# Patient Record
Sex: FEMALE | Race: WHITE | NOT HISPANIC OR LATINO | Employment: OTHER | URBAN - METROPOLITAN AREA
[De-identification: names, ages, dates, MRNs, and addresses within clinical notes are randomized per-mention and may not be internally consistent; named-entity substitution may affect disease eponyms.]

---

## 2018-08-02 ENCOUNTER — TRANSFERRED RECORDS (OUTPATIENT)
Dept: HEALTH INFORMATION MANAGEMENT | Facility: CLINIC | Age: 53
End: 2018-08-02

## 2018-08-13 ENCOUNTER — TRANSFERRED RECORDS (OUTPATIENT)
Dept: HEALTH INFORMATION MANAGEMENT | Facility: CLINIC | Age: 53
End: 2018-08-13

## 2020-02-07 ENCOUNTER — TRANSFERRED RECORDS (OUTPATIENT)
Dept: HEALTH INFORMATION MANAGEMENT | Facility: CLINIC | Age: 55
End: 2020-02-07

## 2020-07-25 ENCOUNTER — OFFICE VISIT (OUTPATIENT)
Dept: URGENT CARE | Facility: URGENT CARE | Age: 55
End: 2020-07-25
Payer: OTHER GOVERNMENT

## 2020-07-25 ENCOUNTER — APPOINTMENT (OUTPATIENT)
Dept: CT IMAGING | Facility: CLINIC | Age: 55
End: 2020-07-25
Attending: FAMILY MEDICINE
Payer: OTHER GOVERNMENT

## 2020-07-25 ENCOUNTER — HOSPITAL ENCOUNTER (EMERGENCY)
Facility: CLINIC | Age: 55
Discharge: HOME | End: 2020-07-25
Attending: FAMILY MEDICINE | Admitting: FAMILY MEDICINE
Payer: OTHER GOVERNMENT

## 2020-07-25 VITALS
OXYGEN SATURATION: 97 % | RESPIRATION RATE: 16 BRPM | SYSTOLIC BLOOD PRESSURE: 138 MMHG | HEART RATE: 96 BPM | TEMPERATURE: 96.8 F | DIASTOLIC BLOOD PRESSURE: 72 MMHG

## 2020-07-25 VITALS
TEMPERATURE: 98 F | OXYGEN SATURATION: 99 % | HEART RATE: 72 BPM | DIASTOLIC BLOOD PRESSURE: 74 MMHG | RESPIRATION RATE: 16 BRPM | SYSTOLIC BLOOD PRESSURE: 118 MMHG | WEIGHT: 215 LBS

## 2020-07-25 DIAGNOSIS — K57.32 DIVERTICULITIS OF COLON: ICD-10-CM

## 2020-07-25 DIAGNOSIS — R35.0 URINARY FREQUENCY: Primary | ICD-10-CM

## 2020-07-25 LAB
ALBUMIN SERPL-MCNC: 4.1 G/DL (ref 3.4–5)
ALBUMIN UR-MCNC: NEGATIVE MG/DL
ALBUMIN UR-MCNC: NEGATIVE MG/DL
ALP SERPL-CCNC: 76 U/L (ref 40–150)
ALT SERPL W P-5'-P-CCNC: 46 U/L (ref 0–50)
ANION GAP SERPL CALCULATED.3IONS-SCNC: 4 MMOL/L (ref 3–14)
APPEARANCE UR: CLEAR
APPEARANCE UR: CLEAR
AST SERPL W P-5'-P-CCNC: 21 U/L (ref 0–45)
BASOPHILS # BLD AUTO: 0 10E9/L (ref 0–0.2)
BASOPHILS NFR BLD AUTO: 0.4 %
BILIRUB SERPL-MCNC: 0.7 MG/DL (ref 0.2–1.3)
BILIRUB UR QL STRIP: NEGATIVE
BILIRUB UR QL STRIP: NEGATIVE
BUN SERPL-MCNC: 9 MG/DL (ref 7–30)
CALCIUM SERPL-MCNC: 9.3 MG/DL (ref 8.5–10.1)
CHLORIDE SERPL-SCNC: 105 MMOL/L (ref 94–109)
CO2 SERPL-SCNC: 30 MMOL/L (ref 20–32)
COLOR UR AUTO: YELLOW
COLOR UR AUTO: YELLOW
CREAT SERPL-MCNC: 0.81 MG/DL (ref 0.52–1.04)
DIFFERENTIAL METHOD BLD: NORMAL
EOSINOPHIL # BLD AUTO: 0.2 10E9/L (ref 0–0.7)
EOSINOPHIL NFR BLD AUTO: 2.6 %
ERYTHROCYTE [DISTWIDTH] IN BLOOD BY AUTOMATED COUNT: 12.8 % (ref 10–15)
GFR SERPL CREATININE-BSD FRML MDRD: 82 ML/MIN/{1.73_M2}
GLUCOSE SERPL-MCNC: 106 MG/DL (ref 70–99)
GLUCOSE UR STRIP-MCNC: NEGATIVE MG/DL
GLUCOSE UR STRIP-MCNC: NEGATIVE MG/DL
HCG SERPL QL: NEGATIVE
HCT VFR BLD AUTO: 41.4 % (ref 35–47)
HGB BLD-MCNC: 14.3 G/DL (ref 11.7–15.7)
HGB UR QL STRIP: ABNORMAL
HGB UR QL STRIP: ABNORMAL
IMM GRANULOCYTES # BLD: 0 10E9/L (ref 0–0.4)
IMM GRANULOCYTES NFR BLD: 0.4 %
KETONES UR STRIP-MCNC: NEGATIVE MG/DL
KETONES UR STRIP-MCNC: NEGATIVE MG/DL
LEUKOCYTE ESTERASE UR QL STRIP: NEGATIVE
LEUKOCYTE ESTERASE UR QL STRIP: NEGATIVE
LYMPHOCYTES # BLD AUTO: 1.6 10E9/L (ref 0.8–5.3)
LYMPHOCYTES NFR BLD AUTO: 20.3 %
MCH RBC QN AUTO: 31.9 PG (ref 26.5–33)
MCHC RBC AUTO-ENTMCNC: 34.5 G/DL (ref 31.5–36.5)
MCV RBC AUTO: 92 FL (ref 78–100)
MONOCYTES # BLD AUTO: 0.5 10E9/L (ref 0–1.3)
MONOCYTES NFR BLD AUTO: 6.4 %
NEUTROPHILS # BLD AUTO: 5.5 10E9/L (ref 1.6–8.3)
NEUTROPHILS NFR BLD AUTO: 69.9 %
NITRATE UR QL: NEGATIVE
NITRATE UR QL: NEGATIVE
NRBC # BLD AUTO: 0 10*3/UL
NRBC BLD AUTO-RTO: 0 /100
PH UR STRIP: 6 PH (ref 5–7)
PH UR STRIP: 7 PH (ref 5–7)
PLATELET # BLD AUTO: 208 10E9/L (ref 150–450)
POTASSIUM SERPL-SCNC: 3.8 MMOL/L (ref 3.4–5.3)
PROT SERPL-MCNC: 7.8 G/DL (ref 6.8–8.8)
RBC # BLD AUTO: 4.48 10E12/L (ref 3.8–5.2)
RBC #/AREA URNS AUTO: 4 /HPF (ref 0–2)
RBC #/AREA URNS AUTO: NORMAL /HPF
SODIUM SERPL-SCNC: 139 MMOL/L (ref 133–144)
SOURCE: ABNORMAL
SOURCE: ABNORMAL
SP GR UR STRIP: 1.01 (ref 1–1.03)
SP GR UR STRIP: 1.01 (ref 1–1.03)
SQUAMOUS #/AREA URNS AUTO: <1 /HPF (ref 0–1)
UROBILINOGEN UR STRIP-ACNC: 0.2 EU/DL (ref 0.2–1)
UROBILINOGEN UR STRIP-MCNC: 0 MG/DL (ref 0–2)
WBC # BLD AUTO: 7.8 10E9/L (ref 4–11)
WBC #/AREA URNS AUTO: <1 /HPF (ref 0–5)
WBC #/AREA URNS AUTO: NORMAL /HPF

## 2020-07-25 PROCEDURE — 74176 CT ABD & PELVIS W/O CONTRAST: CPT

## 2020-07-25 PROCEDURE — 84703 CHORIONIC GONADOTROPIN ASSAY: CPT | Performed by: FAMILY MEDICINE

## 2020-07-25 PROCEDURE — 85025 COMPLETE CBC W/AUTO DIFF WBC: CPT | Performed by: EMERGENCY MEDICINE

## 2020-07-25 PROCEDURE — 96374 THER/PROPH/DIAG INJ IV PUSH: CPT | Performed by: FAMILY MEDICINE

## 2020-07-25 PROCEDURE — 81001 URINALYSIS AUTO W/SCOPE: CPT | Performed by: FAMILY MEDICINE

## 2020-07-25 PROCEDURE — 99203 OFFICE O/P NEW LOW 30 MIN: CPT | Performed by: EMERGENCY MEDICINE

## 2020-07-25 PROCEDURE — 25000128 H RX IP 250 OP 636: Performed by: FAMILY MEDICINE

## 2020-07-25 PROCEDURE — 25800030 ZZH RX IP 258 OP 636: Performed by: FAMILY MEDICINE

## 2020-07-25 PROCEDURE — 96375 TX/PRO/DX INJ NEW DRUG ADDON: CPT | Performed by: FAMILY MEDICINE

## 2020-07-25 PROCEDURE — 80053 COMPREHEN METABOLIC PANEL: CPT | Performed by: EMERGENCY MEDICINE

## 2020-07-25 PROCEDURE — 99285 EMERGENCY DEPT VISIT HI MDM: CPT | Mod: 25 | Performed by: FAMILY MEDICINE

## 2020-07-25 PROCEDURE — 25000128 H RX IP 250 OP 636: Performed by: EMERGENCY MEDICINE

## 2020-07-25 PROCEDURE — 99285 EMERGENCY DEPT VISIT HI MDM: CPT | Mod: Z6 | Performed by: FAMILY MEDICINE

## 2020-07-25 PROCEDURE — 96361 HYDRATE IV INFUSION ADD-ON: CPT | Performed by: FAMILY MEDICINE

## 2020-07-25 PROCEDURE — 81001 URINALYSIS AUTO W/SCOPE: CPT | Performed by: EMERGENCY MEDICINE

## 2020-07-25 RX ORDER — ONDANSETRON 2 MG/ML
4 INJECTION INTRAMUSCULAR; INTRAVENOUS EVERY 30 MIN PRN
Status: DISCONTINUED | OUTPATIENT
Start: 2020-07-25 | End: 2020-07-25 | Stop reason: HOSPADM

## 2020-07-25 RX ORDER — SERTRALINE HYDROCHLORIDE 100 MG/1
150 TABLET, FILM COATED ORAL EVERY EVENING
COMMUNITY

## 2020-07-25 RX ORDER — HYDROCODONE BITARTRATE AND ACETAMINOPHEN 5; 325 MG/1; MG/1
1-2 TABLET ORAL EVERY 6 HOURS PRN
Qty: 8 TABLET | Refills: 0 | Status: SHIPPED | OUTPATIENT
Start: 2020-07-25 | End: 2020-11-16

## 2020-07-25 RX ORDER — KETOROLAC TROMETHAMINE 15 MG/ML
15 INJECTION, SOLUTION INTRAMUSCULAR; INTRAVENOUS ONCE
Status: COMPLETED | OUTPATIENT
Start: 2020-07-25 | End: 2020-07-25

## 2020-07-25 RX ORDER — ACETAMINOPHEN 160 MG
TABLET,DISINTEGRATING ORAL
COMMUNITY
End: 2020-12-04

## 2020-07-25 RX ADMIN — KETOROLAC TROMETHAMINE 15 MG: 15 INJECTION, SOLUTION INTRAMUSCULAR; INTRAVENOUS at 13:05

## 2020-07-25 RX ADMIN — ONDANSETRON 4 MG: 2 INJECTION INTRAMUSCULAR; INTRAVENOUS at 12:25

## 2020-07-25 RX ADMIN — SODIUM CHLORIDE 500 ML: 9 INJECTION, SOLUTION INTRAVENOUS at 13:04

## 2020-07-25 NOTE — PROGRESS NOTES
HPI: Patient is a 54-year-old female who presents with a several day history of left lower quadrant pain.  The pain is gotten increasingly worse.  She notes is quite sensitive to touch.  She is also had a low-grade fever.  She has had a previous urinary tract infection in the past that presented with left lower quadrant pain.  She states it clinically was ruled out at that time that she did not have diverticulitis.  She has had a colonoscopy that does show she has diverticulosis.  She has no dysuria.  She may have slight urinary frequency and urgency.  No back pain.  No nausea vomiting.      ROS: See HPI otherwise negative.    No Known Allergies   Current Outpatient Medications   Medication Sig Dispense Refill     Pediatric Multivit-Minerals-C (GUMMI BEAR MULTIVITAMIN  /MIN) CHEW        sertraline (ZOLOFT) 100 MG tablet Take 200 mg by mouth           PE: Physical therapy is a 54-year-old female who does not appear in acute distress vital signs are normal including a temp of 96.8.  She is not tachycardic.  HEENT reveals moist oral mucous membranes.  Examination of her abdomen reveals fairly exquisite tenderness in left lower quadrant with positive rebound tenderness.  Patient of the right lower quadrant does reproduce left lower quadrant symptoms.  No palpable mass.  Skin warm and moist.        TREATMENT: None in urgent care      ASSESSMENT: Fever for less left lower quadrant pain, probably other basis diverticulitis.  Patient's pain is relatively significant on exam and therefore requires emergency department evaluation to rule out perforation.      DIAGNOSIS: Fever, left lower quadrant abdominal pain      PLAN: Report to the emergency now.  Do not eat or drink until you are evaluated in the emergency department.

## 2020-07-25 NOTE — ED AVS SNAPSHOT
[facility]  [address] [address]  Phone:  [phone]  Fax:  [phone]                                    Sangita Meraz   MRN: [id number]    Department:  [facility]   Date of Visit:  7/25/2020           After Visit Summary Signature Page    I have received my discharge instructions, and my questions have been answered. I have discussed any challenges I see with this plan with the nurse or doctor.    ..........................................................................................................................................  Patient/Patient Representative Signature      ..........................................................................................................................................  Patient Representative Print Name and Relationship to Patient    ..................................................               ................................................  Date                                   Time    ..........................................................................................................................................  Reviewed by Signature/Title    ...................................................              ..............................................  Date                                               Time          22EPIC Rev 08/18

## 2020-07-25 NOTE — DISCHARGE INSTRUCTIONS
Return to the Emergency Room if the following occurs:     Severely worsened pain, vomiting/dehydration, or for any concern at anytime.    Or, follow-up with the following provider as we discussed:     Return to your primary doctor as needed, or if not improved over the next 5 days.    Medications discussed:    Augmentin.  Norco (5/325) 1-2 tablets every 6 hours for pain (start with one).  Note, each tab has 325 mg tylenol.  Do not exceed 4000 mg tylenol per 24 hours.      If you received pain-relieving or sedating medication during your time in the ER, avoid alcohol, driving automobiles, or working with machinery.  Also, a responsible adult must stay with you.        Call the Nurse Advice Line at [phone] or [phone] for any concern at anytime.

## 2020-07-25 NOTE — ED PROVIDER NOTES
HPI   The patient is a 54-year-old female presenting with abdominal pain.  Her pain started about 2 days ago.  The pain is been constant since onset.  The pain is worsened with movement and palpation.  It is primarily felt in the left lower quadrant.  She denies radiating symptoms into her back, chest, or flank.  She does have some mild urinary urgency but no dysuria or obvious hematuria.  She has had nausea but no vomiting.  She denies diarrhea and constipation.  She denies vaginal discharge or irritation.  She denies vaginal bleeding.  She does report having fever symptoms starting last night.  No skin rash.  No trauma or injury.  Her pain is currently rated 7/10.  It has progressively worsened with time.  She has had similar symptoms once previously and she was diagnosed with a bladder infection.  She was given antibiotics and her pain went away.  She was seen in the urgent care environment prior to arrival.  Her urine analysis was unremarkable.        Allergies:  Allergies   Allergen Reactions     Ivp Dye [Contrast Dye] Anaphylaxis     Problem List:    There are no active problems to display for this patient.     Past Medical History:    History reviewed. No pertinent past medical history.  Past Surgical History:    History reviewed. No pertinent surgical history.  Family History:    No family history on file.  Social History:  Marital Status:   [2]  Social History     Tobacco Use     Smoking status: None   Substance Use Topics     Alcohol use: None     Drug use: None      Medications:    amoxicillin-clavulanate (AUGMENTIN) 875-125 MG tablet  HYDROcodone-acetaminophen (NORCO) 5-325 MG tablet  Pediatric Multivit-Minerals-C (GUMMI BEAR MULTIVITAMIN  /MIN) CHEW  sertraline (ZOLOFT) 100 MG tablet      Review of Systems   All other systems reviewed and are negative.      PE   BP: (!) 142/71  Pulse: 79  Temp: 98  F (36.7  C)  Resp: 16  Weight: 97.5 kg (215 lb)  SpO2: 99 %  Physical Exam  Vitals signs  reviewed.   Constitutional:       Appearance: She is well-developed. She is obese.   HENT:      Head: Normocephalic and atraumatic.      Right Ear: External ear normal.      Left Ear: External ear normal.      Nose: Nose normal.      Mouth/Throat:      Mouth: Mucous membranes are moist.      Pharynx: Oropharynx is clear.   Eyes:      Extraocular Movements: Extraocular movements intact.      Conjunctiva/sclera: Conjunctivae normal.      Pupils: Pupils are equal, round, and reactive to light.   Neck:      Musculoskeletal: Normal range of motion.   Cardiovascular:      Rate and Rhythm: Normal rate and regular rhythm.   Pulmonary:      Effort: Pulmonary effort is normal.   Abdominal:      Comments: Obese.  Tender especially in the left lower quadrant.  She will voluntarily guard as I push deeply into this area.  No organomegaly or mass appreciated.  No distention.   Musculoskeletal: Normal range of motion.   Skin:     General: Skin is warm and dry.   Neurological:      Mental Status: She is alert and oriented to person, place, and time.   Psychiatric:         Behavior: Behavior normal.         ED COURSE and MDM   1301.  Patient has left lower quadrant abdominal pain and tenderness.  She has nausea.  She has fever symptoms.  She has a contrast dye allergy.  CT scan without contrast ordered.  Urine analysis unremarkable.  Lab values pending.  Toradol and Zofran given.  Fluid bolus.    1410.  CT scan shows diverticulitis.  Augmentin prescribed.  Hydrocodone/acetaminophen given, 8 tablets.  Follow-up discussed.  Return for worsening.    LABS  Labs Ordered and Resulted from Time of ED Arrival Up to the Time of Departure from the ED   COMPREHENSIVE METABOLIC PANEL - Abnormal; Notable for the following components:       Result Value    Glucose 106 (*)     All other components within normal limits   URINE MACROSCOPIC WITH REFLEX TO MICRO - Abnormal; Notable for the following components:    Blood Urine Small (*)     RBC Urine 4  (*)     All other components within normal limits   CBC WITH PLATELETS DIFFERENTIAL   HCG QUALITATIVE   PERIPHERAL IV CATHETER       IMAGING  Images reviewed by me.  Radiology report also reviewed.  Abd/pelvis CT - no contrast - [facility] Protocol   Final Result   IMPRESSION:    1. Findings compatible with acute uncomplicated diverticulitis.    2. No evidence for abscess formation or intra-abdominal free air.       LYSSA OWENS MD          Procedures    Medications   ondansetron (ZOFRAN) injection 4 mg (4 mg Intravenous Given 7/25/20 1225)   ketorolac (TORADOL) injection 15 mg (15 mg Intravenous Given 7/25/20 1305)   0.9% sodium chloride BOLUS (500 mLs Intravenous New Bag 7/25/20 1304)         IMPRESSION       ICD-10-CM    1. Diverticulitis of colon  K57.32             Medication List      Started    amoxicillin-clavulanate 875-125 MG tablet  Commonly known as:  Augmentin  1 tablet, Oral, 2 TIMES DAILY     HYDROcodone-acetaminophen 5-325 MG tablet  Commonly known as:  NORCO  1-2 tablets, Oral, EVERY 6 HOURS PRN, maximum 6 tablet(s) per day                          Bernard Benites MD  07/25/20 7792

## 2020-07-25 NOTE — PATIENT INSTRUCTIONS
Report to the [place] emergency department at this time.    Do not eat or drink anything until evaluated.

## 2020-11-16 ENCOUNTER — OFFICE VISIT (OUTPATIENT)
Dept: FAMILY MEDICINE | Facility: CLINIC | Age: 55
End: 2020-11-16
Payer: OTHER GOVERNMENT

## 2020-11-16 VITALS
DIASTOLIC BLOOD PRESSURE: 80 MMHG | HEART RATE: 80 BPM | WEIGHT: 230 LBS | SYSTOLIC BLOOD PRESSURE: 128 MMHG | TEMPERATURE: 98.3 F

## 2020-11-16 DIAGNOSIS — R73.09 ELEVATED GLUCOSE: ICD-10-CM

## 2020-11-16 DIAGNOSIS — R10.13 ABDOMINAL PAIN, EPIGASTRIC: Primary | ICD-10-CM

## 2020-11-16 DIAGNOSIS — K21.00 GASTROESOPHAGEAL REFLUX DISEASE WITH ESOPHAGITIS WITHOUT HEMORRHAGE: ICD-10-CM

## 2020-11-16 LAB
ALBUMIN SERPL-MCNC: 3.9 G/DL (ref 3.4–5)
ALP SERPL-CCNC: 71 U/L (ref 40–150)
ALT SERPL W P-5'-P-CCNC: 40 U/L (ref 0–50)
ANION GAP SERPL CALCULATED.3IONS-SCNC: 4 MMOL/L (ref 3–14)
AST SERPL W P-5'-P-CCNC: 28 U/L (ref 0–45)
BILIRUB SERPL-MCNC: 0.4 MG/DL (ref 0.2–1.3)
BUN SERPL-MCNC: 12 MG/DL (ref 7–30)
CALCIUM SERPL-MCNC: 9.9 MG/DL (ref 8.5–10.1)
CHLORIDE SERPL-SCNC: 104 MMOL/L (ref 94–109)
CO2 SERPL-SCNC: 29 MMOL/L (ref 20–32)
CREAT SERPL-MCNC: 1.05 MG/DL (ref 0.52–1.04)
DEPRECATED CALCIDIOL+CALCIFEROL SERPL-MC: 24 UG/L (ref 20–75)
ERYTHROCYTE [DISTWIDTH] IN BLOOD BY AUTOMATED COUNT: 12.9 % (ref 10–15)
GFR SERPL CREATININE-BSD FRML MDRD: 60 ML/MIN/{1.73_M2}
GLUCOSE SERPL-MCNC: 135 MG/DL (ref 70–99)
HCT VFR BLD AUTO: 41.4 % (ref 35–47)
HGB BLD-MCNC: 14.4 G/DL (ref 11.7–15.7)
LIPASE SERPL-CCNC: 165 U/L (ref 73–393)
MCH RBC QN AUTO: 31.6 PG (ref 26.5–33)
MCHC RBC AUTO-ENTMCNC: 34.8 G/DL (ref 31.5–36.5)
MCV RBC AUTO: 91 FL (ref 78–100)
PLATELET # BLD AUTO: 207 10E9/L (ref 150–450)
POTASSIUM SERPL-SCNC: 4.5 MMOL/L (ref 3.4–5.3)
PROT SERPL-MCNC: 7.5 G/DL (ref 6.8–8.8)
RBC # BLD AUTO: 4.56 10E12/L (ref 3.8–5.2)
SODIUM SERPL-SCNC: 137 MMOL/L (ref 133–144)
VIT B12 SERPL-MCNC: 313 PG/ML (ref 193–986)
WBC # BLD AUTO: 4.9 10E9/L (ref 4–11)

## 2020-11-16 PROCEDURE — 80053 COMPREHEN METABOLIC PANEL: CPT | Performed by: NURSE PRACTITIONER

## 2020-11-16 PROCEDURE — 85027 COMPLETE CBC AUTOMATED: CPT | Performed by: NURSE PRACTITIONER

## 2020-11-16 PROCEDURE — 99214 OFFICE O/P EST MOD 30 MIN: CPT | Performed by: NURSE PRACTITIONER

## 2020-11-16 PROCEDURE — 83690 ASSAY OF LIPASE: CPT | Performed by: NURSE PRACTITIONER

## 2020-11-16 PROCEDURE — 36415 COLL VENOUS BLD VENIPUNCTURE: CPT | Performed by: NURSE PRACTITIONER

## 2020-11-16 PROCEDURE — 82607 VITAMIN B-12: CPT | Performed by: NURSE PRACTITIONER

## 2020-11-16 PROCEDURE — 82306 VITAMIN D 25 HYDROXY: CPT | Performed by: NURSE PRACTITIONER

## 2020-11-16 RX ORDER — SUCRALFATE 1 G/1
1 TABLET ORAL 4 TIMES DAILY
Qty: 40 TABLET | Refills: 0 | Status: SHIPPED | OUTPATIENT
Start: 2020-11-16 | End: 2021-06-18

## 2020-11-16 NOTE — PATIENT INSTRUCTIONS
Patient Education     Epigastric Pain (Uncertain Cause)  Epigastric pain is pain in the upper abdomen. It can be a sign of disease. Common causes include:    Acid reflux (stomach acid flowing up into the esophagus)    Gastritis (irritation of the stomach lining) Most often this is from aspirin or NSAID medicines such as ibuprofen, bacteria called H. pylori, or frequent alcohol use.    Peptic ulcer disease    Inflammation of the pancreas    Gallstone    Infection in the gallbladder  Pain may be dull or burning. It may spread upward to the chest or to the back. There may be other symptoms such as belching, bloating, cramps or hunger pains. There may be weight loss or poor appetite, nausea or vomiting.  Since the cause of your pain is not certain yet,you may need more tests. Sometimes the doctor will treat you for the most likely condition to see if there is improvement before doing more tests.  Home care  Medicines    Antacids help neutralize the normal acids in your stomach.If you don t like the liquid, you can try a chewable one. You may find one works better than another for you. Overuse can cause diarrhea or constipation.    Acid blockers (H2 blockers) decrease acid production. Examples are cimetidine, famotidine, and ranitidine.    Acid inhibitors (PPIs) decrease acid production in a different way than the blockers. You may find they work better, but can take a little longer to take effect.  Examples are omeprazole, lansoprazole, pantoprazole, rabeprazole, and esomeprazole. Many of these are available over-the-counter or available as generics.    Take an antacid 30 to 60 minutes after eating and at bedtime, but not at the same time as an acid blocker.    Try not to take NSAIDs such as ibuprofen. Aspirin may also cause problems, but if taking it for your heart or other medical reasons, talk to your doctor before stopping it; you don't want to cause a worse problem, like a heart attack or stroke.  Diet    If  certain foods seem to cause your pain, try not to eat them. Certain foods can worsen symptoms of gastritis. Limit or avoid fatty, fried, and spicy foods, as well as coffee, chocolate, mint, and foods with high acid content such as tomatoes and citrus fruit and juices (orange, grapefruit, lemon).    Eat slowly and chew food well before swallowing. Symptoms of gastritis can be worsened by certain foods.    Don't drink alcohol. It can irritate the stomach.    Don't consume caffeine, or use tobacco. These can delay healing and worsen your problem.    Try eating smaller meals with snacks in between.    Keep an empty stomach for 2 to 3 hours before lying down.    Prop the head of the bed up if you have overnight symptoms. This helps acid clear from your esophagus.    Follow-up care  Follow up with your healthcare provider or as advised.  When to seek medical advice  Call your healthcare provider right away if any of the following occur:    Stomach pain worsens or moves to the right lower part of the abdomen    Chest pain appears, or if it worsens or spreads to the chest, back, neck, shoulder, or arm    Frequent vomiting (can t keep down liquids)    Blood in the stool or vomit (red or black color)    Feeling weak or dizzy, fainting, or having trouble breathing    Fever of 100.4 F (38 C) or higher, or as directed by your healthcare provider    Abdominal swelling  [employer] last reviewed this educational content on 3/1/2018    2296-6406 [employer]. [address] [place] [address]. All rights reserved. This information is not intended as a substitute for professional medical care. Always follow your healthcare professional's instructions.           Patient Education     GERD (Adult)    The esophagus is a tube that carries food from the mouth to the stomach. A valve (the LES, lower esophageal sphincter) at the lower end of the esophagus prevents stomach acid from flowing upward. When this valve doesn't  "work properly, stomach contents may repeatedly flow back up (reflux) into the esophagus. This is called gastroesophageal reflux disease (GERD). GERD can irritate the esophagus. It can cause problems with pain, swallowing or breathing. In severe cases, GERD can cause recurrent pneumonia (from aspiration or breathing in particles) or other serious problems.  Symptoms of reflux include burning, pressure or sharp pain in the upper abdomen or mid to lower chest. The pain can spread to the neck, back, or shoulder. There may be belching, an acid taste in the back of the throat, chronic cough, or sore throat, or hoarseness. GERD symptoms often occur during the day after a big meal. They can also occur at night when lying down.   Home care  Lifestyle changes can help reduce symptoms. If needed, your healthcare provider may prescribe medicines. Symptoms often improve with treatment, but if treatment is stopped, the symptoms often return after a few months. So most persons with GERD will need to continue treatment or get treatment on and off.  Lifestyle changes    Limit or avoid fatty, fried, and spicy foods, as well as coffee, chocolate, mint, and foods with high acid content such as tomatoes and citrus fruit and juices (orange, grapefruit, lemon).    Don t eat large meals, especially at night. Frequent, smaller meals are best. Don't lie down right after eating. And don t eat anything 3 hours before going to bed.    Don't drink alcohol or smoke. As much as possible, stay away from second hand smoke.    If you are overweight, losing weight will reduce symptoms.     Don't wear tight clothing around your stomach area.    If your symptoms occur during sleep, use a foam wedge to elevate your upper body (not just your head.) Or, place 4\" blocks under the head of your bed. Or use 2 bed risers under your bedframe.  Medicines  If needed, medicines can help relieve the symptoms of GERD and prevent damage to the esophagus. Discuss a " medicine plan with your healthcare provider. This may include one or more of the following medicines:    Antacids to help neutralize the normal acids in your stomach.    Acid blockers (Histamine or H2 blockers) to decrease acid production.    Acid inhibitors (proton pump inhibitors PPIs) to decrease acid production in a different way than the blockers. They may work better, but can take a little longer to take effect.  Take an antacid 30 to 60 minutes after eating and at bedtime, but not at the same time as an acid blocker.  Try not to take medicines such as ibuprofen and aspirin. If you are taking aspirin for your heart or other medical reasons, talk to your healthcare provider about stopping it.  Follow-up care  Follow up with your healthcare provider or as advised by our staff.  When to seek medical advice  Call your healthcare provider if any of the following occur:    Stomach pain gets worse or moves to the lower right abdomen (appendix area)    Chest pain appears or gets worse, or spreads to the back, neck, shoulder, or arm    An over-the-counter trial of medicine doesn't relieve your symptoms    Weight loss that can't be explained    Trouble or pain swallowing    Frequent vomiting (can t keep down liquids)    Blood in the stool or vomit (red or black in color)    Feeling weak or dizzy    Fever of 100.4 F (38 C) or higher, or as directed by your healthcare provider  Marta last reviewed this educational content on 3/1/2018    2515-3435 [employer]. [address] [place] [address]. All rights reserved. This information is not intended as a substitute for professional medical care. Always follow your healthcare professional's instructions.           Patient Education     Medicines for GERD  Gastroesophageal reflux disease (GERD) can be treated with medicine. This may be done with a medicine you can buy over the counter. Or with a medicine that your healthcare provider has to prescribe.  In some cases, both types may be used. Your provider will tell you what is best for your symptoms.   Antacids  Antacids work to weaken the acid in your stomach. They can give you quick relief. You can buy many of them with no prescription. Antacids can be high in sodium. This may be a problem if you have high blood pressure. Some antacids also have aluminum. This should be avoided if you have long-term (chronic) kidney disease. So check with your provider first. Take antacids only when you need to, as advised by your provider.   Side effects: Constipation, diarrhea. If you take too much medicine, it can cause calcium to build up.    H-2 blockers  These cause the stomach to make less acid. They are often used on demand as symptoms occur. And they are used daily to keep symptoms away. Your provider may prescribe them if antacids don t work for you. You can buy some of them over the counter. These come in a lower dosage.   Side effects: Confusion in older adults.   Proton-pump inhibitors  These also cause the stomach to make less acid. They reduce stomach acid more than H-2 blockers. They may be used for a short time, or longer to treat certain conditions. You can buy some of them over the counter. Or your provider may prescribe them. They help control GERD symptoms.   Side effects: Belly pain, diarrhea, upset stomach. Possible other side effects linked to long-term use and high doses.   Prokinetics  These medicines affect the movement of the digestive tract. They may be advised if your stomach is emptying too slowly. But in most cases they are not advised for treating GERD.   Side effects:Tiredness, depression, anxiety, problems with physical movement, belly cramps, constipation, diarrhea, a jittery feeling.   Medicines to stay away from  Don t take aspirin without your healthcare provider s approval. And don t take a nonsteroidal anti-inflammatory drug (NSAID), such as ibuprofen. These reduce the protective lining of  your stomach. This can lead to more GERD symptoms. Check with your provider or pharmacist before taking a new medicine.   [employer] last reviewed this educational content on 6/1/2019 2000-2020 [employer]. [address] [place] [address], [place] [address]. All rights reserved. This information is not intended as a substitute for professional medical care. Always follow your healthcare professional's instructions.           Patient Education     What Is GERD?     With GERD, the weak LES allows food and fluids to travel back, or reflux, into the esophagus.    If you often have a painful burning feeling in your chest after you eat, you may have gastroesophageal reflux disease (GERD). Heartburn that keeps coming back is a classic symptom of GERD. But you may have other symptoms as well. A GERD diagnosis is made only after a complete evaluation by your healthcare provider.   Note: Chest pain may also be caused by heart problems. Be sure to have all chest pain evaluated by a healthcare provider.   When you have a reflux problem  After you eat, food travels from your mouth down the esophagus to your stomach. Along the way, food passes through a one-way valve called the lower esophageal sphincter (LES). The LES sits at the opening to your stomach. Normally the LES opens when you swallow. It lets food enter the stomach, then closes quickly. With GERD, the LES doesn t work normally. It lets food and stomach acid flow back (reflux) into the esophagus.   Some common symptoms    Frequent heartburn    Frequent burping    Sour-or acid-tasting fluid backing up into your mouth    Symptoms that get worse after you eat, bend over, or lie down    Trouble swallowing or pain when swallowing    A dry, long-term (chronic) cough    Upset stomach (nausea) or vomiting    Relieving your discomfort  You and your healthcare provider can work together to find the treatment options that best ease your symptoms. These may include lifestyle  changes, medicine, and possibly surgery.   Many people find their GERD symptoms decrease when they eat small frequent meals instead of 3 large ones. Reducing the amount of fatty foods in your diet will also help.    The following foods tend to cause problems for people diagnosed with GERD:     Tomatoes and tomato products    Alcohol    Coffee    Peppermint    Greasy or spicy foods    Acidic foods such as citrus  To ease your symptoms, raise the head of your bed 4 to 6 inches. Don't eat anything within 2 to 3 hours of laying down.   Talk with your provider if you don t understand how to make the dietary changes needed to control your GERD symptoms. Your provider can refer you to a nutritionist.   [employer] last reviewed this educational content on 6/1/2019 2000-2020 [employer]. [address] [place] [address], [place] [address]. All rights reserved. This information is not intended as a substitute for professional medical care. Always follow your healthcare professional's instructions.

## 2020-11-16 NOTE — RESULT ENCOUNTER NOTE
Please Notify Sangita  of test results hemoglobin levels were within normal limits will contact you with other lab results once completed      No Sosa CNP

## 2020-11-16 NOTE — PROGRESS NOTES
Subjective     Sangita Meraz is a 55 year old female who presents to clinic today for the following health issues:    HPI         Abdominal/Flank Pain  Onset/Duration: about 1.5 weeks  Description:   Character: Burning  Location: epigastric region  Radiation: None  Intensity: moderate  Progression of Symptoms:  worsening  Accompanying Signs & Symptoms:  Fever/Chills: no  Gas/Bloating: YES  Nausea: YES  Vomitting: no  Diarrhea: YES  Constipation: no  Dysuria or Hematuria: no  History:   Trauma: no  Previous similar pain: YES- ulcer  Previous tests done: none  Precipitating factors:   Does the pain change with:     Food: YES- helps a little    Bowel Movement: no    Urination: no   Other factors:  no  Therapies tried and outcome: pepcid, tums  No LMP recorded.        Review of Systems   Constitutional, HEENT, cardiovascular, pulmonary, gi and gu systems are negative, except as otherwise noted.      Objective    /80 (BP Location: Right arm, Cuff Size: Adult Large)   Pulse 80   Temp 98.3  F (36.8  C) (Tympanic)   Wt 104.3 kg (230 lb)   There is no height or weight on file to calculate BMI.  Physical Exam   GENERAL: healthy, alert and no distress  EYES: Eyes grossly normal to inspection, PERRL and conjunctivae and sclerae normal  HENT: ear canals and TM's normal, nose and mouth without ulcers or lesions  NECK: no adenopathy, no asymmetry, masses, or scars and thyroid normal to palpation  RESP: lungs clear to auscultation - no rales, rhonchi or wheezes  CV: regular rate and rhythm, normal S1 S2, no S3 or S4, no murmur, click or rub, no peripheral edema and peripheral pulses strong  ABDOMEN: soft, nontender, no hepatosplenomegaly, no masses and bowel sounds normal  MS: no gross musculoskeletal defects noted, no edema  SKIN: no suspicious lesions or rashes  NEURO: Normal strength and tone, mentation intact and speech normal  PSYCH: mentation appears normal, affect normal/bright    Results for orders placed or  performed in visit on 11/16/20 (from the past 24 hour(s))   CBC with platelets   Result Value Ref Range    WBC 4.9 4.0 - 11.0 10e9/L    RBC Count 4.56 3.8 - 5.2 10e12/L    Hemoglobin 14.4 11.7 - 15.7 g/dL    Hematocrit 41.4 35.0 - 47.0 %    MCV 91 78 - 100 fl    MCH 31.6 26.5 - 33.0 pg    MCHC 34.8 31.5 - 36.5 g/dL    RDW 12.9 10.0 - 15.0 %    Platelet Count 207 150 - 450 10e9/L           Assessment & Plan     Abdominal pain, epigastric  Patient has history of ulcers and acid reflux patient also has history of diverticulitis.  Will obtain records from patient's past clinic in [place].  Based on symptoms and exam today pain does seem to be more upper gastric recommend lab work starting Carafate and obtaining upper GI based on her medical record review once we receive it we will determine when colonoscopy to be redone she states it was done 6 months ago will not repeat at this time  - CBC with platelets  - Comprehensive metabolic panel  - Lipase  - GASTROENTEROLOGY ADULT REF PROCEDURE ONLY; Future  - Vitamin D Deficiency  - Vitamin B12  - GASTROENTEROLOGY ADULT REF CONSULT ONLY; Future  - sucralfate (CARAFATE) 1 GM tablet; Take 1 tablet (1 g) by mouth 4 times daily for 10 days    Gastroesophageal reflux disease with esophagitis without hemorrhage  - sucralfate (CARAFATE) 1 GM tablet; Take 1 tablet (1 g) by mouth 4 times daily for 10 days          Return in about 1 week (around 11/23/2020), or if symptoms worsen or fail to improve.    CL Oneill [facility]

## 2020-11-20 DIAGNOSIS — Z11.59 ENCOUNTER FOR SCREENING FOR OTHER VIRAL DISEASES: Primary | ICD-10-CM

## 2020-11-20 NOTE — TELEPHONE ENCOUNTER
REFERRAL INFORMATION:    Referring Provider:  CL Vo CNP     Referring Clinic:  [facility]     Reason for Visit/Diagnosis: Abdominal Pain     FUTURE VISIT INFORMATION:    Appointment Date: 12/16/2020    Appointment Time: 10 AM      NOTES STATUS DETAILS   OFFICE NOTE from Referring Provider Internal 11/16/2020 Office visit with CL Vo CNP    OFFICE NOTE from Other Specialist N/A    HOSPITAL DISCHARGE SUMMARY/  ED VISITS Internal 7/25/2020 ([facility] ER)   OPERATIVE REPORT N/A    MEDICATION LIST Internal         ENDOSCOPY  N/A    COLONOSCOPY N/A    ERCP N/A    EUS N/A    STOOL TESTING N/A    PERTINENT LABS Internal    PATHOLOGY REPORTS (RELATED) N/A    IMAGING (CT, MRI, EGD, MRCP, Small Bowel Follow Through/SBT, MR/CT Enterography) Internal CT Abdomen Pelvis: 7/25/2020

## 2020-12-06 DIAGNOSIS — Z11.59 ENCOUNTER FOR SCREENING FOR OTHER VIRAL DISEASES: ICD-10-CM

## 2020-12-06 PROCEDURE — 36415 COLL VENOUS BLD VENIPUNCTURE: CPT | Performed by: SURGERY

## 2020-12-06 PROCEDURE — U0003 INFECTIOUS AGENT DETECTION BY NUCLEIC ACID (DNA OR RNA); SEVERE ACUTE RESPIRATORY SYNDROME CORONAVIRUS 2 (SARS-COV-2) (CORONAVIRUS DISEASE [COVID-19]), AMPLIFIED PROBE TECHNIQUE, MAKING USE OF HIGH THROUGHPUT TECHNOLOGIES AS DESCRIBED BY CMS-2020-01-R: HCPCS | Performed by: SURGERY

## 2020-12-07 ENCOUNTER — ANESTHESIA EVENT (OUTPATIENT)
Dept: GASTROENTEROLOGY | Facility: CLINIC | Age: 55
End: 2020-12-07
Payer: OTHER GOVERNMENT

## 2020-12-07 LAB
LABORATORY COMMENT REPORT: NORMAL
SARS-COV-2 RNA SPEC QL NAA+PROBE: NEGATIVE
SARS-COV-2 RNA SPEC QL NAA+PROBE: NORMAL
SPECIMEN SOURCE: NORMAL
SPECIMEN SOURCE: NORMAL

## 2020-12-07 NOTE — ANESTHESIA PREPROCEDURE EVALUATION
Anesthesia Pre-Procedure Evaluation    Patient: Sangita Meraz   MRN: [id number] : 1965          Preoperative Diagnosis: Epigastric pain [R10.13]    Procedure(s):  ESOPHAGOGASTRODUODENOSCOPY (EGD)    No past medical history on file.  No past surgical history on file.    Anesthesia Evaluation     .             ROS/MED HX    ENT/Pulmonary:       Neurologic:       Cardiovascular:         METS/Exercise Tolerance:     Hematologic:         Musculoskeletal:         GI/Hepatic:     (+) Other GI/Hepatic epigastric pain      Renal/Genitourinary:         Endo:     (+) Obesity, .      Psychiatric:     (+) psychiatric history       Infectious Disease:         Malignancy:         Other:                          Physical Exam  Normal systems: cardiovascular, pulmonary and dental    Airway   Mallampati: II  TM distance: >3 FB  Neck ROM: full    Dental     Cardiovascular       Pulmonary             Lab Results   Component Value Date    WBC 4.9 2020    HGB 14.4 2020    HCT 41.4 2020     2020     2020    POTASSIUM 4.5 2020    CHLORIDE 104 2020    CO2 29 2020    BUN 12 2020    CR 1.05 (H) 2020     (H) 2020    MIRIAM 9.9 2020    ALBUMIN 3.9 2020    PROTTOTAL 7.5 2020    ALT 40 2020    AST 28 2020    ALKPHOS 71 2020    BILITOTAL 0.4 2020    LIPASE 165 2020    HCGS Negative 2020       Preop Vitals  BP Readings from Last 3 Encounters:   20 128/80   20 118/74   20 138/72    Pulse Readings from Last 3 Encounters:   20 80   20 72   20 96      Resp Readings from Last 3 Encounters:   20 16   20 16    SpO2 Readings from Last 3 Encounters:   20 99%   20 97%      Temp Readings from Last 1 Encounters:   20 36.8  C (98.3  F) (Tympanic)    Ht Readings from Last 1 Encounters:   No data found for Ht      Wt Readings from Last 1 Encounters:    11/16/20 104.3 kg (230 lb)    There is no height or weight on file to calculate BMI.       Anesthesia Plan      History & Physical Review  History and physical reviewed and following examination; no interval change.    ASA Status:  2 .    NPO Status:  > 6 hours    Plan for MAC Reason for MAC:  Deep or markedly invasive procedure (G8)           Postoperative Care      Consents  Anesthetic plan, risks, benefits and alternatives discussed with:  Patient..                 CL Anderson CRNA

## 2020-12-09 ENCOUNTER — ANESTHESIA (OUTPATIENT)
Dept: GASTROENTEROLOGY | Facility: CLINIC | Age: 55
End: 2020-12-09
Payer: OTHER GOVERNMENT

## 2020-12-09 ENCOUNTER — HOSPITAL ENCOUNTER (OUTPATIENT)
Facility: CLINIC | Age: 55
Discharge: HOME | End: 2020-12-09
Attending: SURGERY | Admitting: SURGERY
Payer: OTHER GOVERNMENT

## 2020-12-09 VITALS
HEIGHT: 63 IN | RESPIRATION RATE: 16 BRPM | DIASTOLIC BLOOD PRESSURE: 68 MMHG | BODY MASS INDEX: 40.75 KG/M2 | WEIGHT: 230 LBS | OXYGEN SATURATION: 94 % | TEMPERATURE: 98.2 F | SYSTOLIC BLOOD PRESSURE: 120 MMHG

## 2020-12-09 LAB — UPPER GI ENDOSCOPY: NORMAL

## 2020-12-09 PROCEDURE — 258N000003 HC RX IP 258 OP 636: Performed by: SURGERY

## 2020-12-09 PROCEDURE — 250N000011 HC RX IP 250 OP 636: Performed by: NURSE ANESTHETIST, CERTIFIED REGISTERED

## 2020-12-09 PROCEDURE — 370N000001 HC ANESTHESIA TECHNICAL FEE, 1ST 30 MIN: Performed by: SURGERY

## 2020-12-09 PROCEDURE — 43239 EGD BIOPSY SINGLE/MULTIPLE: CPT | Performed by: SURGERY

## 2020-12-09 PROCEDURE — 88305 TISSUE EXAM BY PATHOLOGIST: CPT | Mod: 26 | Performed by: PATHOLOGY

## 2020-12-09 PROCEDURE — 88305 TISSUE EXAM BY PATHOLOGIST: CPT | Mod: TC,91 | Performed by: SURGERY

## 2020-12-09 PROCEDURE — 250N000009 HC RX 250: Performed by: SURGERY

## 2020-12-09 PROCEDURE — 250N000009 HC RX 250: Performed by: NURSE ANESTHETIST, CERTIFIED REGISTERED

## 2020-12-09 RX ORDER — NALOXONE HYDROCHLORIDE 0.4 MG/ML
0.2 INJECTION, SOLUTION INTRAMUSCULAR; INTRAVENOUS; SUBCUTANEOUS
Status: DISCONTINUED | OUTPATIENT
Start: 2020-12-09 | End: 2020-12-09 | Stop reason: HOSPADM

## 2020-12-09 RX ORDER — NALOXONE HYDROCHLORIDE 0.4 MG/ML
0.4 INJECTION, SOLUTION INTRAMUSCULAR; INTRAVENOUS; SUBCUTANEOUS
Status: DISCONTINUED | OUTPATIENT
Start: 2020-12-09 | End: 2020-12-09 | Stop reason: HOSPADM

## 2020-12-09 RX ORDER — PROPOFOL 10 MG/ML
INJECTION, EMULSION INTRAVENOUS PRN
Status: DISCONTINUED | OUTPATIENT
Start: 2020-12-09 | End: 2020-12-09

## 2020-12-09 RX ORDER — GLYCOPYRROLATE 0.2 MG/ML
INJECTION, SOLUTION INTRAMUSCULAR; INTRAVENOUS PRN
Status: DISCONTINUED | OUTPATIENT
Start: 2020-12-09 | End: 2020-12-09

## 2020-12-09 RX ORDER — PROPOFOL 10 MG/ML
INJECTION, EMULSION INTRAVENOUS CONTINUOUS PRN
Status: DISCONTINUED | OUTPATIENT
Start: 2020-12-09 | End: 2020-12-09

## 2020-12-09 RX ORDER — LIDOCAINE HYDROCHLORIDE 10 MG/ML
INJECTION, SOLUTION INFILTRATION; PERINEURAL PRN
Status: DISCONTINUED | OUTPATIENT
Start: 2020-12-09 | End: 2020-12-09

## 2020-12-09 RX ORDER — FLUMAZENIL 0.1 MG/ML
0.2 INJECTION, SOLUTION INTRAVENOUS
Status: DISCONTINUED | OUTPATIENT
Start: 2020-12-09 | End: 2020-12-09 | Stop reason: HOSPADM

## 2020-12-09 RX ORDER — SODIUM CHLORIDE, SODIUM LACTATE, POTASSIUM CHLORIDE, CALCIUM CHLORIDE 600; 310; 30; 20 MG/100ML; MG/100ML; MG/100ML; MG/100ML
INJECTION, SOLUTION INTRAVENOUS CONTINUOUS
Status: DISCONTINUED | OUTPATIENT
Start: 2020-12-09 | End: 2020-12-09 | Stop reason: HOSPADM

## 2020-12-09 RX ORDER — LIDOCAINE 40 MG/G
CREAM TOPICAL
Status: DISCONTINUED | OUTPATIENT
Start: 2020-12-09 | End: 2020-12-09 | Stop reason: HOSPADM

## 2020-12-09 RX ORDER — ONDANSETRON 2 MG/ML
4 INJECTION INTRAMUSCULAR; INTRAVENOUS
Status: DISCONTINUED | OUTPATIENT
Start: 2020-12-09 | End: 2020-12-09 | Stop reason: HOSPADM

## 2020-12-09 RX ADMIN — SODIUM CHLORIDE, POTASSIUM CHLORIDE, SODIUM LACTATE AND CALCIUM CHLORIDE: 600; 310; 30; 20 INJECTION, SOLUTION INTRAVENOUS at 09:14

## 2020-12-09 RX ADMIN — GLYCOPYRROLATE 0.2 MG: 0.2 INJECTION, SOLUTION INTRAMUSCULAR; INTRAVENOUS at 09:44

## 2020-12-09 RX ADMIN — LIDOCAINE HYDROCHLORIDE 100 MG: 10 INJECTION, SOLUTION INFILTRATION; PERINEURAL at 09:44

## 2020-12-09 RX ADMIN — TOPICAL ANESTHETIC 1 SPRAY: 200 SPRAY DENTAL; PERIODONTAL at 09:44

## 2020-12-09 RX ADMIN — PROPOFOL 50 MG: 10 INJECTION, EMULSION INTRAVENOUS at 09:44

## 2020-12-09 RX ADMIN — PROPOFOL 200 MCG/KG/MIN: 10 INJECTION, EMULSION INTRAVENOUS at 09:44

## 2020-12-09 RX ADMIN — LIDOCAINE HYDROCHLORIDE 1 ML: 10 INJECTION, SOLUTION EPIDURAL; INFILTRATION; INTRACAUDAL; PERINEURAL at 09:14

## 2020-12-09 ASSESSMENT — MIFFLIN-ST. JEOR: SCORE: 1612.4

## 2020-12-09 NOTE — H&P
55 year old year old female here for upper endoscopy for epigastric pain.  Ongoing.  Worse with empty stomach, better with food.  She does take Pepcid BID with some relief.  She has a history of PUD in the past.  She has had 2 previous EGDs, unsure of last.  No melena, dysphagia, hematemesis.          There is no problem list on file for this patient.      No past medical history on file.    History reviewed. No pertinent surgical history.    History reviewed. No pertinent family history.    No current outpatient medications on file.       Allergies   Allergen Reactions     Ivp Dye [Contrast Dye] Anaphylaxis       Pt reports that she has never smoked. She has never used smokeless tobacco. She reports previous alcohol use. She reports that she does not use drugs.    Exam:    Awake, Alert OX3  Lungs - CTA bilaterally  CV - RRR, no murmurs, distal pulses intact  Abd - soft, non-distended, non-tender, +BS  Extr - No cyanosis or edema    A/P 55 year old year old female in need of upper endoscopy for epigastric abdominal pain. Risks, benefits, alternatives, and complications were discussed including the possibility of perforation and the patient agreed to proceed.    Anton Knott, DO on 12/9/2020 at 9:04 AM

## 2020-12-09 NOTE — ANESTHESIA POSTPROCEDURE EVALUATION
Patient: Sangita Meraz    Procedure(s):  ESOPHAGOGASTRODUODENOSCOPY (EGD)    Diagnosis:Epigastric pain [R10.13]  Diagnosis Additional Information: No value filed.    Anesthesia Type:  MAC    Note:  Anesthesia Post Evaluation    Patient location during evaluation: Bedside  Patient participation: Able to fully participate in evaluation  Level of consciousness: awake and alert  Pain management: adequate  Airway patency: patent  Cardiovascular status: acceptable  Respiratory status: acceptable  Hydration status: acceptable  PONV: none     Anesthetic complications: None          Last vitals:  Vitals:    12/09/20 0835   BP: (!) 144/73   Resp: 18   Temp: 36.8  C (98.2  F)   SpO2: 96%         Electronically Signed By: Hernan Freire CRNA, CL [place]  December 9, 2020  9:54 AM

## 2020-12-09 NOTE — ANESTHESIA CARE TRANSFER NOTE
Patient: Sangita Meraz    Procedure(s):  ESOPHAGOGASTRODUODENOSCOPY (EGD)    Diagnosis: Epigastric pain [R10.13]  Diagnosis Additional Information: No value filed.    Anesthesia Type:   MAC     Note:  Airway :Nasal Cannula  Patient transferred to:Phase II  Handoff Report: Identifed the Patient, Identified the Reponsible Provider, Reviewed the pertinent medical history, Discussed the surgical course, Reviewed Intra-OP anesthesia mangement and issues during anesthesia, Set expectations for post-procedure period and Allowed opportunity for questions and acknowledgement of understanding      Vitals: (Last set prior to Anesthesia Care Transfer)    CRNA VITALS  12/9/2020 0923 - 12/9/2020 0954      12/9/2020             Pulse:  82    Ht Rate:  81    SpO2:  96 %                Electronically Signed By: Hernan Freire CRNA, APRN [place]  December 9, 2020  9:54 AM

## 2020-12-10 LAB — COPATH REPORT: NORMAL

## 2020-12-16 ENCOUNTER — PRE VISIT (OUTPATIENT)
Dept: GASTROENTEROLOGY | Facility: CLINIC | Age: 55
End: 2020-12-16

## 2020-12-16 ENCOUNTER — VIRTUAL VISIT (OUTPATIENT)
Dept: GASTROENTEROLOGY | Facility: CLINIC | Age: 55
End: 2020-12-16
Attending: NURSE PRACTITIONER
Payer: OTHER GOVERNMENT

## 2020-12-16 VITALS — HEIGHT: 63 IN | BODY MASS INDEX: 39.87 KG/M2 | WEIGHT: 225 LBS

## 2020-12-16 DIAGNOSIS — R19.7 DIARRHEA, UNSPECIFIED TYPE: Primary | ICD-10-CM

## 2020-12-16 DIAGNOSIS — R10.13 ABDOMINAL PAIN, EPIGASTRIC: ICD-10-CM

## 2020-12-16 PROCEDURE — 99204 OFFICE O/P NEW MOD 45 MIN: CPT | Mod: 95 | Performed by: INTERNAL MEDICINE

## 2020-12-16 RX ORDER — MONTELUKAST SODIUM 4 MG/1
1 TABLET, CHEWABLE ORAL 2 TIMES DAILY
Qty: 60 TABLET | Refills: 3 | Status: SHIPPED | OUTPATIENT
Start: 2020-12-16 | End: 2021-07-13

## 2020-12-16 SDOH — HEALTH STABILITY: MENTAL HEALTH: HOW MANY STANDARD DRINKS CONTAINING ALCOHOL DO YOU HAVE ON A TYPICAL DAY?: NOT ASKED

## 2020-12-16 SDOH — HEALTH STABILITY: MENTAL HEALTH: HOW OFTEN DO YOU HAVE A DRINK CONTAINING ALCOHOL?: NOT ASKED

## 2020-12-16 SDOH — HEALTH STABILITY: MENTAL HEALTH: HOW OFTEN DO YOU HAVE 6 OR MORE DRINKS ON ONE OCCASION?: NOT ASKED

## 2020-12-16 ASSESSMENT — PAIN SCALES - GENERAL: PAINLEVEL: NO PAIN (0)

## 2020-12-16 ASSESSMENT — MIFFLIN-ST. JEOR: SCORE: 1589.72

## 2020-12-16 NOTE — LETTER
"  12/16/2020      RE: Sangita Meraz  [address]  [facility] [address]      Dear Colleague,    Thank you for referring your patient, Sangita Meraz, to the [facility] [place]. Please see a copy of my visit note below.    Sangita Meraz is a 55 year old female who is being evaluated via a billable video visit.      The patient has been notified of following:     \"This video visit will be conducted via a call between you and your physician/provider. We have found that certain health care needs can be provided without the need for an in-person physical exam.  This service lets us provide the care you need with a video conversation.  If a prescription is necessary we can send it directly to your pharmacy.  If lab work is needed we can place an order for that and you can then stop by our lab to have the test done at a later time.    Video visits are billed at different rates depending on your insurance coverage.  Please reach out to your insurance provider with any questions.    If during the course of the call the physician/provider feels a video visit is not appropriate, you will not be charged for this service.\"    Patient has given verbal consent for Video visit? Yes  How would you like to obtain your AVS? MyChart  If you are dropped from the video visit, the video invite should be resent to: Text to cell phone: [phone]  Will anyone else be joining your video visit? No    Video-Visit Details    Type of service:  Video Visit    Video Start Time: 10 AM  Video End Time: 10:39 AM    Originating Location (pt. Location): Home    Distant Location (provider location):  [facility] [place]     Platform used for Video Visit: [facility]      Gastroenterology Consult   [facility]        Chief Complaint:   Loose stool pain, lower abdominal cramping, upper abdominal pain, responsive to Pepcid and Carafate           ASSESSMENT AND RECOMMENDATIONS:   Assessment:  55 " year old female functional dyspepsia and irritable bowel syndrome for years recent diagnosis of diverticulitis in July.  Her gastroenterologic symptoms are wearing on her quality of life and her ability to become healthy.     Recommendations:  Colonoscopy follow up diverticulitis  Colestipol for possible bile acid diarrhea  Gastrin, TTG, IgA, Liver profile, Lipase, CRP  Consider PPI in the future   Consider dietary and health psychology appts in the future             History of Present Illness:   Sangita Meraz is a 55 year old female with a history of    1)IBS: Duration-years, as long as she can remember.  Quality-50% of her stools are watery or mushy, associated prebowel movement lower abdominal cramping.  Frequency-4 times a day.  Other issues: Unpredictability dissatisfaction with bowel movements concern for needing a bathroom vis-à-vis possible urgency.  Foods that contained fiber make her symptoms worse.  She has endorsed bloating in the past.     2)Recent episode of diverticulitis: First episode was in July.  Prior colonoscopy last year and the year before that to assess a large adenomatous polyp removed in piecemeal fashion.  Some residual polyp found on follow up-given a 3 year time interval for surveillance.      3)Upper abdominal pain - she calls ulcer pain.  Was told she had ulcers in [place].  She had an EGD in 12/20 showed some mild antral erythema, biopseis negative for HP.  No small bowel biopsies were obtained.  Pain is relieved somewhat by Pepcid, Carafate, eating food.  Location- mid epigastric region.  Frequency- 3 x a week.  This is improving with Carafate. Has not recently tried PPI.     She denies heartburn or regurgitation or dysphagia.  She had Hill Grade II GE jxn on EGD. Small DH.      Denies weight loss, black or bloody stools, fever, rash, blood clots.          Past Medical History:     Obstructive Sleep Apnea  Diabetes  Diverticulitis  IBS-D  Dyspepsia  Morbid obesity   Depression  Large  adenomatous colon polyp removed at colonoscopy in 2018, residual polyp in 2019         Past Surgical History:     Past Surgical History:   Procedure Laterality Date     ESOPHAGOSCOPY, GASTROSCOPY, DUODENOSCOPY (EGD), COMBINED N/A 12/9/2020    Procedure: ESOPHAGOGASTRODUODENOSCOPY, WITH BIOPSY;  Surgeon: Anton Knott DO;  Location: [facility]            Previous Endoscopy:   Impression:          - Normal examined duodenum.                        - Gastritis. Biopsied.                        - One gastric polyp. Resected and retrieved.                        - Gastroesophageal flap valve classified as Hill Grade                        II (fold present, opens with respiration).                        - Small hiatal hernia.                        - Z-line regular, 39 cm from the incisors.   Recommendation:      - Discharge patient to home (ambulatory).                        - Resume previous diet.                        - Continue present medications.                        - Await pathology results.          Social History:     Social History     Socioeconomic History     Marital status:      Spouse name: None     Number of children: None     Years of education: None     Highest education level: None   Occupational History     None   Social Needs     Financial resource strain: None     Food insecurity     Worry: None     Inability: None     Transportation needs     Medical: None     Non-medical: None   Tobacco Use     Smoking status: Former Smoker     Smokeless tobacco: Never Used   Substance and Sexual Activity     Alcohol use: Yes     Drug use: Never     Sexual activity: None   Lifestyle     Physical activity     Days per week: None     Minutes per session: None     Stress: None   Relationships     Social connections     Talks on phone: None     Gets together: None     Attends [employer] service: None     Active member of club or organization: None     Attends meetings of clubs or organizations: None     " Relationship status: None     Intimate partner violence     Fear of current or ex partner: None     Emotionally abused: None     Physically abused: None     Forced sexual activity: None   Other Topics Concern     Parent/sibling w/ CABG, MI or angioplasty before 65F 55M? Not Asked   Social History Narrative     None            Family History:     Family History   Problem Relation Age of Onset     Uterine Cancer Mother      Colon Polyps Father      No known history of colorectal cancer, liver disease, or inflammatory bowel disease.         Allergies:   Reviewed and edited as appropriate     Allergies   Allergen Reactions     Ivp Dye [Contrast Dye] Anaphylaxis            Medications:     Current Outpatient Medications   Medication Sig Dispense Refill     sertraline (ZOLOFT) 100 MG tablet Take 100 mg by mouth                Review of Systems:     A complete 10 point review of systems was performed and is negative except as noted in the HPI           Physical Exam:   Ht 1.6 m (5' 3\")   Wt 102.1 kg (225 lb)   BMI 39.86 kg/m    Wt:   Wt Readings from Last 2 Encounters:   12/16/20 102.1 kg (225 lb)   12/09/20 104.3 kg (230 lb)      Constitutional: cooperative, pleasant, not dyspneic/diaphoretic, no acute distress  Eyes: Sclera anicteric/injected  Respiratory: Unlabored breathing  Skin:no jaundice  Neuro: AAO x 3, No asterixis  Psych: Normal affect  MSK: No gross deformities         Data:       Kemar Mckeon MD  Associate Professor of Medicine  Division of Gastroenterology, Hepatology, and Nutrition  [facility]        "

## 2020-12-16 NOTE — PROGRESS NOTES
"Sangita Meraz is a 55 year old female who is being evaluated via a billable video visit.      The patient has been notified of following:     \"This video visit will be conducted via a call between you and your physician/provider. We have found that certain health care needs can be provided without the need for an in-person physical exam.  This service lets us provide the care you need with a video conversation.  If a prescription is necessary we can send it directly to your pharmacy.  If lab work is needed we can place an order for that and you can then stop by our lab to have the test done at a later time.    Video visits are billed at different rates depending on your insurance coverage.  Please reach out to your insurance provider with any questions.    If during the course of the call the physician/provider feels a video visit is not appropriate, you will not be charged for this service.\"    Patient has given verbal consent for Video visit? Yes  How would you like to obtain your AVS? MyChart  If you are dropped from the video visit, the video invite should be resent to: Text to cell phone: [phone]  Will anyone else be joining your video visit? No        Video-Visit Details    Type of service:  Video Visit    Video Start Time: 10 AM  Video End Time: 10:39 AM    Originating Location (pt. Location): Home    Distant Location (provider location):  [facility] [place]     Platform used for Video Visit: Jayla Mckeon MD        Gastroenterology Consult   [facility]            Chief Complaint:   Loose stool pain, lower abdominal cramping, upper abdominal pain, responsive to Pepcid and Carafate           ASSESSMENT AND RECOMMENDATIONS:   Assessment:  55 year old female functional dyspepsia and irritable bowel syndrome for years recent diagnosis of diverticulitis in July.  Her gastroenterologic symptoms are wearing on her quality of life and her ability to become healthy.   "   Recommendations:  Colonoscopy follow up diverticulitis  Colestipol for possible bile acid diarrhea  Gastrin, TTG, IgA, Liver profile, Lipase, CRP  Consider PPI in the future   Consider dietary and health psychology appts in the future             History of Present Illness:   Sangita Meraz is a 55 year old female with a history of    1)IBS: Duration-years, as long as she can remember.  Quality-50% of her stools are watery or mushy, associated prebowel movement lower abdominal cramping.  Frequency-4 times a day.  Other issues: Unpredictability dissatisfaction with bowel movements concern for needing a bathroom vis-à-vis possible urgency.  Foods that contained fiber make her symptoms worse.  She has endorsed bloating in the past.     2)Recent episode of diverticulitis: First episode was in July.  Prior colonoscopy last year and the year before that to assess a large adenomatous polyp removed in piecemeal fashion.  Some residual polyp found on follow up-given a 3 year time interval for surveillance.      3)Upper abdominal pain - she calls ulcer pain.  Was told she had ulcers in [place].  She had an EGD in 12/20 showed some mild antral erythema, biopseis negative for HP.  No small bowel biopsies were obtained.  Pain is relieved somewhat by Pepcid, Carafate, eating food.  Location- mid epigastric region.  Frequency- 3 x a week.  This is improving with Carafate. Has not recently tried PPI.     She denies heartburn or regurgitation or dysphagia.  She had Hill Grade II GE jxn on EGD. Small DH.      Denies weight loss, black or bloody stools, fever, rash, blood clots.          Past Medical History:     Obstructive Sleep Apnea  Diabetes  Diverticulitis  IBS-D  Dyspepsia  Morbid obesity   Depression  Large adenomatous colon polyp removed at colonoscopy in 2018, residual polyp in 2019         Past Surgical History:     Past Surgical History:   Procedure Laterality Date     ESOPHAGOSCOPY, GASTROSCOPY, DUODENOSCOPY (EGD), COMBINED  N/A 12/9/2020    Procedure: ESOPHAGOGASTRODUODENOSCOPY, WITH BIOPSY;  Surgeon: Anton Knott DO;  Location: [facility]            Previous Endoscopy:   Impression:          - Normal examined duodenum.                        - Gastritis. Biopsied.                        - One gastric polyp. Resected and retrieved.                        - Gastroesophageal flap valve classified as Hill Grade                        II (fold present, opens with respiration).                        - Small hiatal hernia.                        - Z-line regular, 39 cm from the incisors.   Recommendation:      - Discharge patient to home (ambulatory).                        - Resume previous diet.                        - Continue present medications.                        - Await pathology results.          Social History:     Social History     Socioeconomic History     Marital status:      Spouse name: None     Number of children: None     Years of education: None     Highest education level: None   Occupational History     None   Social Needs     Financial resource strain: None     Food insecurity     Worry: None     Inability: None     Transportation needs     Medical: None     Non-medical: None   Tobacco Use     Smoking status: Former Smoker     Smokeless tobacco: Never Used   Substance and Sexual Activity     Alcohol use: Yes     Drug use: Never     Sexual activity: None   Lifestyle     Physical activity     Days per week: None     Minutes per session: None     Stress: None   Relationships     Social connections     Talks on phone: None     Gets together: None     Attends Alevism service: None     Active member of club or organization: None     Attends meetings of clubs or organizations: None     Relationship status: None     Intimate partner violence     Fear of current or ex partner: None     Emotionally abused: None     Physically abused: None     Forced sexual activity: None   Other Topics Concern      "Parent/sibling w/ CABG, MI or angioplasty before 65F 55M? Not Asked   Social History Narrative     None            Family History:     Family History   Problem Relation Age of Onset     Uterine Cancer Mother      Colon Polyps Father      No known history of colorectal cancer, liver disease, or inflammatory bowel disease.         Allergies:   Reviewed and edited as appropriate     Allergies   Allergen Reactions     Ivp Dye [Contrast Dye] Anaphylaxis            Medications:     Current Outpatient Medications   Medication Sig Dispense Refill     sertraline (ZOLOFT) 100 MG tablet Take 100 mg by mouth                Review of Systems:     A complete 10 point review of systems was performed and is negative except as noted in the HPI           Physical Exam:   Ht 1.6 m (5' 3\")   Wt 102.1 kg (225 lb)   BMI 39.86 kg/m    Wt:   Wt Readings from Last 2 Encounters:   12/16/20 102.1 kg (225 lb)   12/09/20 104.3 kg (230 lb)      Constitutional: cooperative, pleasant, not dyspneic/diaphoretic, no acute distress  Eyes: Sclera anicteric/injected  Respiratory: Unlabored breathing  Skin:no jaundice  Neuro: AAO x 3, No asterixis  Psych: Normal affect  MSK: No gross deformities         Data:         Kemar Mckeon MD  Associate Professor of Medicine  Division of Gastroenterology, Hepatology, and Nutrition  [facility]    "

## 2020-12-22 ENCOUNTER — TELEPHONE (OUTPATIENT)
Dept: GASTROENTEROLOGY | Facility: CLINIC | Age: 55
End: 2020-12-22

## 2020-12-22 NOTE — TELEPHONE ENCOUNTER
2nd Attempt    Left message for patient to call back to schedule colonoscopy with Dr. Mckeon.     Procedure: Lower Endoscopy at [facility] with me in one month   Lower Endoscopy Type: Colonoscopy    Purpose of Colonoscopy Procedure: History of Polyps    Colonoscopy reason for referral: recent diverticulitis    Colonoscopy Sedation: Conscious/Moderate    Preferred Location: [facility]    Scheduling Instructions: If you have not heard from the scheduling office within 2 business days, please call [phone].      Dx:   Diarrhea, unspecified type [R19.7]       Abdominal pain, epigastric [R10.13]

## 2020-12-22 NOTE — TELEPHONE ENCOUNTER
Patient is scheduled for colonoscopy with Dr. Mckeon    Spoke with: Sangita Meraz    Date of Procedure: 2/1/2021    [place] [facility]    Sedation Type C/S    Pre-op for Unit J MAC not needed    (if yes advise patient they will need a pre-op prior to procedure)      Is patient on blood thinners? -no (If yes- inform patient to follow up with PCP or provider for follow up instructions)     Informed patient they will need an adult  yes    Informed Patient of COVID Test Requirement yes and shcheduled    Preferred Pharmacy for Pre Prescription on chart    Confirmed Nurse will call to complete assessment yes    Additional comments: no

## 2020-12-30 DIAGNOSIS — R73.09 ELEVATED GLUCOSE: ICD-10-CM

## 2020-12-30 DIAGNOSIS — R10.13 ABDOMINAL PAIN, EPIGASTRIC: ICD-10-CM

## 2020-12-30 DIAGNOSIS — R19.7 DIARRHEA, UNSPECIFIED TYPE: ICD-10-CM

## 2020-12-30 LAB
ALBUMIN SERPL-MCNC: 3.7 G/DL (ref 3.4–5)
ALP SERPL-CCNC: 91 U/L (ref 40–150)
ALT SERPL W P-5'-P-CCNC: 87 U/L (ref 0–50)
AST SERPL W P-5'-P-CCNC: 35 U/L (ref 0–45)
BILIRUB DIRECT SERPL-MCNC: <0.1 MG/DL (ref 0–0.2)
BILIRUB SERPL-MCNC: 0.3 MG/DL (ref 0.2–1.3)
CRP SERPL-MCNC: 13.4 MG/L (ref 0–8)
FOLATE SERPL-MCNC: 11.2 NG/ML
HBA1C MFR BLD: 6.3 % (ref 0–5.6)
LIPASE SERPL-CCNC: 210 U/L (ref 73–393)
PROT SERPL-MCNC: 7.3 G/DL (ref 6.8–8.8)
VIT B12 SERPL-MCNC: 332 PG/ML (ref 193–986)

## 2020-12-30 PROCEDURE — 36415 COLL VENOUS BLD VENIPUNCTURE: CPT | Performed by: INTERNAL MEDICINE

## 2020-12-30 PROCEDURE — 82607 VITAMIN B-12: CPT | Performed by: INTERNAL MEDICINE

## 2020-12-30 PROCEDURE — 83036 HEMOGLOBIN GLYCOSYLATED A1C: CPT | Performed by: INTERNAL MEDICINE

## 2020-12-30 PROCEDURE — 83516 IMMUNOASSAY NONANTIBODY: CPT | Performed by: INTERNAL MEDICINE

## 2020-12-30 PROCEDURE — 86140 C-REACTIVE PROTEIN: CPT | Performed by: INTERNAL MEDICINE

## 2020-12-30 PROCEDURE — 83690 ASSAY OF LIPASE: CPT | Performed by: INTERNAL MEDICINE

## 2020-12-30 PROCEDURE — 99000 SPECIMEN HANDLING OFFICE-LAB: CPT | Performed by: INTERNAL MEDICINE

## 2020-12-30 PROCEDURE — 80076 HEPATIC FUNCTION PANEL: CPT | Performed by: INTERNAL MEDICINE

## 2020-12-30 PROCEDURE — 82746 ASSAY OF FOLIC ACID SERUM: CPT | Performed by: INTERNAL MEDICINE

## 2020-12-30 PROCEDURE — 82941 ASSAY OF GASTRIN: CPT | Mod: 90 | Performed by: INTERNAL MEDICINE

## 2020-12-30 PROCEDURE — 83516 IMMUNOASSAY NONANTIBODY: CPT | Mod: 59 | Performed by: INTERNAL MEDICINE

## 2020-12-30 PROCEDURE — 82784 ASSAY IGA/IGD/IGG/IGM EACH: CPT | Performed by: INTERNAL MEDICINE

## 2020-12-31 LAB
IGA SERPL-MCNC: 179 MG/DL (ref 84–499)
TTG IGA SER-ACNC: 1 U/ML
TTG IGG SER-ACNC: <1 U/ML

## 2021-01-02 LAB — GASTRIN SERPL-MCNC: 64 PG/ML (ref 0–100)

## 2021-01-06 ENCOUNTER — DOCUMENTATION ONLY (OUTPATIENT)
Dept: GASTROENTEROLOGY | Facility: CLINIC | Age: 56
End: 2021-01-06

## 2021-01-06 DIAGNOSIS — R10.13 ABDOMINAL PAIN, EPIGASTRIC: Primary | ICD-10-CM

## 2021-01-06 DIAGNOSIS — R19.7 DIARRHEA, UNSPECIFIED TYPE: ICD-10-CM

## 2021-01-06 DIAGNOSIS — R79.82 ELEVATED C-REACTIVE PROTEIN (CRP): ICD-10-CM

## 2021-01-06 NOTE — PROGRESS NOTES
Review of Objective Testing    CT abd pelvis July 25 th 2020-IMPRESSION:   1. Findings compatible with acute uncomplicated diverticulitis.   2. No evidence for abscess formation or intra-abdominal free air  Elevated CRP at 13.4 in November.     Blood work:   Nl CBC, lipase, CMP except for slighltly elevated cr at 1.05, elevated non-fasting glucose.     EGD 12/9/2020  Impression:          - Normal examined duodenum.                        - Gastritis. Biopsied.                        - One gastric polyp. Resected and retrieved.                        - Gastroesophageal flap valve classified as Hill Grade                        II (fold present, opens with respiration).                        - Small hiatal hernia.                        - Z-line regular, 39 cm from the incisors.     Recommendation:      - Discharge patient to home (ambulatory).                        - Resume previous diet.                        - Continue present medications.                        - Await pathology results.       PATHOLOGY:   FINAL DIAGNOSIS:   A. Stomach, biopsy:   - Gastric body-type mucosa with mild edema and reactive changes,   nonspecific   - No evidence of inflammation, intestinal metaplasia, dysplasia or   malignancy.   - No Helicobacter pylori identified.     B. Gastric cardia:   - Gastric cardia-type mucosa with mild edema, congestion, some fibrosis of    lamina propria and reactive changes   - see comment   - No evidence of inflammation, intestinal metaplasia, dysplasia or   malignancy.   - No Helicobacter pylori identified.     COMMENT:   The findings are nonspecific but may be seen in association with   congestive or erosive gastropathy     Consult on 12/16/2020    Blood work-   Elevated AST at 81  Elevated CRP at 13.4   Normal TTG, B12, Folate,  Lipase, and Gastrin     Colonoscopy is pending, yet to be completed.   Would order follow up CT and Fecal Slim protectin and fecal elastase now for elevated CRP.

## 2021-01-13 DIAGNOSIS — R19.7 DIARRHEA, UNSPECIFIED TYPE: ICD-10-CM

## 2021-01-13 DIAGNOSIS — R10.13 ABDOMINAL PAIN, EPIGASTRIC: Primary | ICD-10-CM

## 2021-01-13 DIAGNOSIS — R79.82 ELEVATED C-REACTIVE PROTEIN (CRP): ICD-10-CM

## 2021-01-13 DIAGNOSIS — Z11.59 ENCOUNTER FOR SCREENING FOR OTHER VIRAL DISEASES: Primary | ICD-10-CM

## 2021-01-13 NOTE — RESULT ENCOUNTER NOTE
Sangita,  One of your liver tests is mildly elevated and so is your CRP (a test that is very sensitive to inflammation).  I would like to check a MRI of your abdomen.  You still should also get a colonoscopy  My coordinator Bree will be reaching out to set this up.  Dr. Mckeon

## 2021-01-14 ENCOUNTER — PATIENT OUTREACH (OUTPATIENT)
Dept: GASTROENTEROLOGY | Facility: CLINIC | Age: 56
End: 2021-01-14

## 2021-01-14 ENCOUNTER — HEALTH MAINTENANCE LETTER (OUTPATIENT)
Age: 56
End: 2021-01-14

## 2021-01-20 ENCOUNTER — OFFICE VISIT (OUTPATIENT)
Dept: URGENT CARE | Facility: URGENT CARE | Age: 56
End: 2021-01-20
Payer: OTHER GOVERNMENT

## 2021-01-20 VITALS
TEMPERATURE: 98.7 F | RESPIRATION RATE: 16 BRPM | HEART RATE: 76 BPM | DIASTOLIC BLOOD PRESSURE: 80 MMHG | BODY MASS INDEX: 40.57 KG/M2 | SYSTOLIC BLOOD PRESSURE: 128 MMHG | WEIGHT: 229 LBS

## 2021-01-20 DIAGNOSIS — K57.92 DIVERTICULITIS: Primary | ICD-10-CM

## 2021-01-20 DIAGNOSIS — R10.32 LLQ ABDOMINAL PAIN: ICD-10-CM

## 2021-01-20 DIAGNOSIS — R50.9 FEVER, UNSPECIFIED FEVER CAUSE: ICD-10-CM

## 2021-01-20 DIAGNOSIS — Z87.19 HISTORY OF DIVERTICULITIS: ICD-10-CM

## 2021-01-20 LAB
BASOPHILS # BLD AUTO: 0 10E9/L (ref 0–0.2)
BASOPHILS NFR BLD AUTO: 0.2 %
DIFFERENTIAL METHOD BLD: NORMAL
EOSINOPHIL # BLD AUTO: 0.2 10E9/L (ref 0–0.7)
EOSINOPHIL NFR BLD AUTO: 1.9 %
ERYTHROCYTE [DISTWIDTH] IN BLOOD BY AUTOMATED COUNT: 12.9 % (ref 10–15)
HCT VFR BLD AUTO: 39.5 % (ref 35–47)
HGB BLD-MCNC: 14.2 G/DL (ref 11.7–15.7)
LYMPHOCYTES # BLD AUTO: 2.1 10E9/L (ref 0.8–5.3)
LYMPHOCYTES NFR BLD AUTO: 24.1 %
MCH RBC QN AUTO: 32.3 PG (ref 26.5–33)
MCHC RBC AUTO-ENTMCNC: 35.9 G/DL (ref 31.5–36.5)
MCV RBC AUTO: 90 FL (ref 78–100)
MONOCYTES # BLD AUTO: 0.7 10E9/L (ref 0–1.3)
MONOCYTES NFR BLD AUTO: 8 %
NEUTROPHILS # BLD AUTO: 5.8 10E9/L (ref 1.6–8.3)
NEUTROPHILS NFR BLD AUTO: 65.8 %
PLATELET # BLD AUTO: 220 10E9/L (ref 150–450)
RBC # BLD AUTO: 4.39 10E12/L (ref 3.8–5.2)
WBC # BLD AUTO: 8.8 10E9/L (ref 4–11)

## 2021-01-20 PROCEDURE — U0005 INFEC AGEN DETEC AMPLI PROBE: HCPCS | Performed by: NURSE PRACTITIONER

## 2021-01-20 PROCEDURE — U0003 INFECTIOUS AGENT DETECTION BY NUCLEIC ACID (DNA OR RNA); SEVERE ACUTE RESPIRATORY SYNDROME CORONAVIRUS 2 (SARS-COV-2) (CORONAVIRUS DISEASE [COVID-19]), AMPLIFIED PROBE TECHNIQUE, MAKING USE OF HIGH THROUGHPUT TECHNOLOGIES AS DESCRIBED BY CMS-2020-01-R: HCPCS | Performed by: NURSE PRACTITIONER

## 2021-01-20 PROCEDURE — 36415 COLL VENOUS BLD VENIPUNCTURE: CPT | Performed by: NURSE PRACTITIONER

## 2021-01-20 PROCEDURE — 85025 COMPLETE CBC W/AUTO DIFF WBC: CPT | Performed by: NURSE PRACTITIONER

## 2021-01-20 PROCEDURE — 99213 OFFICE O/P EST LOW 20 MIN: CPT | Performed by: NURSE PRACTITIONER

## 2021-01-20 RX ORDER — HYDROCODONE BITARTRATE AND ACETAMINOPHEN 5; 325 MG/1; MG/1
1 TABLET ORAL EVERY 6 HOURS PRN
Qty: 8 TABLET | Refills: 0 | Status: SHIPPED | OUTPATIENT
Start: 2021-01-20 | End: 2021-01-23

## 2021-01-20 ASSESSMENT — PAIN SCALES - GENERAL: PAINLEVEL: SEVERE PAIN (7)

## 2021-01-21 LAB
SARS-COV-2 RNA RESP QL NAA+PROBE: NOT DETECTED
SPECIMEN SOURCE: NORMAL

## 2021-01-21 NOTE — PATIENT INSTRUCTIONS
Take antibiotics as directed.    If your pain worsens or condition worsens in any way you need to go directly to the emergency room.  Patient Education     Diverticulitis    Some people get pouches along the wall of the colon as they get older. These pouches are called diverticuli. They often cause no symptoms. If the pouches become blocked, you can get an infection. This infection is called diverticulitis. It causes pain in your lower belly (abdomen) and fever. It may also cause nausea, vomiting, diarrhea, or constipation. If not treated, it can become a serious health problem. It can cause an abscess to form inside the pouch. The abscess may block the intestinal tract. It may even rupture, spreading infection throughout the belly.   When treatment is started early, oral antibiotics alone may be enough to cure diverticulitis. This method is tried first. But if you don't improve or if your condition gets worse while using these medicines, you may need to be admitted to the hospital. There, you will get antibiotics through an IV. You may also have to rest your bowel. That means you won't eat or drink for a period of time. Severe cases may need surgery.   Home care  These guidelines will help you care for yourself at home:     During the acute illness, rest and follow your healthcare provider's instructions about diet. Sometimes you will need to be on a clear liquid diet to rest your bowel. Once your symptoms are better, you may be told to eat a low-fiber diet for some time. You can eat foods such as:  ? Flake cereal  ? Mashed potatoes  ? Pancakes and waffles  ? Pasta  ? White bread  ? Rice  ? Applesauce  ? Bananas  ? Eggs  ? Fish  ? Poultry  ? Tofu  ? Cooked soft vegetables    Take antibiotics exactly as told. Don't miss any doses or stop taking the medicine, even if you feel better.    Monitor your temperature. Tell your healthcare provider if you have a rising temperature.  Preventing future attacks  Once you have  an episode of diverticulitis, you are at risk for having it again. But you may be able to lower your risk by eating a high-fiber diet (20 g/day to 35 g/day of fiber). This cleans out the colon pouches that already exist. It may also prevent new ones from forming. Foods high in fiber include:     Fresh fruits and edible peelings    Raw or lightly cooked vegetables    Whole-grain cereals and breads    Dried beans and peas    Bran  Here are other steps you can take to help prevent future attacks:     Take your medicines, such as antibiotics, as your healthcare provider says.    Drink 6 to 8 glasses of water every day, unless told otherwise.    Use a heating pad or hot water bottle to help belly cramping or pain.    Start an exercise program. Ask your healthcare provider how to get started. You can benefit from simple activities such as walking or gardening.    Treat diarrhea with a bland diet. Start with liquids only. Then slowly add fiber over time.    Watch for changes in your bowel movements (constipation to diarrhea). Prevent constipation by eating a high-fiber diet and taking a stool softener if needed.    Get plenty of rest and sleep.  Follow-up care  Check in with your healthcare provider as advised or sooner if you are not getting better in the next 2 days.   When to call your healthcare provider   Call your healthcare provider right away if any of these occur:    Fever of 100.4 F (38 C) or higher, or as directed by your healthcare provider    Repeated vomiting or swelling of the belly    Weakness, dizziness, light-headedness    Pain in your belly that gets worse, is severe, or spreads to your back    Pain that moves to the right lower belly    Rectal bleeding (stools that are red, black, or maroon in color)    Unexpected vaginal bleeding  [employer] last reviewed this educational content on 8/1/2019 2000-2020 [employer]. [address] [facility], [place] [address]. All rights reserved. This  information is not intended as a substitute for professional medical care. Always follow your healthcare professional's instructions.           Patient Education     Discharge Instructions for Diverticulitis   You have been diagnosed with diverticulitis. This is a condition in which small pouches form in your colon (large intestine) and become inflamed or infected. Follow the guidelines below for home care.  As you recover  Tips for recovery include:    Eat a low-fiber diet at first while you recover. Your healthcare provider may advise a liquid diet. This gives your bowel a chance to rest so that it can recover.    Foods to include: flake cereal, mashed potatoes, pancakes, waffles, pasta, white bread, rice, applesauce, bananas, eggs, fish, poultry, tofu, and well-cooked vegetables    Take your medicines as directed. Don't stop taking the medicines, even if you feel better.    Monitor your temperature and report any rise in temperature to your healthcare provider.    Take antibiotics exactly as directed. Don't miss any and keep taking them even if you feel better.     Drink 6 to 8 glasses of water every day, unless told otherwise.    Use a heating pad or hot water bottle to reduce abdominal cramping or pain.  Preventing diverticulitis in the future  Tips for prevention include:    Eat a high-fiber diet. Fiber adds bulk to the stool so that it passes through the large intestine more easily.    Keep drinking 6 to 8 glasses of water every day, unless told otherwise.    Start an exercise program. Ask your healthcare provider how to get started. You can benefit from simple activities such as walking or gardening.    Treat diarrhea with a bland diet. Start with liquids only, then slowly add fiber over time.    Watch for changes in your bowel movements (constipation to diarrhea).    Prevent constipation with fiber and add a stool softener if needed.     Get plenty of rest and sleep.  Follow-up care  Make a follow-up  appointment, or as advised. Your provider may recommend a colonoscopy or other imaging test of your colon.  When to call your healthcare provider  Call your healthcare provider immediately if you have any of the following:    Fever of 100.4 F (38.0 C) or higher, or as directed by your healthcare provider    Chills    Severe cramps in the belly, most commonly the lower left side    Tenderness in the belly, most commonly the lower left side    Nausea and vomiting    Bleeding from your rectum  [employer] last reviewed this educational content on 6/1/2019 2000-2020 [employer]. [address] [place] [address]. All rights reserved. This information is not intended as a substitute for professional medical care. Always follow your healthcare professional's instructions.

## 2021-01-21 NOTE — PROGRESS NOTES
"  Assessment & Plan  Patient is sure it is her diverticulitis and does not want to do a UA. Feels very much like last diverticulitis attack. Fever just started today and her symptoms have been coming on for a couple of days but worse today. Will treat as previously with Augmentin, same dose as in ER, and 8 vicodin as previously done in ER. She understands that if she worsens she is to go directly to the ER for further evaluation.  Problem List Items Addressed This Visit     None      Visit Diagnoses     Diverticulitis    -  Primary    Relevant Medications    amoxicillin-clavulanate (AUGMENTIN) 875-125 MG tablet    LLQ abdominal pain        Relevant Medications    HYDROcodone-acetaminophen (NORCO) 5-325 MG tablet    Other Relevant Orders    CBC with platelets and differential (Completed)    Fever, unspecified fever cause        Relevant Medications    HYDROcodone-acetaminophen (NORCO) 5-325 MG tablet    Other Relevant Orders    CBC with platelets and differential (Completed)    Symptomatic COVID-19 Virus (Coronavirus) by PCR (Completed)    History of diverticulitis        Relevant Medications    HYDROcodone-acetaminophen (NORCO) 5-325 MG tablet    Other Relevant Orders    CBC with platelets and differential (Completed)                           20 minutes spent on the date of the encounter doing chart review, history and exam, documentation and further activities as noted above         BMI:   Estimated body mass index is 40.57 kg/m  as calculated from the following:    Height as of 12/16/20: 1.6 m (5' 3\").    Weight as of this encounter: 103.9 kg (229 lb).   Weight management plan: Patient was referred to their PCP to discuss a diet and exercise plan.      Patient Instructions   Take antibiotics as directed.    If your pain worsens or condition worsens in any way you need to go directly to the emergency room.  Patient Education     Diverticulitis    Some people get pouches along the wall of the colon as they get " older. These pouches are called diverticuli. They often cause no symptoms. If the pouches become blocked, you can get an infection. This infection is called diverticulitis. It causes pain in your lower belly (abdomen) and fever. It may also cause nausea, vomiting, diarrhea, or constipation. If not treated, it can become a serious health problem. It can cause an abscess to form inside the pouch. The abscess may block the intestinal tract. It may even rupture, spreading infection throughout the belly.   When treatment is started early, oral antibiotics alone may be enough to cure diverticulitis. This method is tried first. But if you don't improve or if your condition gets worse while using these medicines, you may need to be admitted to the hospital. There, you will get antibiotics through an IV. You may also have to rest your bowel. That means you won't eat or drink for a period of time. Severe cases may need surgery.   Home care  These guidelines will help you care for yourself at home:     During the acute illness, rest and follow your healthcare provider's instructions about diet. Sometimes you will need to be on a clear liquid diet to rest your bowel. Once your symptoms are better, you may be told to eat a low-fiber diet for some time. You can eat foods such as:  ? Flake cereal  ? Mashed potatoes  ? Pancakes and waffles  ? Pasta  ? White bread  ? Rice  ? Applesauce  ? Bananas  ? Eggs  ? Fish  ? Poultry  ? Tofu  ? Cooked soft vegetables    Take antibiotics exactly as told. Don't miss any doses or stop taking the medicine, even if you feel better.    Monitor your temperature. Tell your healthcare provider if you have a rising temperature.  Preventing future attacks  Once you have an episode of diverticulitis, you are at risk for having it again. But you may be able to lower your risk by eating a high-fiber diet (20 g/day to 35 g/day of fiber). This cleans out the colon pouches that already exist. It may also  prevent new ones from forming. Foods high in fiber include:     Fresh fruits and edible peelings    Raw or lightly cooked vegetables    Whole-grain cereals and breads    Dried beans and peas    Bran  Here are other steps you can take to help prevent future attacks:     Take your medicines, such as antibiotics, as your healthcare provider says.    Drink 6 to 8 glasses of water every day, unless told otherwise.    Use a heating pad or hot water bottle to help belly cramping or pain.    Start an exercise program. Ask your healthcare provider how to get started. You can benefit from simple activities such as walking or gardening.    Treat diarrhea with a bland diet. Start with liquids only. Then slowly add fiber over time.    Watch for changes in your bowel movements (constipation to diarrhea). Prevent constipation by eating a high-fiber diet and taking a stool softener if needed.    Get plenty of rest and sleep.  Follow-up care  Check in with your healthcare provider as advised or sooner if you are not getting better in the next 2 days.   When to call your healthcare provider   Call your healthcare provider right away if any of these occur:    Fever of 100.4 F (38 C) or higher, or as directed by your healthcare provider    Repeated vomiting or swelling of the belly    Weakness, dizziness, light-headedness    Pain in your belly that gets worse, is severe, or spreads to your back    Pain that moves to the right lower belly    Rectal bleeding (stools that are red, black, or maroon in color)    Unexpected vaginal bleeding  Marta last reviewed this educational content on 8/1/2019 2000-2020 [employer]. [address] [place] [address], [place] [address]. All rights reserved. This information is not intended as a substitute for professional medical care. Always follow your healthcare professional's instructions.           Patient Education     Discharge Instructions for Diverticulitis   You have been diagnosed with  diverticulitis. This is a condition in which small pouches form in your colon (large intestine) and become inflamed or infected. Follow the guidelines below for home care.  As you recover  Tips for recovery include:    Eat a low-fiber diet at first while you recover. Your healthcare provider may advise a liquid diet. This gives your bowel a chance to rest so that it can recover.    Foods to include: flake cereal, mashed potatoes, pancakes, waffles, pasta, white bread, rice, applesauce, bananas, eggs, fish, poultry, tofu, and well-cooked vegetables    Take your medicines as directed. Don't stop taking the medicines, even if you feel better.    Monitor your temperature and report any rise in temperature to your healthcare provider.    Take antibiotics exactly as directed. Don't miss any and keep taking them even if you feel better.     Drink 6 to 8 glasses of water every day, unless told otherwise.    Use a heating pad or hot water bottle to reduce abdominal cramping or pain.  Preventing diverticulitis in the future  Tips for prevention include:    Eat a high-fiber diet. Fiber adds bulk to the stool so that it passes through the large intestine more easily.    Keep drinking 6 to 8 glasses of water every day, unless told otherwise.    Start an exercise program. Ask your healthcare provider how to get started. You can benefit from simple activities such as walking or gardening.    Treat diarrhea with a bland diet. Start with liquids only, then slowly add fiber over time.    Watch for changes in your bowel movements (constipation to diarrhea).    Prevent constipation with fiber and add a stool softener if needed.     Get plenty of rest and sleep.  Follow-up care  Make a follow-up appointment, or as advised. Your provider may recommend a colonoscopy or other imaging test of your colon.  When to call your healthcare provider  Call your healthcare provider immediately if you have any of the following:    Fever of 100.4 F  (38.0 C) or higher, or as directed by your healthcare provider    Chills    Severe cramps in the belly, most commonly the lower left side    Tenderness in the belly, most commonly the lower left side    Nausea and vomiting    Bleeding from your rectum  Marta last reviewed this educational content on 6/1/2019 2000-2020 [employer]. [address] [place] [address], [place] [address]. All rights reserved. This information is not intended as a substitute for professional medical care. Always follow your healthcare professional's instructions.               No follow-ups on file.    Gayla Agarwal, CL CNP  [facility] [place]    Adi Quesada is a 55 year old who presents to clinic today for the following health issues     HPI   Chief Complaint   Patient presents with     Abdominal Pain     Hx of diverticulitis. LLQ x2 days.      Fever of 101 this afternoon. Hx diverticulitis.          Review of Systems   Constitutional, HEENT, cardiovascular, pulmonary, GI, , musculoskeletal, neuro, skin, endocrine and psych systems are negative, except as otherwise noted.      Objective    /80 (BP Location: Right arm, Patient Position: Sitting, Cuff Size: Adult Large)   Pulse 76   Temp 98.7  F (37.1  C) (Tympanic)   Resp 16   Wt 103.9 kg (229 lb)   Breastfeeding No   BMI 40.57 kg/m    Body mass index is 40.57 kg/m .  Physical Exam   GENERAL: healthy, alert and no distress, nontoxic in appearance  EYES: Eyes grossly normal to inspection, PERRL and conjunctivae and sclerae normal  HENT: ear canals and TM's normal, nose and mouth without ulcers or lesions  NECK: supple with full ROM  RESP: lungs clear to auscultation - no rales, rhonchi or wheezes  CV: regular rate and rhythm, normal S1 S2, no S3 or S4, no murmur, click or rub, no peripheral edema   ABDOMEN: soft, mild LLQ pain, no guarding or rebound tenderness, no hepatosplenomegaly, no masses and bowel sounds normal  MS:  no gross musculoskeletal defects noted, no edema  Neg CVA tenderness    FULL PPE    Results for orders placed or performed in visit on 01/20/21 (from the past 24 hour(s))   CBC with platelets and differential   Result Value Ref Range    WBC 8.8 4.0 - 11.0 10e9/L    RBC Count 4.39 3.8 - 5.2 10e12/L    Hemoglobin 14.2 11.7 - 15.7 g/dL    Hematocrit 39.5 35.0 - 47.0 %    MCV 90 78 - 100 fl    MCH 32.3 26.5 - 33.0 pg    MCHC 35.9 31.5 - 36.5 g/dL    RDW 12.9 10.0 - 15.0 %    Platelet Count 220 150 - 450 10e9/L    % Neutrophils 65.8 %    % Lymphocytes 24.1 %    % Monocytes 8.0 %    % Eosinophils 1.9 %    % Basophils 0.2 %    Absolute Neutrophil 5.8 1.6 - 8.3 10e9/L    Absolute Lymphocytes 2.1 0.8 - 5.3 10e9/L    Absolute Monocytes 0.7 0.0 - 1.3 10e9/L    Absolute Eosinophils 0.2 0.0 - 0.7 10e9/L    Absolute Basophils 0.0 0.0 - 0.2 10e9/L    Diff Method Automated Method

## 2021-01-26 NOTE — OR NURSING
Pt states she has Diverticulitis.  She is scheduled for Colonoscopy 2/2/21.  Sent message to Dr Kemar Mckeon to call pt to see if it should be rescheduled for later date.    Dr Mckeon would like pt to wait 6 weeks for Colonoscopy.  Endo pool notified.

## 2021-01-27 ENCOUNTER — TELEPHONE (OUTPATIENT)
Dept: GASTROENTEROLOGY | Facility: CLINIC | Age: 56
End: 2021-01-27

## 2021-01-27 NOTE — TELEPHONE ENCOUNTER
Caller: Sangita Meraz    Reason for cancel? Pt. Had a flare up Dr. Mckeon needed to push it out 6 weeks    Rescheduled? Yes to 3/16/2021    Will patient call back to reschedule?

## 2021-02-01 ENCOUNTER — HOSPITAL ENCOUNTER (OUTPATIENT)
Dept: MRI IMAGING | Facility: CLINIC | Age: 56
Discharge: HOME | End: 2021-02-01
Attending: INTERNAL MEDICINE | Admitting: INTERNAL MEDICINE
Payer: OTHER GOVERNMENT

## 2021-02-01 DIAGNOSIS — R79.82 ELEVATED C-REACTIVE PROTEIN (CRP): ICD-10-CM

## 2021-02-01 DIAGNOSIS — R19.7 DIARRHEA, UNSPECIFIED TYPE: ICD-10-CM

## 2021-02-01 DIAGNOSIS — R10.13 ABDOMINAL PAIN, EPIGASTRIC: ICD-10-CM

## 2021-02-01 PROCEDURE — 255N000002 HC RX 255 OP 636: Performed by: INTERNAL MEDICINE

## 2021-02-01 PROCEDURE — 250N000011 HC RX IP 250 OP 636: Performed by: INTERNAL MEDICINE

## 2021-02-01 PROCEDURE — 258N000003 HC RX IP 258 OP 636: Performed by: INTERNAL MEDICINE

## 2021-02-01 PROCEDURE — A9585 GADOBUTROL INJECTION: HCPCS | Performed by: INTERNAL MEDICINE

## 2021-02-01 PROCEDURE — 74183 MRI ABD W/O CNTR FLWD CNTR: CPT

## 2021-02-01 RX ORDER — GADOBUTROL 604.72 MG/ML
10 INJECTION INTRAVENOUS ONCE
Status: COMPLETED | OUTPATIENT
Start: 2021-02-01 | End: 2021-02-01

## 2021-02-01 RX ADMIN — GADOBUTROL 10 ML: 604.72 INJECTION INTRAVENOUS at 14:24

## 2021-02-01 RX ADMIN — SODIUM CHLORIDE 50 ML: 9 INJECTION, SOLUTION INTRAVENOUS at 14:24

## 2021-02-01 RX ADMIN — GLUCAGON HYDROCHLORIDE 1 MG: KIT at 14:41

## 2021-02-07 NOTE — RESULT ENCOUNTER NOTE
Sangita,  Your MRI of the abdomen with protocol directed toward you small intestine  looked good/unremarkable. It did not show diverticulitis.      Dr. Mckeon

## 2021-02-27 DIAGNOSIS — Z11.59 ENCOUNTER FOR SCREENING FOR OTHER VIRAL DISEASES: Primary | ICD-10-CM

## 2021-03-11 ENCOUNTER — PATIENT OUTREACH (OUTPATIENT)
Dept: GASTROENTEROLOGY | Facility: CLINIC | Age: 56
End: 2021-03-11

## 2021-03-11 NOTE — TELEPHONE ENCOUNTER
Returned pt call and relayed clinic scheduling line to make follow up appt with Dr Mckeon as requested.

## 2021-03-12 DIAGNOSIS — Z11.59 ENCOUNTER FOR SCREENING FOR OTHER VIRAL DISEASES: ICD-10-CM

## 2021-03-12 LAB
SARS-COV-2 RNA RESP QL NAA+PROBE: NORMAL
SPECIMEN SOURCE: NORMAL

## 2021-03-12 PROCEDURE — U0005 INFEC AGEN DETEC AMPLI PROBE: HCPCS | Performed by: INTERNAL MEDICINE

## 2021-03-12 PROCEDURE — U0003 INFECTIOUS AGENT DETECTION BY NUCLEIC ACID (DNA OR RNA); SEVERE ACUTE RESPIRATORY SYNDROME CORONAVIRUS 2 (SARS-COV-2) (CORONAVIRUS DISEASE [COVID-19]), AMPLIFIED PROBE TECHNIQUE, MAKING USE OF HIGH THROUGHPUT TECHNOLOGIES AS DESCRIBED BY CMS-2020-01-R: HCPCS | Performed by: INTERNAL MEDICINE

## 2021-03-13 LAB
LABORATORY COMMENT REPORT: NORMAL
SARS-COV-2 RNA RESP QL NAA+PROBE: NEGATIVE
SPECIMEN SOURCE: NORMAL

## 2021-03-16 ENCOUNTER — HOSPITAL ENCOUNTER (OUTPATIENT)
Facility: AMBULATORY SURGERY CENTER | Age: 56
Discharge: HOME | End: 2021-03-16
Attending: INTERNAL MEDICINE | Admitting: INTERNAL MEDICINE
Payer: OTHER GOVERNMENT

## 2021-03-16 VITALS
RESPIRATION RATE: 18 BRPM | OXYGEN SATURATION: 95 % | WEIGHT: 225 LBS | HEIGHT: 63 IN | SYSTOLIC BLOOD PRESSURE: 131 MMHG | BODY MASS INDEX: 39.87 KG/M2 | DIASTOLIC BLOOD PRESSURE: 66 MMHG | TEMPERATURE: 97 F | HEART RATE: 69 BPM

## 2021-03-16 LAB — COLONOSCOPY: NORMAL

## 2021-03-16 PROCEDURE — 88305 TISSUE EXAM BY PATHOLOGIST: CPT | Performed by: PATHOLOGY

## 2021-03-16 PROCEDURE — 45380 COLONOSCOPY AND BIOPSY: CPT

## 2021-03-16 RX ORDER — NALOXONE HYDROCHLORIDE 0.4 MG/ML
0.2 INJECTION, SOLUTION INTRAMUSCULAR; INTRAVENOUS; SUBCUTANEOUS
Status: CANCELLED | OUTPATIENT
Start: 2021-03-16 | End: 2021-03-17

## 2021-03-16 RX ORDER — FLUMAZENIL 0.1 MG/ML
0.2 INJECTION, SOLUTION INTRAVENOUS
Status: CANCELLED | OUTPATIENT
Start: 2021-03-16 | End: 2021-03-16

## 2021-03-16 RX ORDER — NALOXONE HYDROCHLORIDE 0.4 MG/ML
0.4 INJECTION, SOLUTION INTRAMUSCULAR; INTRAVENOUS; SUBCUTANEOUS
Status: CANCELLED | OUTPATIENT
Start: 2021-03-16 | End: 2021-03-17

## 2021-03-16 RX ORDER — ONDANSETRON 2 MG/ML
4 INJECTION INTRAMUSCULAR; INTRAVENOUS EVERY 6 HOURS PRN
Status: CANCELLED | OUTPATIENT
Start: 2021-03-16

## 2021-03-16 RX ORDER — PROCHLORPERAZINE MALEATE 10 MG
10 TABLET ORAL EVERY 6 HOURS PRN
Status: CANCELLED | OUTPATIENT
Start: 2021-03-16

## 2021-03-16 RX ORDER — SIMETHICONE
LIQUID (ML) MISCELLANEOUS PRN
Status: DISCONTINUED | OUTPATIENT
Start: 2021-03-16 | End: 2021-03-16 | Stop reason: HOSPADM

## 2021-03-16 RX ORDER — FENTANYL CITRATE 50 UG/ML
INJECTION, SOLUTION INTRAMUSCULAR; INTRAVENOUS PRN
Status: DISCONTINUED | OUTPATIENT
Start: 2021-03-16 | End: 2021-03-16 | Stop reason: HOSPADM

## 2021-03-16 RX ORDER — ONDANSETRON 4 MG/1
4 TABLET, ORALLY DISINTEGRATING ORAL EVERY 6 HOURS PRN
Status: CANCELLED | OUTPATIENT
Start: 2021-03-16

## 2021-03-16 ASSESSMENT — MIFFLIN-ST. JEOR: SCORE: 1589.72

## 2021-03-17 LAB — COPATH REPORT: NORMAL

## 2021-03-17 NOTE — RESULT ENCOUNTER NOTE
Sangita,  The  biopsies of your colon all returned normal.    Kemar Mckeon MD  Associate Professor of Medicine  Division of Gastroenterology, Hepatology, and Nutrition  [facility]

## 2021-04-07 ENCOUNTER — IMMUNIZATION (OUTPATIENT)
Dept: NURSING | Facility: CLINIC | Age: 56
End: 2021-04-07
Payer: OTHER GOVERNMENT

## 2021-04-07 PROCEDURE — 0001A PR COVID VAC PFIZER DIL RECON 30 MCG/0.3 ML IM: CPT

## 2021-04-07 PROCEDURE — 91300 PR COVID VAC PFIZER DIL RECON 30 MCG/0.3 ML IM: CPT

## 2021-04-12 ENCOUNTER — OFFICE VISIT (OUTPATIENT)
Dept: DERMATOLOGY | Facility: CLINIC | Age: 56
End: 2021-04-12
Payer: OTHER GOVERNMENT

## 2021-04-12 VITALS — DIASTOLIC BLOOD PRESSURE: 81 MMHG | OXYGEN SATURATION: 98 % | SYSTOLIC BLOOD PRESSURE: 133 MMHG | HEART RATE: 59 BPM

## 2021-04-12 DIAGNOSIS — D18.01 ANGIOMA OF SKIN: ICD-10-CM

## 2021-04-12 DIAGNOSIS — L82.1 SEBORRHEIC KERATOSIS: ICD-10-CM

## 2021-04-12 DIAGNOSIS — L81.4 LENTIGO: ICD-10-CM

## 2021-04-12 DIAGNOSIS — L85.9 HYPERKERATOSIS: Primary | ICD-10-CM

## 2021-04-12 DIAGNOSIS — D48.5 NEOPLASM OF UNCERTAIN BEHAVIOR OF SKIN: ICD-10-CM

## 2021-04-12 DIAGNOSIS — D22.9 NEVUS: ICD-10-CM

## 2021-04-12 PROCEDURE — 11104 PUNCH BX SKIN SINGLE LESION: CPT | Performed by: PHYSICIAN ASSISTANT

## 2021-04-12 PROCEDURE — 88305 TISSUE EXAM BY PATHOLOGIST: CPT | Performed by: PATHOLOGY

## 2021-04-12 PROCEDURE — 99214 OFFICE O/P EST MOD 30 MIN: CPT | Mod: 25 | Performed by: PHYSICIAN ASSISTANT

## 2021-04-12 RX ORDER — UREA 40 %
CREAM (GRAM) TOPICAL
Qty: 120 G | Refills: 11 | Status: SHIPPED | OUTPATIENT
Start: 2021-04-12 | End: 2021-09-01

## 2021-04-12 NOTE — PROGRESS NOTES
HPI:   Chief complaints: Sangita Meraz is a pleasant 55 year old female who presents for Full skin cancer screening to rule out skin cancer   Last Skin Exam: 1-2 years ago      1st Baseline: no  Personal HX of Skin Cancer: no   Personal HX of Malignant Melanoma: no   Family HX of Skin Cancer / Malignant Melanoma: no  Personal HX of Atypical Moles:   no  Risk factors: history of sun exposure and burns; blistering sunburn in the past  New / Changing lesions:no  Social History: moved from [place] recently;  family and have lived all over - she has 5 children  On review of systems, there are no further skin complaints, patient is feeling otherwise well.  See patient intake sheet.  ROS of the following were done and are negative: Constitutional, Eyes, Ears, Nose,   Mouth, Throat, Cardiovascular, Respiratory, GI, Genitourinary, Musculoskeletal,   Psychiatric, Endocrine, Allergic/Immunologic.    PHYSICAL EXAM:   /81   Pulse 59   LMP 12/09/2018   SpO2 98%   Skin exam performed as follows: Type 2 skin. Mood appropriate  Alert and Oriented X 3. Well developed, well nourished in no distress.  General appearance: Normal  Head including face: Normal  Eyes: conjunctiva and lids: Normal  Mouth: Lips, teeth, gums: Normal  Neck: Normal  Chest-breast/axillae: Normal  Back: Normal  Spleen and liver: Normal  Cardiovascular: Exam of peripheral vascular system by observation for swelling, varicosities, edema: Normal  Genitalia: groin, buttocks: Normal  Extremities: digits/nails (clubbing): Normal  Eccrine and Apocrine glands: Normal  Right upper extremity: Normal  Left upper extremity: Normal  Right lower extremity: Normal  Left lower extremity: Normal  Skin: Scalp and body hair: See below    Pt deferred exam of breasts, groin, buttocks: No    Other physical findings:  1. Multiple pigmented macules on extremities and trunk  2. Multiple pigmented macules on face, trunk and extremities  3. Multiple vascular papules on trunk,  arms and legs  4. Multiple scattered keratotic plaques  5. Smooth hair loss and perifollicular plugging on the vertex scalp       Except as noted above, no other signs of skin cancer or melanoma.     ASSESSMENT/PLAN:   Benign Full skin cancer screening today. . Patient with history of none  Advised on monthly self exams and 1 year  Patient Education: Appropriate brochures given.    1. Multiple benign appearing nevi on arms, legs and trunk. Discussed ABCDEs of melanoma and sunscreen.   2. Multiple lentigos on arms, legs and trunk. Advised benign, no treatment needed.  3. Multiple scattered angiomas. Advised benign, no treatment needed.   4. Seborrheic keratosis on arms, legs and trunk. Advised benign, no treatment needed.  5. R/o LPP vs androgenic alopecia vs other on the vertex scalp.  1% Lidocaine with epinephrine for anesthetic, with sterile technique a 4 mm punch biopsy was used to obtain a biopsy specimen of the lesion. Hemostasis was obtained by pressure and wound was sutured with 4-0 sutures. Antibiotic dressing was applied, and wound care instructions provided. The specimen is labeled and sent to pathology for evaluation. The procedure was well tolerated without complications  6. Hyperkeratosis on bilateral feet - start Urea 40% cream every day-BID            Follow-up: yearly FSE/PRN sooner    1.) Patient was asked about new and changing moles. YES  2.) Patient received a complete physical skin examination: YES  3.) Patient was counseled to perform a monthly self skin examination: YES  Scribed By: Lizzie Landry, MS, PA-C

## 2021-04-12 NOTE — LETTER
4/12/2021         RE: Sangita Meraz  [address]  [facility] [address]        Dear Colleague,    Thank you for referring your patient, Sangita Meraz, to the [facility]. Please see a copy of my visit note below.    HPI:   Chief complaints: Sangita Meraz is a pleasant 55 year old female who presents for Full skin cancer screening to rule out skin cancer   Last Skin Exam: 1-2 years ago      1st Baseline: no  Personal HX of Skin Cancer: no   Personal HX of Malignant Melanoma: no   Family HX of Skin Cancer / Malignant Melanoma: no  Personal HX of Atypical Moles:   no  Risk factors: history of sun exposure and burns; blistering sunburn in the past  New / Changing lesions:no  Social History: moved from [place] recently;  family and have lived all over - she has 5 children  On review of systems, there are no further skin complaints, patient is feeling otherwise well.  See patient intake sheet.  ROS of the following were done and are negative: Constitutional, Eyes, Ears, Nose,   Mouth, Throat, Cardiovascular, Respiratory, GI, Genitourinary, Musculoskeletal,   Psychiatric, Endocrine, Allergic/Immunologic.    PHYSICAL EXAM:   /81   Pulse 59   LMP 12/09/2018   SpO2 98%   Skin exam performed as follows: Type 2 skin. Mood appropriate  Alert and Oriented X 3. Well developed, well nourished in no distress.  General appearance: Normal  Head including face: Normal  Eyes: conjunctiva and lids: Normal  Mouth: Lips, teeth, gums: Normal  Neck: Normal  Chest-breast/axillae: Normal  Back: Normal  Spleen and liver: Normal  Cardiovascular: Exam of peripheral vascular system by observation for swelling, varicosities, edema: Normal  Genitalia: groin, buttocks: Normal  Extremities: digits/nails (clubbing): Normal  Eccrine and Apocrine glands: Normal  Right upper extremity: Normal  Left upper extremity: Normal  Right lower extremity: Normal  Left lower extremity: Normal  Skin: Scalp and body hair: See  below    Pt deferred exam of breasts, groin, buttocks: No    Other physical findings:  1. Multiple pigmented macules on extremities and trunk  2. Multiple pigmented macules on face, trunk and extremities  3. Multiple vascular papules on trunk, arms and legs  4. Multiple scattered keratotic plaques  5. Smooth hair loss and perifollicular plugging on the vertex scalp       Except as noted above, no other signs of skin cancer or melanoma.     ASSESSMENT/PLAN:   Benign Full skin cancer screening today. . Patient with history of none  Advised on monthly self exams and 1 year  Patient Education: Appropriate brochures given.    1. Multiple benign appearing nevi on arms, legs and trunk. Discussed ABCDEs of melanoma and sunscreen.   2. Multiple lentigos on arms, legs and trunk. Advised benign, no treatment needed.  3. Multiple scattered angiomas. Advised benign, no treatment needed.   4. Seborrheic keratosis on arms, legs and trunk. Advised benign, no treatment needed.  5. R/o LPP vs androgenic alopecia vs other on the vertex scalp.  1% Lidocaine with epinephrine for anesthetic, with sterile technique a 4 mm punch biopsy was used to obtain a biopsy specimen of the lesion. Hemostasis was obtained by pressure and wound was sutured with 4-0 sutures. Antibiotic dressing was applied, and wound care instructions provided. The specimen is labeled and sent to pathology for evaluation. The procedure was well tolerated without complications  6. Hyperkeratosis on bilateral feet - start Urea 40% cream every day-BID            Follow-up: yearly FSE/PRN sooner    1.) Patient was asked about new and changing moles. YES  2.) Patient received a complete physical skin examination: YES  3.) Patient was counseled to perform a monthly self skin examination: YES  Scribed By: Lizzie Landry, MS, PAMarianC          Again, thank you for allowing me to participate in the care of your patient.        Sincerely,        Lizzie Landry PA-C

## 2021-04-15 LAB — COPATH REPORT: NORMAL

## 2021-04-19 DIAGNOSIS — L66.10 LICHEN PLANOPILARIS: Primary | ICD-10-CM

## 2021-04-19 RX ORDER — FLUOCINONIDE TOPICAL SOLUTION USP, 0.05% 0.5 MG/ML
SOLUTION TOPICAL
Qty: 50 ML | Refills: 3 | Status: SHIPPED | OUTPATIENT
Start: 2021-04-19 | End: 2022-12-23

## 2021-04-25 NOTE — NURSING NOTE
Chief Complaint   Patient presents with     Abdominal Pain     LLQ pain x 2 days and slight fever - had same symptoms 9 months ago and had UTI       Initial There were no vitals taken for this visit. There is no height or weight on file to calculate BMI.    Patient presents to the clinic using No [facility]    Health Maintenance that is potentially due pending provider review:  NONE    n/a    Is there anyone who you would like to be able to receive your results? No  If yes have patient fill out [id number]    
SROM

## 2021-04-28 ENCOUNTER — IMMUNIZATION (OUTPATIENT)
Dept: NURSING | Facility: CLINIC | Age: 56
End: 2021-04-28
Attending: INTERNAL MEDICINE
Payer: OTHER GOVERNMENT

## 2021-04-28 PROCEDURE — 91300 PR COVID VAC PFIZER DIL RECON 30 MCG/0.3 ML IM: CPT

## 2021-04-28 PROCEDURE — 0002A PR COVID VAC PFIZER DIL RECON 30 MCG/0.3 ML IM: CPT

## 2021-06-18 ENCOUNTER — TELEPHONE (OUTPATIENT)
Dept: GASTROENTEROLOGY | Facility: CLINIC | Age: 56
End: 2021-06-18

## 2021-06-18 DIAGNOSIS — R10.13 ABDOMINAL PAIN, EPIGASTRIC: ICD-10-CM

## 2021-06-18 DIAGNOSIS — K21.00 GASTROESOPHAGEAL REFLUX DISEASE WITH ESOPHAGITIS WITHOUT HEMORRHAGE: ICD-10-CM

## 2021-06-18 RX ORDER — SUCRALFATE 1 G/1
1 TABLET ORAL 4 TIMES DAILY
Qty: 40 TABLET | Refills: 11 | Status: SHIPPED | OUTPATIENT
Start: 2021-06-18 | End: 2021-12-27

## 2021-06-18 NOTE — TELEPHONE ENCOUNTER
Prior Authorization Retail Medication Request    Medication/Dose: sucralfate (CARAFATE) 1 GM tablet  Take 1 tablet (1 g) by mouth 4 times daily - Oral  ICD code (if different than what is on RX):    Previously Tried and Failed:    Rationale:    Loose stool pain, lower abdominal cramping, upper abdominal pain, responsive to Pepcid and Carafate    Insurance Name: [employer], /GADIEL   Insurance ID: [id number]       Pharmacy Information (if different than what is on RX)  Name:    Phone:

## 2021-06-21 NOTE — TELEPHONE ENCOUNTER
Prior Authorization Not Needed per Insurance        Medication: sucralfate (CARAFATE) 1 GM tablet-PA NOT NEEDED    Insurance Company:  - Phone [phone] Fax [phone]  Expected CoPay: $13    Pharmacy Filling the Rx: [id number] [place], [place] - [address] [place] [address]  Pharmacy Notified: Yes  Patient Notified: No    Called pharmacy and pharmacy stated that PA is Not Needed and medication is covered. Pharmacy stated that they have a paid claim on medication on 6/18/2021 quantity 40 for 10 day supply and patient has picked up medication. Insurance also states that PA is Not Needed and medication is covered.

## 2021-07-08 ASSESSMENT — ENCOUNTER SYMPTOMS
BOWEL INCONTINENCE: 0
DIARRHEA: 1
NERVOUS/ANXIOUS: 1
DEPRESSION: 1
POLYDIPSIA: 0
RECTAL PAIN: 0
WEIGHT GAIN: 0
NAUSEA: 1
BLOATING: 1
POLYPHAGIA: 0
WEIGHT LOSS: 0
NIGHT SWEATS: 0
DECREASED APPETITE: 1
BLOOD IN STOOL: 0
INSOMNIA: 0
INCREASED ENERGY: 1
ABDOMINAL PAIN: 1
CONSTIPATION: 0
CHILLS: 0
FATIGUE: 1
VOMITING: 1
DECREASED CONCENTRATION: 1
JAUNDICE: 0
ALTERED TEMPERATURE REGULATION: 1
HALLUCINATIONS: 0
FEVER: 0
HEARTBURN: 0
PANIC: 0

## 2021-07-13 ENCOUNTER — OFFICE VISIT (OUTPATIENT)
Dept: GASTROENTEROLOGY | Facility: CLINIC | Age: 56
End: 2021-07-13
Payer: OTHER GOVERNMENT

## 2021-07-13 VITALS
BODY MASS INDEX: 41.36 KG/M2 | HEART RATE: 67 BPM | HEIGHT: 63 IN | TEMPERATURE: 99.1 F | DIASTOLIC BLOOD PRESSURE: 75 MMHG | OXYGEN SATURATION: 95 % | RESPIRATION RATE: 12 BRPM | WEIGHT: 233.4 LBS | SYSTOLIC BLOOD PRESSURE: 135 MMHG

## 2021-07-13 DIAGNOSIS — R14.0 ABDOMINAL BLOATING: Primary | ICD-10-CM

## 2021-07-13 DIAGNOSIS — E66.01 MORBID OBESITY (H): ICD-10-CM

## 2021-07-13 PROCEDURE — 99215 OFFICE O/P EST HI 40 MIN: CPT | Performed by: INTERNAL MEDICINE

## 2021-07-13 RX ORDER — AMITRIPTYLINE HYDROCHLORIDE 10 MG/1
TABLET ORAL
Qty: 10 TABLET | Refills: 1 | Status: SHIPPED | OUTPATIENT
Start: 2021-07-13 | End: 2021-09-09

## 2021-07-13 ASSESSMENT — PAIN SCALES - GENERAL: PAINLEVEL: NO PAIN (0)

## 2021-07-13 ASSESSMENT — MIFFLIN-ST. JEOR: SCORE: 1627.83

## 2021-07-13 NOTE — NURSING NOTE
"Chief Complaint   Patient presents with     Follow Up     Follow up for abdominal pain       Vitals:    07/13/21 1613   BP: 135/75   BP Location: Left arm   Patient Position: Sitting   Cuff Size: Adult Large   Pulse: 67   Resp: 12   Temp: 99.1  F (37.3  C)   TempSrc: Oral   SpO2: 95%   Weight: 105.9 kg (233 lb 6.4 oz)   Height: 1.6 m (5' 3\")       Body mass index is 41.34 kg/m .          Jyoti Reddy CMA                        "

## 2021-07-13 NOTE — LETTER
7/13/2021         RE: Sangita Meraz  [address]  [facility] [address]        Dear Colleague,    Thank you for referring your patient, Sangita Meraz, to the [facility] [place]. Please see a copy of my visit note below.    Gastroenterology Progress Note  ealth             Reason for Follow Up:   Irritable bowel syndrome with abdominal bloating         ASSESSMENT AND RECOMMENDATIONS:   Assessment:  55 year old female with a history of IBS with abdominal bloating which became much more severe than baseline after her bout of diverticulitis last fall evaluation has been unremarkable which is included an MR E upper endoscopy colonoscopy blood work.  Particularly difficult symptoms include bloating, nausea and vomiting.  The patient's recently started Carafate which has been very helpful.  She uses Metamucil in the morning which is also been very helpful     Recommendations:  Recommend the patient start amitriptyline 10 mg p.o. nightly to titrate to 50 mg if tolerated.  Patient will follow up with me in 1 month via the chart tell me how she is doing.             History of Present Illness:   Sangita Meraz is a 55 year old female with a history of longstanding IBS with diarrhea and abdominal bloating.  In addition to this there is some component of nausea and vomiting.  She is beginning to take omeprazole after stopping her famotidine this seems to be helping in addition Carafate is helping significantly.  She started Metamucil after trying colestipol and this seems to help even better her bowel habits become much more even.  Prior evaluation has been unremarkable.  Evaluation recently has also been unremarkable.  She still has abdominal bloating and discomfort at times.  She would like something else for this at this time.    She denies black or bloody stool weight loss neurologic problems difficulty with new lumps or bumps or other new symptoms.         Past Medical and Surgical  "History, Social History, Family History - per Epic EMR: REVIEWED         Allergies:   Reviewed and edited as appropriate     Allergies   Allergen Reactions     Ivp Dye [Contrast Dye] Anaphylaxis            Medications:     Current Outpatient Medications   Medication Sig Dispense Refill     amitriptyline (ELAVIL) 10 MG tablet 10-20 mg (1-2 tabs) 10 tablet 1     fluocinonide (LIDEX) 0.05 % external solution Apply to scalp BID x 3-4 weeks PRN 50 mL 3     sertraline (ZOLOFT) 100 MG tablet Take 100 mg by mouth        sucralfate (CARAFATE) 1 GM tablet Take 1 tablet (1 g) by mouth 4 times daily 40 tablet 11     Urea 40 % CREA Apply to feet every day- g 11             Review of Systems:     Per HPI           Physical Exam:   /75 (BP Location: Left arm, Patient Position: Sitting, Cuff Size: Adult Large)   Pulse 67   Temp 99.1  F (37.3  C) (Oral)   Resp 12   Ht 1.6 m (5' 3\")   Wt 105.9 kg (233 lb 6.4 oz)   LMP 12/09/2018   SpO2 95%   BMI 41.34 kg/m    Wt:   Wt Readings from Last 2 Encounters:   07/13/21 105.9 kg (233 lb 6.4 oz)   03/16/21 102.1 kg (225 lb)      Constitutional: cooperative, pleasant, not dyspneic/diaphoretic, no acute distress  Eyes: Sclera anicteric/injected  Ears/nose/mouth/throat: Normal oropharynx without ulcers or exudate, mucus membranes moist, hearing intact  Neck: supple, thyroid normal size  CV: No edema  Respiratory: Unlabored breathing  Lymph: No axillary, submandibular, supraclavicular or inguinal lymphadenopathy  Abd:  Nondistended, +bs, no hepatosplenomegaly, nontender, no peritoneal signs  Skin: warm, perfused, no jaundice  Neuro: AAO x 3, No asterixis  Psych: Normal affect  MSK: No gross deformities          Kemar Mckeon MD  Associate Professor of Medicine  Division of Gastroenterology, Hepatology, and Nutrition  [facility]    Answers for HPI/ROS submitted by the patient on 7/8/2021  General Symptoms: Yes  Skin Symptoms: No  HENT Symptoms: " No  EYE SYMPTOMS: No  HEART SYMPTOMS: No  LUNG SYMPTOMS: No  INTESTINAL SYMPTOMS: Yes  URINARY SYMPTOMS: No  GYNECOLOGIC SYMPTOMS: No  BREAST SYMPTOMS: No  SKELETAL SYMPTOMS: No  BLOOD SYMPTOMS: No  NERVOUS SYSTEM SYMPTOMS: No  MENTAL HEALTH SYMPTOMS: Yes  Fever: No  Loss of appetite: Yes  Weight loss: No  Weight gain: No  Fatigue: Yes  Night sweats: No  Chills: No  Increased stress: No  Excessive hunger: No  Excessive thirst: No  Feeling hot or cold when others believe the temperature is normal: Yes  Loss of height: No  Post-operative complications: No  Surgical site pain: No  Hallucinations: No  Change in or Loss of Energy: Yes  Hyperactivity: No  Confusion: No  Heart burn or indigestion: No  Nausea: Yes  Vomiting: Yes  Abdominal pain: Yes  Bloating: Yes  Constipation: No  Diarrhea: Yes  Blood in stool: No  Black stools: No  Rectal or Anal pain: No  Fecal incontinence: No  Yellowing of skin or eyes: No  Vomit with blood: No  Change in stools: No  Nervous or Anxious: Yes  Depression: Yes  Trouble sleeping: No  Trouble thinking or concentrating: Yes  Mood changes: Yes  Panic attacks: No    I spent 1 hour reviewing the patient's chart    Prior medical evaluations imaging studies laboratory test consultations previous notes and the face-to-face interview which was greater than 50% counseling are included.      Again, thank you for allowing me to participate in the care of your patient.        Sincerely,        Kemar Mckeon MD

## 2021-07-13 NOTE — PATIENT INSTRUCTIONS
It was a pleasure taking care of you today. I've included a brief summary of our discussion and care plan from today's visit below.  Please review this information with your primary care provider.  _______________________________________________________________________    My recommendations are summarized as follows:  Recommendations:  Recommend the patient start amitriptyline 10 mg p.o. nightly to titrate to 50 mg if tolerated.  Patient will follow up with me in 1 month via the chart tell me how she is doing.      Return to GI Clinic in [place] in 6 months to review your progress.     If you need any follow-up appointments, please use the following phone numbers below.    To schedule or reschedule a follow-up GI appointment, call [phone] option 1    To schedule your endoscopy procedure, call [phone] option 2    To schedule imaging, please call [phone]     To schedule your lab appointments ([place] [facility] [place] call [phone]. Call your [facility] lab directly if you do not use the [facility]. If you use a non-[facility] lab, please let us know where to fax your lab order (call Anya at [phone]).      _______________________________________________________________________    Please be in touch if there are any further questions that arise following today's visit.  There are multiple ways to contact your gastroenterology care team.      During business hours, you may reach your gastroenterology RN Care Coordinator, Anya Aiken, at [phone].      You can always send a secure message through DailyTicket. DailyTicket messages are answered by your nurse or doctor typically within 24 hours. Please allow extra time on weekends and holidays.     What is DailyTicket?  DailyTicket is a secure way for you to access all of your healthcare records from the [facility].  It is a web based computer program, so you can sign on to it from any location.  It also allows you to send secure messages to your care team.  I  recommend signing up for [employer] access if you have not already done so and are comfortable with using a computer.     For urgent/emergent questions after business hours, you may reach the on-call GI Fellow by contacting the [facility]  at [phone].     How will I get the results of any tests ordered?    You will receive all of your results.  If you have signed up for [employer], any tests ordered at your visit will be available to you after your physician reviews them.  Typically this takes 1-2 weeks.  If there are urgent results that require a change in your care plan, your physician or nurse will call you to discuss the next steps.      Thank you for choosing [facility]!       Sincerely,  Kemar Mckeon MD  [facility]  Division of Gastroenterology

## 2021-07-14 NOTE — PROGRESS NOTES
Gastroenterology Progress Note  [facility]             Reason for Follow Up:   Irritable bowel syndrome with abdominal bloating         ASSESSMENT AND RECOMMENDATIONS:   Assessment:  55 year old female with a history of IBS with abdominal bloating which became much more severe than baseline after her bout of diverticulitis last fall evaluation has been unremarkable which is included an MR E upper endoscopy colonoscopy blood work.  Particularly difficult symptoms include bloating, nausea and vomiting.  The patient's recently started Carafate which has been very helpful.  She uses Metamucil in the morning which is also been very helpful     Recommendations:  Recommend the patient start amitriptyline 10 mg p.o. nightly to titrate to 50 mg if tolerated.  Patient will follow up with me in 1 month via the chart tell me how she is doing.             History of Present Illness:   Sangita Meraz is a 55 year old female with a history of longstanding IBS with diarrhea and abdominal bloating.  In addition to this there is some component of nausea and vomiting.  She is beginning to take omeprazole after stopping her famotidine this seems to be helping in addition Carafate is helping significantly.  She started Metamucil after trying colestipol and this seems to help even better her bowel habits become much more even.  Prior evaluation has been unremarkable.  Evaluation recently has also been unremarkable.  She still has abdominal bloating and discomfort at times.  She would like something else for this at this time.    She denies black or bloody stool weight loss neurologic problems difficulty with new lumps or bumps or other new symptoms.         Past Medical and Surgical History, Social History, Family History - per Epic EMR: REVIEWED         Allergies:   Reviewed and edited as appropriate     Allergies   Allergen Reactions     Ivp Dye [Contrast Dye] Anaphylaxis            Medications:     Current Outpatient Medications  "  Medication Sig Dispense Refill     amitriptyline (ELAVIL) 10 MG tablet 10-20 mg (1-2 tabs) 10 tablet 1     fluocinonide (LIDEX) 0.05 % external solution Apply to scalp BID x 3-4 weeks PRN 50 mL 3     sertraline (ZOLOFT) 100 MG tablet Take 100 mg by mouth        sucralfate (CARAFATE) 1 GM tablet Take 1 tablet (1 g) by mouth 4 times daily 40 tablet 11     Urea 40 % CREA Apply to feet every day- g 11             Review of Systems:     Per HPI           Physical Exam:   /75 (BP Location: Left arm, Patient Position: Sitting, Cuff Size: Adult Large)   Pulse 67   Temp 99.1  F (37.3  C) (Oral)   Resp 12   Ht 1.6 m (5' 3\")   Wt 105.9 kg (233 lb 6.4 oz)   LMP 12/09/2018   SpO2 95%   BMI 41.34 kg/m    Wt:   Wt Readings from Last 2 Encounters:   07/13/21 105.9 kg (233 lb 6.4 oz)   03/16/21 102.1 kg (225 lb)      Constitutional: cooperative, pleasant, not dyspneic/diaphoretic, no acute distress  Eyes: Sclera anicteric/injected  Ears/nose/mouth/throat: Normal oropharynx without ulcers or exudate, mucus membranes moist, hearing intact  Neck: supple, thyroid normal size  CV: No edema  Respiratory: Unlabored breathing  Lymph: No axillary, submandibular, supraclavicular or inguinal lymphadenopathy  Abd:  Nondistended, +bs, no hepatosplenomegaly, nontender, no peritoneal signs  Skin: warm, perfused, no jaundice  Neuro: AAO x 3, No asterixis  Psych: Normal affect  MSK: No gross deformities          Kemar Mckeon MD  Associate Professor of Medicine  Division of Gastroenterology, Hepatology, and Nutrition  [facility]    Answers for HPI/ROS submitted by the patient on 7/8/2021  General Symptoms: Yes  Skin Symptoms: No  HENT Symptoms: No  EYE SYMPTOMS: No  HEART SYMPTOMS: No  LUNG SYMPTOMS: No  INTESTINAL SYMPTOMS: Yes  URINARY SYMPTOMS: No  GYNECOLOGIC SYMPTOMS: No  BREAST SYMPTOMS: No  SKELETAL SYMPTOMS: No  BLOOD SYMPTOMS: No  NERVOUS SYSTEM SYMPTOMS: No  MENTAL HEALTH SYMPTOMS: " Yes  Fever: No  Loss of appetite: Yes  Weight loss: No  Weight gain: No  Fatigue: Yes  Night sweats: No  Chills: No  Increased stress: No  Excessive hunger: No  Excessive thirst: No  Feeling hot or cold when others believe the temperature is normal: Yes  Loss of height: No  Post-operative complications: No  Surgical site pain: No  Hallucinations: No  Change in or Loss of Energy: Yes  Hyperactivity: No  Confusion: No  Heart burn or indigestion: No  Nausea: Yes  Vomiting: Yes  Abdominal pain: Yes  Bloating: Yes  Constipation: No  Diarrhea: Yes  Blood in stool: No  Black stools: No  Rectal or Anal pain: No  Fecal incontinence: No  Yellowing of skin or eyes: No  Vomit with blood: No  Change in stools: No  Nervous or Anxious: Yes  Depression: Yes  Trouble sleeping: No  Trouble thinking or concentrating: Yes  Mood changes: Yes  Panic attacks: No    I spent 1 hour reviewing the patient's chart    Prior medical evaluations imaging studies laboratory test consultations previous notes and the face-to-face interview which was greater than 50% counseling are included.

## 2021-09-01 DIAGNOSIS — L85.9 HYPERKERATOSIS: ICD-10-CM

## 2021-09-01 RX ORDER — UREA 40 %
CREAM (GRAM) TOPICAL
Qty: 120 G | Refills: 11 | Status: SHIPPED | OUTPATIENT
Start: 2021-09-01 | End: 2022-07-27

## 2021-09-08 ASSESSMENT — ENCOUNTER SYMPTOMS
COUGH: 0
WEAKNESS: 0
EYE PAIN: 0
HEADACHES: 0
DIZZINESS: 1
CHILLS: 0
DYSURIA: 0
DIARRHEA: 0
NAUSEA: 0
SORE THROAT: 0
FEVER: 0
FREQUENCY: 0
HEARTBURN: 0
PALPITATIONS: 0
BREAST MASS: 0
SHORTNESS OF BREATH: 0
JOINT SWELLING: 0
NERVOUS/ANXIOUS: 1
CONSTIPATION: 0
HEMATOCHEZIA: 0
HEMATURIA: 0
ARTHRALGIAS: 0
PARESTHESIAS: 0
MYALGIAS: 0
ABDOMINAL PAIN: 0

## 2021-09-09 ENCOUNTER — OFFICE VISIT (OUTPATIENT)
Dept: FAMILY MEDICINE | Facility: CLINIC | Age: 56
End: 2021-09-09
Payer: OTHER GOVERNMENT

## 2021-09-09 VITALS
SYSTOLIC BLOOD PRESSURE: 122 MMHG | DIASTOLIC BLOOD PRESSURE: 80 MMHG | TEMPERATURE: 97.6 F | BODY MASS INDEX: 40.93 KG/M2 | WEIGHT: 231 LBS | RESPIRATION RATE: 16 BRPM | HEART RATE: 72 BPM | HEIGHT: 63 IN

## 2021-09-09 DIAGNOSIS — L23.7 CONTACT DERMATITIS DUE TO POISON IVY: ICD-10-CM

## 2021-09-09 DIAGNOSIS — R68.82 DECREASED LIBIDO: ICD-10-CM

## 2021-09-09 DIAGNOSIS — Z23 NEED FOR PROPHYLACTIC VACCINATION AND INOCULATION AGAINST INFLUENZA: ICD-10-CM

## 2021-09-09 DIAGNOSIS — Z00.00 ROUTINE GENERAL MEDICAL EXAMINATION AT A HEALTH CARE FACILITY: Primary | ICD-10-CM

## 2021-09-09 DIAGNOSIS — Z13.1 SCREENING FOR DIABETES MELLITUS: ICD-10-CM

## 2021-09-09 LAB
CHOLEST SERPL-MCNC: 173 MG/DL
ERYTHROCYTE [DISTWIDTH] IN BLOOD BY AUTOMATED COUNT: 12.8 % (ref 10–15)
GLUCOSE BLD-MCNC: 117 MG/DL (ref 70–99)
HCT VFR BLD AUTO: 41.8 % (ref 35–47)
HDLC SERPL-MCNC: 46 MG/DL
HGB BLD-MCNC: 15 G/DL (ref 11.7–15.7)
LDLC SERPL CALC-MCNC: 90 MG/DL
MCH RBC QN AUTO: 32.1 PG (ref 26.5–33)
MCHC RBC AUTO-ENTMCNC: 35.9 G/DL (ref 31.5–36.5)
MCV RBC AUTO: 90 FL (ref 78–100)
NONHDLC SERPL-MCNC: 127 MG/DL
PLATELET # BLD AUTO: 242 10E3/UL (ref 150–450)
RBC # BLD AUTO: 4.67 10E6/UL (ref 3.8–5.2)
TRIGL SERPL-MCNC: 186 MG/DL
WBC # BLD AUTO: 5.9 10E3/UL (ref 4–11)

## 2021-09-09 PROCEDURE — 99396 PREV VISIT EST AGE 40-64: CPT | Mod: 25 | Performed by: NURSE PRACTITIONER

## 2021-09-09 PROCEDURE — 87624 HPV HI-RISK TYP POOLED RSLT: CPT | Performed by: NURSE PRACTITIONER

## 2021-09-09 PROCEDURE — 90472 IMMUNIZATION ADMIN EACH ADD: CPT | Performed by: NURSE PRACTITIONER

## 2021-09-09 PROCEDURE — 90471 IMMUNIZATION ADMIN: CPT | Performed by: NURSE PRACTITIONER

## 2021-09-09 PROCEDURE — 80061 LIPID PANEL: CPT | Performed by: NURSE PRACTITIONER

## 2021-09-09 PROCEDURE — 90682 RIV4 VACC RECOMBINANT DNA IM: CPT | Performed by: NURSE PRACTITIONER

## 2021-09-09 PROCEDURE — 85027 COMPLETE CBC AUTOMATED: CPT | Performed by: NURSE PRACTITIONER

## 2021-09-09 PROCEDURE — 82947 ASSAY GLUCOSE BLOOD QUANT: CPT | Performed by: NURSE PRACTITIONER

## 2021-09-09 PROCEDURE — 99213 OFFICE O/P EST LOW 20 MIN: CPT | Mod: 25 | Performed by: NURSE PRACTITIONER

## 2021-09-09 PROCEDURE — 36415 COLL VENOUS BLD VENIPUNCTURE: CPT | Performed by: NURSE PRACTITIONER

## 2021-09-09 PROCEDURE — G0145 SCR C/V CYTO,THINLAYER,RESCR: HCPCS | Performed by: NURSE PRACTITIONER

## 2021-09-09 PROCEDURE — 90715 TDAP VACCINE 7 YRS/> IM: CPT | Performed by: NURSE PRACTITIONER

## 2021-09-09 RX ORDER — TRIAMCINOLONE ACETONIDE 1 MG/G
CREAM TOPICAL 2 TIMES DAILY
Qty: 45 G | Refills: 1 | Status: SHIPPED | OUTPATIENT
Start: 2021-09-09 | End: 2021-12-27

## 2021-09-09 RX ORDER — TRIAMCINOLONE ACETONIDE 1 MG/G
CREAM TOPICAL 2 TIMES DAILY
Qty: 45 G | Refills: 1 | Status: SHIPPED | OUTPATIENT
Start: 2021-09-09 | End: 2021-09-09

## 2021-09-09 ASSESSMENT — ENCOUNTER SYMPTOMS
NAUSEA: 0
DIARRHEA: 0
CONSTIPATION: 0
HEMATURIA: 0
JOINT SWELLING: 0
COUGH: 0
PARESTHESIAS: 0
FREQUENCY: 0
DIZZINESS: 1
SHORTNESS OF BREATH: 0
PALPITATIONS: 0
WEAKNESS: 0
ABDOMINAL PAIN: 0
ARTHRALGIAS: 0
SORE THROAT: 0
NERVOUS/ANXIOUS: 1
BREAST MASS: 0
CHILLS: 0
EYE PAIN: 0
FEVER: 0
HEADACHES: 0
HEARTBURN: 0
DYSURIA: 0
MYALGIAS: 0
HEMATOCHEZIA: 0

## 2021-09-09 ASSESSMENT — MIFFLIN-ST. JEOR: SCORE: 1616.94

## 2021-09-09 NOTE — PROGRESS NOTES
SUBJECTIVE:   CC: Sangita Meraz is an 55 year old woman who presents for preventive health visit.       Patient has been advised of split billing requirements and indicates understanding: Yes  Healthy Habits:     Getting at least 3 servings of Calcium per day:  Yes    Bi-annual eye exam:  Yes    Dental care twice a year:  Yes    Sleep apnea or symptoms of sleep apnea:  Daytime drowsiness and Sleep apnea    Diet:  Regular (no restrictions)    Frequency of exercise:  2-3 days/week    Duration of exercise:  Less than 15 minutes    Taking medications regularly:  Yes    Medication side effects:  Not applicable    PHQ-2 Total Score: 1    Additional concerns today:  Yes      Patient has poison ivy on her legs that she would like looked at. She has a spot on her labia that is dry and sometimes becomes inflamed.       Today's PHQ-2 Score:   PHQ-2 ( 1999 [employer]) 9/8/2021   Q1: Little interest or pleasure in doing things 1   Q2: Feeling down, depressed or hopeless 0   PHQ-2 Score 1   Q1: Little interest or pleasure in doing things Several days   Q2: Feeling down, depressed or hopeless Not at all   PHQ-2 Score 1       Abuse: Current or Past (Physical, Sexual or Emotional) - No  Do you feel safe in your environment? Yes    Have you ever done Advance Care Planning? (For example, a Health Directive, POLST, or a discussion with a medical provider or your loved ones about your wishes): No, advance care planning information given to patient to review.  Patient declined advance care planning discussion at this time.    Social History     Tobacco Use     Smoking status: Former Smoker     Smokeless tobacco: Never Used   Substance Use Topics     Alcohol use: Yes     If you drink alcohol do you typically have >3 drinks per day or >7 drinks per week? No    Alcohol Use 9/8/2021   Prescreen: >3 drinks/day or >7 drinks/week? No       Reviewed orders with patient.  Reviewed health maintenance and updated orders accordingly - Yes  Labs  reviewed in EPIC  BP Readings from Last 3 Encounters:   09/09/21 122/80   07/13/21 135/75   04/12/21 133/81    Wt Readings from Last 3 Encounters:   09/09/21 104.8 kg (231 lb)   07/13/21 105.9 kg (233 lb 6.4 oz)   03/16/21 102.1 kg (225 lb)                  Patient Active Problem List   Diagnosis     Morbid obesity (H)     Past Surgical History:   Procedure Laterality Date     COLONOSCOPY N/A 3/16/2021    Procedure: COLONOSCOPY, WITH BIOPSY AND CLIPPING;  Surgeon: Kemar Mckeon MD;  Location: [facility] [place]     ESOPHAGOSCOPY, GASTROSCOPY, DUODENOSCOPY (EGD), COMBINED N/A 12/9/2020    Procedure: ESOPHAGOGASTRODUODENOSCOPY, WITH BIOPSY;  Surgeon: Anton Knott DO;  Location: [facility]       Social History     Tobacco Use     Smoking status: Former Smoker     Smokeless tobacco: Never Used   Substance Use Topics     Alcohol use: Yes     Family History   Problem Relation Age of Onset     Uterine Cancer Mother      Colon Polyps Father          Current Outpatient Medications   Medication Sig Dispense Refill     fluocinonide (LIDEX) 0.05 % external solution Apply to scalp BID x 3-4 weeks PRN 50 mL 3     sertraline (ZOLOFT) 100 MG tablet Take 100 mg by mouth        sucralfate (CARAFATE) 1 GM tablet Take 1 tablet (1 g) by mouth 4 times daily 40 tablet 11     Urea 40 % CREA Apply to feet every day- g 11     Allergies   Allergen Reactions     Ivp Dye [Contrast Dye] Anaphylaxis     Recent Labs   Lab Test 12/30/20  1703 11/16/20  0850 07/25/20  1221   A1C 6.3*  --   --    ALT 87* 40 46   CR  --  1.05* 0.81   GFRESTIMATED  --  60* 82   GFRESTBLACK  --  70 >90   POTASSIUM  --  4.5 3.8        Breast Cancer Screening:    Breast CA Risk Assessment (FHS-7) 9/8/2021   Do you have a family history of breast, colon, or ovarian cancer? No / Unknown         Mammogram Screening: Recommended mammography every 1-2 years with patient discussion and risk factor consideration  Pertinent mammograms are reviewed under the imaging  tab.    History of abnormal Pap smear: NO - age 30- 65 PAP every 3 years recommended     Reviewed and updated as needed this visit by clinical staff                 Reviewed and updated as needed this visit by Provider                History reviewed. No pertinent past medical history.   Past Surgical History:   Procedure Laterality Date     COLONOSCOPY N/A 3/16/2021    Procedure: COLONOSCOPY, WITH BIOPSY AND CLIPPING;  Surgeon: Kemar Mckeon MD;  [place] [facility] [place]     ESOPHAGOSCOPY, GASTROSCOPY, DUODENOSCOPY (EGD), COMBINED N/A 12/9/2020    Procedure: ESOPHAGOGASTRODUODENOSCOPY, WITH BIOPSY;  Surgeon: Anton Knott DO;  Location: [facility]     OB History   No obstetric history on file.       Review of Systems   Constitutional: Negative for chills and fever.   HENT: Negative for congestion, ear pain, hearing loss and sore throat.    Eyes: Negative for pain and visual disturbance.   Respiratory: Negative for cough and shortness of breath.    Cardiovascular: Negative for chest pain, palpitations and peripheral edema.   Gastrointestinal: Negative for abdominal pain, constipation, diarrhea, heartburn, hematochezia and nausea.   Breasts:  Negative for tenderness, breast mass and discharge.   Genitourinary: Negative for dysuria, frequency, genital sores, hematuria, pelvic pain, urgency, vaginal bleeding and vaginal discharge.   Musculoskeletal: Negative for arthralgias, joint swelling and myalgias.   Skin: Positive for rash.   Neurological: Positive for dizziness. Negative for weakness, headaches and paresthesias.   Psychiatric/Behavioral: Negative for mood changes. The patient is nervous/anxious.           OBJECTIVE:   LMP 12/09/2018   Physical Exam  GENERAL APPEARANCE: healthy, alert and no distress  EYES: Eyes grossly normal to inspection, PERRL and conjunctivae and sclerae normal  HENT: ear canals and TM's normal, nose and mouth without ulcers or lesions, oropharynx clear and oral mucous membranes moist  NECK:  no adenopathy, no asymmetry, masses, or scars and thyroid normal to palpation  RESP: lungs clear to auscultation - no rales, rhonchi or wheezes  BREAST: normal without masses, tenderness or nipple discharge and no palpable axillary masses or adenopathy  CV: regular rate and rhythm, normal S1 S2, no S3 or S4, no murmur, click or rub, no peripheral edema and peripheral pulses strong  ABDOMEN: soft, nontender, no hepatosplenomegaly, no masses and bowel sounds normal   (female): normal female external genitalia, normal urethral meatus, vaginal mucosal atrophy noted, normal cervix, adnexae, and uterus without masses or abnormal discharge  MS: no musculoskeletal defects are noted and gait is age appropriate without ataxia  SKIN: no suspicious lesions Examination of the rash to legs bilateral  reveals: Poison Ivy: clusters of well-defined erythematous vesicles with clear drainage.  NEURO: Normal strength and tone, sensory exam grossly normal, mentation intact and speech normal  PSYCH: mentation appears normal and affect normal/bright    Diagnostic Test Results:  Labs reviewed in Epic  Results for orders placed or performed in visit on 09/09/21   CBC with platelets     Status: Normal   Result Value Ref Range    WBC Count 5.9 4.0 - 11.0 10e3/uL    RBC Count 4.67 3.80 - 5.20 10e6/uL    Hemoglobin 15.0 11.7 - 15.7 g/dL    Hematocrit 41.8 35.0 - 47.0 %    MCV 90 78 - 100 fL    MCH 32.1 26.5 - 33.0 pg    MCHC 35.9 31.5 - 36.5 g/dL    RDW 12.8 10.0 - 15.0 %    Platelet Count 242 150 - 450 10e3/uL       ASSESSMENT/PLAN:   (Z00.00) Routine general medical examination at a health care facility  (primary encounter diagnosis)  Comment:   Plan: GLUCOSE, Gynecologic Cytology (PAP), MA         Screening Digital Bilateral, Lipid panel reflex        to direct LDL Fasting, CBC with platelets            (Z23) Need for prophylactic vaccination and inoculation against influenza  Comment: Yes  Plan: INFLUENZA QUAD, RECOMBINANT, P-FREE (RIV4)  "        (FLUBLOK)  Yes I am    (L23.7) Contact dermatitis due to poison ivy  Comment:   Plan: triamcinolone (KENALOG) 0.1 % external cream,         DISCONTINUED: triamcinolone (KENALOG) 0.1 %         external cream            (R68.82) Decreased libido  Comment:   Plan: Ob/Gyn Referral              Patient has been advised of split billing requirements and indicates understanding: Yes  COUNSELING:  Reviewed preventive health counseling, as reflected in patient instructions       Regular exercise       Healthy diet/nutrition       Vision screening       Hearing screening       MMR vaccine reminder (1 dose) for patients born after 1956 with no documented vaccination/immunity        Alcohol Use       Contraception       Family planning       Osteoporosis prevention/bone health       Safe sex practices/STD prevention    Estimated body mass index is 41.34 kg/m  as calculated from the following:    Height as of 7/13/21: 1.6 m (5' 3\").    Weight as of 7/13/21: 105.9 kg (233 lb 6.4 oz).    Weight management plan: Discussed healthy diet and exercise guidelines    She reports that she has quit smoking. She has never used smokeless tobacco.      Counseling Resources:  ATP IV Guidelines  Pooled Cohorts Equation Calculator  Breast Cancer Risk Calculator  BRCA-Related Cancer Risk Assessment: FHS-7 Tool  FRAX Risk Assessment  ICSI Preventive Guidelines  Dietary Guidelines for Americans, 2010  USDA's MyPlate  ASA Prophylaxis  Lung CA Screening    No Sosa, APRN CNP  [facility]  "

## 2021-09-14 LAB
BKR LAB AP GYN ADEQUACY: NORMAL
BKR LAB AP GYN INTERPRETATION: NORMAL
BKR LAB AP HPV REFLEX: NORMAL
BKR LAB AP PREVIOUS ABNORMAL: NORMAL
PATH REPORT.COMMENTS IMP SPEC: NORMAL
PATH REPORT.RELEVANT HX SPEC: NORMAL

## 2021-09-15 LAB
HUMAN PAPILLOMA VIRUS 16 DNA: NEGATIVE
HUMAN PAPILLOMA VIRUS 18 DNA: NEGATIVE
HUMAN PAPILLOMA VIRUS FINAL DIAGNOSIS: NORMAL
HUMAN PAPILLOMA VIRUS OTHER HR: NEGATIVE

## 2021-09-23 ENCOUNTER — OFFICE VISIT (OUTPATIENT)
Dept: OBGYN | Facility: CLINIC | Age: 56
End: 2021-09-23
Payer: OTHER GOVERNMENT

## 2021-09-23 VITALS
RESPIRATION RATE: 18 BRPM | DIASTOLIC BLOOD PRESSURE: 64 MMHG | HEART RATE: 69 BPM | HEIGHT: 63 IN | BODY MASS INDEX: 41.62 KG/M2 | SYSTOLIC BLOOD PRESSURE: 142 MMHG | TEMPERATURE: 97.2 F | WEIGHT: 234.9 LBS

## 2021-09-23 DIAGNOSIS — Z79.890 HORMONE REPLACEMENT THERAPY: Primary | ICD-10-CM

## 2021-09-23 PROCEDURE — 99204 OFFICE O/P NEW MOD 45 MIN: CPT | Performed by: OBSTETRICS & GYNECOLOGY

## 2021-09-23 RX ORDER — FAMOTIDINE 20 MG
TABLET ORAL
COMMUNITY
End: 2022-09-07

## 2021-09-23 RX ORDER — CALCIUM CARBONATE 500(1250)
1 TABLET ORAL 2 TIMES DAILY
COMMUNITY
End: 2022-09-07

## 2021-09-23 RX ORDER — NICOTINE POLACRILEX 4 MG/1
20 GUM, CHEWING ORAL EVERY EVENING
COMMUNITY

## 2021-09-23 ASSESSMENT — MIFFLIN-ST. JEOR: SCORE: 1634.63

## 2021-09-23 NOTE — PROGRESS NOTES
"[facility]  OB/GYN Clinic   Gynecology Consult Note    CC:  Chief Complaint   Patient presents with     Consult     menopausal symptoms.  Vaginal discomfort, painful intercourse, vulvar itching and dryness.  Mole on vagina broke open and was bleeding.  Gets little tears with [place]-decreased libido even more because of this.       HPI: Ms. Meraz is a 55 year old  being seen for GYN consultation for menopausal symptoms.   Mom went into menopause in her 60s. Had PMB in 70s and had uterine cancer.   Periods stopped 51-52. No PMB.   Was having hot flashes, now down to 1x hot flashes at 11PM every night. Very low libido. Dyspareunia, both initial penetration and then deeper. Sometimes it's ok. Feels very dry. Uses \"Gulva\" as a vaginal dryness, uses every day, uses both morning and evening.   Also has brain fog.     GYN Hx: Hx of HPV and chlamydia. More normal PAP smears.     ROS: A 10 pt ROS was completed and found to be otherwise negative unless mentioned in the HPI.     PMH:   History reviewed. No pertinent past medical history. Has IBS.     PSHx:   Past Surgical History:   Procedure Laterality Date     COLONOSCOPY N/A 3/16/2021    Procedure: COLONOSCOPY, WITH BIOPSY AND CLIPPING;  Surgeon: Kemar Mckeon MD;  Location: [facility] OR     ESOPHAGOSCOPY, GASTROSCOPY, DUODENOSCOPY (EGD), COMBINED N/A 2020    Procedure: ESOPHAGOGASTRODUODENOSCOPY, WITH BIOPSY;  Surgeon: Anton Knott DO;  Location: [facility]       OBHx:   OB History    Para Term  AB Living   6 5 0 0 0 0   SAB TAB Ectopic Multiple Live Births   0 0 0 0 0      # Outcome Date GA Lbr Addy/2nd Weight Sex Delivery Anes PTL Lv   6             5 Para            4 Para            3 Para            2 Para            1 Para            1`x c/s, 3x , 1x c/s    Medications:   calcium carbonate (OS-MIRIAM) 500 MG tablet, Take 1 tablet by mouth 2 times daily  fluocinonide (LIDEX) 0.05 % external solution, Apply to " scalp BID x 3-4 weeks PRN  omeprazole 20 MG tablet, Take 20 mg by mouth daily  sertraline (ZOLOFT) 100 MG tablet, Take 100 mg by mouth   sucralfate (CARAFATE) 1 GM tablet, Take 1 tablet (1 g) by mouth 4 times daily  triamcinolone (KENALOG) 0.1 % external cream, Apply topically 2 times daily  Urea 40 % CREA, Apply to feet every day-BID  Vitamin D, Cholecalciferol, 25 MCG (1000 UT) CAPS,     No current facility-administered medications on file prior to visit.      Allergies:      Allergies   Allergen Reactions     Ivp Dye [Contrast Dye] Anaphylaxis       Social History:  Social History     Socioeconomic History     Marital status:      Spouse name: Not on file     Number of children: Not on file     Years of education: Not on file     Highest education level: Not on file   Occupational History     Not on file   Tobacco Use     Smoking status: Former Smoker     Smokeless tobacco: Never Used   Vaping Use     Vaping Use: Never used   Substance and Sexual Activity     Alcohol use: Yes     Drug use: Never     Sexual activity: Yes     Partners: Male   Other Topics Concern     Parent/sibling w/ CABG, MI or angioplasty before 65F 55M? Not Asked   Social History Narrative     Not on file     Social Determinants of Health     Financial Resource Strain:      Difficulty of Paying Living Expenses:    Food Insecurity:      Worried About Running Out of Food in the Last Year:      Ran Out of Food in the Last Year:    Transportation Needs:      Lack of Transportation (Medical):      Lack of Transportation (Non-Medical):    Physical Activity:      Days of Exercise per Week:      Minutes of Exercise per Session:    Stress:      Feeling of Stress :    Social Connections:      Frequency of Communication with Friends and Family:      Frequency of Social Gatherings with Friends and Family:      Attends Amish Services:      Active Member of Clubs or Organizations:      Attends Club or Organization Meetings:      Marital Status:     Intimate Partner Violence:      Fear of Current or Ex-Partner:      Emotionally Abused:      Physically Abused:      Sexually Abused:      Social History     Socioeconomic History     Marital status:      Spouse name: None     Number of children: None     Years of education: None     Highest education level: None   Occupational History     None   Tobacco Use     Smoking status: Former Smoker     Smokeless tobacco: Never Used   Vaping Use     Vaping Use: Never used   Substance and Sexual Activity     Alcohol use: Yes     Drug use: Never     Sexual activity: Yes     Partners: Male   Other Topics Concern     Parent/sibling w/ CABG, MI or angioplasty before 65F 55M? Not Asked   Social History Narrative     None     Social Determinants of Health     Financial Resource Strain:      Difficulty of Paying Living Expenses:    Food Insecurity:      Worried About Running Out of Food in the Last Year:      Ran Out of Food in the Last Year:    Transportation Needs:      Lack of Transportation (Medical):      Lack of Transportation (Non-Medical):    Physical Activity:      Days of Exercise per Week:      Minutes of Exercise per Session:    Stress:      Feeling of Stress :    Social Connections:      Frequency of Communication with Friends and Family:      Frequency of Social Gatherings with Friends and Family:      Attends Taoism Services:      Active Member of Clubs or Organizations:      Attends Club or Organization Meetings:      Marital Status:    Intimate Partner Violence:      Fear of Current or Ex-Partner:      Emotionally Abused:      Physically Abused:      Sexually Abused:        Family History:  Family History   Problem Relation Age of Onset     Uterine Cancer Mother         endometrial cancer     Colon Polyps Father      Physical Exam:   Vitals:    09/23/21 1005   BP: (!) 142/64   BP Location: Right arm   Cuff Size: Adult Large   Pulse: 69   Resp: 18   Temp: 97.2  F (36.2  C)   Weight: 106.5 kg (234 lb 14.4  "oz)   Height: 1.6 m (5' 3\")      Estimated body mass index is 41.61 kg/m  as calculated from the following:    Height as of this encounter: 1.6 m (5' 3\").    Weight as of this encounter: 106.5 kg (234 lb 14.4 oz).    Gen: Pleasant, talkative female in no apparent distress   Respiratory: breathing comfortably on room air   Cardiac: Regular rate, warm and well-perfused.   GI: Abd soft and non-tender  : External genitalia with profound vulvovaginal atrophy with tissue thinning, pallor, and loss of normal architecture, tight painful posterior band, cervix is small, normal uterus and no adnexal enlargement    MSK: Grossly normal movement of all four extremities  Psych: mood and affect bright   Lower extremity: edema not present     A&P: 54 yo F who presents to discuss menopause symptoms.   I did discuss with her that the symptoms she is describing are very classically described during menopause. Discussed that the standard of care for hot flashes is to start hormone replacement therapy. Since she does not have a uterus she would only need estrogen.  I did discuss with her that the data around hormonal replacement therapy is complicated and conflicting.  General prescribing guidelines due state that it should be initiated within 10 years of menopause and the lowest dose for the shortest amount of time that controlled her symptoms should be used.  Discussed that HRT should be used for <10 yrs. I did discuss that hormone replacement therapy used within these prescribing guidelines have benefits including decrease in CVD, dementia, colorectal and fracture risk as well as overall mortality. I discussed that using HRT outside of these prescribing guidelines could increase risks of CVD. If a uterus is present, she needs both estrogen and progesterone to prevent endometrial cancer. THe use of progesterone causes a slight increase risk of breast cancer. I did discuss the increased risk of VTE, but that the transdermal estrogen " would reduce the risk of VTE. Would like to start the combo patch.   I did review that there may be some off label uses of other medications such as gabapentin and SSRIs, but that these are typically reserved for women who are poor candidates for traditional HRT.  Also recommended vaginal estrogen for profound vulvovaginal atrophy symptoms. Wants to try estring.    The patient strongly desires starting hormone replacement therapy as her symptoms are very much affecting her quality of life.  I prescribed her a low-dose transdermal estrogen patch and she will let me know if her symptoms are not controlled and I will increase the dose for her.  I did discuss a trial of weaning annually.    I spent 40 min with the patient and 10 min in documentation and chart review.     Ines Chi MD  OB/GYN  9/23/2021

## 2021-10-06 ENCOUNTER — ANCILLARY PROCEDURE (OUTPATIENT)
Dept: MAMMOGRAPHY | Facility: CLINIC | Age: 56
End: 2021-10-06
Attending: NURSE PRACTITIONER
Payer: OTHER GOVERNMENT

## 2021-10-06 DIAGNOSIS — Z00.00 ROUTINE GENERAL MEDICAL EXAMINATION AT A HEALTH CARE FACILITY: ICD-10-CM

## 2021-10-06 PROCEDURE — 77063 BREAST TOMOSYNTHESIS BI: CPT | Mod: TC | Performed by: RADIOLOGY

## 2021-10-06 PROCEDURE — 77067 SCR MAMMO BI INCL CAD: CPT | Mod: TC | Performed by: RADIOLOGY

## 2021-12-27 ENCOUNTER — E-VISIT (OUTPATIENT)
Dept: FAMILY MEDICINE | Facility: CLINIC | Age: 56
End: 2021-12-27
Payer: OTHER GOVERNMENT

## 2021-12-27 ENCOUNTER — OFFICE VISIT (OUTPATIENT)
Dept: FAMILY MEDICINE | Facility: CLINIC | Age: 56
End: 2021-12-27
Payer: OTHER GOVERNMENT

## 2021-12-27 VITALS
HEART RATE: 76 BPM | SYSTOLIC BLOOD PRESSURE: 136 MMHG | RESPIRATION RATE: 18 BRPM | BODY MASS INDEX: 42.1 KG/M2 | HEIGHT: 63 IN | WEIGHT: 237.6 LBS | TEMPERATURE: 97.6 F | OXYGEN SATURATION: 96 % | DIASTOLIC BLOOD PRESSURE: 76 MMHG

## 2021-12-27 DIAGNOSIS — R31.9 HEMATURIA, UNSPECIFIED TYPE: ICD-10-CM

## 2021-12-27 DIAGNOSIS — N39.0 ACUTE UTI (URINARY TRACT INFECTION): Primary | ICD-10-CM

## 2021-12-27 LAB
ALBUMIN UR-MCNC: NEGATIVE MG/DL
APPEARANCE UR: CLEAR
BACTERIA #/AREA URNS HPF: ABNORMAL /HPF
BILIRUB UR QL STRIP: NEGATIVE
COLOR UR AUTO: YELLOW
GLUCOSE UR STRIP-MCNC: NEGATIVE MG/DL
HGB UR QL STRIP: ABNORMAL
KETONES UR STRIP-MCNC: NEGATIVE MG/DL
LEUKOCYTE ESTERASE UR QL STRIP: NEGATIVE
NITRATE UR QL: NEGATIVE
PH UR STRIP: 6.5 [PH] (ref 5–7)
RBC #/AREA URNS AUTO: ABNORMAL /HPF
SP GR UR STRIP: 1.01 (ref 1–1.03)
SQUAMOUS #/AREA URNS AUTO: ABNORMAL /LPF
UROBILINOGEN UR STRIP-ACNC: 0.2 E.U./DL
WBC #/AREA URNS AUTO: ABNORMAL /HPF

## 2021-12-27 PROCEDURE — 87186 SC STD MICRODIL/AGAR DIL: CPT | Performed by: NURSE PRACTITIONER

## 2021-12-27 PROCEDURE — 99213 OFFICE O/P EST LOW 20 MIN: CPT | Performed by: NURSE PRACTITIONER

## 2021-12-27 PROCEDURE — 81001 URINALYSIS AUTO W/SCOPE: CPT | Performed by: NURSE PRACTITIONER

## 2021-12-27 PROCEDURE — 99207 PR NO BILLABLE SERVICE THIS VISIT: CPT | Performed by: NURSE PRACTITIONER

## 2021-12-27 PROCEDURE — 87086 URINE CULTURE/COLONY COUNT: CPT | Performed by: NURSE PRACTITIONER

## 2021-12-27 PROCEDURE — 87088 URINE BACTERIA CULTURE: CPT | Performed by: NURSE PRACTITIONER

## 2021-12-27 RX ORDER — CEPHALEXIN 500 MG/1
500 CAPSULE ORAL 2 TIMES DAILY
Qty: 14 CAPSULE | Refills: 0 | Status: SHIPPED | OUTPATIENT
Start: 2021-12-27 | End: 2022-01-03

## 2021-12-27 RX ORDER — PHENAZOPYRIDINE HYDROCHLORIDE 200 MG/1
200 TABLET, FILM COATED ORAL 3 TIMES DAILY PRN
Qty: 9 TABLET | Refills: 0 | Status: SHIPPED | OUTPATIENT
Start: 2021-12-27 | End: 2022-02-10

## 2021-12-27 RX ORDER — BUSPIRONE HYDROCHLORIDE 10 MG/1
10 TABLET ORAL 2 TIMES DAILY
COMMUNITY
End: 2022-03-10

## 2021-12-27 ASSESSMENT — PAIN SCALES - GENERAL: PAINLEVEL: MILD PAIN (2)

## 2021-12-27 ASSESSMENT — MIFFLIN-ST. JEOR: SCORE: 1641.75

## 2021-12-27 NOTE — NURSING NOTE
"No chief complaint on file.      Initial Pulse 76   LMP 12/09/2018   SpO2 96%  Estimated body mass index is 41.61 kg/m  as calculated from the following:    Height as of 9/23/21: 1.6 m (5' 3\").    Weight as of 9/23/21: 106.5 kg (234 lb 14.4 oz).    Patient presents to the clinic using No DME    Health Maintenance that is potentially due pending provider review:  NONE    n/a    Is there anyone who you would like to be able to receive your results? No  If yes have patient fill out ОЛЕГ      "

## 2021-12-27 NOTE — PATIENT INSTRUCTIONS
Dear Sangita Meraz,     After reviewing your responses, I would like you to come in for a urine test to make sure we treat you correctly. This urine test is to evaluate you for a possible urinary tract infection, and should be scheduled for today or tomorrow. Schedule a Lab Only appointment here.     Lab appointments are not available at most locations on the weekends. If no Lab Only appointment is available, you should be seen in any of our convenient Walk-in or Urgent Care Centers, which can be found on our website here.     You will receive instructions with your results in [place] once they are available.     If your symptoms worsen, you develop pain in your back or stomach, develop fevers, or are not improving in 5 days, please contact your primary care provider for an appointment or visit a Walk-in or Urgent Care Center to be seen.     Thanks again for choosing us as your health care partner,     CL Oneill CNP  
medium/thick NAC

## 2021-12-27 NOTE — PROGRESS NOTES
"  Assessment & Plan     (N39.0) Acute UTI (urinary tract infection)  (primary encounter diagnosis)  Comment: RBCs noted in urine patient symptoms consistent with UTI will treat obtain urine culture and also repeat urine due to hematuria once treatment completed  Plan: UA Macro with Reflex to Micro and Culture - lab        collect, Urine Microscopic, Urine Culture         Aerobic Bacterial - lab collect, cephALEXin         (KEFLEX) 500 MG capsule, phenazopyridine         (PYRIDIUM) 200 MG tablet, CANCELED: UA reflex         to Microscopic - lab collect      No follow-ups on file.    CL Oneill CNP  [facility]    Adi Quesada is a 56 year old who presents for the following health issues     HPI     Genitourinary - Female  Onset/Duration: 1.5 week ago  Description:   Painful urination (Dysuria): YES           Frequency: YES-   Blood in urine (Hematuria): no  Delay in urine (Hesitency): YES  Intensity: moderate  Progression of Symptoms:  worsening and intermittent  Accompanying Signs & Symptoms:  Fever/chills: no  Flank pain: no  Nausea and vomiting: no  Vaginal symptoms: none  Abdominal/Pelvic Pain: no  History:   History of frequent UTI s: no  History of kidney stones: no  Sexually Active: YES  Possibility of pregnancy: No      Review of Systems   Constitutional, HEENT, cardiovascular, pulmonary, gi and gu systems are negative, except as otherwise noted.      Objective    Pulse 76   LMP 12/09/2018   SpO2 96%   There is no height or weight on file to calculate BMI. /76 (BP Location: Right arm, Patient Position: Sitting, Cuff Size: Adult Large)   Pulse 76   Temp 97.6  F (36.4  C) (Tympanic)   Resp 18   Ht 1.6 m (5' 2.99\")   Wt 107.8 kg (237 lb 9.6 oz)   LMP 12/09/2018   SpO2 96%   BMI 42.10 kg/m        Physical Exam   GENERAL: healthy, alert and no distress  EYES: Eyes grossly normal to inspection, PERRL and conjunctivae and sclerae normal  HENT: ear canals " and TM's normal, nose and mouth without ulcers or lesions  NECK: no adenopathy, no asymmetry, masses, or scars and thyroid normal to palpation  RESP: lungs clear to auscultation - no rales, rhonchi or wheezes  CV: regular rate and rhythm, normal S1 S2, no S3 or S4, no murmur, click or rub, no peripheral edema and peripheral pulses strong  ABDOMEN: soft, nontender, no hepatosplenomegaly, no masses and bowel sounds normal  MS: no gross musculoskeletal defects noted, no edema  SKIN: no suspicious lesions or rashes  NEURO: Normal strength and tone, mentation intact and speech normal  PSYCH: mentation appears normal, affect normal/bright    Results for orders placed or performed in visit on 12/27/21   UA Macro with Reflex to Micro and Culture - lab collect     Status: Abnormal    Specimen: Urine, Midstream   Result Value Ref Range    Color Urine Yellow Colorless, Straw, Light Yellow, Yellow    Appearance Urine Clear Clear    Glucose Urine Negative Negative mg/dL    Bilirubin Urine Negative Negative    Ketones Urine Negative Negative mg/dL    Specific Gravity Urine 1.010 1.003 - 1.035    Blood Urine Trace (A) Negative    pH Urine 6.5 5.0 - 7.0    Protein Albumin Urine Negative Negative mg/dL    Urobilinogen Urine 0.2 0.2, 1.0 E.U./dL    Nitrite Urine Negative Negative    Leukocyte Esterase Urine Negative Negative   Urine Microscopic     Status: Abnormal   Result Value Ref Range    Bacteria Urine Few (A) None Seen /HPF    RBC Urine 2-5 (A) 0-2 /HPF /HPF    WBC Urine 0-5 0-5 /HPF /HPF    Squamous Epithelials Urine Few (A) None Seen /LPF    Narrative    Urine Culture not indicated

## 2021-12-27 NOTE — PATIENT INSTRUCTIONS

## 2021-12-30 LAB
BACTERIA UR CULT: ABNORMAL
BACTERIA UR CULT: ABNORMAL

## 2022-01-03 RX ORDER — SULFAMETHOXAZOLE/TRIMETHOPRIM 800-160 MG
1 TABLET ORAL 2 TIMES DAILY
Qty: 6 TABLET | Refills: 0 | Status: SHIPPED | OUTPATIENT
Start: 2022-01-03 | End: 2022-01-06

## 2022-01-07 ENCOUNTER — LAB (OUTPATIENT)
Dept: LAB | Facility: CLINIC | Age: 57
End: 2022-01-07
Payer: OTHER GOVERNMENT

## 2022-01-07 DIAGNOSIS — N39.0 ACUTE UTI (URINARY TRACT INFECTION): ICD-10-CM

## 2022-01-07 DIAGNOSIS — R31.9 HEMATURIA, UNSPECIFIED TYPE: ICD-10-CM

## 2022-01-07 LAB
ALBUMIN UR-MCNC: NEGATIVE MG/DL
APPEARANCE UR: CLEAR
BILIRUB UR QL STRIP: NEGATIVE
COLOR UR AUTO: YELLOW
GLUCOSE UR STRIP-MCNC: NEGATIVE MG/DL
HGB UR QL STRIP: ABNORMAL
KETONES UR STRIP-MCNC: NEGATIVE MG/DL
LEUKOCYTE ESTERASE UR QL STRIP: NEGATIVE
NITRATE UR QL: NEGATIVE
PH UR STRIP: 5.5 [PH] (ref 5–7)
RBC #/AREA URNS AUTO: ABNORMAL /HPF
SP GR UR STRIP: 1.01 (ref 1–1.03)
SQUAMOUS #/AREA URNS AUTO: ABNORMAL /LPF
UROBILINOGEN UR STRIP-ACNC: 0.2 E.U./DL
WBC #/AREA URNS AUTO: ABNORMAL /HPF

## 2022-01-07 PROCEDURE — 81001 URINALYSIS AUTO W/SCOPE: CPT

## 2022-01-10 ENCOUNTER — TELEPHONE (OUTPATIENT)
Dept: UROLOGY | Facility: CLINIC | Age: 57
End: 2022-01-10
Payer: OTHER GOVERNMENT

## 2022-01-10 NOTE — TELEPHONE ENCOUNTER
[facility] Call Center    Phone Message    May a detailed message be left on voicemail: yes     Reason for Call: Appointment Intake    Referring Provider Name: No Sosa APRN CNP   Diagnosis and/or Symptoms: Hematuria, unspecified type    Action Taken: Message routed to:  Other: [facility]    Travel Screening: Not Applicable

## 2022-01-11 NOTE — TELEPHONE ENCOUNTER
Per recent recheck on urine under PCP    Test results reviewed with Patient continues to have hematuria and absence of an infection recommend follow-up with urology referral has been placed reviewed with patient  No Sosa CNP      Please advise on scheduling.

## 2022-01-18 ENCOUNTER — MYC MEDICAL ADVICE (OUTPATIENT)
Dept: FAMILY MEDICINE | Facility: CLINIC | Age: 57
End: 2022-01-18
Payer: OTHER GOVERNMENT

## 2022-01-24 ENCOUNTER — OFFICE VISIT (OUTPATIENT)
Dept: FAMILY MEDICINE | Facility: CLINIC | Age: 57
End: 2022-01-24
Payer: OTHER GOVERNMENT

## 2022-01-24 ENCOUNTER — ANCILLARY PROCEDURE (OUTPATIENT)
Dept: GENERAL RADIOLOGY | Facility: CLINIC | Age: 57
End: 2022-01-24
Attending: FAMILY MEDICINE
Payer: OTHER GOVERNMENT

## 2022-01-24 VITALS
RESPIRATION RATE: 16 BRPM | OXYGEN SATURATION: 98 % | TEMPERATURE: 99 F | SYSTOLIC BLOOD PRESSURE: 130 MMHG | HEIGHT: 63 IN | HEART RATE: 86 BPM | DIASTOLIC BLOOD PRESSURE: 84 MMHG | BODY MASS INDEX: 41.82 KG/M2 | WEIGHT: 236 LBS

## 2022-01-24 DIAGNOSIS — S99.921A FOOT INJURY, RIGHT, INITIAL ENCOUNTER: Primary | ICD-10-CM

## 2022-01-24 DIAGNOSIS — S99.921A FOOT INJURY, RIGHT, INITIAL ENCOUNTER: ICD-10-CM

## 2022-01-24 PROCEDURE — 99213 OFFICE O/P EST LOW 20 MIN: CPT | Performed by: FAMILY MEDICINE

## 2022-01-24 PROCEDURE — 73630 X-RAY EXAM OF FOOT: CPT | Mod: RT | Performed by: RADIOLOGY

## 2022-01-24 ASSESSMENT — MIFFLIN-ST. JEOR: SCORE: 1633.03

## 2022-01-24 ASSESSMENT — PAIN SCALES - GENERAL: PAINLEVEL: MILD PAIN (3)

## 2022-01-24 NOTE — PROGRESS NOTES
"S: Sangita Meraz is a 56 year old female who fell on ice yesterday, bent R foot oddly, sounds like flexed toes up position.  Hurts on top of foot, has swelling/redness/pain top of foot.     Old injury years ago, broke some of the metatarsals in same foot.  Full recovery.      O:/84   Pulse 86   Temp 99  F (37.2  C)   Resp 16   Ht 1.598 m (5' 2.9\")   Wt 107 kg (236 lb)   LMP 12/09/2018   SpO2 98%   BMI 41.94 kg/m     GEN: Alert and oriented, in no acute distress  Limping quite a bit  R foot with tenderness over top of foot, mid foot.  No pain over ankle area swelling/redness.     Xray: normal, no appreciated fx or lisfranc injury.  Navicular/talar irregularity, but no pain there, likely chronic    A:  R foot contusion      P; rest, time, tylenol.  If not improving, will f/u with podiatry, consult given.  She agrees with plan    "

## 2022-02-02 ENCOUNTER — OFFICE VISIT (OUTPATIENT)
Dept: PODIATRY | Facility: CLINIC | Age: 57
End: 2022-02-02
Payer: OTHER GOVERNMENT

## 2022-02-02 VITALS
SYSTOLIC BLOOD PRESSURE: 115 MMHG | HEIGHT: 63 IN | WEIGHT: 236 LBS | BODY MASS INDEX: 41.82 KG/M2 | DIASTOLIC BLOOD PRESSURE: 74 MMHG | HEART RATE: 67 BPM

## 2022-02-02 DIAGNOSIS — S92.321A DISPLACED FRACTURE OF SECOND METATARSAL BONE, RIGHT FOOT, INITIAL ENCOUNTER FOR CLOSED FRACTURE: ICD-10-CM

## 2022-02-02 DIAGNOSIS — M79.671 ACUTE FOOT PAIN, RIGHT: Primary | ICD-10-CM

## 2022-02-02 DIAGNOSIS — S99.921A FOOT INJURY, RIGHT, INITIAL ENCOUNTER: ICD-10-CM

## 2022-02-02 PROCEDURE — 99203 OFFICE O/P NEW LOW 30 MIN: CPT | Performed by: PODIATRIST

## 2022-02-02 ASSESSMENT — MIFFLIN-ST. JEOR: SCORE: 1633.03

## 2022-02-02 ASSESSMENT — PAIN SCALES - GENERAL: PAINLEVEL: MILD PAIN (3)

## 2022-02-02 NOTE — LETTER
2/2/2022         RE: Sangita Meraz  [address]  [facility] [facility] [place] [address]        Dear Colleague,    Thank you for referring your patient, Sangita Meraz, to the [facility] [place]. Please see a copy of my visit note below.    PATIENT HISTORY:  Sangita Meraz is a 56 year old female who presents to clinic in consultation at the request of  Leonard Fatima M.D. with a chief complaint of right foot pain.  The patient is seen by themselves.  The patient relates the pain is primarily located around the top of foot.  Patient describes injury as slipped on ice and hyper extended toes that has been going on for 11 day(s).  The patient has previously tried rest, ice, elevation, and Nsaids with little relief.  Denies any prior history of similar issues.Has Broken the third through fifth metatarsal in right foot. Happened 32 years ago. Broken right ankle twice as well. The patient is homemaker.  Any previous notes and studies that pertain to the patient's condition were reviewed.    Pertinent medical, surgical and family history was reviewed in Epic chart     Medications:   Current Outpatient Medications:      busPIRone (BUSPAR) 10 MG tablet, Take 10 mg by mouth 2 times daily, Disp: , Rfl:      calcium carbonate (OS-MIRIAM) 500 MG tablet, Take 1 tablet by mouth 2 times daily, Disp: , Rfl:      estradiol (ESTRING) 2 MG vaginal ring, Place 1 each vaginally every 3 months, Disp: 3 each, Rfl: 4     estradiol-norethindrone (COMBIPATCH) 0.05-0.14 MG/DAY bi-weekly patch, Place 1 patch onto the skin twice a week, Disp: 16 patch, Rfl: 6     fluocinonide (LIDEX) 0.05 % external solution, Apply to scalp BID x 3-4 weeks PRN, Disp: 50 mL, Rfl: 3     omeprazole 20 MG tablet, Take 20 mg by mouth daily, Disp: , Rfl:      sertraline (ZOLOFT) 100 MG tablet, Take 150 mg by mouth , Disp: , Rfl:      Urea 40 % CREA, Apply to feet every day-BID, Disp: 120 g, Rfl: 11     Vitamin D, Cholecalciferol, 25 MCG (1000 UT) CAPS, , Disp:  ", Rfl:      phenazopyridine (PYRIDIUM) 200 MG tablet, Take 1 tablet (200 mg) by mouth 3 times daily as needed for irritation Expect your urine to appear bright orange, Disp: 9 tablet, Rfl: 0     Allergies:    Allergies   Allergen Reactions     Ivp Dye [Contrast Dye] Anaphylaxis       Vitals: /74   Pulse 67   Ht 1.598 m (5' 2.9\")   Wt 107 kg (236 lb)   LMP 12/09/2018   BMI 41.94 kg/m    BMI= Body mass index is 41.94 kg/m .    LOWER EXTREMITY PHYSICAL EXAM    Dermatologic: Skin is intact to right lower extremity without significant lesions, rash or abrasion.        Vascular: DP & PT pulses are intact & regular on the right.   CFT and skin temperature is normal to the right lower extremity.     Neurologic: Lower extremity sensation is intact to light touch.  No evidence of weakness in the right lower extremity.        Musculoskeletal: Patient is ambulatory without assistive device or brace.  No gross ankle deformity noted.  No foot or ankle joint effusion is noted.  Noted pain on palpation over the dorsal aspect of the arch of the right foot.  No ecchymosis noted.  Noted edema over the arch of the right foot.    Diagnostics:  Radiographs included three views of the right foot demonstrating a small avulsion fracture fragment over the base of the metatarsal on the lateral view.  No cortical erosions or periosteal elevation.  All joint margins appear stable.  There is no apparent  tumor formation noted.  There is no evidence of foreign body.  The images were independently reviewed by myself along with the patient explaining the findings.      ASSESSMENT / PLAN:     ICD-10-CM    1. Acute foot pain, right  M79.671    2. Foot injury, right, initial encounter  S99.921A Orthopedic  Referral   3. Displaced fracture of second metatarsal bone, right foot, initial encounter for closed fracture  S92.321A MR Foot Right w/o Contrast     Ankle/Foot Bracing Supplies DME       I have explained to Sangita about the " conditions.  We discussed the underlying contributing factors to the condition as well as treatment options along with expected length of recovery.  At this time, the patient will obtain an MRI of the right foot for further evaluation of the Lisfranc joint.  The patient was fitted with a short CAM boot to offload the right foot fracture for proper healing.  The patient will be notified of the MRI results.  The patient will return to the office in one month for reevaluation and repeat x-rays.    Sangita verbalized agreement with and understanding of the rational for the diagnosis and treatment plan.  All questions were answered to best of my ability and the patient's satisfaction. The patient was advised to contact the clinic with any questions that may arise after the clinic visit.      Disclaimer: This note consists of symbols derived from keyboarding, dictation and/or voice recognition software. As a result, there may be errors in the script that have gone undetected. Please consider this when interpreting information found in this chart.       MICHAEL Mena.P.M., F.A.C.F.A.S.        Again, thank you for allowing me to participate in the care of your patient.        Sincerely,        Luis Gore DPM

## 2022-02-02 NOTE — NURSING NOTE
"Chief Complaint   Patient presents with     Consult     right foot injury       Initial /74   Pulse 67   Ht 1.598 m (5' 2.9\")   Wt 107 kg (236 lb)   LMP 12/09/2018   BMI 41.94 kg/m   Estimated body mass index is 41.94 kg/m  as calculated from the following:    Height as of this encounter: 1.598 m (5' 2.9\").    Weight as of this encounter: 107 kg (236 lb).  Medications and allergies reviewed.      Meredith RODRIGUEZ MA    "

## 2022-02-02 NOTE — PROGRESS NOTES
PATIENT HISTORY:  Sangita Meraz is a 56 year old female who presents to clinic in consultation at the request of  Leonard Fatima M.D. with a chief complaint of right foot pain.  The patient is seen by themselves.  The patient relates the pain is primarily located around the top of foot.  Patient describes injury as slipped on ice and hyper extended toes that has been going on for 11 day(s).  The patient has previously tried rest, ice, elevation, and Nsaids with little relief.  Denies any prior history of similar issues.Has Broken the third through fifth metatarsal in right foot. Happened 32 years ago. Broken right ankle twice as well. The patient is homemaker.  Any previous notes and studies that pertain to the patient's condition were reviewed.    Pertinent medical, surgical and family history was reviewed in Epic chart     Medications:   Current Outpatient Medications:      busPIRone (BUSPAR) 10 MG tablet, Take 10 mg by mouth 2 times daily, Disp: , Rfl:      calcium carbonate (OS-MIRIAM) 500 MG tablet, Take 1 tablet by mouth 2 times daily, Disp: , Rfl:      estradiol (ESTRING) 2 MG vaginal ring, Place 1 each vaginally every 3 months, Disp: 3 each, Rfl: 4     estradiol-norethindrone (COMBIPATCH) 0.05-0.14 MG/DAY bi-weekly patch, Place 1 patch onto the skin twice a week, Disp: 16 patch, Rfl: 6     fluocinonide (LIDEX) 0.05 % external solution, Apply to scalp BID x 3-4 weeks PRN, Disp: 50 mL, Rfl: 3     omeprazole 20 MG tablet, Take 20 mg by mouth daily, Disp: , Rfl:      sertraline (ZOLOFT) 100 MG tablet, Take 150 mg by mouth , Disp: , Rfl:      Urea 40 % CREA, Apply to feet every day-BID, Disp: 120 g, Rfl: 11     Vitamin D, Cholecalciferol, 25 MCG (1000 UT) CAPS, , Disp: , Rfl:      phenazopyridine (PYRIDIUM) 200 MG tablet, Take 1 tablet (200 mg) by mouth 3 times daily as needed for irritation Expect your urine to appear bright orange, Disp: 9 tablet, Rfl: 0     Allergies:    Allergies   Allergen Reactions     Ivp  "Dye [Contrast Dye] Anaphylaxis       Vitals: /74   Pulse 67   Ht 1.598 m (5' 2.9\")   Wt 107 kg (236 lb)   LMP 12/09/2018   BMI 41.94 kg/m    BMI= Body mass index is 41.94 kg/m .    LOWER EXTREMITY PHYSICAL EXAM    Dermatologic: Skin is intact to right lower extremity without significant lesions, rash or abrasion.        Vascular: DP & PT pulses are intact & regular on the right.   CFT and skin temperature is normal to the right lower extremity.     Neurologic: Lower extremity sensation is intact to light touch.  No evidence of weakness in the right lower extremity.        Musculoskeletal: Patient is ambulatory without assistive device or brace.  No gross ankle deformity noted.  No foot or ankle joint effusion is noted.  Noted pain on palpation over the dorsal aspect of the arch of the right foot.  No ecchymosis noted.  Noted edema over the arch of the right foot.    Diagnostics:  Radiographs included three views of the right foot demonstrating a small avulsion fracture fragment over the base of the metatarsal on the lateral view.  No cortical erosions or periosteal elevation.  All joint margins appear stable.  There is no apparent  tumor formation noted.  There is no evidence of foreign body.  The images were independently reviewed by myself along with the patient explaining the findings.      ASSESSMENT / PLAN:     ICD-10-CM    1. Acute foot pain, right  M79.671    2. Foot injury, right, initial encounter  S99.921A Orthopedic  Referral   3. Displaced fracture of second metatarsal bone, right foot, initial encounter for closed fracture  S92.321A MR Foot Right w/o Contrast     Ankle/Foot Bracing Supplies DME       I have explained to Sangita about the conditions.  We discussed the underlying contributing factors to the condition as well as treatment options along with expected length of recovery.  At this time, the patient will obtain an MRI of the right foot for further evaluation of the Lisfranc " joint.  The patient was fitted with a short CAM boot to offload the right foot fracture for proper healing.  The patient will be notified of the MRI results.  The patient will return to the office in one month for reevaluation and repeat x-rays.    Sangita verbalized agreement with and understanding of the rational for the diagnosis and treatment plan.  All questions were answered to best of my ability and the patient's satisfaction. The patient was advised to contact the clinic with any questions that may arise after the clinic visit.      Disclaimer: This note consists of symbols derived from keyboarding, dictation and/or voice recognition software. As a result, there may be errors in the script that have gone undetected. Please consider this when interpreting information found in this chart.       FAZAL Gore D.P.M., F.A.C.F.A.S.

## 2022-02-09 ENCOUNTER — HOSPITAL ENCOUNTER (OUTPATIENT)
Dept: MRI IMAGING | Facility: CLINIC | Age: 57
Discharge: HOME | End: 2022-02-09
Attending: PODIATRIST | Admitting: PODIATRIST
Payer: OTHER GOVERNMENT

## 2022-02-09 DIAGNOSIS — S92.321A DISPLACED FRACTURE OF SECOND METATARSAL BONE, RIGHT FOOT, INITIAL ENCOUNTER FOR CLOSED FRACTURE: ICD-10-CM

## 2022-02-09 PROCEDURE — 73718 MRI LOWER EXTREMITY W/O DYE: CPT | Mod: 26 | Performed by: RADIOLOGY

## 2022-02-09 PROCEDURE — 73718 MRI LOWER EXTREMITY W/O DYE: CPT | Mod: RT

## 2022-02-10 ENCOUNTER — VIRTUAL VISIT (OUTPATIENT)
Dept: UROLOGY | Facility: CLINIC | Age: 57
End: 2022-02-10
Payer: OTHER GOVERNMENT

## 2022-02-10 DIAGNOSIS — R31.29 MICROSCOPIC HEMATURIA: ICD-10-CM

## 2022-02-10 DIAGNOSIS — Z87.440 PERSONAL HISTORY OF URINARY TRACT INFECTION: Primary | ICD-10-CM

## 2022-02-10 PROCEDURE — 99203 OFFICE O/P NEW LOW 30 MIN: CPT | Mod: 95 | Performed by: UROLOGY

## 2022-02-10 NOTE — PROGRESS NOTES
Sangita is a 56 year old who is being evaluated via a billable video visit.      How would you like to obtain your AVS? MyChart  If the video visit is dropped, the invitation should be resent by: Text to cell phone:    Will anyone else be joining your video visit? No      Video Start Time: 845    Assessment & Plan   Problem List Items Addressed This Visit     None      Visit Diagnoses     Personal history of urinary tract infection    -  Primary    Microscopic hematuria               Review of external notes as documented elsewhere in note  Review of the result(s) of each unique test - UA/CT         See Patient Instructions    No follow-ups on file.    Jaiden Dillon MD  [facility] Quesada is a 56 year old who presents for the following health issues UTI/microscopic hematuria    HPI     Patient is a pleasant 56-year-old  female who was requested to be seen by No Sánchez for a consultation with regard to patient's microscopic hematuria.  Patient was diagnosed with a urine infection recently when she was seen for frequency, urgency, and dysuria.  She was treated for it with antibiotics.  Repeat urine test show microscopic  hematuria.  Urinalysis a year ago did show 4 red blood cell per high-power field.  CT scan done in 2020 which was unremarkable.  She has remote history of smoking.    Review of Systems   Constitutional, HEENT, cardiovascular, pulmonary, gi and gu systems are negative, except as otherwise noted.      Objective           Vitals:  No vitals were obtained today due to virtual visit.    Physical Exam   GENERAL: Healthy, alert and no distress  EYES: Eyes grossly normal to inspection.  No discharge or erythema, or obvious scleral/conjunctival abnormalities.  RESP: No audible wheeze, cough, or visible cyanosis.  No visible retractions or increased work of breathing.    SKIN: Visible skin clear. No significant rash, abnormal pigmentation or lesions.  NEURO:  Cranial nerves grossly intact.  Mentation and speech appropriate for age.  PSYCH: Mentation appears normal, affect normal/bright, judgement and insight intact, normal speech and appearance well-groomed.    Pleasant 56-year-old  female with recent UTI and microscopic hematuria.  Urine test have not shown any changes for 1 year.  Reassurance.  No further testing needed at this time.            Video-Visit Details    Type of service:  Video Visit    Video End Time:8:55 AM    Originating Location (pt. Location): Home    Distant Location (provider location):  [facility]     Platform used for Video Visit: [employer]

## 2022-02-23 ENCOUNTER — OFFICE VISIT (OUTPATIENT)
Dept: FAMILY MEDICINE | Facility: CLINIC | Age: 57
End: 2022-02-23
Payer: OTHER GOVERNMENT

## 2022-02-23 VITALS
HEIGHT: 64 IN | RESPIRATION RATE: 12 BRPM | TEMPERATURE: 97.7 F | BODY MASS INDEX: 40.29 KG/M2 | HEART RATE: 68 BPM | SYSTOLIC BLOOD PRESSURE: 130 MMHG | WEIGHT: 236 LBS | DIASTOLIC BLOOD PRESSURE: 80 MMHG

## 2022-02-23 DIAGNOSIS — N39.0 RECURRENT UTI: ICD-10-CM

## 2022-02-23 DIAGNOSIS — R30.0 DYSURIA: Primary | ICD-10-CM

## 2022-02-23 LAB
BACTERIA #/AREA URNS HPF: ABNORMAL /HPF
RBC #/AREA URNS AUTO: ABNORMAL /HPF
WBC #/AREA URNS AUTO: ABNORMAL /HPF
WBC CLUMPS #/AREA URNS HPF: PRESENT /HPF

## 2022-02-23 PROCEDURE — 87086 URINE CULTURE/COLONY COUNT: CPT | Performed by: FAMILY MEDICINE

## 2022-02-23 PROCEDURE — 87186 SC STD MICRODIL/AGAR DIL: CPT | Performed by: FAMILY MEDICINE

## 2022-02-23 PROCEDURE — 99214 OFFICE O/P EST MOD 30 MIN: CPT | Performed by: FAMILY MEDICINE

## 2022-02-23 PROCEDURE — 81015 MICROSCOPIC EXAM OF URINE: CPT | Performed by: FAMILY MEDICINE

## 2022-02-23 RX ORDER — PHENAZOPYRIDINE HYDROCHLORIDE 100 MG/1
100 TABLET, FILM COATED ORAL 3 TIMES DAILY PRN
Qty: 30 TABLET | Refills: 0 | Status: SHIPPED | OUTPATIENT
Start: 2022-02-23 | End: 2022-09-07

## 2022-02-23 RX ORDER — NITROFURANTOIN 25; 75 MG/1; MG/1
100 CAPSULE ORAL 2 TIMES DAILY
Qty: 14 CAPSULE | Refills: 0 | Status: SHIPPED | OUTPATIENT
Start: 2022-02-23 | End: 2022-03-10

## 2022-02-23 ASSESSMENT — PAIN SCALES - GENERAL: PAINLEVEL: NO PAIN (0)

## 2022-02-23 NOTE — NURSING NOTE
"Chief Complaint   Patient presents with     UTI     /80   Pulse 68   Temp 97.7  F (36.5  C) (Tympanic)   Resp 12   Ht 1.626 m (5' 4\")   Wt 107 kg (236 lb)   LMP 12/09/2018   BMI 40.51 kg/m   Estimated body mass index is 40.51 kg/m  as calculated from the following:    Height as of this encounter: 1.626 m (5' 4\").    Weight as of this encounter: 107 kg (236 lb).  Patient presents to the clinic using No DME      Health Maintenance that is potentially due pending provider review:    Health Maintenance Due   Topic Date Due     ANNUAL REVIEW OF HM ORDERS  Never done     HIV SCREENING  Never done     HEPATITIS C SCREENING  Never done     ZOSTER IMMUNIZATION (1 of 2) Never done     LUNG CANCER SCREENING  Never done        n/a        "

## 2022-02-23 NOTE — PROGRESS NOTES
"  Assessment & Plan     Dysuria  UTI culture pending   - Urine Microscopic; Future  - Urine Microscopic  - Urine Culture  - nitroFURantoin macrocrystal-monohydrate (MACROBID) 100 MG capsule; Take 1 capsule (100 mg) by mouth 2 times daily  - phenazopyridine (PYRIDIUM) 100 MG tablet; Take 1 tablet (100 mg) by mouth 3 times daily as needed for urinary tract discomfort    Recurrent UTI    - Urine Culture Aerobic Bacterial - lab collect; Future             Patient Instructions   Stay hydrated     Start antibiotic     Repeat Urine culture in 2 weeks       Return in about 2 weeks (around 3/9/2022) for Lab Work.    Trina Olson MD  [facility]    Adi Quesada is a 56 year old who presents for the following health issues     History of Present Illness     Reason for visit:  Probable UTI, potential kidney infection  Symptom onset:  1-3 days ago  Symptoms include:  Urethral discomfort, bladder distension, back pain  Symptom intensity:  Moderate  Symptom progression:  Worsening  Had these symptoms before:  Yes  Has tried/received treatment for these symptoms:  Yes  Previous treatment was successful:  Yes  Prior treatment description:  Antibiotics  What makes it worse:  No  What makes it better:  No    She eats 4 or more servings of fruits and vegetables daily.She consumes 0 sweetened beverage(s) daily.She exercises with enough effort to increase her heart rate 20 to 29 minutes per day.  She exercises with enough effort to increase her heart rate 4 days per week. She is missing 1 dose(s) of medications per week.  She is not taking prescribed medications regularly due to remembering to take.     Has been using Azo  Dysuria is worse today          Review of Systems   Constitutional, HEENT, cardiovascular, pulmonary, gi and gu systems are negative, except as otherwise noted.      Objective    /80   Pulse 68   Temp 97.7  F (36.5  C) (Tympanic)   Resp 12   Ht 1.626 m (5' 4\")   Wt " 107 kg (236 lb)   LMP 12/09/2018   BMI 40.51 kg/m    Body mass index is 40.51 kg/m .  Physical Exam   GENERAL APPEARANCE: healthy, alert and no distress  ABDOMEN: soft, nontender, without hepatosplenomegaly or masses and bowel sounds normal  PSYCH: mentation appears normal and affect normal/bright

## 2022-02-24 LAB — BACTERIA UR CULT: ABNORMAL

## 2022-02-25 ENCOUNTER — E-VISIT (OUTPATIENT)
Dept: URGENT CARE | Facility: URGENT CARE | Age: 57
End: 2022-02-25
Payer: OTHER GOVERNMENT

## 2022-02-25 DIAGNOSIS — G47.30 SLEEP APNEA, UNSPECIFIED TYPE: Primary | ICD-10-CM

## 2022-02-25 PROCEDURE — 99207 PR NON-BILLABLE SERV PER CHARTING: CPT | Performed by: NURSE PRACTITIONER

## 2022-03-02 ENCOUNTER — OFFICE VISIT (OUTPATIENT)
Dept: PODIATRY | Facility: CLINIC | Age: 57
End: 2022-03-02
Payer: OTHER GOVERNMENT

## 2022-03-02 ENCOUNTER — ANCILLARY PROCEDURE (OUTPATIENT)
Dept: GENERAL RADIOLOGY | Facility: CLINIC | Age: 57
End: 2022-03-02
Attending: PODIATRIST
Payer: OTHER GOVERNMENT

## 2022-03-02 VITALS
DIASTOLIC BLOOD PRESSURE: 80 MMHG | BODY MASS INDEX: 40.29 KG/M2 | WEIGHT: 236 LBS | SYSTOLIC BLOOD PRESSURE: 119 MMHG | HEART RATE: 71 BPM | HEIGHT: 64 IN

## 2022-03-02 DIAGNOSIS — S93.601D FOOT SPRAIN, RIGHT, SUBSEQUENT ENCOUNTER: ICD-10-CM

## 2022-03-02 DIAGNOSIS — S92.321D DISPLACED FRACTURE OF SECOND METATARSAL BONE, RIGHT FOOT, SUBSEQUENT ENCOUNTER FOR FRACTURE WITH ROUTINE HEALING: Primary | ICD-10-CM

## 2022-03-02 PROCEDURE — 99213 OFFICE O/P EST LOW 20 MIN: CPT | Performed by: PODIATRIST

## 2022-03-02 PROCEDURE — 73630 X-RAY EXAM OF FOOT: CPT | Mod: RT | Performed by: RADIOLOGY

## 2022-03-02 NOTE — PATIENT INSTRUCTIONS
Next Steps:      1. Support:  a. Wear supportive shoes, sandals, boots and/or inserts that have a rigid supportive sole.    i. This will offload the majority of tension forces that travel through your feet every step you take.    1. Skechers Max Cushioning Elite/Premier   2. Skechers Relax Fit D'Lux Walker  3. [employer]ebok Walk Ultra 7 DMX MAX   4. Hoka Bondi walking shoes  5. Superfeet inserts ([email or web address])  b. It is important that you also wear supportive shoe wear in the house to continue providing support to your feet.    c. You may always use a cushioned liner for your shoes if that makes your feet feel better.  2. Stretching  a. Calf stretching is essential to offload the tension forces that travel through your feet every step you take  b. Preferred calf stretch is the Runner's Stretch  i. Place one foot behind the other foot, flat against the ground (it is important to keep the heel on the ground).  The back leg is the one that will be stretched.  1. Start with the knee straight and lean your hips into the wall, counter or whatever you are leaning into - count to ten.  2. Next, bend the knee.  You should feel the stretch lower in the calf muscle - count to ten.  c. Repeat this stretch once an hour to start off with.  When symptoms subside, I recommend performing the stretch 3 times daily to prevent any future problems.                3. Tissue Massage  a. It is important that you physically loosen the inflammation tissue to help your body heal the injured tissue.  b. I recommend soaking your foot in warm water to increase the microcirculation to the soft tissues.  You may add Epson salt to the water if you prefer.  c. You may apply an over-the-counter muscle rub, such as Voltaren gel, and deeply massage the injured tissue.  4. Reduce Inflammation  a. You can ice the injured tissue with an ice pack with a light cloth covering or soaking in ice water 20 minutes to reduce any acute inflammation, typically at the  end of the day.  b. If tolerated, you may take a Non-Steroidal Antiinflammatory medication (NSAID), such as Advil or Aleve, to help reduce the inflammation tissue.  This can help the overall healing of the injured tissue.  i. It is important to take food with any NSAID medication as the most common side effect is stomach irritation.  If you encounter any problems when taking NSAID, it is recommended that you stop taking the medication and notify your provider.    It is important to understand that most problems that develop in the foot and ankle are caused by excessive tension that cause microinjury to the soft tissues and inflammation in the foot and ankle.  By addressing the underlying causes with support and stretching as well as treating the current inflammatory conditions with tissue massage and anti-inflammatory treatments, most foot and ankle musculoskeletal conditions will resolve.  This may take time to heal.  However, if symptoms persist past 4 weeks you should return to the office for reevaluation to determine further treatment options.

## 2022-03-02 NOTE — LETTER
"    3/2/2022         RE: Sangita Meraz  [address] [address]        Dear Colleague,    Thank you for referring your patient, Sangita Meraz, to the [facility]. Please see a copy of my visit note below.    Sangita returns to the office for reevaluation of the right foot.  The patient relates following the instructions given at the last visit with noted overall less pain and more improvement in function of the right foot.   The patient relates no other problems.    Vitals: /80   Pulse 71   Ht 1.626 m (5' 4\")   Wt 107 kg (236 lb)   LMP 12/09/2018   BMI 40.51 kg/m    BMI= Body mass index is 40.51 kg/m .    Lower Extremity Physical Exam:      Neurovascular status remains unchanged.  Muscular exam is within normal limits to major muscle groups.  Integument is intact.      Noted decreased pain on palpation over the base of the second metatarsal on the right.  No surrounding erythema or edema noted.  Noted firm palpable bony mass over the second tarsometatarsal joint on the right.    Diagnostics:  Radiograph evaluation including three views of the right foot reveals interval healing with increased trabeculation of the second metatarsal base fracture with small bony prominence dorsally..  I personally evaluated the images as well as reviewed the images with the patient pointing out the findings.      Assessment:     ICD-10-CM    1. Displaced fracture of second metatarsal bone, right foot, subsequent encounter for fracture with routine healing  S92.321D XR Foot Right G/E 3 Views   2. Foot sprain, right, subsequent encounter  S93.601D XR Foot Right G/E 3 Views       Plan:    I have explained to Sangita about the progress of the conditions.  At this time, the patient was educated on the importance of offloading supportive shoes and other devices.  I demonstrated to the patient calf stretches to perform every hour daily until symptoms resolve.  After symptoms resolve, the " patient was advised to perform the stretches 3 times daily to prevent future recurrence.  The patient was instructed to perform warm soaks with Epson salt after which to also apply over-the-counter Voltaren gel to deeply massage the injured tissue.  The patient was instructed to do this on a daily basis until symptoms resolve.  The patient may also take over-the-counter NSAID medication, if tolerated, to help further reduce the inflammation tissue.   The patient was advised to take this type of medication with food to prevent stomach irritation and to stop taking the medication if stomach irritation occurs.  The patient will return in four weeks for reevaluation if the symptoms do not resolve.    Sangita verbalized agreement with and understanding of the rational for the diagnosis and treatment plan.  All questions were answered to best of my ability and the patient's satisfaction. The patient was advised to contact the clinic with any questions that may arise after the clinic visit.      Disclaimer: This note consists of symbols derived from keyboarding, dictation and/or voice recognition software. As a result, there may be errors in the script that have gone undetected. Please consider this when interpreting information found in this chart.       MICHAEL Mena.PUGO., F.A.C.F.A.S.        Again, thank you for allowing me to participate in the care of your patient.        Sincerely,        Luis Gore DPM

## 2022-03-02 NOTE — PROGRESS NOTES
"Sangita returns to the office for reevaluation of the right foot.  The patient relates following the instructions given at the last visit with noted overall less pain and more improvement in function of the right foot.   The patient relates no other problems.    Vitals: /80   Pulse 71   Ht 1.626 m (5' 4\")   Wt 107 kg (236 lb)   LMP 12/09/2018   BMI 40.51 kg/m    BMI= Body mass index is 40.51 kg/m .    Lower Extremity Physical Exam:      Neurovascular status remains unchanged.  Muscular exam is within normal limits to major muscle groups.  Integument is intact.      Noted decreased pain on palpation over the base of the second metatarsal on the right.  No surrounding erythema or edema noted.  Noted firm palpable bony mass over the second tarsometatarsal joint on the right.    Diagnostics:  Radiograph evaluation including three views of the right foot reveals interval healing with increased trabeculation of the second metatarsal base fracture with small bony prominence dorsally..  I personally evaluated the images as well as reviewed the images with the patient pointing out the findings.      Assessment:     ICD-10-CM    1. Displaced fracture of second metatarsal bone, right foot, subsequent encounter for fracture with routine healing  S92.321D XR Foot Right G/E 3 Views   2. Foot sprain, right, subsequent encounter  S93.601D XR Foot Right G/E 3 Views       Plan:    I have explained to Sangita about the progress of the conditions.  At this time, the patient was educated on the importance of offloading supportive shoes and other devices.  I demonstrated to the patient calf stretches to perform every hour daily until symptoms resolve.  After symptoms resolve, the patient was advised to perform the stretches 3 times daily to prevent future recurrence.  The patient was instructed to perform warm soaks with Epson salt after which to also apply over-the-counter Voltaren gel to deeply massage the injured tissue.  " The patient was instructed to do this on a daily basis until symptoms resolve.  The patient may also take over-the-counter NSAID medication, if tolerated, to help further reduce the inflammation tissue.   The patient was advised to take this type of medication with food to prevent stomach irritation and to stop taking the medication if stomach irritation occurs.  The patient will return in four weeks for reevaluation if the symptoms do not resolve.    Sangita verbalized agreement with and understanding of the rational for the diagnosis and treatment plan.  All questions were answered to best of my ability and the patient's satisfaction. The patient was advised to contact the clinic with any questions that may arise after the clinic visit.      Disclaimer: This note consists of symbols derived from keyboarding, dictation and/or voice recognition software. As a result, there may be errors in the script that have gone undetected. Please consider this when interpreting information found in this chart.       FAZAL Gore D.P.M., F.ALVAREZ.F.A.S.

## 2022-03-02 NOTE — NURSING NOTE
"Chief Complaint   Patient presents with     Fracture Followup     right foot, XR today       Initial /80   Pulse 71   Ht 1.626 m (5' 4\")   Wt 107 kg (236 lb)   LMP 12/09/2018   BMI 40.51 kg/m   Estimated body mass index is 40.51 kg/m  as calculated from the following:    Height as of this encounter: 1.626 m (5' 4\").    Weight as of this encounter: 107 kg (236 lb).  Medications and allergies reviewed.      Meredith RODRIGUEZ MA    "

## 2022-03-08 ENCOUNTER — MYC MEDICAL ADVICE (OUTPATIENT)
Dept: FAMILY MEDICINE | Facility: CLINIC | Age: 57
End: 2022-03-08
Payer: OTHER GOVERNMENT

## 2022-03-09 ENCOUNTER — LAB (OUTPATIENT)
Dept: LAB | Facility: CLINIC | Age: 57
End: 2022-03-09
Payer: OTHER GOVERNMENT

## 2022-03-09 DIAGNOSIS — N39.0 RECURRENT UTI: ICD-10-CM

## 2022-03-09 PROCEDURE — 87086 URINE CULTURE/COLONY COUNT: CPT

## 2022-03-09 PROCEDURE — 87186 SC STD MICRODIL/AGAR DIL: CPT

## 2022-03-10 DIAGNOSIS — B96.20 E. COLI UTI: Primary | ICD-10-CM

## 2022-03-10 DIAGNOSIS — N39.0 E. COLI UTI: Primary | ICD-10-CM

## 2022-03-10 LAB — BACTERIA UR CULT: ABNORMAL

## 2022-03-10 RX ORDER — CIPROFLOXACIN 500 MG/1
500 TABLET, FILM COATED ORAL 2 TIMES DAILY
Qty: 14 TABLET | Refills: 0 | Status: SHIPPED | OUTPATIENT
Start: 2022-03-10 | End: 2022-03-17

## 2022-03-28 ENCOUNTER — DOCUMENTATION ONLY (OUTPATIENT)
Dept: LAB | Facility: CLINIC | Age: 57
End: 2022-03-28
Payer: OTHER GOVERNMENT

## 2022-03-28 ENCOUNTER — OFFICE VISIT (OUTPATIENT)
Dept: FAMILY MEDICINE | Facility: CLINIC | Age: 57
End: 2022-03-28
Payer: OTHER GOVERNMENT

## 2022-03-28 VITALS
RESPIRATION RATE: 12 BRPM | SYSTOLIC BLOOD PRESSURE: 124 MMHG | HEART RATE: 64 BPM | DIASTOLIC BLOOD PRESSURE: 84 MMHG | WEIGHT: 235 LBS | BODY MASS INDEX: 40.34 KG/M2 | TEMPERATURE: 98.3 F

## 2022-03-28 DIAGNOSIS — R30.0 DYSURIA: Primary | ICD-10-CM

## 2022-03-28 DIAGNOSIS — Z13.1 SCREENING FOR DIABETES MELLITUS: ICD-10-CM

## 2022-03-28 DIAGNOSIS — R31.9 HEMATURIA, UNSPECIFIED TYPE: ICD-10-CM

## 2022-03-28 LAB
ALBUMIN UR-MCNC: NEGATIVE MG/DL
APPEARANCE UR: CLEAR
BACTERIA #/AREA URNS HPF: ABNORMAL /HPF
BILIRUB UR QL STRIP: NEGATIVE
COLOR UR AUTO: YELLOW
GLUCOSE UR STRIP-MCNC: NEGATIVE MG/DL
HBA1C MFR BLD: 6.2 % (ref 0–5.6)
HGB UR QL STRIP: ABNORMAL
KETONES UR STRIP-MCNC: NEGATIVE MG/DL
LEUKOCYTE ESTERASE UR QL STRIP: NEGATIVE
NITRATE UR QL: NEGATIVE
PH UR STRIP: 6 [PH] (ref 5–7)
RBC #/AREA URNS AUTO: ABNORMAL /HPF
SP GR UR STRIP: 1.01 (ref 1–1.03)
SQUAMOUS #/AREA URNS AUTO: ABNORMAL /LPF
UROBILINOGEN UR STRIP-ACNC: 0.2 E.U./DL
WBC #/AREA URNS AUTO: ABNORMAL /HPF

## 2022-03-28 PROCEDURE — 36415 COLL VENOUS BLD VENIPUNCTURE: CPT | Performed by: NURSE PRACTITIONER

## 2022-03-28 PROCEDURE — 99213 OFFICE O/P EST LOW 20 MIN: CPT | Performed by: NURSE PRACTITIONER

## 2022-03-28 PROCEDURE — 81001 URINALYSIS AUTO W/SCOPE: CPT | Performed by: NURSE PRACTITIONER

## 2022-03-28 PROCEDURE — 83036 HEMOGLOBIN GLYCOSYLATED A1C: CPT | Performed by: NURSE PRACTITIONER

## 2022-03-28 ASSESSMENT — PAIN SCALES - GENERAL: PAINLEVEL: MILD PAIN (2)

## 2022-03-28 ASSESSMENT — ENCOUNTER SYMPTOMS: DYSURIA: 1

## 2022-03-28 NOTE — CONFIDENTIAL NOTE
Patient called back, reports she is still having ongoing symptoms of UTI, including burning with urination that has been going on for 8 days. Patient has been dealing with UTI for the past 3 months, has had 4 UA's in the past 3 months. Patient is advised appointment today, will cancel the lab and have an evaluation to review her symptoms and options. Patient is agreeable to this and is scheduled today at 9:40/10 with Iva Rich.    BETO Ordoñez

## 2022-03-28 NOTE — PROGRESS NOTES
Patient is coming to repeat UC per Yanet Diana's result note 3/10/22, please sign pended order.  Thanks!

## 2022-03-28 NOTE — PATIENT INSTRUCTIONS
Dysuria  History of painful urination, ongoing since December.  Has had one positive culture for Staphylococcus strain and 2+ cultures of E. coli.  Was seen by urology via virtual visit and was advised no further work-up for microscopic hematuria.  Patient just finished course of Cipro approximately a week and a half ago, still having symptoms, however are better.  Feels Pyridium does not help the pain.  Encouraged to increase fluid intake, advised to reschedule with alternate urology provider in person and also schedule him only appointment for a repeat urinalysis if symptoms are worsening or not improving in approximately 1 to 2 weeks.  - UA Macro with Reflex to Micro and Culture - lab collect  - Urine Microscopic  - UA Macro with Reflex to Micro and Culture - lab collect; Future    Hematuria, unspecified type  Microscopic hematuria noted on urinalysis infection.  See above.    Screening for diabetes mellitus  Patient due for screening for diabetes due to elevated glucose levels by previous provider.  Will have drawn today.  - Hemoglobin A1c

## 2022-03-28 NOTE — PROGRESS NOTES
"  Assessment & Plan     Dysuria  History of painful urination, ongoing since December.  Has had one positive culture for Staphylococcus strain and 2+ cultures of E. coli.  Was seen by urology via virtual visit and was advised no further work-up for microscopic hematuria.  Patient just finished course of Cipro approximately a week and a half ago, still having symptoms, however are better.  Feels Pyridium does not help the pain.  Encouraged to increase fluid intake, advised to reschedule with alternate urology provider in person and also schedule him only appointment for a repeat urinalysis if symptoms are worsening or not improving in approximately 1 to 2 weeks.  - UA Macro with Reflex to Micro and Culture - lab collect  - Urine Microscopic  - UA Macro with Reflex to Micro and Culture - lab collect; Future    Hematuria, unspecified type  Microscopic hematuria noted on urinalysis infection.  See above.    Screening for diabetes mellitus  Patient due for screening for diabetes due to elevated glucose levels by previous provider.  Will have drawn today.  - Hemoglobin A1c           BMI:   Estimated body mass index is 40.34 kg/m  as calculated from the following:    Height as of 3/2/22: 1.626 m (5' 4\").    Weight as of this encounter: 106.6 kg (235 lb).   Weight management plan: Patient was referred to their PCP to discuss a diet and exercise plan.    See Patient Instructions. After discussion with patient, patient verbalizes and agreeable to receive AVS instructions via My Chart, not printed today     Return in about 1 week (around 4/4/2022) for Lab Work.    Iva Rich, ARIANNE, APRN-CNP   [facility]    Adi Quesada is a 56 year old who presents for the following health issues:    Dysuria    History of Present Illness       Reason for visit:  Recurring UTI  Symptom onset:  3-7 days ago  Symptoms include:  Burning and discomfort in urethra, sometimes bladder pain  Symptom intensity:  " Moderate  Symptom progression:  Worsening  Had these symptoms before:  Yes  Has tried/received treatment for these symptoms:  Yes  Previous treatment was successful:  Yes  Prior treatment description:  2 rounds of antibiotics  What makes it worse:  Evening sitting?  What makes it better:  Haven t figured that out    She eats 4 or more servings of fruits and vegetables daily.She consumes 0 sweetened beverage(s) daily.She exercises with enough effort to increase her heart rate 20 to 29 minutes per day.  She exercises with enough effort to increase her heart rate 3 or less days per week.   She is taking medications regularly.     Burning more so in the evening, wakes up in a.m. without pain   Pyridium not very helpful        Review of Systems   Genitourinary: Positive for dysuria.      Constitutional, HEENT, cardiovascular, pulmonary, gi and gu systems are negative, except as otherwise noted.      Objective    /84   Pulse 64   Temp 98.3  F (36.8  C) (Tympanic)   Resp 12   Wt 106.6 kg (235 lb)   LMP 12/09/2018   BMI 40.34 kg/m    Body mass index is 40.34 kg/m .  Physical Exam   GENERAL APPEARANCE: healthy, alert and no distress  RESP: lungs clear to auscultation - no rales, rhonchi or wheezes  CV: regular rates and rhythm, normal S1 S2, no S3 or S4 and no murmur, click or rub  ABDOMEN: soft, nontender, without hepatosplenomegaly or masses and bowel sounds normal  MS: extremities normal- no gross deformities noted and no CVA tenderness  SKIN: no suspicious lesions or rashes  PSYCH: mentation appears normal and affect normal/bright    Results for orders placed or performed in visit on 03/28/22 (from the past 24 hour(s))   UA Macro with Reflex to Micro and Culture - lab collect    Specimen: Urine, Midstream   Result Value Ref Range    Color Urine Yellow Colorless, Straw, Light Yellow, Yellow    Appearance Urine Clear Clear    Glucose Urine Negative Negative mg/dL    Bilirubin Urine Negative Negative    Ketones  Urine Negative Negative mg/dL    Specific Gravity Urine 1.010 1.003 - 1.035    Blood Urine Small (A) Negative    pH Urine 6.0 5.0 - 7.0    Protein Albumin Urine Negative Negative mg/dL    Urobilinogen Urine 0.2 0.2, 1.0 E.U./dL    Nitrite Urine Negative Negative    Leukocyte Esterase Urine Negative Negative   Urine Microscopic   Result Value Ref Range    Bacteria Urine Few (A) None Seen /HPF    RBC Urine 2-5 (A) 0-2 /HPF /HPF    WBC Urine 0-5 0-5 /HPF /HPF    Squamous Epithelials Urine Few (A) None Seen /LPF    Narrative    Urine Culture not indicated   Hemoglobin A1c   Result Value Ref Range    Hemoglobin A1C 6.2 (H) 0.0 - 5.6 %           Chart documentation with Dragon Voice recognition Software. Although reviewed after completion, some words and grammatical errors may remain.

## 2022-03-28 NOTE — NURSING NOTE
"Chief Complaint   Patient presents with     Dysuria     /84   Pulse 64   Temp 98.3  F (36.8  C) (Tympanic)   Resp 12   Wt 106.6 kg (235 lb)   LMP 12/09/2018   BMI 40.34 kg/m   Estimated body mass index is 40.34 kg/m  as calculated from the following:    Height as of 3/2/22: 1.626 m (5' 4\").    Weight as of this encounter: 106.6 kg (235 lb).  Patient presents to the clinic using No DME      Health Maintenance that is potentially due pending provider review:    Health Maintenance Due   Topic Date Due     ANNUAL REVIEW OF HM ORDERS  Never done     HIV SCREENING  Never done     HEPATITIS C SCREENING  Never done     ZOSTER IMMUNIZATION (1 of 2) Never done     LUNG CANCER SCREENING  Never done        n/a        "

## 2022-03-28 NOTE — CONFIDENTIAL NOTE
Left message for the patient to call back. Is she having symptoms? Has appointment at 8 am this morning.    BETO Ordoñez

## 2022-05-12 ENCOUNTER — OFFICE VISIT (OUTPATIENT)
Dept: FAMILY MEDICINE | Facility: CLINIC | Age: 57
End: 2022-05-12
Payer: OTHER GOVERNMENT

## 2022-05-12 VITALS
BODY MASS INDEX: 39.99 KG/M2 | DIASTOLIC BLOOD PRESSURE: 80 MMHG | HEART RATE: 68 BPM | WEIGHT: 233 LBS | OXYGEN SATURATION: 99 % | SYSTOLIC BLOOD PRESSURE: 132 MMHG | RESPIRATION RATE: 14 BRPM

## 2022-05-12 DIAGNOSIS — M54.81 BILATERAL OCCIPITAL NEURALGIA: Primary | ICD-10-CM

## 2022-05-12 DIAGNOSIS — M54.2 NECK PAIN: ICD-10-CM

## 2022-05-12 LAB
ANION GAP SERPL CALCULATED.3IONS-SCNC: 2 MMOL/L (ref 3–14)
BUN SERPL-MCNC: 12 MG/DL (ref 7–30)
CALCIUM SERPL-MCNC: 9.1 MG/DL (ref 8.5–10.1)
CHLORIDE BLD-SCNC: 106 MMOL/L (ref 94–109)
CO2 SERPL-SCNC: 28 MMOL/L (ref 20–32)
CREAT SERPL-MCNC: 0.88 MG/DL (ref 0.52–1.04)
ERYTHROCYTE [DISTWIDTH] IN BLOOD BY AUTOMATED COUNT: 12.3 % (ref 10–15)
ERYTHROCYTE [SEDIMENTATION RATE] IN BLOOD BY WESTERGREN METHOD: 7 MM/HR (ref 0–30)
GFR SERPL CREATININE-BSD FRML MDRD: 77 ML/MIN/1.73M2
GLUCOSE BLD-MCNC: 122 MG/DL (ref 70–99)
HCT VFR BLD AUTO: 43.4 % (ref 35–47)
HGB BLD-MCNC: 15.2 G/DL (ref 11.7–15.7)
MCH RBC QN AUTO: 32.3 PG (ref 26.5–33)
MCHC RBC AUTO-ENTMCNC: 35 G/DL (ref 31.5–36.5)
MCV RBC AUTO: 92 FL (ref 78–100)
PLATELET # BLD AUTO: 211 10E3/UL (ref 150–450)
POTASSIUM BLD-SCNC: 4.3 MMOL/L (ref 3.4–5.3)
RBC # BLD AUTO: 4.7 10E6/UL (ref 3.8–5.2)
SODIUM SERPL-SCNC: 136 MMOL/L (ref 133–144)
WBC # BLD AUTO: 6.3 10E3/UL (ref 4–11)

## 2022-05-12 PROCEDURE — 99215 OFFICE O/P EST HI 40 MIN: CPT | Performed by: NURSE PRACTITIONER

## 2022-05-12 PROCEDURE — 80048 BASIC METABOLIC PNL TOTAL CA: CPT | Performed by: NURSE PRACTITIONER

## 2022-05-12 PROCEDURE — 36415 COLL VENOUS BLD VENIPUNCTURE: CPT | Performed by: NURSE PRACTITIONER

## 2022-05-12 PROCEDURE — 85027 COMPLETE CBC AUTOMATED: CPT | Performed by: NURSE PRACTITIONER

## 2022-05-12 PROCEDURE — 85652 RBC SED RATE AUTOMATED: CPT | Performed by: NURSE PRACTITIONER

## 2022-05-12 RX ORDER — GABAPENTIN 300 MG/1
300 CAPSULE ORAL 3 TIMES DAILY
Qty: 90 CAPSULE | Refills: 0 | Status: SHIPPED | OUTPATIENT
Start: 2022-05-12 | End: 2022-05-18

## 2022-05-12 ASSESSMENT — PAIN SCALES - GENERAL: PAINLEVEL: MODERATE PAIN (4)

## 2022-05-12 NOTE — PROGRESS NOTES
Assessment & Plan     (M54.81) Bilateral occipital neuralgia  (primary encounter diagnosis)  Comment:*Within normal limits CBC within normal limits concerning for occipital neuralgia  will start to treat with gabapentin if not improving would also consider adding muscle relaxers as he could have a tension headache component to it and will have patient follow-up with neurology  Plan: Adult Neurology  Referral, gabapentin         (NEURONTIN) 300 MG capsule      (M54.2) Neck pain  Comment:   Plan: ESR: Erythrocyte sedimentation rate, CBC with         platelets, Basic metabolic panel  (Ca, Cl, CO2,        Creat, Gluc, K, Na, BUN)                40 minutes spent on the date of the encounter doing chart review, history and exam, documentation and further activities per the note         No follow-ups on file.    CL Oneill CNP  [facility]    Adi Quesada is a 56 year old who presents for the following health issues     Pain    History of Present Illness       Reason for visit:  Back of head/neck pain  Symptom onset:  3-7 days ago  Symptoms include:  Pain  Symptom intensity:  Moderate  Symptom progression:  Staying the same  Had these symptoms before:  Yes  Has tried/received treatment for these symptoms:  No  What makes it worse:  Sudden head movement  What makes it better:  Not really    She eats 2-3 servings of fruits and vegetables daily.She consumes 0 sweetened beverage(s) daily.She exercises with enough effort to increase her heart rate 10 to 19 minutes per day.  She exercises with enough effort to increase her heart rate 3 or less days per week.   She is taking medications regularly.     Review of Systems   Constitutional, HEENT, cardiovascular, pulmonary, gi and gu systems are negative, except as otherwise noted.      Objective    LMP 12/09/2018   Body mass index is 39.99 kg/m . /80   Pulse 68   Resp 14   Wt 105.7 kg (233 lb)   LMP 12/09/2018   SpO2  99%   BMI 39.99 kg/m      Physical Exam   GENERAL: healthy, alert and no distress  EYES: Eyes grossly normal to inspection, PERRL and conjunctivae and sclerae normal  HENT: ear canals and TM's normal, nose and mouth without ulcers or lesions  NECK: no adenopathy, no asymmetry, masses, or scars and thyroid normal to palpation  RESP: lungs clear to auscultation - no rales, rhonchi or wheezes  CV: regular rate and rhythm, normal S1 S2, no S3 or S4, no murmur, click or rub, no peripheral edema and peripheral pulses strong  SKIN: no suspicious lesions or rashes  NEURO: Normal strength and tone, mentation intact and speech normal  PSYCH: mentation appears normal, affect normal/bright    Inspection: normal cervical lordosis  Tender:  occipital nerves  Non-tender:  spinous processes, scapula, medial border of scapula, superior angle of scapula, normal musculature, left paracervical muscles, right paracervical muscles, left trapezius muscles, right trapezius muscles  Range of Motion:  Full ROM of cervical spine  Strength: Full strength of all neck muscles          Results for orders placed or performed in visit on 05/12/22   CBC with platelets     Status: Normal   Result Value Ref Range    WBC Count 6.3 4.0 - 11.0 10e3/uL    RBC Count 4.70 3.80 - 5.20 10e6/uL    Hemoglobin 15.2 11.7 - 15.7 g/dL    Hematocrit 43.4 35.0 - 47.0 %    MCV 92 78 - 100 fL    MCH 32.3 26.5 - 33.0 pg    MCHC 35.0 31.5 - 36.5 g/dL    RDW 12.3 10.0 - 15.0 %    Platelet Count 211 150 - 450 10e3/uL   ESR: Erythrocyte sedimentation rate     Status: Normal   Result Value Ref Range    Erythrocyte Sedimentation Rate 7 0 - 30 mm/hr

## 2022-05-18 ENCOUNTER — MYC MEDICAL ADVICE (OUTPATIENT)
Dept: FAMILY MEDICINE | Facility: CLINIC | Age: 57
End: 2022-05-18
Payer: OTHER GOVERNMENT

## 2022-05-18 DIAGNOSIS — M54.81 BILATERAL OCCIPITAL NEURALGIA: ICD-10-CM

## 2022-05-18 RX ORDER — GABAPENTIN 300 MG/1
CAPSULE ORAL
Qty: 120 CAPSULE | Refills: 0 | Status: SHIPPED | OUTPATIENT
Start: 2022-05-18 | End: 2022-07-27

## 2022-06-16 ENCOUNTER — OFFICE VISIT (OUTPATIENT)
Dept: FAMILY MEDICINE | Facility: CLINIC | Age: 57
End: 2022-06-16
Payer: OTHER GOVERNMENT

## 2022-06-16 VITALS
DIASTOLIC BLOOD PRESSURE: 83 MMHG | OXYGEN SATURATION: 96 % | SYSTOLIC BLOOD PRESSURE: 138 MMHG | WEIGHT: 234 LBS | TEMPERATURE: 97.9 F | HEART RATE: 64 BPM | HEIGHT: 63 IN | BODY MASS INDEX: 41.46 KG/M2 | RESPIRATION RATE: 20 BRPM

## 2022-06-16 DIAGNOSIS — H93.8X2 SENSATION OF FULLNESS IN LEFT EAR: Primary | ICD-10-CM

## 2022-06-16 DIAGNOSIS — Z11.59 NEED FOR HEPATITIS C SCREENING TEST: ICD-10-CM

## 2022-06-16 DIAGNOSIS — Z11.4 SCREENING FOR HIV (HUMAN IMMUNODEFICIENCY VIRUS): ICD-10-CM

## 2022-06-16 PROCEDURE — 99213 OFFICE O/P EST LOW 20 MIN: CPT | Performed by: FAMILY MEDICINE

## 2022-06-16 NOTE — PROGRESS NOTES
"s :Sangita Meraz is a 56 year old female with fullness in L ear.  No drainage.  Some chronic vertigo off/on.  Some ringing chronically too.     O:/83 (BP Location: Right arm, Patient Position: Sitting, Cuff Size: Adult Regular)   Pulse 64   Temp 97.9  F (36.6  C) (Tympanic)   Resp 20   Ht 1.6 m (5' 3\")   Wt 106.1 kg (234 lb)   LMP 12/09/2018   SpO2 96%   Breastfeeding No   BMI 41.45 kg/m    GEN: Alert and oriented, in no acute distress  R ear normal  L ear normal  Normal gait    A: L ear fullness        Tinnitus       Hearing loss       Vertigo    P: could be some transitory eustachian tube issues on top of other chronic sx.    However, Meniere's is a possibility.  She was reassured by normal ear exam, but would like to talk to ENT about this as well.  Reasonable.     Consult given.    "

## 2022-06-22 ASSESSMENT — SLEEP AND FATIGUE QUESTIONNAIRES
HOW LIKELY ARE YOU TO NOD OFF OR FALL ASLEEP WHILE WATCHING TV: SLIGHT CHANCE OF DOZING
HOW LIKELY ARE YOU TO NOD OFF OR FALL ASLEEP WHILE SITTING QUIETLY AFTER LUNCH WITHOUT ALCOHOL: SLIGHT CHANCE OF DOZING
HOW LIKELY ARE YOU TO NOD OFF OR FALL ASLEEP WHILE SITTING AND TALKING TO SOMEONE: WOULD NEVER DOZE
HOW LIKELY ARE YOU TO NOD OFF OR FALL ASLEEP IN A CAR, WHILE STOPPED FOR A FEW MINUTES IN TRAFFIC: WOULD NEVER DOZE
HOW LIKELY ARE YOU TO NOD OFF OR FALL ASLEEP WHILE LYING DOWN TO REST IN THE AFTERNOON WHEN CIRCUMSTANCES PERMIT: HIGH CHANCE OF DOZING
HOW LIKELY ARE YOU TO NOD OFF OR FALL ASLEEP WHILE SITTING AND READING: SLIGHT CHANCE OF DOZING
HOW LIKELY ARE YOU TO NOD OFF OR FALL ASLEEP WHEN YOU ARE A PASSENGER IN A CAR FOR AN HOUR WITHOUT A BREAK: MODERATE CHANCE OF DOZING
HOW LIKELY ARE YOU TO NOD OFF OR FALL ASLEEP WHILE SITTING INACTIVE IN A PUBLIC PLACE: SLIGHT CHANCE OF DOZING

## 2022-06-27 ENCOUNTER — VIRTUAL VISIT (OUTPATIENT)
Dept: SLEEP MEDICINE | Facility: CLINIC | Age: 57
End: 2022-06-27
Attending: NURSE PRACTITIONER
Payer: OTHER GOVERNMENT

## 2022-06-27 VITALS — BODY MASS INDEX: 41.64 KG/M2 | WEIGHT: 235 LBS | HEIGHT: 63 IN

## 2022-06-27 DIAGNOSIS — G47.33 OSA ON CPAP: Primary | ICD-10-CM

## 2022-06-27 DIAGNOSIS — G47.30 SLEEP APNEA, UNSPECIFIED TYPE: ICD-10-CM

## 2022-06-27 PROCEDURE — 99205 OFFICE O/P NEW HI 60 MIN: CPT | Mod: 95 | Performed by: INTERNAL MEDICINE
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
## 2022-06-27 NOTE — NURSING NOTE
Chief Complaint   Patient presents with     Video Visit     New Sleep:  Sleep Apnea (study done about 6 years ago)       Patient confirms medications and allergies are accurate via patients echeck in completion, and or denies any changes since last reviewed/verified.     Medication flagged for removal is Urea cream    Ekaterina Thompson, Virtual Facilitator/LPN

## 2022-06-28 NOTE — PATIENT INSTRUCTIONS
Obstructive sleep apnea(AILEEN):   Based on compliance measures, AILEEN is adequately controlled with CPAP at the current pressure settings.    Prescription has been provided and faxed to [facility] for replacement auto CPAP device with pressure settings 12-16 cmH2O and for new CPAP supplies.  We discussed about the waiting list due to current CPAP supply shortage.  We recommend you  to use your current CPAP equipment until the replacement device becomes available and please to get the supplies for the device replaced regularly.   Please  to take your CPAP device to [facility] to get the pressure settings adjusted on the CPAP to range between 12 to 16 cm of water(since  you have reported weight gain and may benefit from slight increase in the pressure).  Recommend obtaining overnight oximetry with the  CPAP at the revised pressure settings to check for oxygen levels.  Plan to communicate the oximetry results via GivUT.    After obtaining the replacement CPAP equipment, if your insurance requires that she need to meet compliance, please  call the sleep clinic at [phone], to schedule a follow-up via video visit in approximately 6 to 8 weeks after obtaining the device.     Otherwise, if the oximetry results are normal, plan will  be to follow-up at the sleep clinic in 1 year via video visit.      Previously diagnosed idiopathic hypersomnolence-- Recommended  to follow regular sleep schedule and to increase the sleep duration with the aim of obtaining at least 8 hours of sleep per night and avoiding sleep deprivation.    Sleep hygiene-We discussed avoiding watching TV in bed and avoiding using electronics in bed, and  reduce the duration of the nap to no more than half hour.    Please avoid driving, operating any heavy machinery or other hazardous situations while drowsy or sleepy.      Your BMI is Body mass index is 41.63 kg/m .    What is BMI?  Body mass index (BMI) is one way to tell whether you are at a healthy weight,  overweight, or obese. It measures your weight in relation to your height.  A BMI of 18.5 to 24.9 is in the healthy range. A person with a BMI of 25 to 29.9 is considered overweight, and someone with a BMI of 30 or greater is considered obese.  Another way to find out if you are at risk for health problems caused by overweight and obesity is to measure your waist. If you are a woman and your waist is more than 35 inches, or if you are a man and your waist is more than 40 inches, your risk of disease may be higher.  More than two-thirds of American adults are considered overweight or obese. Being overweight or obese increases the risk for further weight gain.  Excess weight may lead to heart disease and diabetes. Creating and following plans for healthy eating and physical activity may help you improve your health.    Methods for maintaining or losing weight.  Weight control is part of healthy lifestyle and includes exercise, emotional health, and healthy eating habits.  Careful eating habits lifelong is the mainstay of weight control.  Though there are significant health benefits from weight loss, long-term weight loss with diet alone may be very difficult to achieve- studies show long-term success with dietary management in less than 10% of people. Attaining a healthy weight may be especially difficult to achieve in those with severe obesity. In some cases, medications, devices and surgical management might be considered.    What can you do?  If you are overweight or obese and are interested in methods for weight loss, you should discuss this with your provider. In addition, we recommend that you review healthy life styles and methods for weight loss available through the National Institutes of Health patient information sites:   http://win.niddk.nih.gov/publications/index.htm

## 2022-07-03 ENCOUNTER — OFFICE VISIT (OUTPATIENT)
Dept: URGENT CARE | Facility: URGENT CARE | Age: 57
End: 2022-07-03
Payer: OTHER GOVERNMENT

## 2022-07-03 VITALS
DIASTOLIC BLOOD PRESSURE: 76 MMHG | SYSTOLIC BLOOD PRESSURE: 126 MMHG | TEMPERATURE: 97.8 F | BODY MASS INDEX: 41.63 KG/M2 | OXYGEN SATURATION: 98 % | WEIGHT: 235 LBS | HEART RATE: 62 BPM

## 2022-07-03 DIAGNOSIS — R30.0 DYSURIA: Primary | ICD-10-CM

## 2022-07-03 DIAGNOSIS — R10.9 FLANK PAIN: ICD-10-CM

## 2022-07-03 DIAGNOSIS — N30.01 ACUTE CYSTITIS WITH HEMATURIA: ICD-10-CM

## 2022-07-03 LAB
ALBUMIN UR-MCNC: NEGATIVE MG/DL
APPEARANCE UR: ABNORMAL
BACTERIA #/AREA URNS HPF: ABNORMAL /HPF
BASOPHILS # BLD AUTO: 0 10E3/UL (ref 0–0.2)
BASOPHILS NFR BLD AUTO: 1 %
BILIRUB UR QL STRIP: NEGATIVE
CLUE CELLS: NORMAL
COLOR UR AUTO: YELLOW
EOSINOPHIL # BLD AUTO: 0.2 10E3/UL (ref 0–0.7)
EOSINOPHIL NFR BLD AUTO: 3 %
ERYTHROCYTE [DISTWIDTH] IN BLOOD BY AUTOMATED COUNT: 12.5 % (ref 10–15)
GLUCOSE UR STRIP-MCNC: NEGATIVE MG/DL
HCT VFR BLD AUTO: 42.7 % (ref 35–47)
HGB BLD-MCNC: 14.7 G/DL (ref 11.7–15.7)
HGB UR QL STRIP: ABNORMAL
IMM GRANULOCYTES # BLD: 0 10E3/UL
IMM GRANULOCYTES NFR BLD: 0 %
KETONES UR STRIP-MCNC: NEGATIVE MG/DL
LEUKOCYTE ESTERASE UR QL STRIP: ABNORMAL
LYMPHOCYTES # BLD AUTO: 1.7 10E3/UL (ref 0.8–5.3)
LYMPHOCYTES NFR BLD AUTO: 26 %
MCH RBC QN AUTO: 31.8 PG (ref 26.5–33)
MCHC RBC AUTO-ENTMCNC: 34.4 G/DL (ref 31.5–36.5)
MCV RBC AUTO: 92 FL (ref 78–100)
MONOCYTES # BLD AUTO: 0.5 10E3/UL (ref 0–1.3)
MONOCYTES NFR BLD AUTO: 8 %
NEUTROPHILS # BLD AUTO: 4 10E3/UL (ref 1.6–8.3)
NEUTROPHILS NFR BLD AUTO: 63 %
NITRATE UR QL: NEGATIVE
PH UR STRIP: 7 [PH] (ref 5–7)
PLATELET # BLD AUTO: 224 10E3/UL (ref 150–450)
RBC # BLD AUTO: 4.62 10E6/UL (ref 3.8–5.2)
RBC #/AREA URNS AUTO: ABNORMAL /HPF
SP GR UR STRIP: 1.01 (ref 1–1.03)
SQUAMOUS #/AREA URNS AUTO: ABNORMAL /LPF
TRICHOMONAS, WET PREP: NORMAL
UROBILINOGEN UR STRIP-ACNC: 0.2 E.U./DL
WBC # BLD AUTO: 6.3 10E3/UL (ref 4–11)
WBC #/AREA URNS AUTO: ABNORMAL /HPF
WBC'S/HIGH POWER FIELD, WET PREP: NORMAL
YEAST, WET PREP: NORMAL

## 2022-07-03 PROCEDURE — 99213 OFFICE O/P EST LOW 20 MIN: CPT | Performed by: NURSE PRACTITIONER

## 2022-07-03 PROCEDURE — 87210 SMEAR WET MOUNT SALINE/INK: CPT | Performed by: NURSE PRACTITIONER

## 2022-07-03 PROCEDURE — 87086 URINE CULTURE/COLONY COUNT: CPT | Performed by: NURSE PRACTITIONER

## 2022-07-03 PROCEDURE — 85025 COMPLETE CBC W/AUTO DIFF WBC: CPT | Performed by: NURSE PRACTITIONER

## 2022-07-03 PROCEDURE — 81001 URINALYSIS AUTO W/SCOPE: CPT | Performed by: NURSE PRACTITIONER

## 2022-07-03 PROCEDURE — 36415 COLL VENOUS BLD VENIPUNCTURE: CPT | Performed by: NURSE PRACTITIONER

## 2022-07-03 RX ORDER — SULFAMETHOXAZOLE/TRIMETHOPRIM 800-160 MG
1 TABLET ORAL 2 TIMES DAILY
Qty: 10 TABLET | Refills: 0 | Status: SHIPPED | OUTPATIENT
Start: 2022-07-03 | End: 2022-07-27

## 2022-07-03 RX ORDER — SULFAMETHOXAZOLE/TRIMETHOPRIM 800-160 MG
1 TABLET ORAL ONCE
Status: CANCELLED | OUTPATIENT
Start: 2022-07-03 | End: 2022-07-03

## 2022-07-03 ASSESSMENT — ENCOUNTER SYMPTOMS
VOMITING: 0
ABDOMINAL DISTENTION: 0
DIARRHEA: 0
FEVER: 0
FREQUENCY: 1
DYSURIA: 1
SHORTNESS OF BREATH: 0
DIFFICULTY URINATING: 1
FLANK PAIN: 1
CHEST TIGHTNESS: 0
CHILLS: 0
WHEEZING: 0
NAUSEA: 0

## 2022-07-03 NOTE — PATIENT INSTRUCTIONS
Take full course of antibiotics.  Urine culture pending, we will call you only if culture shows resistance and change of antibiotic is required or if no growth to stop antibiotics and to follow-up with your primary care provider.  Please follow up with primary care provider in 1 week for a repeat urine test as there was blood in your urine today.  May use over the counter AZO pain relief or Uristat (phenazopyridine) for urinary burning but do not use for 24 hours before future urine tests.  Drink plenty of fluids. Limit caffeine and alcohol as these are bladder irritants.  May take tylenol or ibuprofen as needed for discomfort.   If you develop any vomiting, high fevers or lower back pain, these can be signs of a kidney infection and you should be seen in urgent care or in the ER.  Prevention of future infections by drinking cranberry juice, urination after intercourse and wiping from front to back after using the toilet.  Please follow up with primary care provider if symptoms return, if you're not improving, worsening or new symptoms or for any adverse reactions to medications.

## 2022-07-03 NOTE — PROGRESS NOTES
Assessment & Plan     Dysuria  UA RESULTS:  Recent Labs   Lab Test 07/03/22  1126   COLOR Yellow   APPEARANCE Slightly Cloudy*   URINEGLC Negative   URINEBILI Negative   URINEKETONE Negative   SG 1.015   UBLD Small*   URINEPH 7.0   PROTEIN Negative   UROBILINOGEN 0.2   NITRITE Negative   LEUKEST Large*   RBCU 2-5*   WBCU *     - UA macro with reflex to Microscopic and Culture - Clinc Collect  - Wet prep - Clinic Collect  - Urine Microscopic Exam  - Urine Culture pending     Acute cystitis with hematuria   sulfamethoxazole-trimethoprim (BACTRIM DS) 800-160 MG tablet  Dispense: 10 tablet; Refill: 0   Because of the flank pain prescribed for 5 days instead of 3 days.    Right flank pain order CBC which was normal     CBC RESULTS: Recent Labs   Lab Test 07/03/22  1220   WBC 6.3   RBC 4.62   HGB 14.7   HCT 42.7   MCV 92   MCH 31.8   MCHC 34.4   RDW 12.5        Return in about 2 days (around 7/5/2022).    [facility]    Aid Quesada is a 56 year old female who presents to clinic today for the following health issues: urinary frequency, urgency, dysuria,  burning, suprapubic pain and pressure, back pain and nausea for the last five days. Patient took Pyridium for 2 days ago and increased her fluid intake to help relieve her symptoms. Patient denied nausea, vomiting, fever, chills, and body aches.      Chief Complaint   Patient presents with     Vaginal Problem     Pain with urination and back pain x5 days     UTI 6/28/2022  Onset of symptoms was 5 days 6/28/2022  Course of illness is worsening  Severity moderately severe  Current and associated symptoms dysuria, frequency, urgency, burning, suprapubic pain and pressure, back pain and nausea  Treatment and measures tried Uristat (Pyridium) 2 days and drank a lot water  Predisposing factors include history of frequent UTI's  Patient denied vaginal pain, discharge or bleeding.      Review of Systems    Constitutional: Negative for chills and fever.   Respiratory: Negative for chest tightness, shortness of breath and wheezing.    Cardiovascular: Negative for chest pain.   Gastrointestinal: Negative for abdominal distention, diarrhea, nausea and vomiting.   Genitourinary: Positive for difficulty urinating, dysuria, flank pain, frequency and urgency. Negative for vaginal bleeding, vaginal discharge and vaginal pain.         Objective    /76   Pulse 62   Temp 97.8  F (36.6  C)   Wt 106.6 kg (235 lb)   LMP 12/09/2018   SpO2 98%   BMI 41.63 kg/m    Physical Exam  Vitals and nursing note reviewed.   Constitutional:       Appearance: Normal appearance.   HENT:      Head: Normocephalic and atraumatic.   Cardiovascular:      Rate and Rhythm: Normal rate and regular rhythm.      Pulses: Normal pulses.      Heart sounds: Normal heart sounds.   Pulmonary:      Effort: Pulmonary effort is normal.      Breath sounds: Normal breath sounds.   Abdominal:      General: Abdomen is flat. Bowel sounds are normal.      Palpations: Abdomen is soft.      Tenderness: There is right CVA tenderness. There is no left CVA tenderness.   Skin:     General: Skin is warm and dry.   Neurological:      Mental Status: She is alert.   Psychiatric:         Mood and Affect: Mood normal.         Behavior: Behavior normal.

## 2022-07-04 NOTE — RESULT ENCOUNTER NOTE
[facility] Emergency Dept discharge antibiotic (if prescribed): Sulfamethoxazole-Trimethoprim (Bactrim DS, Septra DS) 800-160 mg PO tablet,  1 tablet by mouth 2 times daily for 5 days.   Date of Rx (if applicable):  7/3/22  No changes in treatment per [facility] ED Lab Result Urine culture protocol.

## 2022-07-05 LAB — BACTERIA UR CULT: NORMAL

## 2022-07-25 NOTE — PROGRESS NOTES
"ENT Consultation    Sangita Meraz who is a 56 year old female seen in consultation at the request of Dr. Leonard Fatima.      History of Present Illness - Sangita Meraz is a 56 year old female hearing a swooshing sound in right ear, hearing loss .  Patient noted about 6 weeks ago right-sided tinnitus more like a low-level home or \"ocean\" nonpulsatile now more constant without vertigo accompanied or dizziness.  Patient did have positional vertigo for years off-and-on never had it treated.  Most notable is the hearing loss at low tones that she noted on the right side.  Denies any ear pressure.  Vertigo mother had profound deafness but never had been diagnosed with the Ménière's disease or any other conditions accompanied by vertigo.  Patient never had any imaging done.          BP Readings from Last 1 Encounters:   07/27/22 118/78       BP noted to be well controlled today in office.     Sangita IS NOT a smoker/uses chewing tobacco.     Past Medical History -   Past Medical History:   Diagnosis Date     Depressive disorder 1982       Current Medications -   Current Outpatient Medications:      calcium carbonate (OS-MIRIAM) 500 MG tablet, Take 1 tablet by mouth 2 times daily, Disp: , Rfl:      estradiol (ESTRING) 2 MG vaginal ring, Place 1 each vaginally every 3 months, Disp: 3 each, Rfl: 4     estradiol-norethindrone (COMBIPATCH) 0.05-0.14 MG/DAY bi-weekly patch, Place 1 patch onto the skin twice a week, Disp: 16 patch, Rfl: 6     fluocinonide (LIDEX) 0.05 % external solution, Apply to scalp BID x 3-4 weeks PRN, Disp: 50 mL, Rfl: 3     omeprazole 20 MG tablet, Take 20 mg by mouth daily, Disp: , Rfl:      sertraline (ZOLOFT) 100 MG tablet, Take 150 mg by mouth , Disp: , Rfl:      Vitamin D, Cholecalciferol, 25 MCG (1000 UT) CAPS, , Disp: , Rfl:      phenazopyridine (PYRIDIUM) 100 MG tablet, Take 1 tablet (100 mg) by mouth 3 times daily as needed for urinary tract discomfort (Patient not taking: Reported on 7/27/2022), " "Disp: 30 tablet, Rfl: 0    Allergies -   Allergies   Allergen Reactions     Ivp Dye [Contrast Dye] Anaphylaxis     Other (Do Not Use)      Other reaction(s): Itching,Watering Eyes, Rash, Sneezing, Itching,Watering Eyes, Rash, Sneezing, Itching,Watering Eyes, Rash, Sneezing       Social History -   Social History     Socioeconomic History     Marital status:    Tobacco Use     Smoking status: Former Smoker     Packs/day: 1.00     Years: 10.00     Pack years: 10.00     Types: Cigarettes     Start date: 10/10/1982     Quit date: 1991     Years since quittin.2     Smokeless tobacco: Never Used   Vaping Use     Vaping Use: Never used   Substance and Sexual Activity     Alcohol use: Yes     Comment: 1-2 beer/month     Drug use: Never     Sexual activity: Yes     Partners: Male     Birth control/protection: Post-menopausal   Other Topics Concern     Parent/sibling w/ CABG, MI or angioplasty before 65F 55M? No       Family History -   Family History   Problem Relation Age of Onset     Uterine Cancer Mother         endometrial cancer     Diabetes Mother      Colon Polyps Father      Diabetes Father      Hypertension Father      Diabetes Brother      Hypertension Brother        Review of Systems - As per HPI and PMHx, otherwise review of system review of the head and neck negative. Otherwise 10+ review of system is negative    Physical Exam  /78   Temp 97.2  F (36.2  C) (Temporal)   Ht 1.6 m (5' 3\")   Wt 106.6 kg (235 lb)   LMP 2018   BMI 41.63 kg/m    BMI: Body mass index is 41.63 kg/m .    General - The patient is well nourished and well developed, and appears to have good nutritional status.  Alert and oriented to person and place, answers questions and cooperates with examination appropriately.    SKIN - No suspicious lesions or rashes.  Respiration - No respiratory distress.  Head and Face - Normocephalic and atraumatic, with no gross asymmetry noted of the contour of the facial " features.  The facial nerve is intact, with strong symmetric movements.    Voice and Breathing - The patient was breathing comfortably without the use of accessory muscles. The patients voice was clear and strong, and had appropriate pitch and quality.    Ears - Bilateral pinna and EACs with normal appearing overlying skin. Tympanic membrane intact with good mobility on pneumatic otoscopy bilaterally. Bony landmarks of the ossicular chain are normal. The tympanic membranes are normal in appearance. No retraction, perforation, or masses.  No fluid or purulence was seen in the external canal or the middle ear.     Eyes - Extraocular movements intact.  Sclera were not icteric or injected, conjunctiva were pink and moist.    Mouth - Examination of the oral cavity showed pink, healthy oral mucosa. No lesions or ulcerations noted.  The tongue was mobile and midline, and the dentition were in good condition.      Throat - The walls of the oropharynx were smooth, pink, moist, symmetric, and had no lesions or ulcerations.  The tonsillar pillars and soft palate were symmetric.  The uvula was midline on elevation.    Neck - Normal midline excursion of the laryngotracheal complex during swallowing.  Full range of motion on passive movement.  Palpation of the occipital, submental, submandibular, internal jugular chain, and supraclavicular nodes did not demonstrate any abnormal lymph nodes or masses.  The carotid pulse was palpable bilaterally.  Palpation of the thyroid was soft and smooth, with no nodules or goiter appreciated.  The trachea was mobile and midline.    Nose - External contour is symmetric, no gross deflection or scars.  Nasal mucosa is pink and moist with no abnormal mucus.  The septum was midline and non-obstructive, turbinates of normal size and position.  No polyps, masses, or purulence noted on examination.    Neuro - Nonfocal neuro exam is normal, CN 2 through 12 intact, normal gait and muscle  tone.      Performed in clinic today:  Audiologic Studies - An audiogram and tympanogram were performed today as part of the evaluation and personally reviewed. The tympanogram shows Type A curves on the right and Type A curves on the left, with Normal canal volumes and middle ear pressures.  The audiogram showed Moderate low-frequency SNHL correcting to normal at high frequencies on the right and Normal thresholdson the left.    Word recognition score 96% on the right versus 92% on the left.    A/P - Sangita Meraz is a 56 year old female with relatively sudden sensorineural hearing loss involving low frequencies on the right tinnitus but no other stigmata of hydrops.  She does have benign positional vertigo symptoms never addressed previously but they are not new.  No history of migraines or any other predisposing phenomena.  At this point we discussed hearing drop that may be hydrops related.  We discussed trial of prednisone 60 mg a day for 6 days to see if that can be reversed.  Risks and benefits of prednisone discussed and patient wishes to try it.  Also discussed diet such as elimination of a lot of salt caffeine dark chocolate glutamate such as red wine aged cheeses and alcohol in general.  Stress reduction techniques also discussed.  Patient will follow the advice of this counseling session.  Finally also patient will be referred for BPPV analysis and physical therapy possible Epley maneuver.  Patient will follow-up in 6 weeks with audiogram at that time.      Guicho Abarca MD

## 2022-07-27 ENCOUNTER — OFFICE VISIT (OUTPATIENT)
Dept: OTOLARYNGOLOGY | Facility: OTHER | Age: 57
End: 2022-07-27
Payer: OTHER GOVERNMENT

## 2022-07-27 ENCOUNTER — OFFICE VISIT (OUTPATIENT)
Dept: AUDIOLOGY | Facility: OTHER | Age: 57
End: 2022-07-27
Payer: OTHER GOVERNMENT

## 2022-07-27 VITALS
HEIGHT: 63 IN | SYSTOLIC BLOOD PRESSURE: 118 MMHG | DIASTOLIC BLOOD PRESSURE: 78 MMHG | WEIGHT: 235 LBS | BODY MASS INDEX: 41.64 KG/M2 | TEMPERATURE: 97.2 F

## 2022-07-27 DIAGNOSIS — H93.8X2 SENSATION OF FULLNESS IN LEFT EAR: ICD-10-CM

## 2022-07-27 DIAGNOSIS — H90.41 SENSORINEURAL HEARING LOSS (SNHL) OF RIGHT EAR WITH UNRESTRICTED HEARING OF LEFT EAR: Primary | ICD-10-CM

## 2022-07-27 DIAGNOSIS — H93.11 TINNITUS OF RIGHT EAR: ICD-10-CM

## 2022-07-27 DIAGNOSIS — H81.10 BENIGN PAROXYSMAL POSITIONAL VERTIGO, UNSPECIFIED LATERALITY: Primary | ICD-10-CM

## 2022-07-27 DIAGNOSIS — H93.8X2 PLUGGED FEELING IN EAR, LEFT: ICD-10-CM

## 2022-07-27 PROCEDURE — 92550 TYMPANOMETRY & REFLEX THRESH: CPT | Performed by: AUDIOLOGIST

## 2022-07-27 PROCEDURE — 99204 OFFICE O/P NEW MOD 45 MIN: CPT | Performed by: OTOLARYNGOLOGY

## 2022-07-27 PROCEDURE — 99207 PR NO CHARGE LOS: CPT | Performed by: AUDIOLOGIST

## 2022-07-27 PROCEDURE — 92557 COMPREHENSIVE HEARING TEST: CPT | Performed by: AUDIOLOGIST

## 2022-07-27 RX ORDER — PREDNISONE 20 MG/1
20 TABLET ORAL
Qty: 18 TABLET | Refills: 0 | Status: SHIPPED | OUTPATIENT
Start: 2022-07-27 | End: 2022-09-07

## 2022-07-27 ASSESSMENT — PAIN SCALES - GENERAL: PAINLEVEL: NO PAIN (0)

## 2022-07-27 NOTE — PROGRESS NOTES
"AUDIOLOGY REPORT:    Patient was referred from ENT by Dr. Abarca for audiology evaluation. The patient reports that low frequencies seem reduced and she has an \"echo\" sound quality in her right ear and her left ear has been feeling plugged. This happened over 2-3 days around a month ago. The patient also reports increased tinnitus in her right ear. She reports that around three weeks prior to the hearing change she had started anticonvulsants for occipital neuralgia. The patient reports that a diagnosis of Meniere's disease has been proposed. She reports positional vertigo and has noted some fluctuation in hearing. The patient reports that her mother developed hearing loss in her 40s. The patient reports that she does not have significant noise exposure. She does not have a prior history of ear problems, has not had ear surgery, and did not have hearing concerns before.     Testing:    Otoscopy:   Otoscopic exam indicates ears are clear of cerumen bilaterally     Tympanograms:    RIGHT: normal eardrum mobility     LEFT:   normal eardrum mobility    Reflexes (reported by stimulus ear):  RIGHT: Ipsilateral is present at normal levels  RIGHT: Contralateral is present at normal levels  LEFT:   Ipsilateral is present at elevated levels   LEFT:   Contralateral is absent at frequencies tested    Thresholds:   Pure Tone Thresholds assessed using conventional audiometry with good reliability from 250-8000 Hz bilaterally using insert earphones and circumaural headphones     RIGHT:  mild to moderate sensorineural hearing loss rising to normal hearing sensitivity at 750 Hz and above    LEFT:    normal hearing sensitivity at all tested frequencies     Speech Reception Threshold:    RIGHT: 15 dB HL    LEFT:   10 dB HL  Results are in agreement with pure tone average.     Word Recognition Score:     RIGHT: 96% at 55 dB HL using NU-6 recorded word list.    LEFT:   92% at 55 dB HL using NU-6 recorded word list.    Discussed " results with the patient.     Patient was returned to ENT for follow up.     Romulo Rosales, CCC-A  Licensed Audiologist #[id number]  7/27/2022

## 2022-07-27 NOTE — LETTER
"    7/27/2022         RE: Sangita Meraz  [address] [facility] [address]        Dear Colleague,    Thank you for referring your patient, Sangita Meraz, to the [facility]. Please see a copy of my visit note below.    ENT Consultation    Sangita Meraz who is a 56 year old female seen in consultation at the request of Dr. Leonard Fatima.      History of Present Illness - Sangita Meraz is a 56 year old female hearing a swooshing sound in right ear, hearing loss .  Patient noted about 6 weeks ago right-sided tinnitus more like a low-level home or \"ocean\" nonpulsatile now more constant without vertigo accompanied or dizziness.  Patient did have positional vertigo for years off-and-on never had it treated.  Most notable is the hearing loss at low tones that she noted on the right side.  Denies any ear pressure.  Vertigo mother had profound deafness but never had been diagnosed with the Ménière's disease or any other conditions accompanied by vertigo.  Patient never had any imaging done.          BP Readings from Last 1 Encounters:   07/27/22 118/78       BP noted to be well controlled today in office.     Sangita IS NOT a smoker/uses chewing tobacco.     Past Medical History -   Past Medical History:   Diagnosis Date     Depressive disorder 1982       Current Medications -   Current Outpatient Medications:      calcium carbonate (OS-MIRIAM) 500 MG tablet, Take 1 tablet by mouth 2 times daily, Disp: , Rfl:      estradiol (ESTRING) 2 MG vaginal ring, Place 1 each vaginally every 3 months, Disp: 3 each, Rfl: 4     estradiol-norethindrone (COMBIPATCH) 0.05-0.14 MG/DAY bi-weekly patch, Place 1 patch onto the skin twice a week, Disp: 16 patch, Rfl: 6     fluocinonide (LIDEX) 0.05 % external solution, Apply to scalp BID x 3-4 weeks PRN, Disp: 50 mL, Rfl: 3     omeprazole 20 MG tablet, Take 20 mg by mouth daily, Disp: , Rfl:      sertraline (ZOLOFT) 100 MG tablet, Take 150 mg by mouth , Disp: , Rfl:      " "Vitamin D, Cholecalciferol, 25 MCG (1000 UT) CAPS, , Disp: , Rfl:      phenazopyridine (PYRIDIUM) 100 MG tablet, Take 1 tablet (100 mg) by mouth 3 times daily as needed for urinary tract discomfort (Patient not taking: Reported on 2022), Disp: 30 tablet, Rfl: 0    Allergies -   Allergies   Allergen Reactions     Ivp Dye [Contrast Dye] Anaphylaxis     Other (Do Not Use)      Other reaction(s): Itching,Watering Eyes, Rash, Sneezing, Itching,Watering Eyes, Rash, Sneezing, Itching,Watering Eyes, Rash, Sneezing       Social History -   Social History     Socioeconomic History     Marital status:    Tobacco Use     Smoking status: Former Smoker     Packs/day: 1.00     Years: 10.00     Pack years: 10.00     Types: Cigarettes     Start date: 10/10/1982     Quit date: 1991     Years since quittin.2     Smokeless tobacco: Never Used   Vaping Use     Vaping Use: Never used   Substance and Sexual Activity     Alcohol use: Yes     Comment: 1-2 beer/month     Drug use: Never     Sexual activity: Yes     Partners: Male     Birth control/protection: Post-menopausal   Other Topics Concern     Parent/sibling w/ CABG, MI or angioplasty before 65F 55M? No       Family History -   Family History   Problem Relation Age of Onset     Uterine Cancer Mother         endometrial cancer     Diabetes Mother      Colon Polyps Father      Diabetes Father      Hypertension Father      Diabetes Brother      Hypertension Brother        Review of Systems - As per HPI and PMHx, otherwise review of system review of the head and neck negative. Otherwise 10+ review of system is negative    Physical Exam  /78   Temp 97.2  F (36.2  C) (Temporal)   Ht 1.6 m (5' 3\")   Wt 106.6 kg (235 lb)   LMP 2018   BMI 41.63 kg/m    BMI: Body mass index is 41.63 kg/m .    General - The patient is well nourished and well developed, and appears to have good nutritional status.  Alert and oriented to person and place, answers questions " and cooperates with examination appropriately.    SKIN - No suspicious lesions or rashes.  Respiration - No respiratory distress.  Head and Face - Normocephalic and atraumatic, with no gross asymmetry noted of the contour of the facial features.  The facial nerve is intact, with strong symmetric movements.    Voice and Breathing - The patient was breathing comfortably without the use of accessory muscles. The patients voice was clear and strong, and had appropriate pitch and quality.    Ears - Bilateral pinna and EACs with normal appearing overlying skin. Tympanic membrane intact with good mobility on pneumatic otoscopy bilaterally. Bony landmarks of the ossicular chain are normal. The tympanic membranes are normal in appearance. No retraction, perforation, or masses.  No fluid or purulence was seen in the external canal or the middle ear.     Eyes - Extraocular movements intact.  Sclera were not icteric or injected, conjunctiva were pink and moist.    Mouth - Examination of the oral cavity showed pink, healthy oral mucosa. No lesions or ulcerations noted.  The tongue was mobile and midline, and the dentition were in good condition.      Throat - The walls of the oropharynx were smooth, pink, moist, symmetric, and had no lesions or ulcerations.  The tonsillar pillars and soft palate were symmetric.  The uvula was midline on elevation.    Neck - Normal midline excursion of the laryngotracheal complex during swallowing.  Full range of motion on passive movement.  Palpation of the occipital, submental, submandibular, internal jugular chain, and supraclavicular nodes did not demonstrate any abnormal lymph nodes or masses.  The carotid pulse was palpable bilaterally.  Palpation of the thyroid was soft and smooth, with no nodules or goiter appreciated.  The trachea was mobile and midline.    Nose - External contour is symmetric, no gross deflection or scars.  Nasal mucosa is pink and moist with no abnormal mucus.  The  septum was midline and non-obstructive, turbinates of normal size and position.  No polyps, masses, or purulence noted on examination.    Neuro - Nonfocal neuro exam is normal, CN 2 through 12 intact, normal gait and muscle tone.      Performed in clinic today:  Audiologic Studies - An audiogram and tympanogram were performed today as part of the evaluation and personally reviewed. The tympanogram shows Type A curves on the right and Type A curves on the left, with Normal canal volumes and middle ear pressures.  The audiogram showed Moderate low-frequency SNHL correcting to normal at high frequencies on the right and Normal thresholdson the left.    Word recognition score 96% on the right versus 92% on the left.    A/P - Sangita Meraz is a 56 year old female with relatively sudden sensorineural hearing loss involving low frequencies on the right tinnitus but no other stigmata of hydrops.  She does have benign positional vertigo symptoms never addressed previously but they are not new.  No history of migraines or any other predisposing phenomena.  At this point we discussed hearing drop that may be hydrops related.  We discussed trial of prednisone 60 mg a day for 6 days to see if that can be reversed.  Risks and benefits of prednisone discussed and patient wishes to try it.  Also discussed diet such as elimination of a lot of salt caffeine dark chocolate glutamate such as red wine aged cheeses and alcohol in general.  Stress reduction techniques also discussed.  Patient will follow the advice of this counseling session.  Finally also patient will be referred for BPPV analysis and physical therapy possible Epley maneuver.  Patient will follow-up in 6 weeks with audiogram at that time.      Guicho Abarca MD      Again, thank you for allowing me to participate in the care of your patient.        Sincerely,        Guicho Abarca MD, MD

## 2022-08-02 ENCOUNTER — HOSPITAL ENCOUNTER (OUTPATIENT)
Dept: PHYSICAL THERAPY | Facility: CLINIC | Age: 57
Setting detail: THERAPIES SERIES
Discharge: HOME | End: 2022-08-02
Attending: OTOLARYNGOLOGY
Payer: OTHER GOVERNMENT

## 2022-08-02 ENCOUNTER — HOSPITAL ENCOUNTER (OUTPATIENT)
Dept: MRI IMAGING | Facility: CLINIC | Age: 57
Discharge: HOME | End: 2022-08-02
Attending: OTOLARYNGOLOGY | Admitting: OTOLARYNGOLOGY
Payer: OTHER GOVERNMENT

## 2022-08-02 DIAGNOSIS — H93.8X2 SENSATION OF FULLNESS IN LEFT EAR: ICD-10-CM

## 2022-08-02 DIAGNOSIS — H81.10 BENIGN PAROXYSMAL POSITIONAL VERTIGO, UNSPECIFIED LATERALITY: ICD-10-CM

## 2022-08-02 PROCEDURE — 70553 MRI BRAIN STEM W/O & W/DYE: CPT

## 2022-08-02 PROCEDURE — A9585 GADOBUTROL INJECTION: HCPCS | Performed by: OTOLARYNGOLOGY

## 2022-08-02 PROCEDURE — 95992 CANALITH REPOSITIONING PROC: CPT | Mod: GP | Performed by: PHYSICAL THERAPIST

## 2022-08-02 PROCEDURE — 97161 PT EVAL LOW COMPLEX 20 MIN: CPT | Mod: GP | Performed by: PHYSICAL THERAPIST

## 2022-08-02 PROCEDURE — 255N000002 HC RX 255 OP 636: Performed by: OTOLARYNGOLOGY

## 2022-08-02 RX ORDER — GADOBUTROL 604.72 MG/ML
10 INJECTION INTRAVENOUS ONCE
Status: COMPLETED | OUTPATIENT
Start: 2022-08-02 | End: 2022-08-02

## 2022-08-02 RX ADMIN — GADOBUTROL 10 ML: 604.72 INJECTION INTRAVENOUS at 15:07

## 2022-08-02 NOTE — PROGRESS NOTES
08/02/22 1300   Quick Adds   Quick Adds Vestibular Eval   Type of Visit Initial OP PT Evaluation   General Information   Start of Care Date 08/02/22   Referring Physician Guicho Abarca   Orders Evaluate and Treat as Indicated   Order Date 07/27/22   Medical Diagnosis BPPV, unspecified laterality   Precautions/Limitations fall precautions   Surgical/Medical history reviewed Yes   Pertinent history of current problem (include personal factors and/or comorbidities that impact the POC) About 5-6 years ago ROlling to the right produces spinning  sensation that lasts for 30 seconds. Looking overhead causes the symptoms to come on as well. Has had swooshing and tinnitus in the right ear in the past year. No attacks of dizziness that last a few days. Gets headaches here and there.   Patient/Family Goals Statement improve balance.   General Information Comments PMH: hearing loss   Fall Risk Screen   Fall screen completed by PT   Have you fallen 2 or more times in the past year? No   Have you fallen and had an injury in the past year? No   Is patient a fall risk? No   Abuse Screen (yes response referral indicated)   Feels Unsafe at Home or Work/School no   Feels Threatened by Someone no   Does Anyone Try to Keep You From Having Contact with Others or Doing Things Outside Your Home? no   Physical Signs of Abuse Present no   System Outcome Measures   Outcome Measures BPPV   Dizziness Handicap Inventory (score out of 100) A decrease in score by 17.18 or greater indicates a clinically significant change in symptoms. 42   Gait Special Tests   Gait Special Tests DYNAMIC GAIT INDEX   Gait Special Tests Dynamic Gait Index   Score out of 24 12/12   Comments feels pulled to the right sometimes   Balance Special Tests   Balance Special Tests Modified CTSIB Conditions   Balance Special Tests Modified CTSIB Conditions   Condition 1, seconds 30 Seconds   Condition 2, seconds 30 Seconds   Condition 4, seconds 30 Seconds   Condition 5,  seconds 30 Seconds   Cervicogenic Screen   Neck ROM some increased dizziness with rotation and flex/ext  but no symptoms with SB   Vertebral Artery Test Normal   Alar Ligament Test Normal   Transverse Ligament Test Normal   Oculomotor Exam   Smooth Pursuit Normal   Saccades Normal   Saccades Comments slight spinny sensation with vertical saccades   VOR Normal   VOR Cancellation Normal   Rapid Head Thrust Normal   Convergence Testing Normal   Convergence Testing Comments 6 inches from nose left eye   Infrared Goggle Exam or Frenzel Lense Exam   Vestibular Suppressant in Last 24 Hours? No   Exam completed with Infrared Goggles   Spontaneous Nystagmus Negative   Gaze Evoked Nystagmus Negative   Head Shake Horizontal Nystagmus Other   Head Shake Horizontal Nystagmus comments mild left beating nystagmus and mild dizziness   Richard-Hallpike (right) Negative   [place]-Hallpike (right) comments mild room spinning sensation   [place]-Hallpike (Left) Negative   [place]-Hallpike (left) comments mild nausea   HSCC Supine Roll Test (Right) Negative   HSCC Supine Roll Test (Left) Negative   Supine Neck Extension Test Negative   BPPV Canal(s) R Posterior   BPPV Type Canalithasis   Dynamic Visual Acuity (DVA)   Static Acuity (LogMar) line 9   Horizontal Head Movement at 1 Hz (LogMar) line 9   Horizontal Head Movement at 2 Hz (LogMar) line 9   Clinical Impression   Criteria for Skilled Therapeutic Interventions Met yes, treatment indicated   PT Diagnosis BPPV right   Influenced by the following impairments dizziness with rolling right   Functional limitations due to impairments rolling to the right, sleeping on the right, sitting up   Clinical Presentation Stable/Uncomplicated   Clinical Presentation Rationale clinical decision making and chart review   Clinical Decision Making (Complexity) Low complexity   Therapy Frequency 1 time/week   Predicted Duration of Therapy Intervention (days/wks) 4 weeks   Risk & Benefits of therapy have been explained  Yes   Patient, Family & other staff in agreement with plan of care Yes   Clinical Impression Comments Sangita is a 56 year old female who presents with all subjective information sounding like right sided BPPV.  However, upon testing she had no nystagmus but did have some room spinning sensations.  Treated for right sided BPPV still due to symptoms that line up with this diagnosis.   Education Assessment   Preferred Learning Style Listening;Demonstration;Pictures/video   Barriers to Learning No barriers   GOALS   PT Eval Goals 2;1   Goal 1   Goal Identifier 1   Goal Description Patient will be able to roll onto the right side with no symptoms of dizziness.   Target Date 08/16/22   Goal 2   Goal Identifier 2   Goal Description Patient will be able to look up/down for extended periods of time with no dizziness.   Target Date 08/30/22   Total Evaluation Time   PT Eval, Low Complexity Minutes (80327) 30       Sue Jeong  PT, DPT       8/2/2022   [address] [address]  [email or web address]  Voicemail: [phone]

## 2022-09-01 NOTE — PROGRESS NOTES
"ENT Consultation    Sangita Meraz who is a 56 year old female seen in consultation at the request of Leonard Fatima .      History of Present Illness - Sangita Meraz is a 56 year old female ***      There is no height or weight on file to calculate BMI.    {Weight Management Plan -- Delete if patient has a normal BMI:869297}    BP Readings from Last 1 Encounters:   22 118/78       {htnspecialty:045550}    Sangita {IS:541878} a smoker/uses chewing tobacco.  Sangita {QUITTIN::\"is not ready to quit\"}      Past Medical History -   Past Medical History:   Diagnosis Date     Depressive disorder 1982       Current Medications -   Current Outpatient Medications:      calcium carbonate (OS-MIRIAM) 500 MG tablet, Take 1 tablet by mouth 2 times daily, Disp: , Rfl:      estradiol (ESTRING) 2 MG vaginal ring, Place 1 each vaginally every 3 months, Disp: 3 each, Rfl: 4     estradiol-norethindrone (COMBIPATCH) 0.05-0.14 MG/DAY bi-weekly patch, Place 1 patch onto the skin twice a week, Disp: 16 patch, Rfl: 6     fluocinonide (LIDEX) 0.05 % external solution, Apply to scalp BID x 3-4 weeks PRN, Disp: 50 mL, Rfl: 3     omeprazole 20 MG tablet, Take 20 mg by mouth daily, Disp: , Rfl:      phenazopyridine (PYRIDIUM) 100 MG tablet, Take 1 tablet (100 mg) by mouth 3 times daily as needed for urinary tract discomfort (Patient not taking: Reported on 2022), Disp: 30 tablet, Rfl: 0     predniSONE (DELTASONE) 20 MG tablet, Take 1 tablet (20 mg) by mouth 3 times daily (with meals), Disp: 18 tablet, Rfl: 0     sertraline (ZOLOFT) 100 MG tablet, Take 150 mg by mouth , Disp: , Rfl:      Vitamin D, Cholecalciferol, 25 MCG (1000 UT) CAPS, , Disp: , Rfl:     Allergies -   Allergies   Allergen Reactions     Ivp Dye [Contrast Dye] Anaphylaxis     Other (Do Not Use)      Other reaction(s): Itching,Watering Eyes, Rash, Sneezing, Itching,Watering Eyes, Rash, Sneezing, Itching,Watering Eyes, Rash, Sneezing       Social History -   Social " History     Socioeconomic History     Marital status:    Tobacco Use     Smoking status: Former Smoker     Packs/day: 1.00     Years: 10.00     Pack years: 10.00     Types: Cigarettes     Start date: 10/10/1982     Quit date: 1991     Years since quittin.3     Smokeless tobacco: Never Used   Vaping Use     Vaping Use: Never used   Substance and Sexual Activity     Alcohol use: Yes     Comment: 1-2 beer/month     Drug use: Never     Sexual activity: Yes     Partners: Male     Birth control/protection: Post-menopausal   Other Topics Concern     Parent/sibling w/ CABG, MI or angioplasty before 65F 55M? No       Family History -   Family History   Problem Relation Age of Onset     Uterine Cancer Mother         endometrial cancer     Diabetes Mother      Colon Polyps Father      Diabetes Father      Hypertension Father      Diabetes Brother      Hypertension Brother        Review of Systems - As per HPI and PMHx, otherwise review of system review of the head and neck negative. Otherwise 10+ review of system is negative    Physical Exam  LMP 2018   BMI: There is no height or weight on file to calculate BMI.    General - The patient is well nourished and well developed, and appears to have good nutritional status.  Alert and oriented to person and place, answers questions and cooperates with examination appropriately.    SKIN - No suspicious lesions or rashes.  Respiration - No respiratory distress.  Head and Face - Normocephalic and atraumatic, with no gross asymmetry noted of the contour of the facial features.  The facial nerve is intact, with strong symmetric movements.    Voice and Breathing - The patient was breathing comfortably without the use of accessory muscles. The patients voice was clear and strong, and had appropriate pitch and quality.    Ears - Bilateral pinna and EACs with normal appearing overlying skin. Tympanic membrane intact with good mobility on pneumatic otoscopy bilaterally.  Bony landmarks of the ossicular chain are normal. The tympanic membranes are normal in appearance. No retraction, perforation, or masses.  No fluid or purulence was seen in the external canal or the middle ear.     Eyes - Extraocular movements intact.  Sclera were not icteric or injected, conjunctiva were pink and moist.    Mouth - Examination of the oral cavity showed pink, healthy oral mucosa. No lesions or ulcerations noted.  The tongue was mobile and midline, and the dentition were in good condition.      Throat - The walls of the oropharynx were smooth, pink, moist, symmetric, and had no lesions or ulcerations.  The tonsillar pillars and soft palate were symmetric.  The uvula was midline on elevation.    Neck - Normal midline excursion of the laryngotracheal complex during swallowing.  Full range of motion on passive movement.  Palpation of the occipital, submental, submandibular, internal jugular chain, and supraclavicular nodes did not demonstrate any abnormal lymph nodes or masses.  The carotid pulse was palpable bilaterally.  Palpation of the thyroid was soft and smooth, with no nodules or goiter appreciated.  The trachea was mobile and midline.    Nose - External contour is symmetric, no gross deflection or scars.  Nasal mucosa is pink and moist with no abnormal mucus.  The septum was midline and non-obstructive, turbinates of normal size and position.  No polyps, masses, or purulence noted on examination.    Neuro - Nonfocal neuro exam is normal, CN 2 through 12 intact, normal gait and muscle tone.      Performed in clinic today:  {Preformedtoday1:234685}      A/P - Sangita Meraz is a 56 year old female ***      Guicho Abarca MD

## 2022-09-02 ENCOUNTER — TELEPHONE (OUTPATIENT)
Dept: OTOLARYNGOLOGY | Facility: OTHER | Age: 57
End: 2022-09-02

## 2022-09-02 NOTE — TELEPHONE ENCOUNTER
[facility] Call Center    Phone Message    May a detailed message be left on voicemail: yes     Reason for Call: Other:   Pt has an appt on 09/07 but did test positive for covid on 08/31. Pt wants to know if she can still come in to her appt. Please follow-up to advise. Thank you.     Action Taken: Other:   ent    Travel Screening: Not Applicable

## 2022-09-06 NOTE — TELEPHONE ENCOUNTER
I called pt and informed her that since it's been over 5 days since she first tested postive and she doesn't have a fever and feels ok she should be ok to come in tomorrow and to make sure she wears her mask. She said she feels fine just has a cough.   Wendy Michael MA

## 2022-09-07 ENCOUNTER — OFFICE VISIT (OUTPATIENT)
Dept: OTOLARYNGOLOGY | Facility: OTHER | Age: 57
End: 2022-09-07

## 2022-09-07 ENCOUNTER — OFFICE VISIT (OUTPATIENT)
Dept: AUDIOLOGY | Facility: OTHER | Age: 57
End: 2022-09-07
Payer: OTHER GOVERNMENT

## 2022-09-07 VITALS
WEIGHT: 233.6 LBS | BODY MASS INDEX: 41.39 KG/M2 | HEIGHT: 63 IN | SYSTOLIC BLOOD PRESSURE: 120 MMHG | DIASTOLIC BLOOD PRESSURE: 60 MMHG | TEMPERATURE: 97.9 F

## 2022-09-07 DIAGNOSIS — H90.41 SENSORINEURAL HEARING LOSS (SNHL) OF RIGHT EAR WITH UNRESTRICTED HEARING OF LEFT EAR: Primary | ICD-10-CM

## 2022-09-07 DIAGNOSIS — H81.01 COCHLEAR HYDROPS, RIGHT: ICD-10-CM

## 2022-09-07 DIAGNOSIS — H81.11 BENIGN PAROXYSMAL POSITIONAL VERTIGO, RIGHT: Primary | ICD-10-CM

## 2022-09-07 DIAGNOSIS — H91.21 SUDDEN HEARING LOSS, RIGHT: ICD-10-CM

## 2022-09-07 PROCEDURE — 92550 TYMPANOMETRY & REFLEX THRESH: CPT | Performed by: AUDIOLOGIST

## 2022-09-07 PROCEDURE — 99207 PR NO CHARGE LOS: CPT | Performed by: AUDIOLOGIST

## 2022-09-07 PROCEDURE — 99214 OFFICE O/P EST MOD 30 MIN: CPT | Performed by: OTOLARYNGOLOGY

## 2022-09-07 PROCEDURE — 92557 COMPREHENSIVE HEARING TEST: CPT | Performed by: AUDIOLOGIST

## 2022-09-07 NOTE — LETTER
9/7/2022         RE: Sangita Meraz  [address] [address]        Dear Colleague,    Thank you for referring your patient, Sangita Meraz, to the [facility]. Please see a copy of my visit note below.    History of Present Illness - Sangita Meraz is a 56 year old female presenting in clinic today for a recheck on Patient presents with:  Consult: Hearing loss     Patient presented last time with a idiopathic sudden sensorineural hearing loss pattern very reminiscent of hydrops.  Some pressure was also noted in the ear.  She also had some vertigo symptoms largely positional.  Patient has had physical therapy evaluation which indeed revealed some positional changes in the right.  She took prednisone 60 mg daily for 6 days without much help with hearing but that the pressure has been relieved.  MRI BRAIN WITHOUT AND WITH CONTRAST  8/2/2022 4:27 PM     HISTORY:  sudden hearing loss right; Sensation of fullness in left ear     Possible BPPV on the right side following Richard-Hallpike testing even though no nystagmus but there was symptomatic nausea and feeling of dizziness and vertigo.  Epley maneuver was performed.  TECHNIQUE:  Multiplanar, multisequence MRI of the brain without and  with gadavist 10ml     COMPARISON: None.     FINDINGS:  Mild parenchymal volume loss is present. Few scattered nonspecific  frontoparietal predominantly matter T2 hyperintensities likely  represent mild chronic small vessel ischemic change. No evidence of  recent ischemia, hemorrhage, mass, mass effect, or hydrocephalus.  Internal auditory canals are unremarkable with normal appearance of  bilateral cranial nerve VII and VIII and the membranous labyrinth and  structures. Incidental right cerebellar developmental venous anomaly  considered to be a normal variant.     Marrow signal is within normal limits. Mild frontal sinus mucosal  thickening. The visualized tympanic and mastoid cavities  are  unremarkable.                                                                      IMPRESSION:  Volume loss and scattered nonspecific white matter T2 hyperintensities  which likely represent mild chronic small vessel ischemic change.         BP Readings from Last 1 Encounters:   22 120/60       BP noted to be well controlled today in office.     Sangita IS NOT a smoker/uses chewing tobacco.      Past Medical History -   Past Medical History:   Diagnosis Date     Depressive disorder        Current Medications -   Current Outpatient Medications:      estradiol (ESTRING) 2 MG vaginal ring, Place 1 each vaginally every 3 months, Disp: 3 each, Rfl: 4     estradiol-norethindrone (COMBIPATCH) 0.05-0.14 MG/DAY bi-weekly patch, Place 1 patch onto the skin twice a week, Disp: 16 patch, Rfl: 6     fluocinonide (LIDEX) 0.05 % external solution, Apply to scalp BID x 3-4 weeks PRN, Disp: 50 mL, Rfl: 3     omeprazole 20 MG tablet, Take 20 mg by mouth daily, Disp: , Rfl:      sertraline (ZOLOFT) 100 MG tablet, Take 150 mg by mouth , Disp: , Rfl:     Allergies -   Allergies   Allergen Reactions     Ivp Dye [Contrast Dye] Anaphylaxis     Other (Do Not Use)      Other reaction(s): Itching,Watering Eyes, Rash, Sneezing, Itching,Watering Eyes, Rash, Sneezing, Itching,Watering Eyes, Rash, Sneezing       Social History -   Social History     Socioeconomic History     Marital status:      Spouse name: None     Number of children: None     Years of education: None     Highest education level: None   Tobacco Use     Smoking status: Former Smoker     Packs/day: 1.00     Years: 10.00     Pack years: 10.00     Types: Cigarettes     Start date: 10/10/1982     Quit date: 1991     Years since quittin.3     Smokeless tobacco: Never Used   Vaping Use     Vaping Use: Never used   Substance and Sexual Activity     Alcohol use: Yes     Comment: 1-2 beer/month     Drug use: Never     Sexual activity: Yes     Partners:  "Male     Birth control/protection: Post-menopausal   Other Topics Concern     Parent/sibling w/ CABG, MI or angioplasty before 65F 55M? No       Family History -   Family History   Problem Relation Age of Onset     Uterine Cancer Mother         endometrial cancer     Diabetes Mother      Colon Polyps Father      Diabetes Father      Hypertension Father      Diabetes Brother      Hypertension Brother        Review of Systems - As per HPI and PMHx, otherwise review of system review of the head and neck negative. Otherwise 10+ review of system is negative    Physical Exam  /60   Temp 97.9  F (36.6  C) (Temporal)   Ht 1.6 m (5' 3\")   Wt 106 kg (233 lb 9.6 oz)   LMP 12/09/2018   BMI 41.38 kg/m    BMI: Body mass index is 41.38 kg/m .    General - The patient is well nourished and well developed, and appears to have good nutritional status.  Alert and oriented to person and place, answers questions and cooperates with examination appropriately.    SKIN - No suspicious lesions or rashes.  Respiration - No respiratory distress.  Head and Face - Normocephalic and atraumatic, with no gross asymmetry noted of the contour of the facial features.  The facial nerve is intact, with strong symmetric movements.    Voice and Breathing - The patient was breathing comfortably without the use of accessory muscles. The patients voice was clear and strong, and had appropriate pitch and quality.    Ears - Bilateral pinna and EACs with normal appearing overlying skin. Tympanic membrane intact with good mobility on pneumatic otoscopy bilaterally. Bony landmarks of the ossicular chain are normal. The tympanic membranes are normal in appearance. No retraction, perforation, or masses.  No fluid or purulence was seen in the external canal or the middle ear.     Eyes - Extraocular movements intact.  Sclera were not icteric or injected, conjunctiva were pink and moist.    Mouth - Examination of the oral cavity showed pink, healthy oral " mucosa. No lesions or ulcerations noted.  The tongue was mobile and midline, and the dentition were in good condition.      Throat - The walls of the oropharynx were smooth, pink, moist, symmetric, and had no lesions or ulcerations.  The tonsillar pillars and soft palate were symmetric.  The uvula was midline on elevation.    Neck - Normal midline excursion of the laryngotracheal complex during swallowing.  Full range of motion on passive movement.  Palpation of the occipital, submental, submandibular, internal jugular chain, and supraclavicular nodes did not demonstrate any abnormal lymph nodes or masses.  The carotid pulse was palpable bilaterally.  Palpation of the thyroid was soft and smooth, with no nodules or goiter appreciated.  The trachea was mobile and midline.    Nose - External contour is symmetric, no gross deflection or scars.  Nasal mucosa is pink and moist with no abnormal mucus.  The septum was midline and non-obstructive, turbinates of normal size and position.  No polyps, masses, or purulence noted on examination.    Neuro - Nonfocal neuro exam is normal, CN 2 through 12 intact, normal gait and muscle tone.      Performed in clinic today:  Audiologic Studies - An audiogram and tympanogram were performed today as part of the evaluation and personally reviewed. The tympanogram shows Type A curves on the right and Type A curves on the left, with Normal canal volumes and middle ear pressures.  The audiogram showed Moderate low-frequency SNHL correcting to normal hearing at mid high to high frequencies on the right and  normal thresholdson the left.     Word recognition score 84% on the right versus 100 on the left.    A/P - Sangita Meraz is a 56 year old female Patient presents with:  Consult: Hearing loss     Patient with hydrops presenting changes involving her right ear.  Richard-Hallpike and followed Apley did help with her balance.  Pressure is gone.  On the hearing issue remains.  We discussed  further options considering overall negative MRI with transtympanic perfusion therapy possibly minimal results due to poor response to high-dose oral steroids.  She is not interested in that right now.  We also try and address her concerns about the commentary on the  About T2 hyperintensities which is present in majority of MRIs in patients over the age of 50.  If she still has concerns we offered to send her to neurology.  She does not.  She does not experience any other focal or general neurological issues.  [facility] should follow up in 4 months.  We counseled patient on amplification she will look into hearing aids.  He has appropriate questions regarding that issue.    At [facility] next appointment they will need a hearing test.      Guicho Abarca MD          Again, thank you for allowing me to participate in the care of your patient.        Sincerely,        Guicho Abarca MD, MD

## 2022-09-07 NOTE — PROGRESS NOTES
AUDIOLOGY REPORT:    Patient was referred from ENT by Dr. Abarca for audiology evaluation. The patient was last seen on 7/27/2022 and results showed mild to moderate low frequency sensorineural hearing loss in the right ear. This had started fairly suddenly. The patient returns today after steroid treatment and reports that she has not noted any changes in hearing. She reports that she saw physical therapy for dizziness, which helped.    Testing:    Otoscopy:   Otoscopic exam indicates ears are clear of cerumen bilaterally     Tympanograms:    RIGHT: normal eardrum mobility     LEFT:   normal eardrum mobility    Reflexes (reported by stimulus ear):  RIGHT: Ipsilateral is present at normal levels  RIGHT: Contralateral is present at elevated levels   LEFT:   Ipsilateral is present at elevated levels   LEFT:   Contralateral is present at elevated levels     Thresholds:   Pure Tone Thresholds assessed using conventional audiometry with good reliability from 250-8000 Hz bilaterally using insert earphones and circumaural headphones     RIGHT:  moderate rising to mild sensorineural hearing loss at 250-750 Hz rising to normal hearing sensitivity    LEFT:    normal hearing sensitivity at all tested frequencies     Speech Reception Threshold:    RIGHT: 15 dB HL    LEFT:   15 dB HL  Results are in agreement with pure tone average.     Word Recognition Score:     RIGHT: 84% at 55 dB HL using NU-6 recorded word list.      96% at 60 dB HL using NU-6 recorded word list.    LEFT:   100% at 55 dB HL using NU-6 recorded word list.    Discussed results with the patient. Compared to 7/27/2022, thresholds today are 10-15 dB poorer in the right ear at 250-1000 Hz, 15 dB poorer in the left ear at 8000 Hz, and stable at all other tested frequencies.    Patient was returned to ENT for follow up.     Romulo Rosales, CCC-A  Licensed Audiologist #[id number]  9/7/2022

## 2022-09-07 NOTE — PROGRESS NOTES
History of Present Illness - Sangita Meraz is a 56 year old female presenting in clinic today for a recheck on Patient presents with:  Consult: Hearing loss     Patient presented last time with a idiopathic sudden sensorineural hearing loss pattern very reminiscent of hydrops.  Some pressure was also noted in the ear.  She also had some vertigo symptoms largely positional.  Patient has had physical therapy evaluation which indeed revealed some positional changes in the right.  She took prednisone 60 mg daily for 6 days without much help with hearing but that the pressure has been relieved.  MRI BRAIN WITHOUT AND WITH CONTRAST  8/2/2022 4:27 PM     HISTORY:  sudden hearing loss right; Sensation of fullness in left ear     Possible BPPV on the right side following Richard-Hallpike testing even though no nystagmus but there was symptomatic nausea and feeling of dizziness and vertigo.  Epley maneuver was performed.  TECHNIQUE:  Multiplanar, multisequence MRI of the brain without and  with gadavist 10ml     COMPARISON: None.     FINDINGS:  Mild parenchymal volume loss is present. Few scattered nonspecific  frontoparietal predominantly matter T2 hyperintensities likely  represent mild chronic small vessel ischemic change. No evidence of  recent ischemia, hemorrhage, mass, mass effect, or hydrocephalus.  Internal auditory canals are unremarkable with normal appearance of  bilateral cranial nerve VII and VIII and the membranous labyrinth and  structures. Incidental right cerebellar developmental venous anomaly  considered to be a normal variant.     Marrow signal is within normal limits. Mild frontal sinus mucosal  thickening. The visualized tympanic and mastoid cavities are  unremarkable.                                                                      IMPRESSION:  Volume loss and scattered nonspecific white matter T2 hyperintensities  which likely represent mild chronic small vessel ischemic change.         BP Readings  from Last 1 Encounters:   22 120/60       BP noted to be well controlled today in office.     Sangita IS NOT a smoker/uses chewing tobacco.      Past Medical History -   Past Medical History:   Diagnosis Date     Depressive disorder        Current Medications -   Current Outpatient Medications:      estradiol (ESTRING) 2 MG vaginal ring, Place 1 each vaginally every 3 months, Disp: 3 each, Rfl: 4     estradiol-norethindrone (COMBIPATCH) 0.05-0.14 MG/DAY bi-weekly patch, Place 1 patch onto the skin twice a week, Disp: 16 patch, Rfl: 6     fluocinonide (LIDEX) 0.05 % external solution, Apply to scalp BID x 3-4 weeks PRN, Disp: 50 mL, Rfl: 3     omeprazole 20 MG tablet, Take 20 mg by mouth daily, Disp: , Rfl:      sertraline (ZOLOFT) 100 MG tablet, Take 150 mg by mouth , Disp: , Rfl:     Allergies -   Allergies   Allergen Reactions     Ivp Dye [Contrast Dye] Anaphylaxis     Other (Do Not Use)      Other reaction(s): Itching,Watering Eyes, Rash, Sneezing, Itching,Watering Eyes, Rash, Sneezing, Itching,Watering Eyes, Rash, Sneezing       Social History -   Social History     Socioeconomic History     Marital status:      Spouse name: None     Number of children: None     Years of education: None     Highest education level: None   Tobacco Use     Smoking status: Former Smoker     Packs/day: 1.00     Years: 10.00     Pack years: 10.00     Types: Cigarettes     Start date: 10/10/1982     Quit date: 1991     Years since quittin.3     Smokeless tobacco: Never Used   Vaping Use     Vaping Use: Never used   Substance and Sexual Activity     Alcohol use: Yes     Comment: 1-2 beer/month     Drug use: Never     Sexual activity: Yes     Partners: Male     Birth control/protection: Post-menopausal   Other Topics Concern     Parent/sibling w/ CABG, MI or angioplasty before 65F 55M? No       Family History -   Family History   Problem Relation Age of Onset     Uterine Cancer Mother         endometrial  "cancer     Diabetes Mother      Colon Polyps Father      Diabetes Father      Hypertension Father      Diabetes Brother      Hypertension Brother        Review of Systems - As per HPI and PMHx, otherwise review of system review of the head and neck negative. Otherwise 10+ review of system is negative    Physical Exam  /60   Temp 97.9  F (36.6  C) (Temporal)   Ht 1.6 m (5' 3\")   Wt 106 kg (233 lb 9.6 oz)   LMP 12/09/2018   BMI 41.38 kg/m    BMI: Body mass index is 41.38 kg/m .    General - The patient is well nourished and well developed, and appears to have good nutritional status.  Alert and oriented to person and place, answers questions and cooperates with examination appropriately.    SKIN - No suspicious lesions or rashes.  Respiration - No respiratory distress.  Head and Face - Normocephalic and atraumatic, with no gross asymmetry noted of the contour of the facial features.  The facial nerve is intact, with strong symmetric movements.    Voice and Breathing - The patient was breathing comfortably without the use of accessory muscles. The patients voice was clear and strong, and had appropriate pitch and quality.    Ears - Bilateral pinna and EACs with normal appearing overlying skin. Tympanic membrane intact with good mobility on pneumatic otoscopy bilaterally. Bony landmarks of the ossicular chain are normal. The tympanic membranes are normal in appearance. No retraction, perforation, or masses.  No fluid or purulence was seen in the external canal or the middle ear.     Eyes - Extraocular movements intact.  Sclera were not icteric or injected, conjunctiva were pink and moist.    Mouth - Examination of the oral cavity showed pink, healthy oral mucosa. No lesions or ulcerations noted.  The tongue was mobile and midline, and the dentition were in good condition.      Throat - The walls of the oropharynx were smooth, pink, moist, symmetric, and had no lesions or ulcerations.  The tonsillar pillars " and soft palate were symmetric.  The uvula was midline on elevation.    Neck - Normal midline excursion of the laryngotracheal complex during swallowing.  Full range of motion on passive movement.  Palpation of the occipital, submental, submandibular, internal jugular chain, and supraclavicular nodes did not demonstrate any abnormal lymph nodes or masses.  The carotid pulse was palpable bilaterally.  Palpation of the thyroid was soft and smooth, with no nodules or goiter appreciated.  The trachea was mobile and midline.    Nose - External contour is symmetric, no gross deflection or scars.  Nasal mucosa is pink and moist with no abnormal mucus.  The septum was midline and non-obstructive, turbinates of normal size and position.  No polyps, masses, or purulence noted on examination.    Neuro - Nonfocal neuro exam is normal, CN 2 through 12 intact, normal gait and muscle tone.      Performed in clinic today:  Audiologic Studies - An audiogram and tympanogram were performed today as part of the evaluation and personally reviewed. The tympanogram shows Type A curves on the right and Type A curves on the left, with Normal canal volumes and middle ear pressures.  The audiogram showed Moderate low-frequency SNHL correcting to normal hearing at mid high to high frequencies on the right and  normal thresholdson the left.     Word recognition score 84% on the right versus 100 on the left.    A/P - Sangita Meraz is a 56 year old female Patient presents with:  Consult: Hearing loss     Patient with hydrops presenting changes involving her right ear.  Olney-Hallpike and followed Apley did help with her balance.  Pressure is gone.  On the hearing issue remains.  We discussed further options considering overall negative MRI with transtympanic perfusion therapy possibly minimal results due to poor response to high-dose oral steroids.  She is not interested in that right now.  We also try and address her concerns about the  commentary on the  About T2 hyperintensities which is present in majority of MRIs in patients over the age of 50.  If she still has concerns we offered to send her to neurology.  She does not.  She does not experience any other focal or general neurological issues.  Sangita should follow up in 4 months.  We counseled patient on amplification she will look into hearing aids.  He has appropriate questions regarding that issue.    At [facility] next appointment they will need a hearing test.      Guicho Abarca MD

## 2022-09-13 ASSESSMENT — ENCOUNTER SYMPTOMS
FEVER: 0
HEADACHES: 0
HEMATOCHEZIA: 0
DYSURIA: 0
SHORTNESS OF BREATH: 0
CONSTIPATION: 0
HEMATURIA: 0
NERVOUS/ANXIOUS: 0
ABDOMINAL PAIN: 0
EYE PAIN: 0
FREQUENCY: 0
JOINT SWELLING: 0
NAUSEA: 0
CHILLS: 0
HEARTBURN: 0
COUGH: 0
MYALGIAS: 0
PARESTHESIAS: 0
BREAST MASS: 0
ARTHRALGIAS: 0
SORE THROAT: 0
PALPITATIONS: 0
DIARRHEA: 0
DIZZINESS: 0
WEAKNESS: 0

## 2022-09-20 ENCOUNTER — OFFICE VISIT (OUTPATIENT)
Dept: OBGYN | Facility: CLINIC | Age: 57
End: 2022-09-20
Payer: OTHER GOVERNMENT

## 2022-09-20 VITALS
RESPIRATION RATE: 18 BRPM | BODY MASS INDEX: 42.22 KG/M2 | SYSTOLIC BLOOD PRESSURE: 128 MMHG | DIASTOLIC BLOOD PRESSURE: 79 MMHG | HEART RATE: 73 BPM | WEIGHT: 238.3 LBS | HEIGHT: 63 IN | TEMPERATURE: 97.7 F

## 2022-09-20 DIAGNOSIS — Z23 NEED FOR PROPHYLACTIC VACCINATION AND INOCULATION AGAINST INFLUENZA: Primary | ICD-10-CM

## 2022-09-20 DIAGNOSIS — Z79.890 HORMONE REPLACEMENT THERAPY: ICD-10-CM

## 2022-09-20 DIAGNOSIS — N93.9 ABNORMAL UTERINE BLEEDING: ICD-10-CM

## 2022-09-20 DIAGNOSIS — Z12.31 ENCOUNTER FOR SCREENING MAMMOGRAM FOR BREAST CANCER: ICD-10-CM

## 2022-09-20 PROCEDURE — 90471 IMMUNIZATION ADMIN: CPT | Performed by: PHYSICIAN ASSISTANT

## 2022-09-20 PROCEDURE — 90682 RIV4 VACC RECOMBINANT DNA IM: CPT | Performed by: PHYSICIAN ASSISTANT

## 2022-09-20 PROCEDURE — 99396 PREV VISIT EST AGE 40-64: CPT | Mod: 25 | Performed by: PHYSICIAN ASSISTANT

## 2022-09-20 NOTE — NURSING NOTE
"Initial /79 (BP Location: Right arm, Patient Position: Chair, Cuff Size: Adult Large)   Pulse 73   Temp 97.7  F (36.5  C) (Tympanic)   Resp 18   Ht 1.6 m (5' 3\")   Wt 108.1 kg (238 lb 4.8 oz)   LMP 12/09/2018   Breastfeeding No   BMI 42.21 kg/m   Estimated body mass index is 42.21 kg/m  as calculated from the following:    Height as of this encounter: 1.6 m (5' 3\").    Weight as of this encounter: 108.1 kg (238 lb 4.8 oz). .    Gloria Kearns, NEELAM    "

## 2022-09-20 NOTE — PROGRESS NOTES
SUBJECTIVE:   CC: Sangita Meraz is an 56 year old woman who presents for preventive health visit.       Patient has been advised of split billing requirements and indicates understanding: Yes  Healthy Habits:  Answers for HPI/ROS submitted by the patient on 9/13/2022  Frequency of exercise:: 1 day/week  Getting at least 3 servings of Calcium per day:: Yes  Diet:: Regular (no restrictions)  Taking medications regularly:: Yes  Medication side effects:: None  Bi-annual eye exam:: Yes  Dental care twice a year:: Yes  Sleep apnea or symptoms of sleep apnea:: Daytime drowsiness, Sleep apnea  abdominal pain: No  Blood in stool: No  Blood in urine: No  chest pain: No  chills: No  congestion: No  constipation: No  cough: No  diarrhea: No  dizziness: No  ear pain: No  eye pain: No  nervous/anxious: No  fever: No  frequency: No  genital sores: No  headaches: No  hearing loss: Yes  heartburn: No  arthralgias: No  joint swelling: No  peripheral edema: No  mood changes: No  myalgias: No  nausea: No  dysuria: No  palpitations: No  Skin sensation changes: No  sore throat: No  urgency: No  rash: No  shortness of breath: No  visual disturbance: No  weakness: No  pelvic pain: No  vaginal bleeding: No  vaginal discharge: No  tenderness: No  breast mass: No  breast discharge: No  Additional concerns today:: No  Duration of exercise:: 15-30 minutes        Today's PHQ-2 Score:   PHQ-2 ( 1999 Pfizer) 9/13/2022 9/13/2022   Q1: Little interest or pleasure in doing things 0 -   Q2: Feeling down, depressed or hopeless 0 -   PHQ-2 Score 0 -   PHQ-2 Total Score (12-17 Years)- Positive if 3 or more points; Administer PHQ-A if positive - -   Q1: Little interest or pleasure in doing things Not at all Not at all   Q2: Feeling down, depressed or hopeless Not at all Not at all   PHQ-2 Score 0 0       Abuse: Current or Past(Physical, Sexual or Emotional)- No  Do you feel safe in your environment? Yes        Social History     Tobacco Use     Smoking  status: Former Smoker     Packs/day: 1.00     Years: 10.00     Pack years: 10.00     Types: Cigarettes     Start date: 10/10/1982     Quit date: 1991     Years since quittin.3     Smokeless tobacco: Never Used   Substance Use Topics     Alcohol use: Yes     Comment: 1-2 beer/month     If you drink alcohol do you typically have >3 drinks per day or >7 drinks per week? No                     Reviewed orders with patient.  Reviewed health maintenance and updated orders accordingly - Yes  Labs reviewed in EPIC  BP Readings from Last 3 Encounters:   22 128/79   22 120/60   22 118/78    Wt Readings from Last 3 Encounters:   22 108.1 kg (238 lb 4.8 oz)   22 106 kg (233 lb 9.6 oz)   22 106.6 kg (235 lb)                  Patient Active Problem List   Diagnosis     Morbid obesity (H)     Past Surgical History:   Procedure Laterality Date     ABDOMEN SURGERY  ,,'15    2 c-secs, abdomenplasty     CHOLECYSTECTOMY       COLONOSCOPY N/A 2021    Procedure: COLONOSCOPY, WITH BIOPSY AND CLIPPING;  Surgeon: Kemar Mckeon MD;  Location: [facility] OR     ESOPHAGOSCOPY, GASTROSCOPY, DUODENOSCOPY (EGD), COMBINED N/A 2020    Procedure: ESOPHAGOGASTRODUODENOSCOPY, WITH BIOPSY;  Surgeon: Anton Knott DO;  Location: [facility]     GENITOURINARY SURGERY  ', ',     tubal ligation, anterior/posterior repair, tubal ligation reversal       Social History     Tobacco Use     Smoking status: Former Smoker     Packs/day: 1.00     Years: 10.00     Pack years: 10.00     Types: Cigarettes     Start date: 10/10/1982     Quit date: 1991     Years since quittin.3     Smokeless tobacco: Never Used   Substance Use Topics     Alcohol use: Yes     Comment: 1-2 beer/month     Family History   Problem Relation Age of Onset     Uterine Cancer Mother         endometrial cancer     Diabetes Mother      Colon Polyps Father      Diabetes Father      Hypertension Father       Diabetes Brother      Hypertension Brother          Current Outpatient Medications   Medication Sig Dispense Refill     estradiol (ESTRING) 2 MG vaginal ring Place 1 each vaginally every 3 months 3 each 4     [START ON 9/22/2022] estradiol-norethindrone (COMBIPATCH) 0.05-0.14 MG/DAY bi-weekly patch Place 1 patch onto the skin twice a week 16 patch 6     omeprazole 20 MG tablet Take 20 mg by mouth daily       sertraline (ZOLOFT) 100 MG tablet Take 150 mg by mouth        fluocinonide (LIDEX) 0.05 % external solution Apply to scalp BID x 3-4 weeks PRN (Patient not taking: Reported on 9/20/2022) 50 mL 3     Allergies   Allergen Reactions     Ivp Dye [Contrast Dye] Anaphylaxis     Other (Do Not Use)      Other reaction(s): Itching,Watering Eyes, Rash, Sneezing, Itching,Watering Eyes, Rash, Sneezing, Itching,Watering Eyes, Rash, Sneezing     Recent Labs   Lab Test 05/12/22  1127 03/28/22  1042 09/09/21  1010 12/30/20  1703 11/16/20  0850 07/25/20  1221   A1C  --  6.2*  --  6.3*  --   --    LDL  --   --  90  --   --   --    HDL  --   --  46*  --   --   --    TRIG  --   --  186*  --   --   --    ALT  --   --   --  87* 40 46   CR 0.88  --   --   --  1.05* 0.81   GFRESTIMATED 77  --   --   --  60* 82   GFRESTBLACK  --   --   --   --  70 >90   POTASSIUM 4.3  --   --   --  4.5 3.8          Breast CA Risk Assessment (FHS-7) 9/8/2021 10/6/2021   Do you have a family history of breast, colon, or ovarian cancer? No / Unknown No / Unknown         Mammogram Screening: Recommended annual mammography  Pertinent mammograms are reviewed under the imaging tab.    Pertinent mammograms are reviewed under the imaging tab.  History of abnormal Pap smear: NO - age 30-65 PAP every 5 years with negative HPV co-testing recommended  PAP / HPV Latest Ref Rng & Units 9/9/2021   PAP   Negative for Intraepithelial Lesion or Malignancy (NILM)   HPV16 Negative Negative   HPV18 Negative Negative   HRHPV Negative Negative     Reviewed and updated as  needed this visit by clinical staff   Tobacco  Allergies  Meds   Med Hx  Surg Hx  Fam Hx  Soc Hx          Reviewed and updated as needed this visit by Provider                   Past Medical History:   Diagnosis Date     Depressive disorder       Past Surgical History:   Procedure Laterality Date     ABDOMEN SURGERY  ,,'15    2 c-secs, abdomenplasty     CHOLECYSTECTOMY       COLONOSCOPY N/A 2021    Procedure: COLONOSCOPY, WITH BIOPSY AND CLIPPING;  Surgeon: Kemar Mckeon MD;  Location: [facility] OR     ESOPHAGOSCOPY, GASTROSCOPY, DUODENOSCOPY (EGD), COMBINED N/A 2020    Procedure: ESOPHAGOGASTRODUODENOSCOPY, WITH BIOPSY;  Surgeon: Anton Knott DO;  Location: [facility]     GENITOURINARY SURGERY  , ,     tubal ligation, anterior/posterior repair, tubal ligation reversal     OB History    Para Term  AB Living   6 5 0 0 0 0   SAB IAB Ectopic Multiple Live Births   0 0 0 0 0      # Outcome Date GA Lbr Addy/2nd Weight Sex Delivery Anes PTL Lv   6             5 Para            4 Para            3 Para            2 Para            1 Para                ROS:  CONSTITUTIONAL: NEGATIVE for fever, chills, change in weight  INTEGUMENTARU/SKIN: NEGATIVE for worrisome rashes, moles or lesions  EYES: NEGATIVE for vision changes or irritation  ENT: NEGATIVE for ear, mouth and throat problems  RESP: NEGATIVE for significant cough or SOB  BREAST: NEGATIVE for masses, tenderness or discharge  CV: NEGATIVE for chest pain, palpitations or peripheral edema  GI: NEGATIVE for nausea, abdominal pain, heartburn, or change in bowel habits  : NEGATIVE for unusual urinary or vaginal symptoms. Patient postmenopausal  MUSCULOSKELETAL: NEGATIVE for significant arthralgias or myalgia  NEURO: NEGATIVE for weakness, dizziness or paresthesias  PSYCHIATRIC: NEGATIVE for changes in mood or affect    OBJECTIVE:   /79 (BP Location: Right arm, Patient Position: Chair, Cuff Size: Adult  "Large)   Pulse 73   Temp 97.7  F (36.5  C) (Tympanic)   Resp 18   Ht 1.6 m (5' 3\")   Wt 108.1 kg (238 lb 4.8 oz)   LMP 12/09/2018   Breastfeeding No   BMI 42.21 kg/m    EXAM:  GENERAL: healthy, alert and no distress  EYES: Eyes grossly normal to inspection, PERRL and conjunctivae and sclerae normal  HENT: ear canals and TM's normal, nose and mouth without ulcers or lesions  NECK: no adenopathy, no asymmetry, masses, or scars and thyroid normal to palpation  RESP: lungs clear to auscultation - no rales, rhonchi or wheezes  BREAST: normal without masses, tenderness or nipple discharge and no palpable axillary masses or adenopathy  CV: regular rate and rhythm, normal S1 S2, no S3 or S4, no murmur, click or rub, no peripheral edema and peripheral pulses strong  ABDOMEN: soft, nontender, no hepatosplenomegaly, no masses and bowel sounds normal   (female): normal female external genitalia, normal urethral meatus, vaginal mucosa pink, moist, well rugated, and normal cervix/adnexa/uterus without masses or discharge  MS: no gross musculoskeletal defects noted, no edema  SKIN: no suspicious lesions or rashes  NEURO: Normal strength and tone, mentation intact and speech normal  PSYCH: mentation appears normal, affect normal/bright    Diagnostic Test Results:  none     ASSESSMENT/PLAN:   (Z23) Need for prophylactic vaccination and inoculation against influenza  (primary encounter diagnosis)  Comment: influenza vaccine given today.   Plan: INFLUENZA QUAD, RECOMBINANT, P-FREE (RIV4)         (FLUBLOK)            (Z12.31) Encounter for screening mammogram for breast cancer  Comment:   Plan: *MA Screening Digital Bilateral          Hormone replacement Therapy  Comment: refilled estradiol vaginal ring and combipatch, but discussed trying to a trial period of not using the hormones to see if you no longer need them. Discussed risks of hormone use with patient and patient states she will do a trial period to see if she no " "longer needs these medications. She states her main issue for using them was for vaginal atrophy which does not appear to be an issue today on exam.   Plan: obtain Pelvic US to evaluate endometrial lining due to vaginal spotting back in February for a few days while on hormone replacement therapy. Pelvic US ordered under Dr. Chi for her review.     Abnormal uterine bleeding  Comment: spotting for a few days back in February, but patient currently on hormones and is postmenopausal with no persistent bleeding and she states hormones have improved symptoms.   Plan: obtain pelvic US to be reviewed by Dr. Chi.     Patient has been advised of split billing requirements and indicates understanding: Yes  COUNSELING:   Reviewed preventive health counseling, as reflected in patient instructions       Regular exercise       Healthy diet/nutrition    Estimated body mass index is 42.21 kg/m  as calculated from the following:    Height as of this encounter: 1.6 m (5' 3\").    Weight as of this encounter: 108.1 kg (238 lb 4.8 oz).    Weight management plan: Discussed healthy diet and exercise guidelines    She reports that she quit smoking about 31 years ago. Her smoking use included cigarettes. She started smoking about 39 years ago. She has a 10.00 pack-year smoking history. She has never used smokeless tobacco.      Counseling Resources:  ATP IV Guidelines  Pooled Cohorts Equation Calculator  Breast Cancer Risk Calculator  BRCA-Related Cancer Risk Assessment: FHS-7 Tool  FRAX Risk Assessment  ICSI Preventive Guidelines  Dietary Guidelines for Americans, 2010  USDA's MyPlate  ASA Prophylaxis  Lung CA Screening    ANUPAMA Salmon [facility]    "

## 2022-10-03 ENCOUNTER — HOSPITAL ENCOUNTER (OUTPATIENT)
Dept: ULTRASOUND IMAGING | Facility: CLINIC | Age: 57
Discharge: HOME | End: 2022-10-03
Attending: OBSTETRICS & GYNECOLOGY | Admitting: OBSTETRICS & GYNECOLOGY
Payer: OTHER GOVERNMENT

## 2022-10-03 DIAGNOSIS — N93.9 ABNORMAL UTERINE BLEEDING: ICD-10-CM

## 2022-10-03 PROCEDURE — 76830 TRANSVAGINAL US NON-OB: CPT

## 2022-10-05 ENCOUNTER — PREP FOR PROCEDURE (OUTPATIENT)
Dept: OBGYN | Facility: CLINIC | Age: 57
End: 2022-10-05

## 2022-10-05 ENCOUNTER — VIRTUAL VISIT (OUTPATIENT)
Dept: OBGYN | Facility: CLINIC | Age: 57
End: 2022-10-05
Payer: OTHER GOVERNMENT

## 2022-10-05 DIAGNOSIS — N95.0 POSTMENOPAUSAL BLEEDING: Primary | ICD-10-CM

## 2022-10-05 PROCEDURE — 99213 OFFICE O/P EST LOW 20 MIN: CPT | Mod: 95 | Performed by: OBSTETRICS & GYNECOLOGY

## 2022-10-05 NOTE — PROGRESS NOTES
Sangita is a 56 year old who is being evaluated via a billable telephone visit.      What phone number would you like to be contacted at? [phone]  How would you like to obtain your AVS? Roman Emmanuel LPN    [facility]  OB/GYN Clinic   Gynecology Consult Note    CC:   Chief Complaint   Patient presents with     Consult     Discuss Hysteroscopy       HPI: Ms. De Leon a 56 year old  being seen for GYN consultation for a single episode of PMB.   One year ago had an episode of clumpy brown bleeding with first starting vaginal ring and a combipatch. Has been feeling really well on this combination, atrophy/hot flashes completely resolved. No dysparuenia. No further bleeding.       ROS: A 10 pt ROS was completed and found to be otherwise negative unless mentioned in the HPI.     PMH:   Past Medical History:   Diagnosis Date     Depressive disorder        PSHx:   Past Surgical History:   Procedure Laterality Date     ABDOMEN SURGERY  ,,'15    2 c-secs, abdomenplasty     CHOLECYSTECTOMY       COLONOSCOPY N/A 2021    Procedure: COLONOSCOPY, WITH BIOPSY AND CLIPPING;  Surgeon: Kemar Mckeon MD;  Location: [facility] [place]     ESOPHAGOSCOPY, GASTROSCOPY, DUODENOSCOPY (EGD), COMBINED N/A 2020    Procedure: ESOPHAGOGASTRODUODENOSCOPY, WITH BIOPSY;  Surgeon: Anton Knott DO;  Location: [place] [facility]     GENITOURINARY SURGERY  ', ,     tubal ligation, anterior/posterior repair, tubal ligation reversal       OBHx:   OB History    Para Term  AB Living   6 5 0 0 0 0   SAB IAB Ectopic Multiple Live Births   0 0 0 0 0      # Outcome Date GA Lbr Addy/2nd Weight Sex Delivery Anes PTL Lv   6             5 Para            4 Para            3 Para            2 Para            1 Para                Medications:   estradiol (ESTRING) 2 MG vaginal ring, Place 1 each vaginally every 3 months  estradiol-norethindrone (COMBIPATCH) 0.05-0.14 MG/DAY  bi-weekly patch, Place 1 patch onto the skin twice a week  omeprazole 20 MG tablet, Take 20 mg by mouth daily  sertraline (ZOLOFT) 100 MG tablet, Take 150 mg by mouth   fluocinonide (LIDEX) 0.05 % external solution, Apply to scalp BID x 3-4 weeks PRN (Patient not taking: No sig reported)    No current facility-administered medications on file prior to visit.      Allergies:      Allergies   Allergen Reactions     Ivp Dye [Contrast Dye] Anaphylaxis     Other (Do Not Use)      Other reaction(s): Itching,Watering Eyes, Rash, Sneezing, Itching,Watering Eyes, Rash, Sneezing, Itching,Watering Eyes, Rash, Sneezing       Social History:   Social History     Socioeconomic History     Marital status:      Spouse name: Not on file     Number of children: Not on file     Years of education: Not on file     Highest education level: Not on file   Occupational History     Not on file   Tobacco Use     Smoking status: Former Smoker     Packs/day: 1.00     Years: 10.00     Pack years: 10.00     Types: Cigarettes     Start date: 10/10/1982     Quit date: 1991     Years since quittin.4     Smokeless tobacco: Never Used   Vaping Use     Vaping Use: Never used   Substance and Sexual Activity     Alcohol use: Yes     Comment: 1-2 beer/month     Drug use: Never     Sexual activity: Yes     Partners: Male     Birth control/protection: Post-menopausal   Other Topics Concern     Parent/sibling w/ CABG, MI or angioplasty before 65F 55M? No   Social History Narrative     Not on file     Social Determinants of Health     Financial Resource Strain: Not on file   Food Insecurity: Not on file   Transportation Needs: Not on file   Physical Activity: Not on file   Stress: Not on file   Social Connections: Not on file   Intimate Partner Violence: Not on file   Housing Stability: Not on file     Social History     Socioeconomic History     Marital status:      Spouse name: None     Number of children: None     Years of  education: None     Highest education level: None   Tobacco Use     Smoking status: Former Smoker     Packs/day: 1.00     Years: 10.00     Pack years: 10.00     Types: Cigarettes     Start date: 10/10/1982     Quit date: 1991     Years since quittin.4     Smokeless tobacco: Never Used   Vaping Use     Vaping Use: Never used   Substance and Sexual Activity     Alcohol use: Yes     Comment: 1-2 beer/month     Drug use: Never     Sexual activity: Yes     Partners: Male     Birth control/protection: Post-menopausal   Other Topics Concern     Parent/sibling w/ CABG, MI or angioplasty before 65F 55M? No       Family History:   Family History   Problem Relation Age of Onset     Uterine Cancer Mother         endometrial cancer     Diabetes Mother      Colon Polyps Father      Diabetes Father      Hypertension Father      Diabetes Brother      Hypertension Brother        Physical Exam:   GEN: no distress  PSYCH: Alert, coherent speech, normal   rate and volume, able to articulate logical thoughts, able   to abstract reason, no tangential thoughts, no hallucinations   or delusions, normal affect  RESP: No cough, no audible wheezing, able to talk in full sentences  Remainder of exam unable to be completed due to telephone visits    Labs/Imaging:   ULTRASOUND PELVIC TRANSABDOMINAL AND TRANSVAGINAL October 3, 2022  10:05 AM     CLINICAL HISTORY: Abnormal bleeding on Hormone therapy meds. Abnormal  uterine bleeding.     TECHNIQUE: Transabdominal scans were performed. Endovaginal ultrasound  was performed to better visualize the adnexa.     COMPARISON: None.     FINDINGS:     UTERUS: 9.8 x 4.8 x 3.8 cm. Normal in size and position with no  masses.     ENDOMETRIUM: 10 mm. Heterogenous endometrium with numerous echogenic  foci scattered throughout.     RIGHT OVARY: 2.5 x 2.0 x 1.7 cm. Normal with flow demonstrated.     LEFT OVARY: Not seen due to bowel gas.     No significant free fluid.                                                                       IMPRESSION:  1. Thickened heterogenous endometrium measuring up to 10 mm.  2. Normal sonographic appearance the right ovary.  3. Nonvisualization of left ovary.     YOBANI CROCKER MD     A&P: 57 yo F with postmenopausal bleeding.   We did review US together and given thickening >10mm with echogenic foci, I did recommend hysteroscopy for directed sampling. Reviewed steps of procedure as well as risks of bleeding, infection and uterine perforation. Considered EMB, but would prefer hysteroscopy for more accurate sampling. If bleeding recurs we can adjust HRT, but ok to continue meds as prescribed given 1x episode of bleeding.     I spent 12 min over the phone with the patient.     Ines Chi MD  OB/GYN  10/5/2022

## 2022-10-05 NOTE — H&P (VIEW-ONLY)
Sangita is a 56 year old who is being evaluated via a billable telephone visit.      What phone number would you like to be contacted at? [phone]  How would you like to obtain your AVS? Roman Emmanuel LPN    [facility]  OB/GYN Clinic   Gynecology Consult Note    CC:   Chief Complaint   Patient presents with     Consult     Discuss Hysteroscopy       HPI: Ms. De Leon a 56 year old  being seen for GYN consultation for a single episode of PMB.   One year ago had an episode of clumpy brown bleeding with first starting vaginal ring and a combipatch. Has been feeling really well on this combination, atrophy/hot flashes completely resolved. No dysparuenia. No further bleeding.       ROS: A 10 pt ROS was completed and found to be otherwise negative unless mentioned in the HPI.     PMH:   Past Medical History:   Diagnosis Date     Depressive disorder        PSHx:   Past Surgical History:   Procedure Laterality Date     ABDOMEN SURGERY  ,,'15    2 c-secs, abdomenplasty     CHOLECYSTECTOMY       COLONOSCOPY N/A 2021    Procedure: COLONOSCOPY, WITH BIOPSY AND CLIPPING;  Surgeon: Kemar Mckeon MD;  [place] [facility] [place]     ESOPHAGOSCOPY, GASTROSCOPY, DUODENOSCOPY (EGD), COMBINED N/A 2020    Procedure: ESOPHAGOGASTRODUODENOSCOPY, WITH BIOPSY;  Surgeon: Anton Knott DO;  Location: [place] [facility]     GENITOURINARY SURGERY  ', ,     tubal ligation, anterior/posterior repair, tubal ligation reversal       OBHx:   OB History    Para Term  AB Living   6 5 0 0 0 0   SAB IAB Ectopic Multiple Live Births   0 0 0 0 0      # Outcome Date GA Lbr Addy/2nd Weight Sex Delivery Anes PTL Lv   6             5 Para            4 Para            3 Para            2 Para            1 Para                Medications:   estradiol (ESTRING) 2 MG vaginal ring, Place 1 each vaginally every 3 months  estradiol-norethindrone (COMBIPATCH) 0.05-0.14 MG/DAY  bi-weekly patch, Place 1 patch onto the skin twice a week  omeprazole 20 MG tablet, Take 20 mg by mouth daily  sertraline (ZOLOFT) 100 MG tablet, Take 150 mg by mouth   fluocinonide (LIDEX) 0.05 % external solution, Apply to scalp BID x 3-4 weeks PRN (Patient not taking: No sig reported)    No current facility-administered medications on file prior to visit.      Allergies:      Allergies   Allergen Reactions     Ivp Dye [Contrast Dye] Anaphylaxis     Other (Do Not Use)      Other reaction(s): Itching,Watering Eyes, Rash, Sneezing, Itching,Watering Eyes, Rash, Sneezing, Itching,Watering Eyes, Rash, Sneezing       Social History:   Social History     Socioeconomic History     Marital status:      Spouse name: Not on file     Number of children: Not on file     Years of education: Not on file     Highest education level: Not on file   Occupational History     Not on file   Tobacco Use     Smoking status: Former Smoker     Packs/day: 1.00     Years: 10.00     Pack years: 10.00     Types: Cigarettes     Start date: 10/10/1982     Quit date: 1991     Years since quittin.4     Smokeless tobacco: Never Used   Vaping Use     Vaping Use: Never used   Substance and Sexual Activity     Alcohol use: Yes     Comment: 1-2 beer/month     Drug use: Never     Sexual activity: Yes     Partners: Male     Birth control/protection: Post-menopausal   Other Topics Concern     Parent/sibling w/ CABG, MI or angioplasty before 65F 55M? No   Social History Narrative     Not on file     Social Determinants of Health     Financial Resource Strain: Not on file   Food Insecurity: Not on file   Transportation Needs: Not on file   Physical Activity: Not on file   Stress: Not on file   Social Connections: Not on file   Intimate Partner Violence: Not on file   Housing Stability: Not on file     Social History     Socioeconomic History     Marital status:      Spouse name: None     Number of children: None     Years of  education: None     Highest education level: None   Tobacco Use     Smoking status: Former Smoker     Packs/day: 1.00     Years: 10.00     Pack years: 10.00     Types: Cigarettes     Start date: 10/10/1982     Quit date: 1991     Years since quittin.4     Smokeless tobacco: Never Used   Vaping Use     Vaping Use: Never used   Substance and Sexual Activity     Alcohol use: Yes     Comment: 1-2 beer/month     Drug use: Never     Sexual activity: Yes     Partners: Male     Birth control/protection: Post-menopausal   Other Topics Concern     Parent/sibling w/ CABG, MI or angioplasty before 65F 55M? No       Family History:   Family History   Problem Relation Age of Onset     Uterine Cancer Mother         endometrial cancer     Diabetes Mother      Colon Polyps Father      Diabetes Father      Hypertension Father      Diabetes Brother      Hypertension Brother        Physical Exam:   GEN: no distress  PSYCH: Alert, coherent speech, normal   rate and volume, able to articulate logical thoughts, able   to abstract reason, no tangential thoughts, no hallucinations   or delusions, normal affect  RESP: No cough, no audible wheezing, able to talk in full sentences  Remainder of exam unable to be completed due to telephone visits    Labs/Imaging:   ULTRASOUND PELVIC TRANSABDOMINAL AND TRANSVAGINAL October 3, 2022  10:05 AM     CLINICAL HISTORY: Abnormal bleeding on Hormone therapy meds. Abnormal  uterine bleeding.     TECHNIQUE: Transabdominal scans were performed. Endovaginal ultrasound  was performed to better visualize the adnexa.     COMPARISON: None.     FINDINGS:     UTERUS: 9.8 x 4.8 x 3.8 cm. Normal in size and position with no  masses.     ENDOMETRIUM: 10 mm. Heterogenous endometrium with numerous echogenic  foci scattered throughout.     RIGHT OVARY: 2.5 x 2.0 x 1.7 cm. Normal with flow demonstrated.     LEFT OVARY: Not seen due to bowel gas.     No significant free fluid.                                                                       IMPRESSION:  1. Thickened heterogenous endometrium measuring up to 10 mm.  2. Normal sonographic appearance the right ovary.  3. Nonvisualization of left ovary.     YOBANI CROCKER MD     A&P: 57 yo F with postmenopausal bleeding.   We did review US together and given thickening >10mm with echogenic foci, I did recommend hysteroscopy for directed sampling. Reviewed steps of procedure as well as risks of bleeding, infection and uterine perforation. Considered EMB, but would prefer hysteroscopy for more accurate sampling. If bleeding recurs we can adjust HRT, but ok to continue meds as prescribed given 1x episode of bleeding.     I spent 12 min over the phone with the patient.     Ines Chi MD  OB/GYN  10/5/2022

## 2022-10-06 ENCOUNTER — TELEPHONE (OUTPATIENT)
Dept: OBGYN | Facility: CLINIC | Age: 57
End: 2022-10-06

## 2022-10-06 NOTE — TELEPHONE ENCOUNTER
"[id number]  Sangitaher MATT Meraz    You are now scheduled for surgery at [facility].  Below are the details for your surgery.  Please read the \"Preparing for Your Surgery\" instructions and let us know if you have any questions.    Type of surgery:  HYSTEROSCOPY, WITH DILATION AND CURETTAGE OF UTERUS (N/A)    Surgeon:  Ines Chi MD  Location of surgery: [facility] OR    Date of surgery:  10/18/22    Time:  10:00 am   Arrival Time:  9:00 am    Time can change, to be confirmed a couple of days prior by pre-op surgery nurse.    Pre-Op Appt Date: Patient to schedule with a PCP or Family Practice Provider within 30 days to the surgery. Dr. Chi will do morning of  Post-Op Appt Date: To be determined by provider     *For overnight Surgery - PCR Covid-19 test from a lab required 2-4 days prior.  See \"testing for Covid-19 handout\"    *For Same Day Surgery Covid-19 test required 1-2 days prior.  See \"testing for Covid-19 handout\"  Pt will do at home covid test  Packet sent out: Yes  Pre-cert/Authorization completed:  TBD by Financial Securing Office.   MA Sterilization/Hysterectomy Acknowledgment Consent signed: Not Applicable    [facility]  [phone]    Fax: [phone]  Same Day Surgery [phone]  Fax: [phone]  [facility] [phone]    "

## 2022-10-17 ENCOUNTER — OFFICE VISIT (OUTPATIENT)
Dept: FAMILY MEDICINE | Facility: CLINIC | Age: 57
End: 2022-10-17
Payer: OTHER GOVERNMENT

## 2022-10-17 ENCOUNTER — ANESTHESIA EVENT (OUTPATIENT)
Dept: SURGERY | Facility: CLINIC | Age: 57
End: 2022-10-17
Payer: OTHER GOVERNMENT

## 2022-10-17 VITALS
DIASTOLIC BLOOD PRESSURE: 80 MMHG | BODY MASS INDEX: 42.69 KG/M2 | WEIGHT: 241 LBS | RESPIRATION RATE: 18 BRPM | TEMPERATURE: 96.9 F | HEART RATE: 72 BPM | SYSTOLIC BLOOD PRESSURE: 126 MMHG

## 2022-10-17 DIAGNOSIS — M13.0 POLYARTHRITIS: Primary | ICD-10-CM

## 2022-10-17 PROBLEM — G47.33 OBSTRUCTIVE SLEEP APNEA SYNDROME: Status: ACTIVE | Noted: 2022-10-17

## 2022-10-17 PROBLEM — N81.10 FEMALE CYSTOCELE: Status: ACTIVE | Noted: 2022-10-17

## 2022-10-17 PROBLEM — N39.46 MIXED STRESS AND URGE URINARY INCONTINENCE: Status: ACTIVE | Noted: 2022-10-17

## 2022-10-17 PROBLEM — E28.2 POLYCYSTIC OVARIAN SYNDROME: Status: ACTIVE | Noted: 2022-10-17

## 2022-10-17 PROBLEM — H81.09 MENIERE'S DISEASE, UNSPECIFIED LATERALITY: Status: ACTIVE | Noted: 2022-10-17

## 2022-10-17 PROBLEM — F33.41 RECURRENT MAJOR DEPRESSIVE DISORDER, IN PARTIAL REMISSION (H): Status: ACTIVE | Noted: 2022-10-17

## 2022-10-17 PROBLEM — N81.6 HERNIATION OF RECTUM INTO VAGINA: Status: ACTIVE | Noted: 2022-10-17

## 2022-10-17 PROBLEM — N81.4 UTEROVAGINAL PROLAPSE: Status: ACTIVE | Noted: 2022-10-17

## 2022-10-17 LAB
CRP SERPL-MCNC: 10.21 MG/L
ERYTHROCYTE [DISTWIDTH] IN BLOOD BY AUTOMATED COUNT: 12.8 % (ref 10–15)
ERYTHROCYTE [SEDIMENTATION RATE] IN BLOOD BY WESTERGREN METHOD: 9 MM/HR (ref 0–30)
HCT VFR BLD AUTO: 40.3 % (ref 35–47)
HGB BLD-MCNC: 14.2 G/DL (ref 11.7–15.7)
MCH RBC QN AUTO: 32.1 PG (ref 26.5–33)
MCHC RBC AUTO-ENTMCNC: 35.2 G/DL (ref 31.5–36.5)
MCV RBC AUTO: 91 FL (ref 78–100)
PLATELET # BLD AUTO: 209 10E3/UL (ref 150–450)
RBC # BLD AUTO: 4.43 10E6/UL (ref 3.8–5.2)
WBC # BLD AUTO: 6 10E3/UL (ref 4–11)

## 2022-10-17 PROCEDURE — 99214 OFFICE O/P EST MOD 30 MIN: CPT | Performed by: PHYSICIAN ASSISTANT

## 2022-10-17 PROCEDURE — 36415 COLL VENOUS BLD VENIPUNCTURE: CPT | Performed by: PHYSICIAN ASSISTANT

## 2022-10-17 PROCEDURE — 86431 RHEUMATOID FACTOR QUANT: CPT | Performed by: PHYSICIAN ASSISTANT

## 2022-10-17 PROCEDURE — 86200 CCP ANTIBODY: CPT | Performed by: PHYSICIAN ASSISTANT

## 2022-10-17 PROCEDURE — 86140 C-REACTIVE PROTEIN: CPT | Performed by: PHYSICIAN ASSISTANT

## 2022-10-17 PROCEDURE — 85027 COMPLETE CBC AUTOMATED: CPT | Performed by: PHYSICIAN ASSISTANT

## 2022-10-17 PROCEDURE — 86038 ANTINUCLEAR ANTIBODIES: CPT | Performed by: PHYSICIAN ASSISTANT

## 2022-10-17 PROCEDURE — 85652 RBC SED RATE AUTOMATED: CPT | Performed by: PHYSICIAN ASSISTANT

## 2022-10-17 ASSESSMENT — PAIN SCALES - GENERAL: PAINLEVEL: MILD PAIN (3)

## 2022-10-17 NOTE — NURSING NOTE
"Chief Complaint   Patient presents with     Joint pain        Initial /80 (BP Location: Right arm, Patient Position: Sitting, Cuff Size: Adult Large)   Pulse 72   Temp 96.9  F (36.1  C) (Tympanic)   Resp 18   Wt 109.3 kg (241 lb)   LMP 12/09/2018   BMI 42.69 kg/m   Estimated body mass index is 42.69 kg/m  as calculated from the following:    Height as of 9/20/22: 1.6 m (5' 3\").    Weight as of this encounter: 109.3 kg (241 lb).    Patient presents to the clinic using No DME    Is there anyone who you would like to be able to receive your results? No  If yes have patient fill out [id number]    Victorina Weston CMA    "

## 2022-10-17 NOTE — PROGRESS NOTES
Assessment & Plan   Polyarthritis  Pt presents with 1-2 month history of bilateral shoulder and hip pain with morning stiffness. Personal hx of lichen planus, Menieres disease. Family history of autoimmune and rheumatoid disease. No associated weakness, fevers, or skin changes. History and exam not consistent with infectious cause, such as lyme. Most likely diagnosis polymyalgia rheumatica vs rheumatoid arthritis. Labs pending. Will formulate management plan based on results.  - ESR: Erythrocyte sedimentation rate; Future  - Rheumatoid factor; Future  - Anti Nuclear Kristin IgG by IFA with Reflex; Future  - Cyclic Citrullinated Peptide Antibody IgG; Future  - CBC with platelets; Future  - CRP, inflammation; Future    Return in about 3 months (around 1/17/2023), or if symptoms worsen or fail to improve, for In-Clinic Visit.    ANUPAMA Delgado [facility]    Adi Quesada is a 57 year old, presenting for the following health issues:  Joint pain   Pt presents with a 1-2 month history of bilateral shoulder and hip pain and stiffness. She states the pain has progressively worsened and is particularly worse in the morning and at night. Denies associated weakness. No skin changes or fevers. Has taken tylenol and Voltaren gel which has been mildly helpful.     History of Present Illness       Reason for visit:  Joint pain (Started in left shoulder, then started to notice it in the right and now in her hips)  Symptom onset:  More than a month  Symptoms include:  Pain near shoulder and hip joints that radiates down limb  Symptom intensity:  Moderate  Symptom progression:  Worsening  Had these symptoms before:  No  What makes it worse:  Inactivity or too much use of limbs  What makes it better:  Some rotation of joint helps; tylenol; topical ibuprofen    She eats 4 or more servings of fruits and vegetables daily.She consumes 0 sweetened beverage(s) daily.She exercises with enough effort to  increase her heart rate 10 to 19 minutes per day.  She exercises with enough effort to increase her heart rate 4 days per week.   She is taking medications regularly.       Review of Systems   Constitutional, HEENT, cardiovascular, pulmonary, gi and gu systems are negative, except as otherwise noted.      Objective    /80 (BP Location: Right arm, Patient Position: Sitting, Cuff Size: Adult Large)   Pulse 72   Temp 96.9  F (36.1  C) (Tympanic)   Resp 18   Wt 109.3 kg (241 lb)   LMP 12/09/2018   BMI 42.69 kg/m    Body mass index is 42.69 kg/m .  Physical Exam   GENERAL: healthy, alert and no distress  RESP: lungs clear to auscultation - no rales, rhonchi or wheezes  CV: regular rate and rhythm, normal S1 S2, no S3 or S4, no murmur, click or rub, no peripheral edema and peripheral pulses strong  ABDOMEN: soft, nontender, no hepatosplenomegaly, no masses and bowel sounds normal  MS: normal muscle tone, normal range of motion and no edema    Physician Attestation   I, Anton Arreola PA-C, was present with the medical/HOLDEN student who participated in the service and in the documentation of the note.  I have verified the history and personally performed the physical exam and medical decision making.  I agree with the assessment and plan of care as documented in the note.      Anton Arreola PA-C

## 2022-10-17 NOTE — PATIENT INSTRUCTIONS
Suspect PMR vs rheumatoid arthritis.     Lab work will help us determine the treatment, if any.     Continue to stay as active as possible.

## 2022-10-18 ENCOUNTER — ANESTHESIA (OUTPATIENT)
Dept: SURGERY | Facility: CLINIC | Age: 57
End: 2022-10-18
Payer: OTHER GOVERNMENT

## 2022-10-18 ENCOUNTER — HOSPITAL ENCOUNTER (OUTPATIENT)
Facility: CLINIC | Age: 57
Discharge: HOME | End: 2022-10-18
Attending: OBSTETRICS & GYNECOLOGY | Admitting: OBSTETRICS & GYNECOLOGY
Payer: OTHER GOVERNMENT

## 2022-10-18 VITALS
SYSTOLIC BLOOD PRESSURE: 142 MMHG | HEART RATE: 71 BPM | DIASTOLIC BLOOD PRESSURE: 70 MMHG | BODY MASS INDEX: 42.7 KG/M2 | WEIGHT: 241 LBS | TEMPERATURE: 98.9 F | RESPIRATION RATE: 16 BRPM | HEIGHT: 63 IN | OXYGEN SATURATION: 93 %

## 2022-10-18 DIAGNOSIS — Z98.890 STATUS POST HYSTEROSCOPY: Primary | ICD-10-CM

## 2022-10-18 LAB
ABO/RH(D): NORMAL
ANA SER QL IF: NEGATIVE
ANTIBODY SCREEN: NEGATIVE
RHEUMATOID FACT SER NEPH-ACNC: 10 IU/ML
SPECIMEN EXPIRATION DATE: NORMAL

## 2022-10-18 PROCEDURE — 258N000003 HC RX IP 258 OP 636: Performed by: NURSE ANESTHETIST, CERTIFIED REGISTERED

## 2022-10-18 PROCEDURE — 710N000012 HC RECOVERY PHASE 2, PER MINUTE: Performed by: OBSTETRICS & GYNECOLOGY

## 2022-10-18 PROCEDURE — 88305 TISSUE EXAM BY PATHOLOGIST: CPT | Mod: TC | Performed by: OBSTETRICS & GYNECOLOGY

## 2022-10-18 PROCEDURE — 250N000011 HC RX IP 250 OP 636: Performed by: NURSE ANESTHETIST, CERTIFIED REGISTERED

## 2022-10-18 PROCEDURE — 250N000009 HC RX 250: Performed by: NURSE ANESTHETIST, CERTIFIED REGISTERED

## 2022-10-18 PROCEDURE — 88305 TISSUE EXAM BY PATHOLOGIST: CPT | Mod: 26 | Performed by: PATHOLOGY

## 2022-10-18 PROCEDURE — 250N000011 HC RX IP 250 OP 636: Performed by: OBSTETRICS & GYNECOLOGY

## 2022-10-18 PROCEDURE — 272N000001 HC OR GENERAL SUPPLY STERILE: Performed by: OBSTETRICS & GYNECOLOGY

## 2022-10-18 PROCEDURE — 250N000013 HC RX MED GY IP 250 OP 250 PS 637: Performed by: NURSE ANESTHETIST, CERTIFIED REGISTERED

## 2022-10-18 PROCEDURE — 370N000017 HC ANESTHESIA TECHNICAL FEE, PER MIN: Performed by: OBSTETRICS & GYNECOLOGY

## 2022-10-18 PROCEDURE — 86901 BLOOD TYPING SEROLOGIC RH(D): CPT | Performed by: OBSTETRICS & GYNECOLOGY

## 2022-10-18 PROCEDURE — 999N000141 HC STATISTIC PRE-PROCEDURE NURSING ASSESSMENT: Performed by: OBSTETRICS & GYNECOLOGY

## 2022-10-18 PROCEDURE — 36415 COLL VENOUS BLD VENIPUNCTURE: CPT | Performed by: OBSTETRICS & GYNECOLOGY

## 2022-10-18 PROCEDURE — 360N000076 HC SURGERY LEVEL 3, PER MIN: Performed by: OBSTETRICS & GYNECOLOGY

## 2022-10-18 PROCEDURE — 58558 HYSTEROSCOPY BIOPSY: CPT | Performed by: OBSTETRICS & GYNECOLOGY

## 2022-10-18 RX ORDER — ACETAMINOPHEN 325 MG/1
975 TABLET ORAL ONCE
Status: COMPLETED | OUTPATIENT
Start: 2022-10-18 | End: 2022-10-18

## 2022-10-18 RX ORDER — SODIUM CHLORIDE, SODIUM LACTATE, POTASSIUM CHLORIDE, CALCIUM CHLORIDE 600; 310; 30; 20 MG/100ML; MG/100ML; MG/100ML; MG/100ML
INJECTION, SOLUTION INTRAVENOUS CONTINUOUS
Status: DISCONTINUED | OUTPATIENT
Start: 2022-10-18 | End: 2022-10-18 | Stop reason: HOSPADM

## 2022-10-18 RX ORDER — DEXAMETHASONE SODIUM PHOSPHATE 4 MG/ML
INJECTION, SOLUTION INTRA-ARTICULAR; INTRALESIONAL; INTRAMUSCULAR; INTRAVENOUS; SOFT TISSUE PRN
Status: DISCONTINUED | OUTPATIENT
Start: 2022-10-18 | End: 2022-10-18

## 2022-10-18 RX ORDER — MAGNESIUM SULFATE HEPTAHYDRATE 40 MG/ML
2 INJECTION, SOLUTION INTRAVENOUS ONCE
Status: DISCONTINUED | OUTPATIENT
Start: 2022-10-18 | End: 2022-10-18 | Stop reason: HOSPADM

## 2022-10-18 RX ORDER — ACETAMINOPHEN 325 MG/1
975 TABLET ORAL ONCE
Status: CANCELLED | OUTPATIENT
Start: 2022-10-18 | End: 2022-10-18

## 2022-10-18 RX ORDER — HYDROMORPHONE HCL IN WATER/PF 6 MG/30 ML
0.2 PATIENT CONTROLLED ANALGESIA SYRINGE INTRAVENOUS EVERY 5 MIN PRN
Status: DISCONTINUED | OUTPATIENT
Start: 2022-10-18 | End: 2022-10-18 | Stop reason: HOSPADM

## 2022-10-18 RX ORDER — FENTANYL CITRATE 50 UG/ML
25 INJECTION, SOLUTION INTRAMUSCULAR; INTRAVENOUS EVERY 5 MIN PRN
Status: DISCONTINUED | OUTPATIENT
Start: 2022-10-18 | End: 2022-10-18 | Stop reason: HOSPADM

## 2022-10-18 RX ORDER — LIDOCAINE 40 MG/G
CREAM TOPICAL
Status: DISCONTINUED | OUTPATIENT
Start: 2022-10-18 | End: 2022-10-18 | Stop reason: HOSPADM

## 2022-10-18 RX ORDER — GABAPENTIN 300 MG/1
300 CAPSULE ORAL
Status: COMPLETED | OUTPATIENT
Start: 2022-10-18 | End: 2022-10-18

## 2022-10-18 RX ORDER — BUPIVACAINE HYDROCHLORIDE 2.5 MG/ML
INJECTION, SOLUTION INFILTRATION; PERINEURAL PRN
Status: DISCONTINUED | OUTPATIENT
Start: 2022-10-18 | End: 2022-10-18 | Stop reason: HOSPADM

## 2022-10-18 RX ORDER — PROPOFOL 10 MG/ML
INJECTION, EMULSION INTRAVENOUS CONTINUOUS PRN
Status: DISCONTINUED | OUTPATIENT
Start: 2022-10-18 | End: 2022-10-18

## 2022-10-18 RX ORDER — KETAMINE HYDROCHLORIDE 10 MG/ML
INJECTION INTRAMUSCULAR; INTRAVENOUS PRN
Status: DISCONTINUED | OUTPATIENT
Start: 2022-10-18 | End: 2022-10-18

## 2022-10-18 RX ORDER — ONDANSETRON 2 MG/ML
INJECTION INTRAMUSCULAR; INTRAVENOUS PRN
Status: DISCONTINUED | OUTPATIENT
Start: 2022-10-18 | End: 2022-10-18

## 2022-10-18 RX ORDER — FENTANYL CITRATE 50 UG/ML
25 INJECTION, SOLUTION INTRAMUSCULAR; INTRAVENOUS
Status: DISCONTINUED | OUTPATIENT
Start: 2022-10-18 | End: 2022-10-18 | Stop reason: HOSPADM

## 2022-10-18 RX ORDER — OXYCODONE HYDROCHLORIDE 5 MG/1
5 TABLET ORAL EVERY 6 HOURS PRN
Qty: 5 TABLET | Refills: 0 | Status: SHIPPED | OUTPATIENT
Start: 2022-10-18 | End: 2022-12-05

## 2022-10-18 RX ORDER — OXYCODONE HYDROCHLORIDE 5 MG/1
5 TABLET ORAL
Status: CANCELLED | OUTPATIENT
Start: 2022-10-18

## 2022-10-18 RX ORDER — ONDANSETRON 4 MG/1
4 TABLET, ORALLY DISINTEGRATING ORAL EVERY 30 MIN PRN
Status: DISCONTINUED | OUTPATIENT
Start: 2022-10-18 | End: 2022-10-18 | Stop reason: HOSPADM

## 2022-10-18 RX ORDER — ACETAMINOPHEN 325 MG/1
975 TABLET ORAL ONCE
Status: DISCONTINUED | OUTPATIENT
Start: 2022-10-18 | End: 2022-10-18

## 2022-10-18 RX ORDER — IBUPROFEN 400 MG/1
800 TABLET, FILM COATED ORAL ONCE
Status: CANCELLED | OUTPATIENT
Start: 2022-10-18 | End: 2022-10-18

## 2022-10-18 RX ORDER — ONDANSETRON 2 MG/ML
4 INJECTION INTRAMUSCULAR; INTRAVENOUS EVERY 30 MIN PRN
Status: DISCONTINUED | OUTPATIENT
Start: 2022-10-18 | End: 2022-10-18 | Stop reason: HOSPADM

## 2022-10-18 RX ORDER — OXYCODONE HYDROCHLORIDE 5 MG/1
5 TABLET ORAL EVERY 4 HOURS PRN
Status: DISCONTINUED | OUTPATIENT
Start: 2022-10-18 | End: 2022-10-18 | Stop reason: HOSPADM

## 2022-10-18 RX ADMIN — LIDOCAINE HYDROCHLORIDE 0.1 ML: 10 INJECTION, SOLUTION EPIDURAL; INFILTRATION; INTRACAUDAL; PERINEURAL at 09:35

## 2022-10-18 RX ADMIN — PROPOFOL 100 MCG/KG/MIN: 10 INJECTION, EMULSION INTRAVENOUS at 10:25

## 2022-10-18 RX ADMIN — FENTANYL CITRATE 25 MCG: 50 INJECTION, SOLUTION INTRAMUSCULAR; INTRAVENOUS at 11:28

## 2022-10-18 RX ADMIN — GABAPENTIN 300 MG: 300 CAPSULE ORAL at 09:36

## 2022-10-18 RX ADMIN — ONDANSETRON 4 MG: 2 INJECTION INTRAMUSCULAR; INTRAVENOUS at 10:25

## 2022-10-18 RX ADMIN — KETAMINE HYDROCHLORIDE 50 MG: 10 INJECTION, SOLUTION INTRAMUSCULAR; INTRAVENOUS at 10:25

## 2022-10-18 RX ADMIN — DEXAMETHASONE SODIUM PHOSPHATE 4 MG: 4 INJECTION, SOLUTION INTRA-ARTICULAR; INTRALESIONAL; INTRAMUSCULAR; INTRAVENOUS; SOFT TISSUE at 10:25

## 2022-10-18 RX ADMIN — SODIUM CHLORIDE, POTASSIUM CHLORIDE, SODIUM LACTATE AND CALCIUM CHLORIDE: 600; 310; 30; 20 INJECTION, SOLUTION INTRAVENOUS at 09:35

## 2022-10-18 RX ADMIN — ACETAMINOPHEN 975 MG: 325 TABLET, FILM COATED ORAL at 09:36

## 2022-10-18 RX ADMIN — FENTANYL CITRATE 25 MCG: 50 INJECTION, SOLUTION INTRAMUSCULAR; INTRAVENOUS at 11:50

## 2022-10-18 RX ADMIN — MIDAZOLAM 2 MG: 1 INJECTION INTRAMUSCULAR; INTRAVENOUS at 10:22

## 2022-10-18 RX ADMIN — OXYCODONE HYDROCHLORIDE 5 MG: 5 TABLET ORAL at 11:53

## 2022-10-18 ASSESSMENT — ACTIVITIES OF DAILY LIVING (ADL)
ADLS_ACUITY_SCORE: 35
ADLS_ACUITY_SCORE: 35

## 2022-10-18 NOTE — ANESTHESIA POSTPROCEDURE EVALUATION
Patient: Sangita Meraz    Procedure: Procedure(s):  HYSTEROSCOPY, WITH DILATION AND CURETTAGE OF UTERUS and polypectomy       Anesthesia Type:  General    Note:  Disposition: Outpatient   Postop Pain Control: Uneventful            Sign Out: Well controlled pain   PONV: No   Neuro/Psych: Uneventful            Sign Out: Acceptable/Baseline neuro status   Airway/Respiratory: Uneventful            Sign Out: Acceptable/Baseline resp. status   CV/Hemodynamics: Uneventful            Sign Out: Acceptable CV status; No obvious hypovolemia; No obvious fluid overload   Other NRE: NONE   DID A NON-ROUTINE EVENT OCCUR? No           Last vitals:  Vitals Value Taken Time   /62 10/18/22 1130   Temp     Pulse 71 10/18/22 1130   Resp 16 10/18/22 1130   SpO2 96 % 10/18/22 1159   Vitals shown include unvalidated device data.    Electronically Signed By: Roger Hernandez CRNA, APRN CRNA  October 18, 2022  12:00 PM

## 2022-10-18 NOTE — OR NURSING
Patient is a hard IV start. After 4th try did get into upper forearm on the right. IVF infusing but looks puffy at the sight. Patient states might feel a little wonky. Jin GUTHRIE called to verify IV is in the vein.

## 2022-10-18 NOTE — INTERVAL H&P NOTE
"I have reviewed the surgical (or preoperative) H&P that is linked to this encounter, and examined the patient. There are no significant changes      Clinical Conditions Present on Arrival:  Clinically Significant Risk Factors Present on Admission                   # Severe Obesity: Estimated body mass index is 42.69 kg/m  as calculated from the following:    Height as of this encounter: 1.6 m (5' 3\").    Weight as of this encounter: 109.3 kg (241 lb).       "

## 2022-10-18 NOTE — OP NOTE
"[facility] Gynecology Operative Note    Patient: Sangita Meraz  : 1965  MRN: [id number]    Date of Service: 10/18/2022    Pre-operative diagnosis:  - postmenopausal bleeding, echogenic focus in uterus     Post-operative diagnosis:  - Same  - cervical stenosis     Procedure:   - hysteroscopy, dilation and curettage, polypectomy      Surgeon: Ines Chi MD  Assistants: none    Anesthesia: Local with MAC    EBL: 5 mL  Hysteroscope Fluid Deficit: 185    Specimens: endometrial cvurettings  Complications: none apparent     Findings: Small anteverted uterus with no appreciable ovarian or fallopian tube enlargement. External genitalia, vagina and cervix all within normal limits to gross examination other than moderate vulvovaginal atrophy.     Indications: Sangita Meraz is a 57 year old  with postmenopausal bleeding and an echogenic focus noted on pelvic US. She was counseled regarding the risks and alternatives of D&C and hysteroscopy including perforation, damage to pelvic organs, bleeding and infection. The patient agreed to proceed, and signed written informed consent.     Procedure: The patient was taken to the operating room where she was placed in the dorsal lithotomy position. Sedation was administered until the patient was found to be comfortable. An exam under anesthesia was completed. The patient was then prepped and draped in the usual sterile fashion followed by a \"Time-Out\" to verify the correct patient and procedure. A speculum was inserted into the vagina and the cervix was visualized. A paracervical block with 8 cc of 1% lidocaine was performed at 4 and 8 o'clock. The cervix was dilated using an os finder and hegar dilators to 7mm until a Hysteroscope could be passed through the cervix. The intrauterine cavity was inspected with visualization of the above noted findings. The myosure device was used to excise the calcifications in the uterus as well as sample the " entire uterine lining. The endometrial curettings were sent to pathology for examination. The tenaculum was removed from the cervix and good hemostasis was noted at the tenaculum puncture sites. The speculum was removed from the vagina. The patient tolerated the procedure well and was taken to the recovery room in stable condition.     Ines Chi MD  10/18/2022 11:17 AM

## 2022-10-18 NOTE — ANESTHESIA PREPROCEDURE EVALUATION
Anesthesia Pre-Procedure Evaluation    Patient: Sangita Meraz   MRN: [id number] : 1965        Procedure : Procedure(s):  HYSTEROSCOPY, WITH DILATION AND CURETTAGE OF UTERUS          Past Medical History:   Diagnosis Date     Depressive disorder 1982     Obstructive sleep apnea syndrome 10/17/2022      Past Surgical History:   Procedure Laterality Date     ABDOMEN SURGERY  ,,'15    2 c-secs, abdomenplasty     CHOLECYSTECTOMY       COLONOSCOPY N/A 2021    Procedure: COLONOSCOPY, WITH BIOPSY AND CLIPPING;  Surgeon: Kemar Mckeon MD;  Location: [facility] [place] OR     ESOPHAGOSCOPY, GASTROSCOPY, DUODENOSCOPY (EGD), COMBINED N/A 2020    Procedure: ESOPHAGOGASTRODUODENOSCOPY, WITH BIOPSY;  Surgeon: Anton Knott DO;  Location: [facility]     GENITOURINARY SURGERY  , ,     tubal ligation, anterior/posterior repair, tubal ligation reversal      Allergies   Allergen Reactions     Ivp Dye [Contrast Dye] Anaphylaxis     Other (Do Not Use)      Other reaction(s): Itching,Watering Eyes, Rash, Sneezing, Itching,Watering Eyes, Rash, Sneezing, Itching,Watering Eyes, Rash, Sneezing      Social History     Tobacco Use     Smoking status: Former     Packs/day: 1.00     Years: 10.00     Pack years: 10.00     Types: Cigarettes     Start date: 10/10/1982     Quit date: 1991     Years since quittin.4     Smokeless tobacco: Never   Substance Use Topics     Alcohol use: Yes     Comment: 1-2 beer/month      Wt Readings from Last 1 Encounters:   10/17/22 109.3 kg (241 lb)        Anesthesia Evaluation   Pt has had prior anesthetic. Type: General.    History of anesthetic complications       ROS/MED HX  ENT/Pulmonary:     (+) sleep apnea,     Neurologic: Comment: Meniere's disease, unspecified laterality      Cardiovascular:       METS/Exercise Tolerance: >4 METS    Hematologic:  - neg hematologic  ROS     Musculoskeletal:  - neg musculoskeletal ROS     GI/Hepatic:  - neg GI/hepatic ROS      Renal/Genitourinary:  - neg Renal ROS     Endo: Comment: Polycystic ovarian syndrome    (+) Obesity,     Psychiatric/Substance Use:     (+) psychiatric history depression     Infectious Disease:  - neg infectious disease ROS     Malignancy:  - neg malignancy ROS     Other:  - neg other ROS          Physical Exam    Airway  airway exam normal      Mallampati: I   TM distance: > 3 FB   Neck ROM: full   Mouth opening: > 3 cm    Respiratory Devices and Support         Dental  no notable dental history         Cardiovascular   cardiovascular exam normal       Rhythm and rate: regular and normal     Pulmonary   pulmonary exam normal        breath sounds clear to auscultation           OUTSIDE LABS:  CBC:   Lab Results   Component Value Date    WBC 6.0 10/17/2022    WBC 6.3 07/03/2022    HGB 14.2 10/17/2022    HGB 14.7 07/03/2022    HCT 40.3 10/17/2022    HCT 42.7 07/03/2022     10/17/2022     07/03/2022     BMP:   Lab Results   Component Value Date     05/12/2022     11/16/2020    POTASSIUM 4.3 05/12/2022    POTASSIUM 4.5 11/16/2020    CHLORIDE 106 05/12/2022    CHLORIDE 104 11/16/2020    CO2 28 05/12/2022    CO2 29 11/16/2020    BUN 12 05/12/2022    BUN 12 11/16/2020    CR 0.88 05/12/2022    CR 1.05 (H) 11/16/2020     (H) 05/12/2022     (H) 09/09/2021     COAGS: No results found for: PTT, INR, FIBR  POC:   Lab Results   Component Value Date    HCGS Negative 07/25/2020     HEPATIC:   Lab Results   Component Value Date    ALBUMIN 3.7 12/30/2020    PROTTOTAL 7.3 12/30/2020    ALT 87 (H) 12/30/2020    AST 35 12/30/2020    ALKPHOS 91 12/30/2020    BILITOTAL 0.3 12/30/2020     OTHER:   Lab Results   Component Value Date    A1C 6.2 (H) 03/28/2022    MIRIAM 9.1 05/12/2022    LIPASE 210 12/30/2020    CRP 10.21 (H) 10/17/2022    SED 9 10/17/2022       Anesthesia Plan    ASA Status:  2      Anesthesia Type: General.     - Airway: Native airway   Induction: Intravenous, Propofol.   Maintenance:  TIVA.        Consents    Anesthesia Plan(s) and associated risks, benefits, and realistic alternatives discussed. Questions answered and patient/representative(s) expressed understanding.     - Discussed: Risks, Benefits and Alternatives for BOTH SEDATION and the PROCEDURE were discussed     - Discussed with:  Patient      - Extended Intubation/Ventilatory Support Discussed: No.      - Patient is DNR/DNI Status: No    Use of blood products discussed: No .     Postoperative Care    Pain management: Oral pain medications, IV analgesics.   PONV prophylaxis: Ondansetron (or other 5HT-3), Dexamethasone or Solumedrol, Background Propofol Infusion     Comments:                Roger Hernandez CRNA, APRN CRNA

## 2022-10-18 NOTE — ANESTHESIA CARE TRANSFER NOTE
Patient: Sangita Meraz    Procedure: Procedure(s):  HYSTEROSCOPY, WITH DILATION AND CURETTAGE OF UTERUS and polypectomy       Diagnosis: Postmenopausal bleeding [N95.0]  Diagnosis Additional Information: No value filed.    Anesthesia Type:   General     Note:    Oropharynx: oropharynx clear of all foreign objects and spontaneously breathing  Level of Consciousness: awake  Oxygen Supplementation: face mask  Level of Supplemental Oxygen (L/min / FiO2): 4  Independent Airway: airway patency satisfactory and stable  Dentition: dentition unchanged  Vital Signs Stable: post-procedure vital signs reviewed and stable  Report to RN Given: handoff report given  Patient transferred to: Phase II    Handoff Report: Identifed the Patient, Identified the Reponsible Provider, Reviewed the pertinent medical history, Discussed the surgical course, Reviewed Intra-OP anesthesia mangement and issues during anesthesia, Set expectations for post-procedure period and Allowed opportunity for questions and acknowledgement of understanding      Vitals:  Vitals Value Taken Time   BP     Temp     Pulse     Resp     SpO2         Electronically Signed By: Roger Hernandez CRNA, APRN CRNA  October 18, 2022  11:09 AM

## 2022-10-19 LAB
CCP AB SER IA-ACNC: 1.2 U/ML
PATH REPORT.COMMENTS IMP SPEC: NORMAL
PATH REPORT.COMMENTS IMP SPEC: NORMAL
PATH REPORT.FINAL DX SPEC: NORMAL
PATH REPORT.GROSS SPEC: NORMAL
PATH REPORT.MICROSCOPIC SPEC OTHER STN: NORMAL
PATH REPORT.RELEVANT HX SPEC: NORMAL
PHOTO IMAGE: NORMAL

## 2022-10-26 NOTE — PROGRESS NOTES
[facility]    Outpatient Physical Therapy Discharge Note  Patient: Sangita Meraz  : 1965    Beginning/End Dates of Reporting Period:  Seen for one visit on date of evaluation 22    Referring Provider: Guicho Samaniego Diagnosis: see katherine     Client Self Report: see eval    Objective Measurements:     Goals:  Unable to assess goals as patient did not need to return for any follow up visits   Goal Identifier 1   Goal Description Patient will be able to roll onto the right side with no symptoms of dizziness.   Target Date 22   Date Met      Progress (detail required for progress note):       Goal Identifier 2   Goal Description Patient will be able to look up/down for extended periods of time with no dizziness.   Target Date 22   Date Met      Progress (detail required for progress note):     Assessment: Patient was seen for initial evaluation and treatment for BPPV was provided on the same day. It is common for BPPV to be fully treated within one treatment session so patient was instructed to only f/u with therapy if symptoms continued or returned. It has now been over 30 days since pt was last seen and no follow up treatments were needed after treatment for BPPV.  This episode of care is now being discharged.     Plan:  Discharge from therapy.    Discharge:    Reason for Discharge: no further therapy was needed past one treatment session on date of evaluation    Equipment Issued: none      Sue Jeong  PT, DPT       10/26/2022   [place] [id number] [address] [place] [id number]   [email or web address]  [email or web address]: [phone]

## 2022-12-05 ENCOUNTER — HOSPITAL ENCOUNTER (EMERGENCY)
Facility: CLINIC | Age: 57
Discharge: HOME | End: 2022-12-05
Attending: EMERGENCY MEDICINE | Admitting: EMERGENCY MEDICINE
Payer: OTHER GOVERNMENT

## 2022-12-05 ENCOUNTER — APPOINTMENT (OUTPATIENT)
Dept: CT IMAGING | Facility: CLINIC | Age: 57
End: 2022-12-05
Attending: EMERGENCY MEDICINE
Payer: OTHER GOVERNMENT

## 2022-12-05 ENCOUNTER — OFFICE VISIT (OUTPATIENT)
Dept: URGENT CARE | Facility: URGENT CARE | Age: 57
End: 2022-12-05
Payer: OTHER GOVERNMENT

## 2022-12-05 VITALS
RESPIRATION RATE: 16 BRPM | HEART RATE: 65 BPM | HEIGHT: 63 IN | WEIGHT: 225 LBS | SYSTOLIC BLOOD PRESSURE: 131 MMHG | BODY MASS INDEX: 39.87 KG/M2 | TEMPERATURE: 99.2 F | OXYGEN SATURATION: 95 % | DIASTOLIC BLOOD PRESSURE: 74 MMHG

## 2022-12-05 VITALS
SYSTOLIC BLOOD PRESSURE: 128 MMHG | DIASTOLIC BLOOD PRESSURE: 77 MMHG | WEIGHT: 225 LBS | TEMPERATURE: 101 F | BODY MASS INDEX: 39.86 KG/M2 | HEART RATE: 70 BPM | OXYGEN SATURATION: 98 %

## 2022-12-05 DIAGNOSIS — R10.32 LLQ ABDOMINAL PAIN: Primary | ICD-10-CM

## 2022-12-05 DIAGNOSIS — K57.92 ACUTE DIVERTICULITIS: ICD-10-CM

## 2022-12-05 LAB
ALBUMIN UR-MCNC: 30 MG/DL
ANION GAP SERPL CALCULATED.3IONS-SCNC: 8 MMOL/L (ref 7–15)
APPEARANCE UR: CLEAR
BASOPHILS # BLD AUTO: 0 10E3/UL (ref 0–0.2)
BASOPHILS NFR BLD AUTO: 0 %
BILIRUB UR QL STRIP: ABNORMAL
BUN SERPL-MCNC: 6.6 MG/DL (ref 6–20)
CALCIUM SERPL-MCNC: 9.7 MG/DL (ref 8.6–10)
CHLORIDE SERPL-SCNC: 100 MMOL/L (ref 98–107)
COLOR UR AUTO: YELLOW
CREAT SERPL-MCNC: 0.8 MG/DL (ref 0.51–0.95)
DEPRECATED HCO3 PLAS-SCNC: 28 MMOL/L (ref 22–29)
EOSINOPHIL # BLD AUTO: 0.2 10E3/UL (ref 0–0.7)
EOSINOPHIL NFR BLD AUTO: 2 %
ERYTHROCYTE [DISTWIDTH] IN BLOOD BY AUTOMATED COUNT: 12.5 % (ref 10–15)
GFR SERPL CREATININE-BSD FRML MDRD: 86 ML/MIN/1.73M2
GLUCOSE SERPL-MCNC: 130 MG/DL (ref 70–99)
GLUCOSE UR STRIP-MCNC: NEGATIVE MG/DL
HCT VFR BLD AUTO: 42.5 % (ref 35–47)
HGB BLD-MCNC: 15 G/DL (ref 11.7–15.7)
HGB UR QL STRIP: ABNORMAL
HYALINE CASTS: 1 /LPF
IMM GRANULOCYTES # BLD: 0 10E3/UL
IMM GRANULOCYTES NFR BLD: 0 %
KETONES UR STRIP-MCNC: 15 MG/DL
LEUKOCYTE ESTERASE UR QL STRIP: ABNORMAL
LYMPHOCYTES # BLD AUTO: 1.8 10E3/UL (ref 0.8–5.3)
LYMPHOCYTES NFR BLD AUTO: 19 %
MCH RBC QN AUTO: 31.9 PG (ref 26.5–33)
MCHC RBC AUTO-ENTMCNC: 35.3 G/DL (ref 31.5–36.5)
MCV RBC AUTO: 90 FL (ref 78–100)
MONOCYTES # BLD AUTO: 0.6 10E3/UL (ref 0–1.3)
MONOCYTES NFR BLD AUTO: 6 %
MUCOUS THREADS #/AREA URNS LPF: PRESENT /LPF
NEUTROPHILS # BLD AUTO: 7.3 10E3/UL (ref 1.6–8.3)
NEUTROPHILS NFR BLD AUTO: 73 %
NITRATE UR QL: POSITIVE
NRBC # BLD AUTO: 0 10E3/UL
NRBC BLD AUTO-RTO: 0 /100
PH UR STRIP: 6 [PH] (ref 5–7)
PLATELET # BLD AUTO: 210 10E3/UL (ref 150–450)
POTASSIUM SERPL-SCNC: 4.2 MMOL/L (ref 3.4–5.3)
RBC # BLD AUTO: 4.7 10E6/UL (ref 3.8–5.2)
RBC URINE: 15 /HPF
SODIUM SERPL-SCNC: 136 MMOL/L (ref 136–145)
SP GR UR STRIP: 1.02 (ref 1–1.03)
SQUAMOUS EPITHELIAL: 16 /HPF
UROBILINOGEN UR STRIP-MCNC: NORMAL MG/DL
WBC # BLD AUTO: 9.9 10E3/UL (ref 4–11)
WBC URINE: 52 /HPF

## 2022-12-05 PROCEDURE — 74176 CT ABD & PELVIS W/O CONTRAST: CPT

## 2022-12-05 PROCEDURE — 99284 EMERGENCY DEPT VISIT MOD MDM: CPT | Mod: 25 | Performed by: EMERGENCY MEDICINE

## 2022-12-05 PROCEDURE — 250N000013 HC RX MED GY IP 250 OP 250 PS 637: Performed by: EMERGENCY MEDICINE

## 2022-12-05 PROCEDURE — 87086 URINE CULTURE/COLONY COUNT: CPT | Performed by: EMERGENCY MEDICINE

## 2022-12-05 PROCEDURE — 85025 COMPLETE CBC W/AUTO DIFF WBC: CPT | Performed by: EMERGENCY MEDICINE

## 2022-12-05 PROCEDURE — 250N000011 HC RX IP 250 OP 636: Performed by: EMERGENCY MEDICINE

## 2022-12-05 PROCEDURE — 36415 COLL VENOUS BLD VENIPUNCTURE: CPT | Performed by: EMERGENCY MEDICINE

## 2022-12-05 PROCEDURE — 81001 URINALYSIS AUTO W/SCOPE: CPT | Performed by: EMERGENCY MEDICINE

## 2022-12-05 PROCEDURE — 99284 EMERGENCY DEPT VISIT MOD MDM: CPT | Performed by: EMERGENCY MEDICINE

## 2022-12-05 PROCEDURE — 82310 ASSAY OF CALCIUM: CPT | Performed by: EMERGENCY MEDICINE

## 2022-12-05 PROCEDURE — 99214 OFFICE O/P EST MOD 30 MIN: CPT | Performed by: NURSE PRACTITIONER

## 2022-12-05 RX ORDER — OXYCODONE HYDROCHLORIDE 5 MG/1
5 TABLET ORAL EVERY 6 HOURS PRN
Qty: 10 TABLET | Refills: 0 | Status: SHIPPED | OUTPATIENT
Start: 2022-12-05 | End: 2022-12-23

## 2022-12-05 RX ORDER — ONDANSETRON 4 MG/1
4 TABLET, ORALLY DISINTEGRATING ORAL EVERY 8 HOURS PRN
Qty: 10 TABLET | Refills: 0 | Status: ON HOLD | OUTPATIENT
Start: 2022-12-05 | End: 2023-04-03

## 2022-12-05 RX ORDER — ACETAMINOPHEN 325 MG/1
975 TABLET ORAL ONCE
Status: COMPLETED | OUTPATIENT
Start: 2022-12-05 | End: 2022-12-05

## 2022-12-05 RX ORDER — ONDANSETRON 4 MG/1
4 TABLET, ORALLY DISINTEGRATING ORAL ONCE
Status: COMPLETED | OUTPATIENT
Start: 2022-12-05 | End: 2022-12-05

## 2022-12-05 RX ADMIN — ONDANSETRON 4 MG: 4 TABLET, ORALLY DISINTEGRATING ORAL at 16:19

## 2022-12-05 RX ADMIN — ACETAMINOPHEN 975 MG: 325 TABLET, FILM COATED ORAL at 16:18

## 2022-12-05 ASSESSMENT — ACTIVITIES OF DAILY LIVING (ADL): ADLS_ACUITY_SCORE: 35

## 2022-12-05 ASSESSMENT — ENCOUNTER SYMPTOMS
FEVER: 1
DIFFICULTY URINATING: 0
ABDOMINAL DISTENTION: 1
ANAL BLEEDING: 0
DIARRHEA: 0
VOMITING: 0
HEMATURIA: 0
DYSURIA: 0
COUGH: 0
WHEEZING: 0
CHEST TIGHTNESS: 0
CONSTIPATION: 0
CHILLS: 1
BACK PAIN: 1
FREQUENCY: 0
NAUSEA: 1
ABDOMINAL PAIN: 1
HEMATOCHEZIA: 0

## 2022-12-05 NOTE — ED TRIAGE NOTES
Pt presents with abdominal pain that is similar to her typical diverticulitis sx but more painful and she feels more bloated. Pt reports normal BM (last this AM), pt denies vomiting. Was seen at [facility] and sent to ER for CT.     Pt is allergic to the contrast dye that coincides with shellfish allergies. Pt had imaging at [facility] with contrast without difficulty.      Triage Assessment     Row Name 12/05/22 9541       Triage Assessment (Adult)    Airway WDL WDL       Respiratory WDL    Respiratory WDL WDL       Skin Circulation/Temperature WDL    Skin Circulation/Temperature WDL WDL       Cardiac WDL    Cardiac WDL WDL       Peripheral/Neurovascular WDL    Peripheral Neurovascular WDL WDL       Cognitive/Neuro/Behavioral WDL    Cognitive/Neuro/Behavioral WDL WDL

## 2022-12-05 NOTE — PROGRESS NOTES
Assessment & Plan     LLQ abdominal pain      Ivett Lin NP  [facility] [place]    Adi Quesada is a 57 year old female (hx of diverticulitis) who presents to clinic today for the following health issues: Patient states she has left lower abdominal pain 8/10 currently, fever, anorexia, nausea, bloating, fever, chills, arthralgias, myalgias, headache and passing flatus starting yesterdays but pain has gotten worse. Patient states recalls yesterday morning she woke up with 6/10 of left lower abdominal pain but after lunch that day she developed a fever and by evening it was 101. Patient states the last BM was this morning at 8:30 am which was normal soft BM but it did hurt to expel it. Patient states she has had diverticulitis in the past but this is the worse episode she has had.     Surgical History: cholecystectomy, tubal ligation and reversal, two c-sections and pelvic floor repair and tummy tuck, Negative D & C 1.5 months ago because endometrial lining was thick and was on HRT. Patient has not had a period for 6 years.    Chief Complaint   Patient presents with     Gastrointestinal Problem     Lower left quadrant abdominal pain, fever, headache started late Saturday early Sunday, taking tylenol last dose around 9AM     Abdominal Pain  Location: LLQ   Radiation: None and back.    Pain character: dull and cramping,   Severity: 8 on a scale of 1-10.    Duration: yesterday morning 6/10 and fever after lunch by evening 101  Course of Illness: rapidly improving.  Exacerbated by: cough/deep breath, bowel movement and movement/walking  Relieved by: left side a little relief  Associated Symptoms: anorexia, nausea, bloating, fever, chills, arthralgias, myalgias and headache and passing flatus. Last BM was this morning at 8:30 am normal soft BM but hurt to exspell it  Female : no period for 6 years  Surgical History: cholecystectomy, tubal ligation and reversal, two c-sections and pelvic  floor repair and tummy MICHAEL russell & PETER 1.5 month ago negative     Review of Systems   Constitutional: Positive for chills and fever.   Respiratory: Negative for cough, chest tightness and wheezing.    Cardiovascular: Negative for chest pain.   Gastrointestinal: Positive for abdominal distention, abdominal pain and nausea. Negative for anal bleeding, constipation, diarrhea, hematochezia and vomiting.   Genitourinary: Negative for difficulty urinating, dysuria, frequency and hematuria.   Musculoskeletal: Positive for back pain.   Skin: Negative for rash.         Objective    /77 (BP Location: Right arm, Patient Position: Sitting, Cuff Size: Adult Large)   Pulse 70   Temp (!) 101  F (38.3  C) (Tympanic)   Wt 102.1 kg (225 lb)   LMP 12/09/2018   SpO2 98%   BMI 39.86 kg/m    Physical Exam  Vitals and nursing note reviewed.   Constitutional:       Appearance: She is ill-appearing.   HENT:      Head: Normocephalic and atraumatic.      Right Ear: Tympanic membrane, ear canal and external ear normal.      Left Ear: Tympanic membrane, ear canal and external ear normal.      Mouth/Throat:      Mouth: Mucous membranes are moist.   Eyes:      Conjunctiva/sclera: Conjunctivae normal.   Cardiovascular:      Rate and Rhythm: Normal rate and regular rhythm.      Pulses: Normal pulses.      Heart sounds: Normal heart sounds.   Pulmonary:      Effort: Pulmonary effort is normal.      Breath sounds: Normal breath sounds.   Abdominal:      General: Bowel sounds are normal.      Tenderness: There is no right CVA tenderness or left CVA tenderness.      Comments: Left lower abdominal tenderness 8/10 with palpation   Lymphadenopathy:      Cervical: No cervical adenopathy.   Neurological:      Mental Status: She is alert.   Psychiatric:         Mood and Affect: Mood normal.         Behavior: Behavior normal.

## 2022-12-06 NOTE — ED PROVIDER NOTES
History     Chief Complaint   Patient presents with     Abdominal Pain     HPI  Sangita Meraz is a 57 year old female who presents for left lower quadrant abdominal pain.  Symptoms are severe, getting worse for the several days.  Nausea but no vomiting.  No diarrhea or bloody stools.  Similar to her prior episodes of diverticulitis but worse pain.  She is felt warm and flushed but no fevers.  No chest pain, cough, shortness of breath, dysuria, urinary frequency, or rash.    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     Priority: Medium     Mixed stress and urge urinary incontinence 10/17/2022     Priority: Medium     Polycystic ovarian syndrome 10/17/2022     Priority: Medium     Recurrent major depressive disorder, in partial remission (H) 10/17/2022     Priority: Medium     Obstructive sleep apnea syndrome 10/17/2022     Priority: Medium     severe       Uterovaginal prolapse 10/17/2022     Priority: Medium     Meniere's disease, unspecified laterality 10/17/2022     Priority: Medium     Morbid obesity (H) 07/13/2021     Priority: Medium        Past Medical History:    Past Medical History:   Diagnosis Date     Depressive disorder 1982     Obstructive sleep apnea syndrome 10/17/2022       Past Surgical History:    Past Surgical History:   Procedure Laterality Date     ABDOMEN SURGERY  '92,'09,'15    2 c-secs, abdomenplasty     CHOLECYSTECTOMY  '98     COLONOSCOPY N/A 03/16/2021    Procedure: COLONOSCOPY, WITH BIOPSY AND CLIPPING;  Surgeon: Kemar Mckeon MD;  Location: [facility] OR     DILATION AND CURETTAGE, OPERATIVE HYSTEROSCOPY, COMBINED  "N/A 10/18/2022    Procedure: HYSTEROSCOPY, WITH DILATION AND CURETTAGE OF UTERUS and polypectomy;  Surgeon: Ines Chi MD;  Location: [facility]     ESOPHAGOSCOPY, GASTROSCOPY, DUODENOSCOPY (EGD), COMBINED N/A 2020    Procedure: ESOPHAGOGASTRODUODENOSCOPY, WITH BIOPSY;  Surgeon: Anton Knott DO;  Location: [facility]     GENITOURINARY SURGERY  ', ,     tubal ligation, anterior/posterior repair, tubal ligation reversal       Family History:    Family History   Problem Relation Age of Onset     Uterine Cancer Mother         endometrial cancer     Diabetes Mother      Colon Polyps Father      Diabetes Father      Hypertension Father      Diabetes Brother      Hypertension Brother        Social History:  Marital Status:   [2]  Social History     Tobacco Use     Smoking status: Former     Packs/day: 1.00     Years: 10.00     Pack years: 10.00     Types: Cigarettes     Start date: 10/10/1982     Quit date: 1991     Years since quittin.5     Smokeless tobacco: Never   Vaping Use     Vaping Use: Never used   Substance Use Topics     Alcohol use: Yes     Comment: 1-2 beer/month     Drug use: Never        Medications:    amoxicillin-clavulanate (AUGMENTIN) 875-125 MG tablet  ondansetron (ZOFRAN ODT) 4 MG ODT tab  oxyCODONE (ROXICODONE) 5 MG tablet  estradiol (ESTRING) 2 MG vaginal ring  estradiol-norethindrone (COMBIPATCH) 0.05-0.14 MG/DAY bi-weekly patch  fluocinonide (LIDEX) 0.05 % external solution  omeprazole 20 MG tablet  sertraline (ZOLOFT) 100 MG tablet          Review of Systems  Pertinent positives and negatives listed in the HPI, all other systems reviewed and are negative.    Physical Exam   BP: 134/81  Pulse: 71  Temp: 99.2  F (37.3  C)  Resp: 16  Height: 160 cm (5' 3\")  Weight: 102.1 kg (225 lb)  SpO2: 96 %      Physical Exam  Vitals and nursing note reviewed.   Constitutional:       General: She is in acute distress.      Appearance: She is well-developed and " well-nourished. She is not diaphoretic.   HENT:      Head: Normocephalic and atraumatic.      Right Ear: External ear normal.      Left Ear: External ear normal.      Nose: Nose normal.   Eyes:      General: No scleral icterus.     Conjunctiva/sclera: Conjunctivae normal.   Cardiovascular:      Rate and Rhythm: Normal rate and regular rhythm.   Pulmonary:      Effort: Pulmonary effort is normal. No respiratory distress.      Breath sounds: No stridor.   Abdominal:      General: There is no distension.      Palpations: Abdomen is soft.      Tenderness: There is abdominal tenderness in the left lower quadrant. There is guarding.   Musculoskeletal:      Cervical back: Normal range of motion.   Skin:     General: Skin is warm and dry.   Neurological:      Mental Status: She is alert and oriented to person, place, and time.   Psychiatric:         Mood and Affect: Mood and affect normal.         Behavior: Behavior normal.         ED Course                 Procedures              Critical Care time:  none               Results for orders placed or performed during the hospital encounter of 12/05/22 (from the past 24 hour(s))   CBC with Platelets & Differential    Narrative    The following orders were created for panel order CBC with Platelets & Differential.  Procedure                               Abnormality         Status                     ---------                               -----------         ------                     CBC with platelets and d...[[id number]]                      Final result                 Please view results for these tests on the individual orders.   Basic metabolic panel   Result Value Ref Range    Sodium 136 136 - 145 mmol/L    Potassium 4.2 3.4 - 5.3 mmol/L    Chloride 100 98 - 107 mmol/L    Carbon Dioxide (CO2) 28 22 - 29 mmol/L    Anion Gap 8 7 - 15 mmol/L    Urea Nitrogen 6.6 6.0 - 20.0 mg/dL    Creatinine 0.80 0.51 - 0.95 mg/dL    Calcium 9.7 8.6 - 10.0 mg/dL    Glucose 130 (H) 70 - 99  mg/dL    GFR Estimate 86 >60 mL/min/1.73m2   CBC with platelets and differential   Result Value Ref Range    WBC Count 9.9 4.0 - 11.0 10e3/uL    RBC Count 4.70 3.80 - 5.20 10e6/uL    Hemoglobin 15.0 11.7 - 15.7 g/dL    Hematocrit 42.5 35.0 - 47.0 %    MCV 90 78 - 100 fL    MCH 31.9 26.5 - 33.0 pg    MCHC 35.3 31.5 - 36.5 g/dL    RDW 12.5 10.0 - 15.0 %    Platelet Count 210 150 - 450 10e3/uL    % Neutrophils 73 %    % Lymphocytes 19 %    % Monocytes 6 %    % Eosinophils 2 %    % Basophils 0 %    % Immature Granulocytes 0 %    NRBCs per 100 WBC 0 <1 /100    Absolute Neutrophils 7.3 1.6 - 8.3 10e3/uL    Absolute Lymphocytes 1.8 0.8 - 5.3 10e3/uL    Absolute Monocytes 0.6 0.0 - 1.3 10e3/uL    Absolute Eosinophils 0.2 0.0 - 0.7 10e3/uL    Absolute Basophils 0.0 0.0 - 0.2 10e3/uL    Absolute Immature Granulocytes 0.0 <=0.4 10e3/uL    Absolute NRBCs 0.0 10e3/uL   UA with Microscopic reflex to Culture    Specimen: Urine, Midstream   Result Value Ref Range    Color Urine Yellow Colorless, Straw, Light Yellow, Yellow    Appearance Urine Clear Clear    Glucose Urine Negative Negative mg/dL    Bilirubin Urine Small (A) Negative    Ketones Urine 15 (A) Negative mg/dL    Specific Gravity Urine 1.020 1.003 - 1.035    Blood Urine Moderate (A) Negative    pH Urine 6.0 5.0 - 7.0    Protein Albumin Urine 30 (A) Negative mg/dL    Urobilinogen Urine Normal Normal, 2.0 mg/dL    Nitrite Urine Positive (A) Negative    Leukocyte Esterase Urine Small (A) Negative    Mucus Urine Present (A) None Seen /LPF    RBC Urine 15 (H) <=2 /HPF    WBC Urine 52 (H) <=5 /HPF    Squamous Epithelials Urine 16 (H) <=1 /HPF    Hyaline Casts Urine 1 <=2 /LPF    Narrative    Urine Culture ordered based on laboratory criteria   CT Abdomen Pelvis w/o Contrast    Narrative    EXAM: CT ABDOMEN PELVIS W/O CONTRAST  LOCATION: [facility]  DATE/TIME: 12/5/2022 6:53 PM    INDICATION: left lower abdominal pain  COMPARISON: 10/03/2022 pelvic  ultrasound, MR enterography 02/01/2021 and CT AP 07/25/2020  TECHNIQUE: CT scan of the abdomen and pelvis was performed without oral or IV contrast. Multiplanar reformats were obtained. Dose reduction techniques were used.  CONTRAST: None.    FINDINGS:   LOWER CHEST: Visualized lungs are clear. No pleural effusion. Heart size normal with no pericardial effusion.     HEPATOBILIARY: Moderate diffuse hepatic steatosis. Liver is otherwise normal. No bile duct dilatation. Cholecystectomy.    PANCREAS: Normal.    SPLEEN: Spleen size normal.    ADRENAL GLANDS: Normal.    KIDNEY/BLADDER: Kidneys, ureters and bladder are normal.    BOWEL: Uncomplicated acute diverticulitis proximal small sigmoid colon with no abscess, obstruction or perforation. No free fluid or free air. Dermal appendix.    LYMPH NODES: No lymphadenopathy.    VASCULATURE: Normal caliber abdominal aorta.      PELVIC ORGANS: No pelvic mass or fluid. Vaginal pessary.    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      Impression    IMPRESSION:   1.  Uncomplicated acute diverticulitis proximal small sigmoid colon with no abscess, obstruction or perforation.   2.  Moderate diffuse hepatic steatosis.  3.  Cholecystectomy.       Medications   acetaminophen (TYLENOL) tablet 975 mg (975 mg Oral Given 12/5/22 1618)   ondansetron (ZOFRAN ODT) ODT tab 4 mg (4 mg Oral Given 12/5/22 1619)       Assessments & Plan (with Medical Decision Making)   57-year-old female with a history of prior diverticulitis who presents with left lower quadrant abdominal pain.  Temperature is 37.3  C, heart is 71, SPO2 is 96% on room air.  She given ondansetron for nausea.  Acetaminophen given for pain.  White blood cell count is 9.9 which is reassuring and his hemoglobin is 15, no signs of anemia.  Electrolytes are within normal notes.  CT of the abdomen pelvis obtained, images reviewed independently as well as radiology read reviewed,  positive for acute diverticulitis without signs of perforation or abscess.  Her urinalysis does have white blood cells and red blood cells but is also positive for skin cells, leukocyte esterase, and nitrite.  Urine culture is pending.  At this time she is safe to discharge.  I will start her on amoxicillin discussed alendronate, I think this will cover for urinary sources as well as the diverticulitis.  She is given a short course of ondansetron and oxycodone for symptoms.  She is told to return if worse, otherwise follow-up in clinic.  The patient is in agreement with this plan.    I have reviewed the nursing notes.    I have reviewed the findings, diagnosis, plan and need for follow up with the patient.       New Prescriptions    AMOXICILLIN-CLAVULANATE (AUGMENTIN) 875-125 MG TABLET    Take 1 tablet by mouth 2 times daily for 7 days    ONDANSETRON (ZOFRAN ODT) 4 MG ODT TAB    Take 1 tablet (4 mg) by mouth every 8 hours as needed for nausea    OXYCODONE (ROXICODONE) 5 MG TABLET    Take 1 tablet (5 mg) by mouth every 6 hours as needed for severe pain (7-10)       Final diagnoses:   Acute diverticulitis       12/5/2022   [facility] EMERGENCY DEPT     Gavin Gallego MD  12/05/22 2139

## 2022-12-06 NOTE — DISCHARGE INSTRUCTIONS
Drink plenty fluids.  Take the antibiotics as prescribed.  Increase the fiber in the diet.  Return if you are having worsening symptoms or other concerns.  Get rechecked if not feeling better over the next 4 to 5 days.    Use ibuprofen and acetaminophen for your symptoms.  Use oxycodone as needed for pain that is not controlled by the prior two medications.    Oxycodone is an addictive opioid medication, please only take it when the pain is more than can be controlled by acetaminophen or ibuprofen alone. It will also make you lightheaded, nauseated, and constipated.  Do not drive, operate heavy machinery, or take care of young children while taking this medication. Do not mix it with other medications or drugs that will make you sleepy, such as alcohol.     Repeated studies have shown that the longer you use opioid pain medications, the longer it is until you return to normal function. It is our recommendation that you taper off opioids as quickly as possible with the goal of returning to normal function or near normal function. Long term use of opioids quickly results in growing tolerance to the medication (less of the benefits) and increased dependence (more of the bad side effects).     Pain is very difficult to treat and we can very rarely take away pain completely. If you are having difficulty with pain over several weeks after an injury, you may need to start different medications and therapies (such as physical therapy, graded exercise, massage, and acupuncture). Please talk about this with your regular doctor.     If you are interested in seeking free, confidential treatment referral and information service for individuals and families facing mental health and/or substance use disorders please call [phone] ([phone])

## 2022-12-07 LAB — BACTERIA UR CULT: NORMAL

## 2022-12-20 ENCOUNTER — VIRTUAL VISIT (OUTPATIENT)
Dept: FAMILY MEDICINE | Facility: CLINIC | Age: 57
End: 2022-12-20
Payer: OTHER GOVERNMENT

## 2022-12-20 ENCOUNTER — LAB (OUTPATIENT)
Dept: LAB | Facility: CLINIC | Age: 57
End: 2022-12-20
Payer: OTHER GOVERNMENT

## 2022-12-20 DIAGNOSIS — R30.0 DYSURIA: ICD-10-CM

## 2022-12-20 DIAGNOSIS — N30.00 ACUTE CYSTITIS WITHOUT HEMATURIA: Primary | ICD-10-CM

## 2022-12-20 DIAGNOSIS — R30.0 DYSURIA: Primary | ICD-10-CM

## 2022-12-20 DIAGNOSIS — B37.31 YEAST INFECTION OF THE VAGINA: ICD-10-CM

## 2022-12-20 LAB
ALBUMIN UR-MCNC: NEGATIVE MG/DL
APPEARANCE UR: CLEAR
BACTERIA #/AREA URNS HPF: ABNORMAL /HPF
BILIRUB UR QL STRIP: NEGATIVE
CLUE CELLS: ABNORMAL
COLOR UR AUTO: YELLOW
GLUCOSE UR STRIP-MCNC: NEGATIVE MG/DL
HGB UR QL STRIP: ABNORMAL
KETONES UR STRIP-MCNC: NEGATIVE MG/DL
LEUKOCYTE ESTERASE UR QL STRIP: ABNORMAL
NITRATE UR QL: NEGATIVE
PH UR STRIP: 6.5 [PH] (ref 5–7)
RBC #/AREA URNS AUTO: ABNORMAL /HPF
SP GR UR STRIP: 1.01 (ref 1–1.03)
SQUAMOUS #/AREA URNS AUTO: ABNORMAL /LPF
TRICHOMONAS, WET PREP: ABNORMAL
UROBILINOGEN UR STRIP-ACNC: 0.2 E.U./DL
WBC #/AREA URNS AUTO: ABNORMAL /HPF
WBC'S/HIGH POWER FIELD, WET PREP: ABNORMAL
YEAST, WET PREP: PRESENT

## 2022-12-20 PROCEDURE — 81001 URINALYSIS AUTO W/SCOPE: CPT

## 2022-12-20 PROCEDURE — 87088 URINE BACTERIA CULTURE: CPT

## 2022-12-20 PROCEDURE — 87186 SC STD MICRODIL/AGAR DIL: CPT

## 2022-12-20 PROCEDURE — 99213 OFFICE O/P EST LOW 20 MIN: CPT | Mod: 95 | Performed by: PHYSICIAN ASSISTANT

## 2022-12-20 PROCEDURE — 87086 URINE CULTURE/COLONY COUNT: CPT

## 2022-12-20 PROCEDURE — 87210 SMEAR WET MOUNT SALINE/INK: CPT

## 2022-12-20 RX ORDER — FLUCONAZOLE 150 MG/1
150 TABLET ORAL
Qty: 3 TABLET | Refills: 0 | Status: SHIPPED | OUTPATIENT
Start: 2022-12-20 | End: 2022-12-27

## 2022-12-20 RX ORDER — SULFAMETHOXAZOLE/TRIMETHOPRIM 800-160 MG
1 TABLET ORAL 2 TIMES DAILY
Qty: 14 TABLET | Refills: 0 | Status: SHIPPED | OUTPATIENT
Start: 2022-12-20 | End: 2022-12-23

## 2022-12-20 ASSESSMENT — PATIENT HEALTH QUESTIONNAIRE - PHQ9
10. IF YOU CHECKED OFF ANY PROBLEMS, HOW DIFFICULT HAVE THESE PROBLEMS MADE IT FOR YOU TO DO YOUR WORK, TAKE CARE OF THINGS AT HOME, OR GET ALONG WITH OTHER PEOPLE: NOT DIFFICULT AT ALL
SUM OF ALL RESPONSES TO PHQ QUESTIONS 1-9: 0
SUM OF ALL RESPONSES TO PHQ QUESTIONS 1-9: 0

## 2022-12-20 NOTE — PROGRESS NOTES
"Sangita is a 57 year old who is being evaluated via a billable video visit.      How would you like to obtain your AVS? MyChart  If the video visit is dropped, the invitation should be resent by: Text to cell phone: [phone]  Will anyone else be joining your video visit? No        Assessment & Plan     Dysuria  UC on 12/5 showed large amount urogenital yaima.  Will get labs with wet prep today as well.  Follow up pending labs.    - UA Macro with Reflex to Micro and Culture - lab collect; Future  - Wet prep - lab collect; Future        Return in about 3 days (around 12/23/2022) for if symptoms worsen or fail to improve.    ANUPAMA Nelson [facility] VERÓNICAVERNON Quesada is a 57 year old, presenting for the following health issues:  No chief complaint on file.      History of Present Illness       Reason for visit:  UTI symptoms  Symptom onset:  3-7 days ago  Symptoms include:  Urinary urgency, spasm when urinating, burning in urethra  Symptom intensity:  Moderate  Symptom progression:  Staying the same  Had these symptoms before:  Yes  Has tried/received treatment for these symptoms:  Yes  Previous treatment was successful:  Yes  Prior treatment description:  Antibiotics  What makes it worse:  Cold  What makes it better:  Sometimes the symptoms spontaneously stop, only to resume later.    She eats 4 or more servings of fruits and vegetables daily.She consumes 0 sweetened beverage(s) daily.She exercises with enough effort to increase her heart rate 10 to 19 minutes per day.  She exercises with enough effort to increase her heart rate 6 days per week.   She is taking medications regularly.     Burning when urinating for about a week  Urgency is the worst  Has had UTI's in the past    Was in ED on 12/5 for diverticulitis and was told that her urine test looked \"funny\".  The provider said the Augmentin she was put on should help with anything going on with the urine.  Had a D & " C not long ago- a cystocele was mentioned at that time and pelvic floor weakness    Denies fever, abd pain, back pain or vomiting        Review of Systems   Constitutional, HEENT, cardiovascular, pulmonary, gi and gu systems are negative, except as otherwise noted.      Objective           Vitals:  No vitals were obtained today due to virtual visit.    Physical Exam   GENERAL: Healthy, alert and no distress  EYES: Eyes grossly normal to inspection.  No discharge or erythema, or obvious scleral/conjunctival abnormalities.  RESP: No audible wheeze, cough, or visible cyanosis.  No visible retractions or increased work of breathing.    SKIN: Visible skin clear. No significant rash, abnormal pigmentation or lesions.  NEURO: Cranial nerves grossly intact.  Mentation and speech appropriate for age.  PSYCH: Mentation appears normal, affect normal/bright, judgement and insight intact, normal speech and appearance well-groomed.              Video-Visit Details    Video Start Time: 7:25 AM    Type of service:  Video Visit    Video End Time:7:35 AM    Originating Location (pt. Location): Home    Distant Location (provider location):  On-site    Platform used for Video Visit: Sharelook

## 2022-12-23 ENCOUNTER — OFFICE VISIT (OUTPATIENT)
Dept: FAMILY MEDICINE | Facility: CLINIC | Age: 57
End: 2022-12-23
Payer: OTHER GOVERNMENT

## 2022-12-23 VITALS
DIASTOLIC BLOOD PRESSURE: 88 MMHG | WEIGHT: 217 LBS | RESPIRATION RATE: 16 BRPM | OXYGEN SATURATION: 99 % | SYSTOLIC BLOOD PRESSURE: 124 MMHG | BODY MASS INDEX: 38.44 KG/M2 | TEMPERATURE: 97.1 F | HEART RATE: 60 BPM

## 2022-12-23 DIAGNOSIS — K57.30 DIVERTICULAR DISEASE OF COLON: Primary | ICD-10-CM

## 2022-12-23 LAB
BACTERIA UR CULT: ABNORMAL
BACTERIA UR CULT: ABNORMAL

## 2022-12-23 PROCEDURE — 99214 OFFICE O/P EST MOD 30 MIN: CPT | Performed by: NURSE PRACTITIONER

## 2022-12-23 RX ORDER — CIPROFLOXACIN 500 MG/1
500 TABLET, FILM COATED ORAL 2 TIMES DAILY
Qty: 20 TABLET | Refills: 0 | Status: SHIPPED | OUTPATIENT
Start: 2022-12-23 | End: 2023-01-02

## 2022-12-23 RX ORDER — METRONIDAZOLE 500 MG/1
500 TABLET ORAL 2 TIMES DAILY
Qty: 20 TABLET | Refills: 0 | Status: SHIPPED | OUTPATIENT
Start: 2022-12-23 | End: 2023-01-02

## 2022-12-23 ASSESSMENT — PAIN SCALES - GENERAL: PAINLEVEL: MODERATE PAIN (5)

## 2022-12-23 NOTE — PROGRESS NOTES
Assessment & Plan     Diverticular disease of colon  Recurrent flare x 4 this year  Begin  Treatment for 10 days due to recurrent nature  - ciprofloxacin (CIPRO) 500 MG tablet  Dispense: 20 tablet; Refill: 0  - metroNIDAZOLE (FLAGYL) 500 MG tablet  Dispense: 20 tablet; Refill: 0  See GI in follow up   Fluconazole next week     Call or return to the clinic with any worsening of symptoms or no resolution. Patient/Parent verbalized understanding and is in agreement. Medication side effects reviewed.   Current Outpatient Medications   Medication Sig Dispense Refill     ciprofloxacin (CIPRO) 500 MG tablet Take 1 tablet (500 mg) by mouth 2 times daily for 10 days 20 tablet 0     estradiol (ESTRING) 2 MG vaginal ring Place 1 each vaginally every 3 months 3 each 4     estradiol-norethindrone (COMBIPATCH) 0.05-0.14 MG/DAY bi-weekly patch Place 1 patch onto the skin twice a week 16 patch 6     fluconazole (DIFLUCAN) 150 MG tablet Take 1 tablet (150 mg) by mouth every 3 days for 3 doses 3 tablet 0     metroNIDAZOLE (FLAGYL) 500 MG tablet Take 1 tablet (500 mg) by mouth 2 times daily for 10 days 20 tablet 0     omeprazole 20 MG tablet Take 20 mg by mouth daily       ondansetron (ZOFRAN ODT) 4 MG ODT tab Take 1 tablet (4 mg) by mouth every 8 hours as needed for nausea 10 tablet 0     sertraline (ZOLOFT) 100 MG tablet Take 150 mg by mouth        Chart documentation with Dragon Voice recognition Software. Although reviewed after completion, some words and grammatical errors may remain.    CL Lai CNP  [facility]    Adi Quesada is a 57 year old, presenting for the following health issues:  RECHECK (In ED 12/5 for diverticulitis)      HPI      ED/UC Followup:    Facility:  [facility]   Date of visit: 12/5/22  Reason for visit: Acute diverticulitis  Current Status: was feeling better, then developed some lower left quadrant pain that radiates to her left lower back about  2 days ago.    4 flares this year  Working on weight loss  Being treated for UTI at this time  Finished augmentin last week  LLQ pain has returned  No blood or mucus in the stool  No constipation  Taking fiber daily      Review of Systems   Constitutional, HEENT, cardiovascular, pulmonary, GI, , musculoskeletal, neuro, skin, endocrine and psych systems are negative, except as otherwise noted.      Objective    /88   Pulse 60   Temp 97.1  F (36.2  C) (Tympanic)   Resp 16   Wt 98.4 kg (217 lb)   LMP 12/09/2018   SpO2 99%   BMI 38.44 kg/m    Body mass index is 38.44 kg/m .  Physical Exam   GENERAL: healthy, alert and no distress  EYES: Eyes grossly normal to inspection, PERRL and conjunctivae and sclerae normal  HENT: ear canals and TM's normal, nose and mouth without ulcers or lesions  NECK: no adenopathy, no asymmetry, masses, or scars and thyroid normal to palpation  RESP: lungs clear to auscultation - no rales, rhonchi or wheezes  CV: regular rates and rhythm, normal S1 S2, no S3 or S4 and no peripheral edema  ABDOMEN: soft, LLQ mildly tender, no hepatosplenomegaly, no masses and bowel sounds normal  MS: no gross musculoskeletal defects noted, no edema  SKIN: no suspicious lesions or rashes  NEURO: Normal strength and tone, mentation intact and speech normal  PSYCH: mentation appears normal, affect normal/bright    Lab on 12/20/2022   Component Date Value Ref Range Status     Trichomonas 12/20/2022 Absent  Absent Final     Yeast 12/20/2022 Present (A)  Absent Final     Clue Cells 12/20/2022 Absent  Absent Final     WBCs/high power field 12/20/2022 1+ (A)  None Final     Color Urine 12/20/2022 Yellow  Colorless, Straw, Light Yellow, Yellow Final     Appearance Urine 12/20/2022 Clear  Clear Final     Glucose Urine 12/20/2022 Negative  Negative mg/dL Final     Bilirubin Urine 12/20/2022 Negative  Negative Final     Ketones Urine 12/20/2022 Negative  Negative mg/dL Final     Specific Gravity Urine  12/20/2022 1.010  1.003 - 1.035 Final     Blood Urine 12/20/2022 Trace (A)  Negative Final     pH Urine 12/20/2022 6.5  5.0 - 7.0 Final     Protein Albumin Urine 12/20/2022 Negative  Negative mg/dL Final     Urobilinogen Urine 12/20/2022 0.2  0.2, 1.0 E.U./dL Final     Nitrite Urine 12/20/2022 Negative  Negative Final     Leukocyte Esterase Urine 12/20/2022 Large (A)  Negative Final     Bacteria Urine 12/20/2022 Few (A)  None Seen /HPF Final     RBC Urine 12/20/2022 2-5 (A)  0-2 /HPF /HPF Final     WBC Urine 12/20/2022  (A)  0-5 /HPF /HPF Final     Squamous Epithelials Urine 12/20/2022 Few (A)  None Seen /LPF Final     Culture 12/20/2022 10,000-50,000 CFU/mL Enterobacter cloacae complex (A)   Final     Culture 12/20/2022 10,000-50,000 CFU/mL Enterobacter cloacae complex (A)   Final     Results for orders placed or performed during the hospital encounter of 12/05/22   CT Abdomen Pelvis w/o Contrast    Narrative    EXAM: CT ABDOMEN PELVIS W/O CONTRAST  LOCATION: [facility]  DATE/TIME: 12/5/2022 6:53 PM    INDICATION: left lower abdominal pain  COMPARISON: 10/03/2022 pelvic ultrasound, MR enterography 02/01/2021 and CT AP 07/25/2020  TECHNIQUE: CT scan of the abdomen and pelvis was performed without oral or IV contrast. Multiplanar reformats were obtained. Dose reduction techniques were used.  CONTRAST: None.    FINDINGS:   LOWER CHEST: Visualized lungs are clear. No pleural effusion. Heart size normal with no pericardial effusion.     HEPATOBILIARY: Moderate diffuse hepatic steatosis. Liver is otherwise normal. No bile duct dilatation. Cholecystectomy.    PANCREAS: Normal.    SPLEEN: Spleen size normal.    ADRENAL GLANDS: Normal.    KIDNEY/BLADDER: Kidneys, ureters and bladder are normal.    BOWEL: Uncomplicated acute diverticulitis proximal small sigmoid colon with no abscess, obstruction or perforation. No free fluid or free air. Dermal appendix.    LYMPH NODES: No  lymphadenopathy.    VASCULATURE: Normal caliber abdominal aorta.      PELVIC ORGANS: No pelvic mass or fluid. Vaginal pessary.    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      Impression    IMPRESSION:   1.  Uncomplicated acute diverticulitis proximal small sigmoid colon with no abscess, obstruction or perforation.   2.  Moderate diffuse hepatic steatosis.  3.  Cholecystectomy.

## 2022-12-25 ENCOUNTER — HOSPITAL ENCOUNTER (EMERGENCY)
Facility: CLINIC | Age: 57
Discharge: HOME | End: 2022-12-25
Attending: EMERGENCY MEDICINE | Admitting: EMERGENCY MEDICINE
Payer: OTHER GOVERNMENT

## 2022-12-25 ENCOUNTER — APPOINTMENT (OUTPATIENT)
Dept: CT IMAGING | Facility: CLINIC | Age: 57
End: 2022-12-25
Attending: EMERGENCY MEDICINE
Payer: OTHER GOVERNMENT

## 2022-12-25 VITALS
TEMPERATURE: 98 F | OXYGEN SATURATION: 93 % | HEART RATE: 64 BPM | DIASTOLIC BLOOD PRESSURE: 65 MMHG | RESPIRATION RATE: 22 BRPM | SYSTOLIC BLOOD PRESSURE: 126 MMHG

## 2022-12-25 DIAGNOSIS — K57.32 DIVERTICULITIS OF COLON: ICD-10-CM

## 2022-12-25 LAB
ALBUMIN SERPL BCG-MCNC: 4.2 G/DL (ref 3.5–5.2)
ALBUMIN UR-MCNC: NEGATIVE MG/DL
ALP SERPL-CCNC: 74 U/L (ref 35–104)
ALT SERPL W P-5'-P-CCNC: 34 U/L (ref 10–35)
ANION GAP SERPL CALCULATED.3IONS-SCNC: 7 MMOL/L (ref 7–15)
APPEARANCE UR: CLEAR
AST SERPL W P-5'-P-CCNC: 25 U/L (ref 10–35)
BASOPHILS # BLD AUTO: 0 10E3/UL (ref 0–0.2)
BASOPHILS NFR BLD AUTO: 1 %
BILIRUB SERPL-MCNC: 0.2 MG/DL
BILIRUB UR QL STRIP: NEGATIVE
BUN SERPL-MCNC: 10.1 MG/DL (ref 6–20)
CALCIUM SERPL-MCNC: 9.6 MG/DL (ref 8.6–10)
CHLORIDE SERPL-SCNC: 103 MMOL/L (ref 98–107)
COLOR UR AUTO: YELLOW
CREAT SERPL-MCNC: 0.84 MG/DL (ref 0.51–0.95)
CRP SERPL-MCNC: 6.88 MG/L
DEPRECATED HCO3 PLAS-SCNC: 25 MMOL/L (ref 22–29)
EOSINOPHIL # BLD AUTO: 0.1 10E3/UL (ref 0–0.7)
EOSINOPHIL NFR BLD AUTO: 2 %
ERYTHROCYTE [DISTWIDTH] IN BLOOD BY AUTOMATED COUNT: 12.8 % (ref 10–15)
GFR SERPL CREATININE-BSD FRML MDRD: 81 ML/MIN/1.73M2
GLUCOSE SERPL-MCNC: 174 MG/DL (ref 70–99)
GLUCOSE UR STRIP-MCNC: 500 MG/DL
HCT VFR BLD AUTO: 40.6 % (ref 35–47)
HGB BLD-MCNC: 14.1 G/DL (ref 11.7–15.7)
HGB UR QL STRIP: NEGATIVE
IMM GRANULOCYTES # BLD: 0 10E3/UL
IMM GRANULOCYTES NFR BLD: 0 %
KETONES UR STRIP-MCNC: 15 MG/DL
LEUKOCYTE ESTERASE UR QL STRIP: NEGATIVE
LYMPHOCYTES # BLD AUTO: 1.2 10E3/UL (ref 0.8–5.3)
LYMPHOCYTES NFR BLD AUTO: 19 %
MCH RBC QN AUTO: 31.5 PG (ref 26.5–33)
MCHC RBC AUTO-ENTMCNC: 34.7 G/DL (ref 31.5–36.5)
MCV RBC AUTO: 91 FL (ref 78–100)
MONOCYTES # BLD AUTO: 0.3 10E3/UL (ref 0–1.3)
MONOCYTES NFR BLD AUTO: 5 %
MUCOUS THREADS #/AREA URNS LPF: PRESENT /LPF
NEUTROPHILS # BLD AUTO: 4.7 10E3/UL (ref 1.6–8.3)
NEUTROPHILS NFR BLD AUTO: 73 %
NITRATE UR QL: NEGATIVE
NRBC # BLD AUTO: 0 10E3/UL
NRBC BLD AUTO-RTO: 0 /100
PH UR STRIP: 5 [PH] (ref 5–7)
PLATELET # BLD AUTO: 220 10E3/UL (ref 150–450)
POTASSIUM SERPL-SCNC: 4.2 MMOL/L (ref 3.4–5.3)
PROT SERPL-MCNC: 7.3 G/DL (ref 6.4–8.3)
RBC # BLD AUTO: 4.48 10E6/UL (ref 3.8–5.2)
RBC URINE: 2 /HPF
SODIUM SERPL-SCNC: 135 MMOL/L (ref 136–145)
SP GR UR STRIP: <1.005 (ref 1–1.03)
UROBILINOGEN UR STRIP-MCNC: NORMAL MG/DL
WBC # BLD AUTO: 6.4 10E3/UL (ref 4–11)
WBC URINE: 4 /HPF

## 2022-12-25 PROCEDURE — 36415 COLL VENOUS BLD VENIPUNCTURE: CPT | Performed by: EMERGENCY MEDICINE

## 2022-12-25 PROCEDURE — 86140 C-REACTIVE PROTEIN: CPT | Performed by: FAMILY MEDICINE

## 2022-12-25 PROCEDURE — 96375 TX/PRO/DX INJ NEW DRUG ADDON: CPT | Performed by: EMERGENCY MEDICINE

## 2022-12-25 PROCEDURE — 258N000003 HC RX IP 258 OP 636: Performed by: FAMILY MEDICINE

## 2022-12-25 PROCEDURE — 99285 EMERGENCY DEPT VISIT HI MDM: CPT | Performed by: EMERGENCY MEDICINE

## 2022-12-25 PROCEDURE — 85025 COMPLETE CBC W/AUTO DIFF WBC: CPT | Performed by: EMERGENCY MEDICINE

## 2022-12-25 PROCEDURE — 81001 URINALYSIS AUTO W/SCOPE: CPT | Performed by: EMERGENCY MEDICINE

## 2022-12-25 PROCEDURE — 96367 TX/PROPH/DG ADDL SEQ IV INF: CPT | Performed by: EMERGENCY MEDICINE

## 2022-12-25 PROCEDURE — 99285 EMERGENCY DEPT VISIT HI MDM: CPT | Mod: 25 | Performed by: EMERGENCY MEDICINE

## 2022-12-25 PROCEDURE — 250N000011 HC RX IP 250 OP 636: Performed by: EMERGENCY MEDICINE

## 2022-12-25 PROCEDURE — 96365 THER/PROPH/DIAG IV INF INIT: CPT | Performed by: EMERGENCY MEDICINE

## 2022-12-25 PROCEDURE — 74176 CT ABD & PELVIS W/O CONTRAST: CPT

## 2022-12-25 PROCEDURE — 250N000011 HC RX IP 250 OP 636: Performed by: FAMILY MEDICINE

## 2022-12-25 PROCEDURE — 250N000013 HC RX MED GY IP 250 OP 250 PS 637: Performed by: EMERGENCY MEDICINE

## 2022-12-25 PROCEDURE — 80053 COMPREHEN METABOLIC PANEL: CPT | Performed by: EMERGENCY MEDICINE

## 2022-12-25 PROCEDURE — 82040 ASSAY OF SERUM ALBUMIN: CPT | Performed by: EMERGENCY MEDICINE

## 2022-12-25 RX ORDER — METRONIDAZOLE 500 MG/100ML
500 INJECTION, SOLUTION INTRAVENOUS ONCE
Status: COMPLETED | OUTPATIENT
Start: 2022-12-25 | End: 2022-12-25

## 2022-12-25 RX ORDER — ONDANSETRON 4 MG/1
4 TABLET, ORALLY DISINTEGRATING ORAL ONCE
Status: COMPLETED | OUTPATIENT
Start: 2022-12-25 | End: 2022-12-25

## 2022-12-25 RX ORDER — HYDROMORPHONE HYDROCHLORIDE 1 MG/ML
0.5 INJECTION, SOLUTION INTRAMUSCULAR; INTRAVENOUS; SUBCUTANEOUS ONCE
Status: COMPLETED | OUTPATIENT
Start: 2022-12-25 | End: 2022-12-25

## 2022-12-25 RX ORDER — OXYCODONE HYDROCHLORIDE 5 MG/1
10 TABLET ORAL ONCE
Status: COMPLETED | OUTPATIENT
Start: 2022-12-25 | End: 2022-12-25

## 2022-12-25 RX ORDER — OXYCODONE HYDROCHLORIDE 5 MG/1
5 TABLET ORAL EVERY 6 HOURS PRN
Qty: 10 TABLET | Refills: 0 | Status: SHIPPED | OUTPATIENT
Start: 2022-12-25 | End: 2023-01-18

## 2022-12-25 RX ORDER — CIPROFLOXACIN 2 MG/ML
400 INJECTION, SOLUTION INTRAVENOUS ONCE
Status: COMPLETED | OUTPATIENT
Start: 2022-12-25 | End: 2022-12-25

## 2022-12-25 RX ORDER — ACETAMINOPHEN 500 MG
500 TABLET ORAL EVERY 6 HOURS PRN
COMMUNITY

## 2022-12-25 RX ORDER — KETOROLAC TROMETHAMINE 15 MG/ML
15 INJECTION, SOLUTION INTRAMUSCULAR; INTRAVENOUS ONCE
Status: DISCONTINUED | OUTPATIENT
Start: 2022-12-25 | End: 2022-12-25

## 2022-12-25 RX ADMIN — IBUPROFEN 600 MG: 400 TABLET ORAL at 06:08

## 2022-12-25 RX ADMIN — HYDROMORPHONE HYDROCHLORIDE 0.5 MG: 1 INJECTION, SOLUTION INTRAMUSCULAR; INTRAVENOUS; SUBCUTANEOUS at 08:09

## 2022-12-25 RX ADMIN — OXYCODONE HYDROCHLORIDE 10 MG: 5 TABLET ORAL at 06:08

## 2022-12-25 RX ADMIN — SODIUM CHLORIDE 1000 ML: 9 INJECTION, SOLUTION INTRAVENOUS at 07:45

## 2022-12-25 RX ADMIN — METRONIDAZOLE 500 MG: 500 INJECTION, SOLUTION INTRAVENOUS at 08:12

## 2022-12-25 RX ADMIN — CIPROFLOXACIN 400 MG: 2 INJECTION, SOLUTION INTRAVENOUS at 09:21

## 2022-12-25 RX ADMIN — ONDANSETRON 4 MG: 4 TABLET, ORALLY DISINTEGRATING ORAL at 06:07

## 2022-12-25 ASSESSMENT — ACTIVITIES OF DAILY LIVING (ADL)
ADLS_ACUITY_SCORE: 35

## 2022-12-25 NOTE — DISCHARGE INSTRUCTIONS
Continue Cipro and Flagyl as previously directed.  [place] diet and clear fluids until clearly improving.  Oxycodone for breakthrough pain that does not respond to Tylenol or ibuprofen.  Return to the emergency department if worse or changes

## 2022-12-25 NOTE — ED PROVIDER NOTES
Emergency Department Patient Sign-out       Brief HPI:  This is a 57 year old female signed out to me by Dr. Gallego .  See initial ED Provider note for details of the presentation.            Significant Events prior to my assuming care: 57-year-old female signed out to me by my outgoing colleague Dr. Lew with concerns of abdominal pain.  Recent completion of a course about 20 days ago of Augmentin for diverticulitis, improvement but then recurrence of symptoms about 3 days ago seen by primary care provider and placed on Cipro and Flagyl.  Symptoms have become worse.  Lab diagnostics were obtained and CT abdomen pelvis without contrast ordered as the patient has a severe contrast allergy that precludes the use of contrast.      Exam:   Patient Vitals for the past 24 hrs:   BP Temp Temp src Pulse Resp SpO2   12/25/22 1109 -- -- -- -- -- 93 %   12/25/22 1108 -- -- -- -- -- 94 %   12/25/22 1023 128/69 -- -- 63 -- 94 %   12/25/22 0802 -- -- -- -- -- 95 %   12/25/22 0757 -- -- -- -- -- 96 %   12/25/22 0752 -- -- -- -- -- 97 %   12/25/22 0747 -- -- -- -- -- 97 %   12/25/22 0737 (!) 141/80 -- -- 69 -- --   12/25/22 0558 -- -- -- -- -- 97 %   12/25/22 0552 (!) 159/85 98  F (36.7  C) Oral 76 22 97 %           ED RESULTS:   Results for orders placed or performed during the hospital encounter of 12/25/22 (from the past 24 hour(s))   Comprehensive metabolic panel     Status: Abnormal    Collection Time: 12/25/22  6:15 AM   Result Value Ref Range    Sodium 135 (L) 136 - 145 mmol/L    Potassium 4.2 3.4 - 5.3 mmol/L    Chloride 103 98 - 107 mmol/L    Carbon Dioxide (CO2) 25 22 - 29 mmol/L    Anion Gap 7 7 - 15 mmol/L    Urea Nitrogen 10.1 6.0 - 20.0 mg/dL    Creatinine 0.84 0.51 - 0.95 mg/dL    Calcium 9.6 8.6 - 10.0 mg/dL    Glucose 174 (H) 70 - 99 mg/dL    Alkaline Phosphatase 74 35 - 104 U/L    AST 25 10 - 35 U/L    ALT 34 10 - 35 U/L    Protein Total 7.3 6.4 - 8.3 g/dL    Albumin 4.2 3.5 - 5.2 g/dL    Bilirubin Total  0.2 <=1.2 mg/dL    GFR Estimate 81 >60 mL/min/1.73m2   CBC with Platelets & Differential     Status: None    Collection Time: 12/25/22  6:15 AM    Narrative    The following orders were created for panel order CBC with Platelets & Differential.  Procedure                               Abnormality         Status                     ---------                               -----------         ------                     CBC with platelets and d...[[id number]]                      Final result                 Please view results for these tests on the individual orders.   CBC with platelets and differential     Status: None    Collection Time: 12/25/22  6:15 AM   Result Value Ref Range    WBC Count 6.4 4.0 - 11.0 10e3/uL    RBC Count 4.48 3.80 - 5.20 10e6/uL    Hemoglobin 14.1 11.7 - 15.7 g/dL    Hematocrit 40.6 35.0 - 47.0 %    MCV 91 78 - 100 fL    MCH 31.5 26.5 - 33.0 pg    MCHC 34.7 31.5 - 36.5 g/dL    RDW 12.8 10.0 - 15.0 %    Platelet Count 220 150 - 450 10e3/uL    % Neutrophils 73 %    % Lymphocytes 19 %    % Monocytes 5 %    % Eosinophils 2 %    % Basophils 1 %    % Immature Granulocytes 0 %    NRBCs per 100 WBC 0 <1 /100    Absolute Neutrophils 4.7 1.6 - 8.3 10e3/uL    Absolute Lymphocytes 1.2 0.8 - 5.3 10e3/uL    Absolute Monocytes 0.3 0.0 - 1.3 10e3/uL    Absolute Eosinophils 0.1 0.0 - 0.7 10e3/uL    Absolute Basophils 0.0 0.0 - 0.2 10e3/uL    Absolute Immature Granulocytes 0.0 <=0.4 10e3/uL    Absolute NRBCs 0.0 10e3/uL   CRP Inflammation     Status: Abnormal    Collection Time: 12/25/22  6:15 AM   Result Value Ref Range    CRP Inflammation 6.88 (H) <5.00 mg/L   CT Abdomen Pelvis w/o Contrast     Status: None    Collection Time: 12/25/22  6:42 AM    Narrative    EXAM: CT ABDOMEN PELVIS W/O CONTRAST  LOCATION: [facility]  DATE/TIME: 12/25/2022 6:42 AM    INDICATION: llq abd pain  COMPARISON: 12/5/2022  TECHNIQUE: CT scan of the abdomen and pelvis was performed without IV contrast.  Multiplanar reformats were obtained. Dose reduction techniques were used.  CONTRAST: None.    FINDINGS:   LOWER CHEST: Normal.    HEPATOBILIARY: Mild hepatic steatosis. No significant mass. No bile duct dilatation. No calcified gallstones. Gallbladder is surgically absent.    PANCREAS: No significant mass, duct dilatation, or inflammatory change.    SPLEEN: Normal size.    ADRENAL GLANDS: Normal.    KIDNEYS/BLADDER: Normal.    BOWEL: Extensive diverticula are again seen throughout the colon with mild mural thickening of the proximal sigmoid/distal descending colon which when compared with prior study is slightly improved from prior exam. The small large bowel are otherwise   normal in size and caliber.    LYMPH NODES: There is a increased number of mesenteric lymph nodes again seen, with mild haziness of the root of the mesentery which is unchanged from the prior exam.    VASCULATURE: No abdominal aortic aneurysm.    PELVIC ORGANS: A pessary device is present, the uterus is unremarkable within limits of a noncontrast exam.    MUSCULOSKELETAL: Multi-level discogenic and posterior facet degenerative changes of the lumbar spine as well as degenerative osteoarthritic changes of the SI joint are again noted.      Impression    IMPRESSION:   1.  Persistent mild mural thickening of the proximal sigmoid/distal descending colon with adjacent free fluid, when compared to 12/5/2022 the degree of inflammation in this area is decreased consistent with improving but continuing diverticulitis. Within   limits of a noncontrast CT scan no intra-abdominal abscess/fluid collection is seen at this time.  2.  Increased number of mesenteric lymph nodes with mild mesenteric intravenous again noted, this was present on the prior exam and is a very subtle in appearance, favored to at least in part be reactive in nature secondary to the patient's   diverticulitis, a more chronic process such as mesenteric panniculitis may also be present and  would have a similar appearance. Correlation with patient's clinical history is recommended.     UA reflex to Microscopic     Status: Abnormal    Collection Time: 12/25/22  7:38 AM   Result Value Ref Range    Color Urine Yellow Colorless, Straw, Light Yellow, Yellow    Appearance Urine Clear Clear    Glucose Urine 500 (A) Negative mg/dL    Bilirubin Urine Negative Negative    Ketones Urine 15 (A) Negative mg/dL    Specific Gravity Urine <1.005 1.003 - 1.035    Blood Urine Negative Negative    pH Urine 5.0 5.0 - 7.0    Protein Albumin Urine Negative Negative mg/dL    Urobilinogen Urine Normal Normal, 2.0 mg/dL    Nitrite Urine Negative Negative    Leukocyte Esterase Urine Negative Negative    RBC Urine 2 <=2 /HPF    WBC Urine 4 <=5 /HPF    Mucus Urine Present (A) None Seen /LPF    Narrative    Microscopic not indicated       ED MEDICATIONS:   Medications   ibuprofen (ADVIL/MOTRIN) tablet 600 mg (600 mg Oral Given 12/25/22 0608)   oxyCODONE (ROXICODONE) tablet 10 mg (10 mg Oral Given 12/25/22 0608)   ondansetron (ZOFRAN ODT) ODT tab 4 mg (4 mg Oral Given 12/25/22 0607)   0.9% sodium chloride BOLUS (0 mLs Intravenous Stopped 12/25/22 1109)   ciprofloxacin (CIPRO) infusion 400 mg (0 mg Intravenous Stopped 12/25/22 1041)   metroNIDAZOLE (FLAGYL) infusion 500 mg (0 mg Intravenous Stopped 12/25/22 0917)   HYDROmorphone (PF) (DILAUDID) injection 0.5 mg (0.5 mg Intravenous Given 12/25/22 0809)     11:43 AM  I had reviewed with the patient her unremarkable hemogram, CT demonstrating likely interval improvement despite the fact that her symptoms have worsened.  I do not think she is being on the Cipro Flagyl regimen long enough to deem this a treatment failure yet.  She seems to be able to tolerate orally and I am going to continue the regimen.  I gave her an IV dose of both medicines and asked her to continue the prescription that she already has.  As far as pain goes she will use Tylenol as baseline pain relief as well as  ibuprofen.  I have prescribed oxycodone through [facility] for her.  Return criteria for the emergency department were discussed.  Disposition is to home.        Impression:    ICD-10-CM    1. Diverticulitis of colon  K57.32           Plan:    Pending studies include no further pending studies.  All discussed results of tests in the emergency department with the patient      MD Ayah Dunaway, Will Avila MD  12/25/22 1141

## 2022-12-25 NOTE — ED TRIAGE NOTES
Recently seen for diverticulitis.  Seen a week ago for UTI symptoms.  On ABX for both.  Now with severe ongoing pain.      Triage Assessment     Row Name 12/25/22 0555       Triage Assessment (Adult)    Airway WDL WDL       Respiratory WDL    Respiratory WDL WDL       Skin Circulation/Temperature WDL    Skin Circulation/Temperature WDL WDL       Cardiac WDL    Cardiac WDL WDL       Peripheral/Neurovascular WDL    Peripheral Neurovascular WDL WDL       Cognitive/Neuro/Behavioral WDL    Cognitive/Neuro/Behavioral WDL WDL       Fred Coma Scale    Best Eye Response 4-->(E4) spontaneous    Best Motor Response 6-->(M6) obeys commands    Best Verbal Response 5-->(V5) oriented    Fred Coma Scale Score 15

## 2022-12-25 NOTE — ED PROVIDER NOTES
History     Chief Complaint   Patient presents with     Abdominal Pain     HPI  Sangita Meraz is a 57 year old female with a history of diverticulitis who presents for worsening left lower quadrant abdominal pain.  I cared for the patient about 20 days ago and diagnosed her with diverticulitis based on CT scan.  She used a course of amoxicillin-clavulanate and had improvement in her symptoms but then started to have recurrence of symptoms 3 days ago.  She saw her primary physician and was prescribed ciprofloxacin and metronidazole.  She has been taking this but unfortunately her symptoms have become worse, especially worse since last evening.  Sharp pain in the left lower quadrant with several episodes of nonbloody nonbilious emesis.  She also reports nonbloody diarrhea.  Pain is moderate to severe.  She has been using acetaminophen for the pain.  She did take some ondansetron for nausea with minimal improvement.  She has had a prior cholecystectomy and  section.  No fever, chills, chest pain, shortness of breath, cough, dysuria, hematuria, or rash.    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     Priority: Medium     Mixed stress and urge urinary incontinence 10/17/2022     Priority: Medium     Polycystic ovarian syndrome 10/17/2022     Priority: Medium     Recurrent major depressive disorder, in partial remission (H) 10/17/2022     Priority: Medium     Obstructive sleep apnea syndrome 10/17/2022     Priority: Medium     severe       Uterovaginal prolapse 10/17/2022     Priority: Medium     Meniere's disease, unspecified laterality  10/17/2022     Priority: Medium     Morbid obesity (H) 2021     Priority: Medium        Past Medical History:    Past Medical History:   Diagnosis Date     Depressive disorder 1982     Obstructive sleep apnea syndrome 10/17/2022       Past Surgical History:    Past Surgical History:   Procedure Laterality Date     ABDOMEN SURGERY  ,,'15    2 c-secs, abdomenplasty     CHOLECYSTECTOMY       COLONOSCOPY N/A 2021    Procedure: COLONOSCOPY, WITH BIOPSY AND CLIPPING;  Surgeon: Kemar Mckoen MD;  [place] [facility] [place]     DILATION AND CURETTAGE, OPERATIVE HYSTEROSCOPY, COMBINED N/A 10/18/2022    Procedure: HYSTEROSCOPY, WITH DILATION AND CURETTAGE OF UTERUS and polypectomy;  Surgeon: Ines Chi MD;  Location: [facility] OR     ESOPHAGOSCOPY, GASTROSCOPY, DUODENOSCOPY (EGD), COMBINED N/A 2020    Procedure: ESOPHAGOGASTRODUODENOSCOPY, WITH BIOPSY;  Surgeon: Anton Knott DO;  Location: [facility] GI     GENITOURINARY SURGERY  , ,     tubal ligation, anterior/posterior repair, tubal ligation reversal       Family History:    Family History   Problem Relation Age of Onset     Uterine Cancer Mother         endometrial cancer     Diabetes Mother      Colon Polyps Father      Diabetes Father      Hypertension Father      Diabetes Brother      Hypertension Brother        Social History:  Marital Status:   [2]  Social History     Tobacco Use     Smoking status: Former     Packs/day: 1.00     Years: 10.00     Pack years: 10.00     Types: Cigarettes     Start date: 10/10/1982     Quit date: 1991     Years since quittin.6     Smokeless tobacco: Never   Vaping Use     Vaping Use: Never used   Substance Use Topics     Alcohol use: Yes     Comment: 1-2 beer/month     Drug use: Never        Medications:    ciprofloxacin (CIPRO) 500 MG tablet  estradiol (ESTRING) 2 MG vaginal ring  estradiol-norethindrone (COMBIPATCH) 0.05-0.14 MG/DAY bi-weekly patch  fluconazole (DIFLUCAN) 150  MG tablet  metroNIDAZOLE (FLAGYL) 500 MG tablet  omeprazole 20 MG tablet  ondansetron (ZOFRAN ODT) 4 MG ODT tab  sertraline (ZOLOFT) 100 MG tablet          Review of Systems  Pertinent positives and negatives listed in the HPI, all other systems reviewed and are negative.    Physical Exam   BP: (!) 159/85  Pulse: 76  Temp: 98  F (36.7  C)  Resp: 22  SpO2: 97 %      Physical Exam  Vitals and nursing note reviewed.   Constitutional:       General: She is in acute distress.      Appearance: She is well-developed. She is not diaphoretic.   HENT:      Head: Normocephalic and atraumatic.      Right Ear: External ear normal.      Left Ear: External ear normal.      Nose: Nose normal.   Eyes:      General: No scleral icterus.     Conjunctiva/sclera: Conjunctivae normal.   Cardiovascular:      Rate and Rhythm: Normal rate and regular rhythm.   Pulmonary:      Effort: Pulmonary effort is normal. No respiratory distress.      Breath sounds: No stridor.   Abdominal:      General: There is no distension.      Palpations: Abdomen is soft.      Tenderness: There is abdominal tenderness in the left lower quadrant. There is guarding.   Musculoskeletal:      Cervical back: Normal range of motion.   Skin:     General: Skin is warm and dry.   Neurological:      Mental Status: She is alert and oriented to person, place, and time.   Psychiatric:         Behavior: Behavior normal.         ED Course                 Procedures              Critical Care time:  none               No results found for this or any previous visit (from the past 24 hour(s)).    Medications   ibuprofen (ADVIL/MOTRIN) tablet 600 mg (has no administration in time range)   oxyCODONE (ROXICODONE) tablet 10 mg (has no administration in time range)   ondansetron (ZOFRAN ODT) ODT tab 4 mg (has no administration in time range)       Assessments & Plan (with Medical Decision Making)   57-year-old female with a history of diverticulitis who presents with worsening of her  left lower quadrant pain while on ciprofloxacin and metronidazole for treatment of diverticulitis.  Temperature is 36.7  C, heart rate is 76, SPO2 is 97% on room air.  She is given ibuprofen, oxycodone, and ondansetron for her symptoms.  Plan is for blood tests and CT of the abdomen pelvis.  The patient is signed out to Dr. Poon at change of shift to follow-up on these, likely discharge home with symptom control on her current antibiotic regimen if these are reassuring.    I have reviewed the nursing notes.    I have reviewed the findings, diagnosis, plan and need for follow up with the patient.       New Prescriptions    No medications on file       Final diagnoses:   Abdominal pain, left lower quadrant       12/25/2022   [facility] EMERGENCY DEPT     Gavin Gallego MD  12/25/22 0602

## 2023-01-06 ENCOUNTER — TELEPHONE (OUTPATIENT)
Dept: SLEEP MEDICINE | Facility: CLINIC | Age: 58
End: 2023-01-06

## 2023-01-06 DIAGNOSIS — G47.33 OSA ON CPAP: ICD-10-CM

## 2023-01-06 NOTE — TELEPHONE ENCOUNTER
Oximetry has been resulted and  scanned into chart.  Encounter forwarded to provider for review.    Recording Start Date: 01/05/2023    Completed on: 01/06/2023    Duration: 6  Hrs: 58 Minutes: 38 Seconds   Time (min) Spent below 88%: 0.2      ALBA De Leon

## 2023-01-13 NOTE — PROGRESS NOTES
History of Present Illness - Sanigta Meraz is a 57 year old female presenting in clinic today for a recheck on Patient presents with:  Follow Up    Patient presented with a tentative diagnosis of right cochlear hydrops.  Her word recognition score  Dropped to 84% versus 100% on the left few months ago and low tone thresholds were in the moderate sensorineural loss range.  She chose not to perform procedures offered to her multiple medicines but rather change her diet avoiding salt caffeine.  Since feels less pressure in the ear tinnitus more less occasional and overall hears better.  Denies any vertigo or dizziness.          BP Readings from Last 1 Encounters:   12/25/22 126/65       BP noted to be well controlled today in office.     Sangita IS NOT a smoker/uses chewing tobacco.  She already quit.      Past Medical History -   Past Medical History:   Diagnosis Date     Depressive disorder 1982     Obstructive sleep apnea syndrome 10/17/2022       Current Medications -   Current Outpatient Medications:      acetaminophen (TYLENOL) 500 MG tablet, Take 500 mg by mouth every 6 hours as needed for mild pain, Disp: , Rfl:      estradiol (ESTRING) 2 MG vaginal ring, Place 1 each vaginally every 3 months, Disp: 3 each, Rfl: 4     estradiol-norethindrone (COMBIPATCH) 0.05-0.14 MG/DAY bi-weekly patch, Place 1 patch onto the skin twice a week (Patient taking differently: Place 0.25 patches onto the skin twice a week), Disp: 16 patch, Rfl: 6     omeprazole 20 MG tablet, Take 20 mg by mouth daily, Disp: , Rfl:      ondansetron (ZOFRAN ODT) 4 MG ODT tab, Take 1 tablet (4 mg) by mouth every 8 hours as needed for nausea, Disp: 10 tablet, Rfl: 0     oxyCODONE (ROXICODONE) 5 MG tablet, Take 1 tablet (5 mg) by mouth every 6 hours as needed for severe pain (7-10), Disp: 10 tablet, Rfl: 0     psyllium (METAMUCIL/KONSYL) 58.6 % powder, Take by mouth daily, Disp: , Rfl:      sertraline (ZOLOFT) 100 MG tablet, Take 150 mg by mouth ,  Disp: , Rfl:     Allergies -   Allergies   Allergen Reactions     Ivp Dye [Contrast Dye] Anaphylaxis     Other (Do Not Use)      Other reaction(s): Itching,Watering Eyes, Rash, Sneezing, Itching,Watering Eyes, Rash, Sneezing, Itching,Watering Eyes, Rash, Sneezing       Social History -   Social History     Socioeconomic History     Marital status:    Tobacco Use     Smoking status: Former     Packs/day: 1.00     Years: 10.00     Pack years: 10.00     Types: Cigarettes     Start date: 10/10/1982     Quit date: 1991     Years since quittin.6     Smokeless tobacco: Never   Vaping Use     Vaping Use: Never used   Substance and Sexual Activity     Alcohol use: Yes     Comment: 1-2 beer/month     Drug use: Never     Sexual activity: Yes     Partners: Male     Birth control/protection: Post-menopausal   Other Topics Concern     Parent/sibling w/ CABG, MI or angioplasty before 65F 55M? No       Family History -   Family History   Problem Relation Age of Onset     Uterine Cancer Mother         endometrial cancer     Diabetes Mother      Colon Polyps Father      Diabetes Father      Hypertension Father      Diabetes Brother      Hypertension Brother        Review of Systems - As per HPI and PMHx, otherwise review of system review of the head and neck negative. Otherwise 10+ review of system is negative    Physical Exam  LMP 2018   BMI: There is no height or weight on file to calculate BMI.    General - The patient is well nourished and well developed, and appears to have good nutritional status.  Alert and oriented to person and place, answers questions and cooperates with examination appropriately.    SKIN - No suspicious lesions or rashes.  Respiration - No respiratory distress.  Head and Face - Normocephalic and atraumatic, with no gross asymmetry noted of the contour of the facial features.  The facial nerve is intact, with strong symmetric movements.    Voice and Breathing - The patient was  breathing comfortably without the use of accessory muscles. The patients voice was clear and strong, and had appropriate pitch and quality.    Ears - Bilateral pinna and EACs with normal appearing overlying skin. Tympanic membrane intact with good mobility on pneumatic otoscopy bilaterally. Bony landmarks of the ossicular chain are normal. The tympanic membranes are normal in appearance. No retraction, perforation, or masses.  No fluid or purulence was seen in the external canal or the middle ear.     Eyes - Extraocular movements intact.  Sclera were not icteric or injected, conjunctiva were pink and moist.        Neck - Normal midline excursion of the laryngotracheal complex during swallowing.  Full range of motion on passive movement.  Palpation of the occipital, submental, submandibular, internal jugular chain, and supraclavicular nodes did not demonstrate any abnormal lymph nodes or masses.  The carotid pulse was palpable bilaterally.  Palpation of the thyroid was soft and smooth, with no nodules or goiter appreciated.  The trachea was mobile and midline.    Nose - External contour is symmetric, no gross deflection or scars.  Nasal mucosa is pink and moist with no abnormal mucus.  The septum was midline and non-obstructive, turbinates of normal size and position.  No polyps, masses, or purulence noted on examination.    Neuro - Nonfocal neuro exam is normal, CN 2 through 12 intact, normal gait and muscle tone.      Performed in clinic today:  Audiologic Studies - An audiogram and tympanogram were performed today as part of the evaluation and personally reviewed. The tympanogram shows Type A curves on the right and Type A curves on the left, with Normal canal volumes and middle ear pressures.  The audiogram showed Mild low-frequency SNHL correcting at mid to high frequencies on the right and Normal thresholdson the left.    Word recognition score 100% on the right and 100% on the left.    A/P - Sangita Meraz is  a 57 year old female Patient presents with:  Follow Up    Patient with picture consistent with cochlear hydrops but currently much less symptomatic.  Hearing is improved as well as word recognition.  At this point we will continue her dietary restrictions we will recheck in about 6 months    Sangita should follow up in 6 months.      At [facility] next appointment they will need a hearing test.      Guicho Abarca MD

## 2023-01-17 ENCOUNTER — OFFICE VISIT (OUTPATIENT)
Dept: GASTROENTEROLOGY | Facility: CLINIC | Age: 58
End: 2023-01-17
Payer: OTHER GOVERNMENT

## 2023-01-17 VITALS
DIASTOLIC BLOOD PRESSURE: 80 MMHG | OXYGEN SATURATION: 96 % | HEART RATE: 63 BPM | BODY MASS INDEX: 38.48 KG/M2 | HEIGHT: 63 IN | WEIGHT: 217.2 LBS | SYSTOLIC BLOOD PRESSURE: 134 MMHG

## 2023-01-17 DIAGNOSIS — K57.32 DIVERTICULITIS OF COLON: Primary | ICD-10-CM

## 2023-01-17 PROCEDURE — 99213 OFFICE O/P EST LOW 20 MIN: CPT | Performed by: INTERNAL MEDICINE

## 2023-01-17 ASSESSMENT — PAIN SCALES - GENERAL: PAINLEVEL: NO PAIN (0)

## 2023-01-17 NOTE — PATIENT INSTRUCTIONS
Recommendations:  Refer to Colorectal Surgery to discuss operative therapy for recurrent diverticulitis.    Follow up with loraine MITCHELLN.

## 2023-01-17 NOTE — PROGRESS NOTES
GASTROENTEROLOGY  Return Visit       Site of Visit:   [address] [place] [address]     Provider:   Kemar Mckeon MD    PCP:   No Ref-Primary, Physician    Reason for return visit:  Follow up of Recurrent Diverticulitis     Assessment:  57 year old with recurrent, prolonged diverticulitis this winter, around [place].  Has persistent lower abdominal pain.     Recommendations:   Colorectal surgery referral   Follow up PRN    Interim Subjective Notes:   57 year old with 5-7 bouts of diverticulitis.  She had a prolonged event. Required two round of anti-bioitcs. 2 CT scans.   She has some persistent LLQ abdominal pain, bowel habits are ok.  She is actively trying to be healthier.   Ironically started Noom this fall.  She feels better  241 now down to 217.    She is taking probiotics, She is avoiding simple surgars and processed carbs.      ROS: No fevers, Chills, Headaches, Rash, Neurologic problems, black or bloody stool, vomiting.         Current Outpatient Medications   Medication Sig Dispense Refill     acetaminophen (TYLENOL) 500 MG tablet Take 500 mg by mouth every 6 hours as needed for mild pain       estradiol (ESTRING) 2 MG vaginal ring Place 1 each vaginally every 3 months 3 each 4     omeprazole 20 MG tablet Take 20 mg by mouth daily       ondansetron (ZOFRAN ODT) 4 MG ODT tab Take 1 tablet (4 mg) by mouth every 8 hours as needed for nausea 10 tablet 0     psyllium (METAMUCIL/KONSYL) 58.6 % powder Take by mouth daily       sertraline (ZOLOFT) 100 MG tablet Take 150 mg by mouth        estradiol-norethindrone (COMBIPATCH) 0.05-0.14 MG/DAY bi-weekly patch Place 1 patch onto the skin twice a week (Patient taking differently: Place 0.25 patches onto the skin twice a week) 16 patch 6     oxyCODONE (ROXICODONE) 5 MG tablet Take 1 tablet (5 mg) by mouth every 6 hours as needed for severe pain (7-10) 10 tablet 0       Past Surgical History:   Procedure Laterality Date     ABDOMEN SURGERY  '92,'09,'15    2 c-secs, abdomenplasty  "    CHOLECYSTECTOMY  '98     COLONOSCOPY N/A 03/16/2021    Procedure: COLONOSCOPY, WITH BIOPSY AND CLIPPING;  Surgeon: Kemar Mckeon MD;  [place] [facility] [place]     DILATION AND CURETTAGE, OPERATIVE HYSTEROSCOPY, COMBINED N/A 10/18/2022    Procedure: HYSTEROSCOPY, WITH DILATION AND CURETTAGE OF UTERUS and polypectomy;  Surgeon: Ines Chi MD;  Location: [facility] OR     ESOPHAGOSCOPY, GASTROSCOPY, DUODENOSCOPY (EGD), COMBINED N/A 12/09/2020    Procedure: ESOPHAGOGASTRODUODENOSCOPY, WITH BIOPSY;  Surgeon: Anton Knott DO;  Location: [facility] GI     GENITOURINARY SURGERY  '01, '03, '07    tubal ligation, anterior/posterior repair, tubal ligation reversal       Past Medical History:   Diagnosis Date     Depressive disorder 1982     Obstructive sleep apnea syndrome 10/17/2022       Family History   Problem Relation Age of Onset     Uterine Cancer Mother         endometrial cancer     Diabetes Mother      Colon Polyps Father      Diabetes Father      Hypertension Father      Diabetes Brother      Hypertension Brother         reports that she quit smoking about 31 years ago. Her smoking use included cigarettes. She started smoking about 40 years ago. She has a 10.00 pack-year smoking history. She has never used smokeless tobacco. She reports current alcohol use. She reports that she does not use drugs.         Physical Exam:   /80 (BP Location: Left arm)   Pulse 63   Ht 1.6 m (5' 3\")   Wt 98.5 kg (217 lb 3.2 oz)   LMP 12/09/2018   SpO2 96%   BMI 38.48 kg/m    Wt:   Wt Readings from Last 2 Encounters:   01/17/23 98.5 kg (217 lb 3.2 oz)   12/23/22 98.4 kg (217 lb)      Constitutional: cooperative, pleasant, not dyspneic/diaphoretic, no acute distress  Eyes: Sclera anicteric/injected  Abd:  Nondistended, +bs, no hepatosplenomegaly, nontender, no peritoneal signs  Skin: warm, perfused, no jaundice  Neuro: AAO x 3, No asterixis  Psych: Normal affect  MSK: Normal gait    Wt Readings from Last 2 " "Encounters:   01/17/23 98.5 kg (217 lb 3.2 oz)   12/23/22 98.4 kg (217 lb)     Chief Complaint   Patient presents with     Follow Up       Vitals:    01/17/23 1049   BP: 134/80   BP Location: Left arm   Pulse: 63   SpO2: 96%   Weight: 98.5 kg (217 lb 3.2 oz)   Height: 1.6 m (5' 3\")       Body mass index is 38.48 kg/m .    Harley Martines  "

## 2023-01-17 NOTE — LETTER
1/17/2023         RE: Sangita Meraz  [address]  [facility] [address]        Dear Colleague,    Thank you for referring your patient, Sangita Meraz, to the [facility] [place]. Please see a copy of my visit note below.      GASTROENTEROLOGY  Return Visit       Site of Visit:   [address] [facility]     Provider:   Kemar Mckeon MD    PCP:   No Ref-Primary, Physician    Reason for return visit:  Follow up of Recurrent Diverticulitis     Assessment:  57 year old with recurrent, prolonged diverticulitis this winter, around [place].  Has persistent lower abdominal pain.     Recommendations:   Colorectal surgery referral   Follow up PRN    Interim Subjective Notes:   57 year old with 5-7 bouts of diverticulitis.  She had a prolonged event. Required two round of anti-bioitcs. 2 CT scans.   She has some persistent LLQ abdominal pain, bowel habits are ok.  She is actively trying to be healthier.   Ironically started Noom this fall.  She feels better  241 now down to 217.    She is taking probiotics, She is avoiding simple surgars and processed carbs.      ROS: No fevers, Chills, Headaches, Rash, Neurologic problems, black or bloody stool, vomiting.         Current Outpatient Medications   Medication Sig Dispense Refill     acetaminophen (TYLENOL) 500 MG tablet Take 500 mg by mouth every 6 hours as needed for mild pain       estradiol (ESTRING) 2 MG vaginal ring Place 1 each vaginally every 3 months 3 each 4     omeprazole 20 MG tablet Take 20 mg by mouth daily       ondansetron (ZOFRAN ODT) 4 MG ODT tab Take 1 tablet (4 mg) by mouth every 8 hours as needed for nausea 10 tablet 0     psyllium (METAMUCIL/KONSYL) 58.6 % powder Take by mouth daily       sertraline (ZOLOFT) 100 MG tablet Take 150 mg by mouth        estradiol-norethindrone (COMBIPATCH) 0.05-0.14 MG/DAY bi-weekly patch Place 1 patch onto the skin twice a week (Patient taking differently: Place 0.25 patches onto the skin twice a  "week) 16 patch 6     oxyCODONE (ROXICODONE) 5 MG tablet Take 1 tablet (5 mg) by mouth every 6 hours as needed for severe pain (7-10) 10 tablet 0       Past Surgical History:   Procedure Laterality Date     ABDOMEN SURGERY  '92,'09,'15    2 c-secs, abdomenplasty     CHOLECYSTECTOMY  '98     COLONOSCOPY N/A 03/16/2021    Procedure: COLONOSCOPY, WITH BIOPSY AND CLIPPING;  Surgeon: Kemar Mckeon MD;  [place]: [facility] [place]     DILATION AND CURETTAGE, OPERATIVE HYSTEROSCOPY, COMBINED N/A 10/18/2022    Procedure: HYSTEROSCOPY, WITH DILATION AND CURETTAGE OF UTERUS and polypectomy;  Surgeon: Ines Chi MD;  Location: [facility] OR     ESOPHAGOSCOPY, GASTROSCOPY, DUODENOSCOPY (EGD), COMBINED N/A 12/09/2020    Procedure: ESOPHAGOGASTRODUODENOSCOPY, WITH BIOPSY;  Surgeon: Anton Knott DO;  Location: [facility]     GENITOURINARY SURGERY  '01, '03, '07    tubal ligation, anterior/posterior repair, tubal ligation reversal       Past Medical History:   Diagnosis Date     Depressive disorder 1982     Obstructive sleep apnea syndrome 10/17/2022       Family History   Problem Relation Age of Onset     Uterine Cancer Mother         endometrial cancer     Diabetes Mother      Colon Polyps Father      Diabetes Father      Hypertension Father      Diabetes Brother      Hypertension Brother         reports that she quit smoking about 31 years ago. Her smoking use included cigarettes. She started smoking about 40 years ago. She has a 10.00 pack-year smoking history. She has never used smokeless tobacco. She reports current alcohol use. She reports that she does not use drugs.         Physical Exam:   /80 (BP Location: Left arm)   Pulse 63   Ht 1.6 m (5' 3\")   Wt 98.5 kg (217 lb 3.2 oz)   LMP 12/09/2018   SpO2 96%   BMI 38.48 kg/m    Wt:   Wt Readings from Last 2 Encounters:   01/17/23 98.5 kg (217 lb 3.2 oz)   12/23/22 98.4 kg (217 lb)      Constitutional: cooperative, pleasant, not dyspneic/diaphoretic, no acute " "distress  Eyes: Sclera anicteric/injected  Abd:  Nondistended, +bs, no hepatosplenomegaly, nontender, no peritoneal signs  Skin: warm, perfused, no jaundice  Neuro: AAO x 3, No asterixis  Psych: Normal affect  MSK: Normal gait    Wt Readings from Last 2 Encounters:   01/17/23 98.5 kg (217 lb 3.2 oz)   12/23/22 98.4 kg (217 lb)     Chief Complaint   Patient presents with     Follow Up       Vitals:    01/17/23 1049   BP: 134/80   BP Location: Left arm   Pulse: 63   SpO2: 96%   Weight: 98.5 kg (217 lb 3.2 oz)   Height: 1.6 m (5' 3\")       Body mass index is 38.48 kg/m .    Harley Martines      Again, thank you for allowing me to participate in the care of your patient.        Sincerely,        Kemar Mckeon MD    "

## 2023-01-17 NOTE — PROGRESS NOTES
"Chief Complaint   Patient presents with     Follow Up       Vitals:    01/17/23 1049   BP: 134/80   BP Location: Left arm   Pulse: 63   SpO2: 96%   Weight: 98.5 kg (217 lb 3.2 oz)   Height: 1.6 m (5' 3\")       Body mass index is 38.48 kg/m .    Harley Wyman    "

## 2023-01-18 ENCOUNTER — OFFICE VISIT (OUTPATIENT)
Dept: OTOLARYNGOLOGY | Facility: OTHER | Age: 58
End: 2023-01-18
Payer: OTHER GOVERNMENT

## 2023-01-18 ENCOUNTER — OFFICE VISIT (OUTPATIENT)
Dept: AUDIOLOGY | Facility: OTHER | Age: 58
End: 2023-01-18
Payer: OTHER GOVERNMENT

## 2023-01-18 VITALS
WEIGHT: 217.2 LBS | BODY MASS INDEX: 38.48 KG/M2 | DIASTOLIC BLOOD PRESSURE: 70 MMHG | HEIGHT: 63 IN | TEMPERATURE: 97.7 F | SYSTOLIC BLOOD PRESSURE: 118 MMHG

## 2023-01-18 DIAGNOSIS — H90.41 SENSORINEURAL HEARING LOSS (SNHL) OF RIGHT EAR WITH UNRESTRICTED HEARING OF LEFT EAR: Primary | ICD-10-CM

## 2023-01-18 DIAGNOSIS — H81.01 COCHLEAR HYDROPS OF RIGHT EAR: Primary | ICD-10-CM

## 2023-01-18 PROCEDURE — 92550 TYMPANOMETRY & REFLEX THRESH: CPT | Performed by: AUDIOLOGIST

## 2023-01-18 PROCEDURE — 99207 PR NO CHARGE LOS: CPT | Performed by: AUDIOLOGIST

## 2023-01-18 PROCEDURE — 92557 COMPREHENSIVE HEARING TEST: CPT | Performed by: AUDIOLOGIST

## 2023-01-18 PROCEDURE — 99213 OFFICE O/P EST LOW 20 MIN: CPT | Performed by: OTOLARYNGOLOGY

## 2023-01-18 ASSESSMENT — PAIN SCALES - GENERAL: PAINLEVEL: NO PAIN (0)

## 2023-01-18 NOTE — TELEPHONE ENCOUNTER
Diagnosis, Referred by & from: Diverticulitis of Colon; referred by Dr. Mckeon   Appt date: 2/15/2023   NOTES STATUS DETAILS   OFFICE NOTE from referring provider [facility]:  23, 21 - GI OV with Dr. Mckeon   OFFICE NOTE from other specialist [facility]:  22 - PCC OV with Yanet Diana NP  22 - UC OV with Ivett Lin NP  21 - UC OV with Gayla Agarwal NP   DISCHARGE SUMMARY from hospital N/A    DISCHARGE REPORT from the ER [facility]:  22 - ED OV with Dr. Poon  22 - ED OV with Dr. Gallego  20 - ED OV with Dr. Benites   OPERATIVE REPORT [facility]:  10/18/22 - OP Note for HYSTEROSCOPY WITH DILATION AND CURETTAGE OF UTERUS with Dr. Alvarado   MEDICATION LIST Internal    LABS     BIOPSIES/PATHOLOGY RELATED TO DIAGNOSIS Received / [facility]:  3/16/21 - Colon Biopsy (Case: [id number])    [facility]:  20 - Colon Biopsy (Case: [id number])   DIAGNOSTIC PROCEDURES     COLONOSCOPY Received / [facility]:  3/16/21 - Colonoscopy    [facility]:  20 - Colonoscopy   UPPER ENDOSCOPY (EGD) [facility]:  20 - EGD   IMAGING (DISC & REPORT)      CT [facility]:  22 - CT Abd/Pelvis  22 - CT Abd/Pelvis  20 - CT Abd/Pelvis   MRI [facility]:  21 - MRI Enterography   ULTRASOUND  (ENDOANAL/ENDORECTAL) [facility]:  10/3/22 - US Pelvic

## 2023-01-18 NOTE — LETTER
1/18/2023         RE: Sangita Meraz  [address]  [facility] [address]        Dear Colleague,    Thank you for referring your patient, Sangita Meraz, to the [facility]. Please see a copy of my visit note below.    History of Present Illness - Sangita Meraz is a 57 year old female presenting in clinic today for a recheck on Patient presents with:  Follow Up    Patient presented with a tentative diagnosis of right cochlear hydrops.  Her word recognition score  Dropped to 84% versus 100% on the left few months ago and low tone thresholds were in the moderate sensorineural loss range.  She chose not to perform procedures offered to her multiple medicines but rather change her diet avoiding salt caffeine.  Since feels less pressure in the ear tinnitus more less occasional and overall hears better.  Denies any vertigo or dizziness.          BP Readings from Last 1 Encounters:   12/25/22 126/65       BP noted to be well controlled today in office.     Sangita IS NOT a smoker/uses chewing tobacco.  She already quit.      Past Medical History -   Past Medical History:   Diagnosis Date     Depressive disorder 1982     Obstructive sleep apnea syndrome 10/17/2022       Current Medications -   Current Outpatient Medications:      acetaminophen (TYLENOL) 500 MG tablet, Take 500 mg by mouth every 6 hours as needed for mild pain, Disp: , Rfl:      estradiol (ESTRING) 2 MG vaginal ring, Place 1 each vaginally every 3 months, Disp: 3 each, Rfl: 4     estradiol-norethindrone (COMBIPATCH) 0.05-0.14 MG/DAY bi-weekly patch, Place 1 patch onto the skin twice a week (Patient taking differently: Place 0.25 patches onto the skin twice a week), Disp: 16 patch, Rfl: 6     omeprazole 20 MG tablet, Take 20 mg by mouth daily, Disp: , Rfl:      ondansetron (ZOFRAN ODT) 4 MG ODT tab, Take 1 tablet (4 mg) by mouth every 8 hours as needed for nausea, Disp: 10 tablet, Rfl: 0     oxyCODONE (ROXICODONE) 5 MG tablet, Take 1  tablet (5 mg) by mouth every 6 hours as needed for severe pain (7-10), Disp: 10 tablet, Rfl: 0     psyllium (METAMUCIL/KONSYL) 58.6 % powder, Take by mouth daily, Disp: , Rfl:      sertraline (ZOLOFT) 100 MG tablet, Take 150 mg by mouth , Disp: , Rfl:     Allergies -   Allergies   Allergen Reactions     Ivp Dye [Contrast Dye] Anaphylaxis     Other (Do Not Use)      Other reaction(s): Itching,Watering Eyes, Rash, Sneezing, Itching,Watering Eyes, Rash, Sneezing, Itching,Watering Eyes, Rash, Sneezing       Social History -   Social History     Socioeconomic History     Marital status:    Tobacco Use     Smoking status: Former     Packs/day: 1.00     Years: 10.00     Pack years: 10.00     Types: Cigarettes     Start date: 10/10/1982     Quit date: 1991     Years since quittin.6     Smokeless tobacco: Never   Vaping Use     Vaping Use: Never used   Substance and Sexual Activity     Alcohol use: Yes     Comment: 1-2 beer/month     Drug use: Never     Sexual activity: Yes     Partners: Male     Birth control/protection: Post-menopausal   Other Topics Concern     Parent/sibling w/ CABG, MI or angioplasty before 65F 55M? No       Family History -   Family History   Problem Relation Age of Onset     Uterine Cancer Mother         endometrial cancer     Diabetes Mother      Colon Polyps Father      Diabetes Father      Hypertension Father      Diabetes Brother      Hypertension Brother        Review of Systems - As per HPI and PMHx, otherwise review of system review of the head and neck negative. Otherwise 10+ review of system is negative    Physical Exam  LMP 2018   BMI: There is no height or weight on file to calculate BMI.    General - The patient is well nourished and well developed, and appears to have good nutritional status.  Alert and oriented to person and place, answers questions and cooperates with examination appropriately.    SKIN - No suspicious lesions or rashes.  Respiration - No  respiratory distress.  Head and Face - Normocephalic and atraumatic, with no gross asymmetry noted of the contour of the facial features.  The facial nerve is intact, with strong symmetric movements.    Voice and Breathing - The patient was breathing comfortably without the use of accessory muscles. The patients voice was clear and strong, and had appropriate pitch and quality.    Ears - Bilateral pinna and EACs with normal appearing overlying skin. Tympanic membrane intact with good mobility on pneumatic otoscopy bilaterally. Bony landmarks of the ossicular chain are normal. The tympanic membranes are normal in appearance. No retraction, perforation, or masses.  No fluid or purulence was seen in the external canal or the middle ear.     Eyes - Extraocular movements intact.  Sclera were not icteric or injected, conjunctiva were pink and moist.        Neck - Normal midline excursion of the laryngotracheal complex during swallowing.  Full range of motion on passive movement.  Palpation of the occipital, submental, submandibular, internal jugular chain, and supraclavicular nodes did not demonstrate any abnormal lymph nodes or masses.  The carotid pulse was palpable bilaterally.  Palpation of the thyroid was soft and smooth, with no nodules or goiter appreciated.  The trachea was mobile and midline.    Nose - External contour is symmetric, no gross deflection or scars.  Nasal mucosa is pink and moist with no abnormal mucus.  The septum was midline and non-obstructive, turbinates of normal size and position.  No polyps, masses, or purulence noted on examination.    Neuro - Nonfocal neuro exam is normal, CN 2 through 12 intact, normal gait and muscle tone.      Performed in clinic today:  Audiologic Studies - An audiogram and tympanogram were performed today as part of the evaluation and personally reviewed. The tympanogram shows Type A curves on the right and Type A curves on the left, with Normal canal volumes and  middle ear pressures.  The audiogram showed Mild low-frequency SNHL correcting at mid to high frequencies on the right and Normal thresholdson the left.    Word recognition score 100% on the right and 100% on the left.    A/P - Sangitaher MATT Meraz is a 57 year old female Patient presents with:  Follow Up    Patient with picture consistent with cochlear hydrops but currently much less symptomatic.  Hearing is improved as well as word recognition.  At this point we will continue her dietary restrictions we will recheck in about 6 months    Sangita should follow up in 6 months.      At Sangita next appointment they will need a hearing test.      Guicho Abarca MD          Again, thank you for allowing me to participate in the care of your patient.        Sincerely,        Guicho Abarca MD, MD

## 2023-01-18 NOTE — PROGRESS NOTES
AUDIOLOGY REPORT:    Patient was referred from ENT by Dr. Abarca for audiology evaluation. The patient was first seen in July 2022 following a fairly sudden loss of hearing in the right ear around a month prior. She was last tested on 9/7/2022 and results showed moderate rising to mild low frequency sensorineural hearing loss in the right ear. She returns today for follow up and reports that she has not noted any significant changes in hearing. She reports tinnitus in the right ear that varies in intensity. The patient reports that she does not have ear pain or pressure.    Testing:    Otoscopy:   Otoscopic exam indicates ears are clear of cerumen bilaterally     Tympanograms:    RIGHT: normal eardrum mobility     LEFT:   normal eardrum mobility    Reflexes (reported by stimulus ear):  RIGHT: Ipsilateral is present at normal levels  RIGHT: Contralateral is present at normal levels  LEFT:   Ipsilateral is present at normal levels  LEFT:   Contralateral is present at normal levels    Thresholds:   Pure Tone Thresholds assessed using conventional audiometry with good reliability from 250-8000 Hz bilaterally using insert earphones and circumaural headphones     RIGHT:  moderate rising to mild sensorineural hearing loss rising to normal hearing sensitivity at 750 Hz and above    LEFT:    normal hearing sensitivity at all tested frequencies     Speech Reception Threshold:    RIGHT: 15 dB HL    LEFT:   15 dB HL  Results are in agreement with pure tone average.     Word Recognition Score:     RIGHT: 100% at 60 dB HL using NU-6 recorded word list.    LEFT:   100% at 55 dB HL using NU-6 recorded word list.    Discussed results with the patient. Compared to 9/7/2022, thresholds today are 15-20 dB better in the right ear at 500-1000 Hz, 15 dB poorer in the right ear at 2000 Hz, 10 dB better in the left ear at 2768-5521 Hz, and stable at all other tested frequencies.    Patient was returned to ENT for follow up.     Allyson  Romulo Li, [facility]  Licensed Audiologist #[id number]  1/18/2023

## 2023-01-26 NOTE — PROGRESS NOTES
Colon and Rectal Surgery Consult Clinic Note    Referring provider:  Kemar Mckeon MD  [address] [place] [address]       RE: Sangita Meraz  : 1965  JAE: 2/15/2023      Sangita Meraz is a very pleasant 57 year old female with recurrent diverticulitis. She presents to the [facility] and Rectal Surgery Clinic for a new patient consultation.      First episode of acute uncomplicated diverticulitis on 2020. She was treated conservatively with Augmentin with complete resolution.    In total she has had 5-7 episodes of acute diverticulitis. Each time she was treated with oral antibiotics.    Most recently, she presented to the ED on 22 with uncomplicated acute diverticulitis. She was discharged with Augmentin. She improved with this but then began developing left lower quadrant abdominal pain that radiated to her left lower back. She saw her PCP on 22 and was started on Cipro and Flagyl. She continued to worsen and presented to the ED on 22. CT was actually improved from prior, and she was discharged home with instructions to complete the course of Cipro and Flagyl.  IMPRESSION:   1.  Persistent mild mural thickening of the proximal sigmoid/distal descending colon with adjacent free fluid, when compared to 2022 the degree of inflammation in this area is decreased consistent with improving but continuing diverticulitis. Within limits of a noncontrast CT scan no intra-abdominal abscess/fluid collection is seen at this time.  2.  Increased number of mesenteric lymph nodes with mild mesenteric intravenous again noted, this was present on the prior exam and is a very subtle in appearance, favored to at least in part be reactive in nature secondary to the patient's  diverticulitis, a more chronic process such as mesenteric panniculitis may also be present and would have a similar appearance. Correlation with patient's clinical history is recommended.    Most recent colonoscopy 3/16/21  with diverticulosis of the sigmoid and descending colon. Normal random biopsies.  Impression:  - Diverticulosis in the sigmoid colon and in the descending colon.   - The examined portion of the ileum was normal.   - Biopsies were taken with a cold forceps from the right colon and left colon for evaluation of microscopic colitis.     Colonoscopy prior to this was 2020. She had a post-polypectomy scar from a 10mm sessile serrated adenoma treated with argon beam coagulation. Pathology of the site was negative.      PSH:  x2 (, ), abdominoplasty (), cholecystectomy ()    No history of abscess. No fecaluria or pneumaturia. Sometimes feels food as it passes if she eats certain things. She misses being on a higher fiber diet with residue.    PLEASE SEE NOTE BELOW FOR PHYSICAL EXAMINATION, REVIEW OF SYSTEMS, AND OTHER HISTORY.    Assessment/Plan: 57 year old female with recurrent acute uncomplicated diverticulitis.  (1) history of a sessile serrated adenoma. We discussed that this is a different pathway. There is a description that it was previously 10 mm and it was carefully surveyed to ensure it was completely removed. Her next colonoscopy should likely then be  (3 years from previous). We discussed this together and I answered questions about this.  (2) With significant recurrent episodes, would recommend laparoscopic sigmoid colectomy. She would like to proceed with this. Plan for PRE[facility] (P[facility]), full mechanical bowel preparation with antibiotics. Discussed surgery and possible complications. Discussed recovery and Enhanced Recovery After Surgery (ERAS).     30 minutes spent on the date of encounter performing chart review, history and exam, documentation and further activities as noted above.     Bhavana Johansen MD  Colon and Rectal Surgery Staff  [facility]  Center      -------------------------------------------------------------------------------------------------------------------      Medical history:  Past Medical History:   Diagnosis Date     Depressive disorder 1982     Obstructive sleep apnea syndrome 10/17/2022       Surgical history:  Past Surgical History:   Procedure Laterality Date     ABDOMEN SURGERY  '92,'09,'15    2 c-secs, abdomenplasty     CHOLECYSTECTOMY  '98     COLONOSCOPY N/A 03/16/2021    Procedure: COLONOSCOPY, WITH BIOPSY AND CLIPPING;  Surgeon: Kemar Mckeon MD;  [facility]: [facility]     DILATION AND CURETTAGE, OPERATIVE HYSTEROSCOPY, COMBINED N/A 10/18/2022    Procedure: HYSTEROSCOPY, WITH DILATION AND CURETTAGE OF UTERUS and polypectomy;  Surgeon: Ines Chi MD;  Location: [facility]     ESOPHAGOSCOPY, GASTROSCOPY, DUODENOSCOPY (EGD), COMBINED N/A 12/09/2020    Procedure: ESOPHAGOGASTRODUODENOSCOPY, WITH BIOPSY;  Surgeon: Anton Knott DO;  Location: [facility]     GENITOURINARY SURGERY  '01, '03, '07    tubal ligation, anterior/posterior repair, tubal ligation reversal       Problem list:    Patient Active Problem List    Diagnosis Date Noted     Female cystocele 10/17/2022     Priority: Medium     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     Priority: Medium     Mixed stress and urge urinary incontinence 10/17/2022     Priority: Medium     Polycystic ovarian syndrome 10/17/2022     Priority: Medium     Recurrent major depressive disorder, in partial remission (H) 10/17/2022     Priority: Medium     Obstructive sleep apnea syndrome 10/17/2022     Priority: Medium     severe       Uterovaginal prolapse 10/17/2022     Priority: Medium     Meniere's disease, unspecified laterality 10/17/2022     Priority: Medium     Morbid obesity (H) 07/13/2021     Priority: Medium       Medications:  Current Outpatient Medications   Medication Sig Dispense Refill     estradiol (ESTRING) 2 MG vaginal ring Place 1 each vaginally every 3 months  "3 each 4     metroNIDAZOLE (FLAGYL) 500 MG tablet Take 1 tablet (500 mg) by mouth every 6 hours At 8:00 am, 2:00 pm, 8:00 pm the day prior to your surgery with neomycin and zofran. 3 tablet 0     neomycin (MYCIFRADIN) 500 MG tablet Take 2 tablets (1,000 mg) by mouth every 6 hours At 8:00 am, 2:00 pm, 8:00 pm the day prior to your surgery with flagyl and zofran. 6 tablet 0     omeprazole 20 MG tablet Take 20 mg by mouth daily       ondansetron (ZOFRAN) 4 MG tablet Take 1 tablet (4 mg) by mouth every 6 hours At 8:00 am, 2:00 pm, 8:00 pm the day prior to your surgery with neomycin and flagyl. 3 tablet 0     polyethylene glycol (MIRALAX) 17 g packet Take 238 g by mouth See Admin Instructions Starting at 4 pm night prior to surgery. Refer to \"Getting Ready for Surgery\" instructions. 14 packet 0     sertraline (ZOLOFT) 100 MG tablet Take 150 mg by mouth        acetaminophen (TYLENOL) 500 MG tablet Take 500 mg by mouth every 6 hours as needed for mild pain (Patient not taking: Reported on 1/18/2023)       ondansetron (ZOFRAN ODT) 4 MG ODT tab Take 1 tablet (4 mg) by mouth every 8 hours as needed for nausea (Patient not taking: Reported on 1/18/2023) 10 tablet 0     psyllium (METAMUCIL/KONSYL) 58.6 % powder Take by mouth daily (Patient not taking: Reported on 1/18/2023)         Allergies:  Allergies   Allergen Reactions     Ivp Dye [Contrast Dye] Anaphylaxis     Other (Do Not Use)      Other reaction(s): Itching,Watering Eyes, Rash, Sneezing, Itching,Watering Eyes, Rash, Sneezing, Itching,Watering Eyes, Rash, Sneezing       Family history:  Family History   Problem Relation Age of Onset     Uterine Cancer Mother         endometrial cancer     Diabetes Mother      Colon Polyps Father      Diabetes Father      Hypertension Father      Diabetes Brother      Hypertension Brother        Social history:  Social History     Socioeconomic History     Marital status:      Spouse name: Not on file     Number of children: Not " on file     Years of education: Not on file     Highest education level: Not on file   Occupational History     Not on file   Tobacco Use     Smoking status: Former     Packs/day: 1.00     Years: 10.00     Pack years: 10.00     Types: Cigarettes     Start date: 10/10/1982     Quit date: 1991     Years since quittin.7     Smokeless tobacco: Never   Vaping Use     Vaping Use: Never used   Substance and Sexual Activity     Alcohol use: Yes     Comment: 1-2 beer/month     Drug use: Never     Sexual activity: Yes     Partners: Male     Birth control/protection: Post-menopausal   Other Topics Concern     Parent/sibling w/ CABG, MI or angioplasty before 65F 55M? No   Social History Narrative     Not on file     Social Determinants of Health     Financial Resource Strain: Not on file   Food Insecurity: Not on file   Transportation Needs: Not on file   Physical Activity: Not on file   Stress: Not on file   Social Connections: Not on file   Intimate Partner Violence: Not on file   Housing Stability: Not on file         Nursing Notes:   Nacho Plascencia, EMT  2/15/2023  5:20 PM  Signed  No chief complaint on file.      Vitals:    02/15/23 1719   BP: 139/88   BP Location: Left arm   Patient Position: Sitting   Cuff Size: Adult Large   Pulse: 64   SpO2: 99%       There is no height or weight on file to calculate BMI.    Nacho Plascencia EMT-P         Physical Examination:  /88 (BP Location: Left arm, Patient Position: Sitting, Cuff Size: Adult Large)   Pulse 64   LMP 2018   SpO2 99%   General: alert, oriented, in no acute distress, sitting comfortably  A formal abdominal examination was not performed.

## 2023-02-08 ENCOUNTER — MYC MEDICAL ADVICE (OUTPATIENT)
Dept: SURGERY | Facility: CLINIC | Age: 58
End: 2023-02-08
Payer: OTHER GOVERNMENT

## 2023-02-09 ENCOUNTER — TELEPHONE (OUTPATIENT)
Dept: SLEEP MEDICINE | Facility: CLINIC | Age: 58
End: 2023-02-09

## 2023-02-15 ENCOUNTER — PRE VISIT (OUTPATIENT)
Dept: SURGERY | Facility: CLINIC | Age: 58
End: 2023-02-15

## 2023-02-15 ENCOUNTER — OFFICE VISIT (OUTPATIENT)
Dept: SURGERY | Facility: CLINIC | Age: 58
End: 2023-02-15
Attending: INTERNAL MEDICINE
Payer: OTHER GOVERNMENT

## 2023-02-15 VITALS — DIASTOLIC BLOOD PRESSURE: 88 MMHG | HEART RATE: 64 BPM | SYSTOLIC BLOOD PRESSURE: 139 MMHG | OXYGEN SATURATION: 99 %

## 2023-02-15 DIAGNOSIS — K57.32 DIVERTICULITIS OF COLON: ICD-10-CM

## 2023-02-15 DIAGNOSIS — K57.30 DIVERTICULOSIS OF LARGE INTESTINE WITHOUT HEMORRHAGE: Primary | ICD-10-CM

## 2023-02-15 PROCEDURE — 99203 OFFICE O/P NEW LOW 30 MIN: CPT | Performed by: COLON & RECTAL SURGERY

## 2023-02-15 RX ORDER — POLYETHYLENE GLYCOL 3350 17 G/17G
238 POWDER, FOR SOLUTION ORAL SEE ADMIN INSTRUCTIONS
Qty: 14 PACKET | Refills: 0 | Status: ON HOLD | OUTPATIENT
Start: 2023-02-15 | End: 2023-04-03

## 2023-02-15 RX ORDER — METRONIDAZOLE 500 MG/1
500 TABLET ORAL EVERY 6 HOURS
Qty: 3 TABLET | Refills: 0 | Status: ON HOLD | OUTPATIENT
Start: 2023-02-15 | End: 2023-04-03

## 2023-02-15 RX ORDER — NEOMYCIN SULFATE 500 MG/1
1000 TABLET ORAL EVERY 6 HOURS
Qty: 6 TABLET | Refills: 0 | Status: ON HOLD | OUTPATIENT
Start: 2023-02-15 | End: 2023-04-03

## 2023-02-15 RX ORDER — ONDANSETRON 4 MG/1
4 TABLET, FILM COATED ORAL EVERY 6 HOURS
Qty: 3 TABLET | Refills: 0 | Status: ON HOLD | OUTPATIENT
Start: 2023-02-15 | End: 2023-04-03

## 2023-02-15 ASSESSMENT — PAIN SCALES - GENERAL: PAINLEVEL: MILD PAIN (2)

## 2023-02-15 NOTE — PATIENT INSTRUCTIONS
Follow up:  Schedule surgery with Judy  [phone]  Full bowel prep with antibiotics  Pre op physical  Labs         Please call with any questions or concerns regarding your clinic visit today.    It is a pleasure being involved in your health care.    Contacts post-consultation depending on your need:    Radiology Appointments [phone]    Schedule Clinic Appointments [phone] # 1   M-F 7:30 - 5 pm    [facility] [phone]    Clinic Fax Number [phone]    Surgery Scheduling [phone]    My Chart is available 24 hours a day and is a secure way to access your records and communicate with your care team.  I strongly recommend signing up if you haven't already done so, if you are comfortable with computers.  If you would like to inquire about this or are having problems with My Chart access, you may call [phone] or go online at [email or web address].  Please allow at least 24 hours for a response and extra time on weekends and Holidays.

## 2023-02-15 NOTE — LETTER
2/15/2023       RE: Sangita Meraz  [address] [facility] [address]     Dear Colleague,    Thank you for referring your patient, Sangita Meraz, to the [facility] [place] [facility]. Please see a copy of my visit note below.    Colon and Rectal Surgery Consult Clinic Note    Referring provider:  Kemar Mckeon MD  [address] [place] [address]       RE: Sangita Meraz  : 1965  JAE: 2/15/2023      Sangita Meraz is a very pleasant 57 year old female with recurrent diverticulitis. She presents to the Colon and Rectal Surgery Clinic for a new patient consultation.      First episode of acute uncomplicated diverticulitis on 2020. She was treated conservatively with Augmentin with complete resolution.    In total she has had 5-7 episodes of acute diverticulitis. Each time she was treated with oral antibiotics.    Most recently, she presented to the ED on 22 with uncomplicated acute diverticulitis. She was discharged with Augmentin. She improved with this but then began developing left lower quadrant abdominal pain that radiated to her left lower back. She saw her PCP on 22 and was started on Cipro and Flagyl. She continued to worsen and presented to the ED on 22. CT was actually improved from prior, and she was discharged home with instructions to complete the course of Cipro and Flagyl.  IMPRESSION:   1.  Persistent mild mural thickening of the proximal sigmoid/distal descending colon with adjacent free fluid, when compared to 2022 the degree of inflammation in this area is decreased consistent with improving but continuing diverticulitis. Within limits of a noncontrast CT scan no intra-abdominal abscess/fluid collection is seen at this time.  2.  Increased number of mesenteric lymph nodes with mild mesenteric intravenous again noted, this was present on the prior exam and is a very  subtle in appearance, favored to at least in part be reactive in nature secondary to the patient's  diverticulitis, a more chronic process such as mesenteric panniculitis may also be present and would have a similar appearance. Correlation with patient's clinical history is recommended.    Most recent colonoscopy 3/16/21 with diverticulosis of the sigmoid and descending colon. Normal random biopsies.  Impression:  - Diverticulosis in the sigmoid colon and in the descending colon.   - The examined portion of the ileum was normal.   - Biopsies were taken with a cold forceps from the right colon and left colon for evaluation of microscopic colitis.     Colonoscopy prior to this was 2020. She had a post-polypectomy scar from a 10mm sessile serrated adenoma treated with argon beam coagulation. Pathology of the site was negative.      PSH:  x2 (, ), abdominoplasty (), cholecystectomy ()    No history of abscess. No fecaluria or pneumaturia. Sometimes feels food as it passes if she eats certain things. She misses being on a higher fiber diet with residue.    PLEASE SEE NOTE BELOW FOR PHYSICAL EXAMINATION, REVIEW OF SYSTEMS, AND OTHER HISTORY.    Assessment/Plan: 57 year old female with recurrent acute uncomplicated diverticulitis.  (1) history of a sessile serrated adenoma. We discussed that this is a different pathway. There is a description that it was previously 10 mm and it was carefully surveyed to ensure it was completely removed. Her next colonoscopy should likely then be  (3 years from previous). We discussed this together and I answered questions about this.  (2) With significant recurrent episodes, would recommend laparoscopic sigmoid colectomy. She would like to proceed with this. Plan for PREOPERATIVE ASSESSMENT CENTER (PAC), full mechanical bowel preparation with antibiotics. Discussed surgery and possible complications. Discussed recovery and Enhanced Recovery After Surgery  (ERAS).     30 minutes spent on the date of encounter performing chart review, history and exam, documentation and further activities as noted above.     Bhavana Johansen MD  Colon and Rectal Surgery Staff  [facility]      -------------------------------------------------------------------------------------------------------------------      Medical history:  Past Medical History:   Diagnosis Date     Depressive disorder 1982     Obstructive sleep apnea syndrome 10/17/2022       Surgical history:  Past Surgical History:   Procedure Laterality Date     ABDOMEN SURGERY  '92,'09,'15    2 c-secs, abdomenplasty     CHOLECYSTECTOMY  '98     COLONOSCOPY N/A 03/16/2021    Procedure: COLONOSCOPY, WITH BIOPSY AND CLIPPING;  Surgeon: Kemar Mckeon MD;  [place] [facility] [place]     DILATION AND CURETTAGE, OPERATIVE HYSTEROSCOPY, COMBINED N/A 10/18/2022    Procedure: HYSTEROSCOPY, WITH DILATION AND CURETTAGE OF UTERUS and polypectomy;  Surgeon: Ines Chi MD;  Location: [facility]     ESOPHAGOSCOPY, GASTROSCOPY, DUODENOSCOPY (EGD), COMBINED N/A 12/09/2020    Procedure: ESOPHAGOGASTRODUODENOSCOPY, WITH BIOPSY;  Surgeon: Anton Knott DO;  Location: [facility]     GENITOURINARY SURGERY  '01, '03, '07    tubal ligation, anterior/posterior repair, tubal ligation reversal       Problem list:    Patient Active Problem List    Diagnosis Date Noted     Female cystocele 10/17/2022     Priority: Medium     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     Priority: Medium     Mixed stress and urge urinary incontinence 10/17/2022     Priority: Medium     Polycystic ovarian syndrome 10/17/2022     Priority: Medium     Recurrent major depressive disorder, in partial remission (H) 10/17/2022     Priority: Medium     Obstructive sleep apnea syndrome 10/17/2022     Priority: Medium     severe       Uterovaginal prolapse 10/17/2022     Priority: Medium     Meniere's disease, unspecified laterality  "10/17/2022     Priority: Medium     Morbid obesity (H) 07/13/2021     Priority: Medium       Medications:  Current Outpatient Medications   Medication Sig Dispense Refill     estradiol (ESTRING) 2 MG vaginal ring Place 1 each vaginally every 3 months 3 each 4     metroNIDAZOLE (FLAGYL) 500 MG tablet Take 1 tablet (500 mg) by mouth every 6 hours At 8:00 am, 2:00 pm, 8:00 pm the day prior to your surgery with neomycin and zofran. 3 tablet 0     neomycin (MYCIFRADIN) 500 MG tablet Take 2 tablets (1,000 mg) by mouth every 6 hours At 8:00 am, 2:00 pm, 8:00 pm the day prior to your surgery with flagyl and zofran. 6 tablet 0     omeprazole 20 MG tablet Take 20 mg by mouth daily       ondansetron (ZOFRAN) 4 MG tablet Take 1 tablet (4 mg) by mouth every 6 hours At 8:00 am, 2:00 pm, 8:00 pm the day prior to your surgery with neomycin and flagyl. 3 tablet 0     polyethylene glycol (MIRALAX) 17 g packet Take 238 g by mouth See Admin Instructions Starting at 4 pm night prior to surgery. Refer to \"Getting Ready for Surgery\" instructions. 14 packet 0     sertraline (ZOLOFT) 100 MG tablet Take 150 mg by mouth        acetaminophen (TYLENOL) 500 MG tablet Take 500 mg by mouth every 6 hours as needed for mild pain (Patient not taking: Reported on 1/18/2023)       ondansetron (ZOFRAN ODT) 4 MG ODT tab Take 1 tablet (4 mg) by mouth every 8 hours as needed for nausea (Patient not taking: Reported on 1/18/2023) 10 tablet 0     psyllium (METAMUCIL/KONSYL) 58.6 % powder Take by mouth daily (Patient not taking: Reported on 1/18/2023)         Allergies:  Allergies   Allergen Reactions     Ivp Dye [Contrast Dye] Anaphylaxis     Other (Do Not Use)      Other reaction(s): Itching,Watering Eyes, Rash, Sneezing, Itching,Watering Eyes, Rash, Sneezing, Itching,Watering Eyes, Rash, Sneezing       Family history:  Family History   Problem Relation Age of Onset     Uterine Cancer Mother         endometrial cancer     Diabetes Mother      Colon Polyps " Father      Diabetes Father      Hypertension Father      Diabetes Brother      Hypertension Brother        Social history:  Social History     Socioeconomic History     Marital status:      Spouse name: Not on file     Number of children: Not on file     Years of education: Not on file     Highest education level: Not on file   Occupational History     Not on file   Tobacco Use     Smoking status: Former     Packs/day: 1.00     Years: 10.00     Pack years: 10.00     Types: Cigarettes     Start date: 10/10/1982     Quit date: 1991     Years since quittin.7     Smokeless tobacco: Never   Vaping Use     Vaping Use: Never used   Substance and Sexual Activity     Alcohol use: Yes     Comment: 1-2 beer/month     Drug use: Never     Sexual activity: Yes     Partners: Male     Birth control/protection: Post-menopausal   Other Topics Concern     Parent/sibling w/ CABG, MI or angioplasty before 65F 55M? No   Social History Narrative     Not on file     Social Determinants of Health     Financial Resource Strain: Not on file   Food Insecurity: Not on file   Transportation Needs: Not on file   Physical Activity: Not on file   Stress: Not on file   Social Connections: Not on file   Intimate Partner Violence: Not on file   Housing Stability: Not on file         Nursing Notes:   Nacho Plascencia, EMT  2/15/2023  5:20 PM  Signed  No chief complaint on file.      Vitals:    02/15/23 1719   BP: 139/88   BP Location: Left arm   Patient Position: Sitting   Cuff Size: Adult Large   Pulse: 64   SpO2: 99%       There is no height or weight on file to calculate BMI.    Nacho Plascencia EMT-P         Physical Examination:  /88 (BP Location: Left arm, Patient Position: Sitting, Cuff Size: Adult Large)   Pulse 64   LMP 2018   SpO2 99%   General: alert, oriented, in no acute distress, sitting comfortably  A formal abdominal examination was not performed.      Sincerely,    Bhavana oJhansen MD

## 2023-02-15 NOTE — NURSING NOTE
No chief complaint on file.      Vitals:    02/15/23 1719   BP: 139/88   BP Location: Left arm   Patient Position: Sitting   Cuff Size: Adult Large   Pulse: 64   SpO2: 99%       There is no height or weight on file to calculate BMI.    Nacho Plascencia EMT-P

## 2023-02-16 ENCOUNTER — TELEPHONE (OUTPATIENT)
Dept: SURGERY | Facility: CLINIC | Age: 58
End: 2023-02-16
Payer: OTHER GOVERNMENT

## 2023-02-16 NOTE — CONFIDENTIAL NOTE
Sangita calls with a likely mild diverticulitis attack. Current sx: nausea and pain in abdomen, LLQ.     Recommended to begin a low fiber/residue diet- educated on low fiber and low residue diet. Let her know to contact with further questions and concerns

## 2023-02-16 NOTE — TELEPHONE ENCOUNTER
Spoke with patient, she'd like her surgery admit case to be scheduled with Dr. Bhavana Johansen on Fri 3/31/2023 on the [facility]. Will schedule once Case Request is 2nd signed.    Scheduled all related appointments. Will send Surgery Packet once case is officially scheduled.    Judy Triana  Torri-op Coordinator  [facility]  Direct Phone: [phone]

## 2023-02-17 NOTE — TELEPHONE ENCOUNTER
FUTURE VISIT INFORMATION      SURGERY INFORMATION:    Date: 3/31/23    [facility]    Surgeon:  Bhavana Johansen MD    Anesthesia Type:  General    Procedure: COLECTOMY, SIGMOID, LAPAROSCOPIC    Consult: ov 2/15    RECORDS REQUESTED FROM:       Primary Care Provider: [facility]    Pertinent Medical History: AILEEN    Most recent Sleep Study:  6/24/22

## 2023-02-19 ENCOUNTER — TELEPHONE (OUTPATIENT)
Dept: SURGERY | Facility: CLINIC | Age: 58
End: 2023-02-19
Payer: OTHER GOVERNMENT

## 2023-02-19 DIAGNOSIS — K57.32 DIVERTICULITIS OF COLON: Primary | ICD-10-CM

## 2023-02-19 RX ORDER — CIPROFLOXACIN 500 MG/1
500 TABLET, FILM COATED ORAL 2 TIMES DAILY
Qty: 28 TABLET | Refills: 0 | Status: SHIPPED | OUTPATIENT
Start: 2023-02-19 | End: 2023-03-10

## 2023-02-19 RX ORDER — METRONIDAZOLE 500 MG/1
500 TABLET ORAL 3 TIMES DAILY
Qty: 42 TABLET | Refills: 0 | Status: SHIPPED | OUTPATIENT
Start: 2023-02-19 | End: 2023-03-10

## 2023-02-19 NOTE — TELEPHONE ENCOUNTER
Telephone Encounter Follow-up Note     Patient called the triage line with new onset abdominal pain and low grade fevers which have not improved over 48 hours.    I returned the call and spoke with the patient.  She first tried to do low residue diet but this did not improve things.  She is passing stool but feels uncomfortable and the pain is worsening slightly.  Her current temperature is 99 degrees.     Previous regimen has included Cipro/Flagyl which worked.       Impression: Acute recurrent diverticulitis episode.     Plan:  Discussed with patient that we will start her on 14 days of Cipro Flagyl. E-prescribed to her pharmacy  She will stay on low residue diet  We will check on her in 24-48 hours to ensure things are improving.   Currently scheduled for surgery 3/31/2023. For Lap Sigmoid Resection.  If she does well with this bout, we will keep this schedule. But if significant symptoms, I would want to delay surgery to get to a full 2 months beyond this.    Total time was 8 minutes on the phone plus another 7 minutes to document, over 50% was spent counseling the patient.     Bhavana Johansen MD  Colon and Rectal Surgery Attending  Department of Surgery  [facility]

## 2023-02-20 ENCOUNTER — PATIENT OUTREACH (OUTPATIENT)
Dept: SURGERY | Facility: CLINIC | Age: 58
End: 2023-02-20
Payer: OTHER GOVERNMENT

## 2023-02-20 NOTE — PROGRESS NOTES
Sangita called in this weekend with a new diverticulitis attack. She was prescribed cipro/flagyl. She states she has only had two doses but feeling a bit better. Denies fevers, blood in stool or changes in bowel habits. Reports abd tenderness. Will call her again on Thursday.

## 2023-02-23 ENCOUNTER — PATIENT OUTREACH (OUTPATIENT)
Dept: SURGERY | Facility: CLINIC | Age: 58
End: 2023-02-23
Payer: OTHER GOVERNMENT

## 2023-02-23 NOTE — PROGRESS NOTES
I called Sangita to check and see how she is doing today. She had a recent diverticulitis flare and was given antibiotics. She is feeling much better. Denies fevers and abdominal pain.

## 2023-03-10 ENCOUNTER — VIRTUAL VISIT (OUTPATIENT)
Dept: SURGERY | Facility: CLINIC | Age: 58
End: 2023-03-10
Payer: OTHER GOVERNMENT

## 2023-03-10 ENCOUNTER — PRE VISIT (OUTPATIENT)
Dept: SURGERY | Facility: CLINIC | Age: 58
End: 2023-03-10

## 2023-03-10 ENCOUNTER — ANESTHESIA EVENT (OUTPATIENT)
Dept: SURGERY | Facility: CLINIC | Age: 58
End: 2023-03-10

## 2023-03-10 DIAGNOSIS — Z01.818 PREOP EXAMINATION: Primary | ICD-10-CM

## 2023-03-10 DIAGNOSIS — K57.32 DIVERTICULITIS OF COLON: ICD-10-CM

## 2023-03-10 PROCEDURE — 99203 OFFICE O/P NEW LOW 30 MIN: CPT | Mod: VID | Performed by: PHYSICIAN ASSISTANT

## 2023-03-10 RX ORDER — MULTIVIT WITH MINERALS/LUTEIN
1000 TABLET ORAL PRN
Status: ON HOLD | COMMUNITY
End: 2023-03-31

## 2023-03-10 ASSESSMENT — ENCOUNTER SYMPTOMS: SEIZURES: 0

## 2023-03-10 ASSESSMENT — LIFESTYLE VARIABLES: TOBACCO_USE: 1

## 2023-03-10 NOTE — PATIENT INSTRUCTIONS
Preparing for Your Surgery      Name:  Sangita Meraz   MRN:  [id number]   :  1965   Today's Date:  3/10/2023       Arriving for surgery:  Surgery date:  3/31/23  Arrival time:  12:15 pm     Surgeries and procedures: Adult patients can have 2 visitors all through the surgery process.     Visiting hours: 8 a.m. to 8:30 p.m.     Hospital: Adult patients and children under age 18 can have 4 visitor at a time     No visitors under the age of 5 are allowed for hospital patients.  Double occupancy rooms: Patients can have only two visitors at a time.     Patients with disabilities: Can have a support person with them (family member, service provider     Or someone well informed about their needs) plus the allowed number of visitors     Patients confirmed or suspected to have symptoms of COVID 19 or flu:     No visitors allowed for adult patients.   Children (under age 18) can have 1 named visitor.     People who are sick or showing symptoms of COVID 19 or flu:    Are not allowed to visit patients--we can only make exceptions in special situations.       Please follow these guidelines for your visit:   Arrive wearing a mask over your mouth and nose; we will give you a medical mask to wear    If you arrive wearing a cloth mask.   Keep it on during your entire visit, even when in patient's room.   If you don't wear a mask we'll ask you to leave.     Clean your hands with alcohol hand . Do this when you arrive at and leave the building and patient room,    And again after you touch your mask or anything in the room.     You can t visit if you have a fever, cough, shortness of breath, muscle aches, headaches, sore throat    Or diarrhea      Stay 6 feet away from others during your visit and between visits     Go directly to and from the room you are visiting.     Stay in the patient s room during your visit. Limit going to other places in the hospital as much as possible     Leave bags and jackets at home or  in the car.     For everyone s health, please don t come and go during your visit. That includes for smoking   during your visit.     Please come to:     [facility] [address]  [address] [place]  [address]    - ? parking is available in front of the hospital      -    Please proceed to [address] on the 3rd floor. [phone]?     - ?If you are in need of directions, wheelchair or escort please stop at the Information Desk in the lobby.  Inform the information person that you are here for surgery; a wheelchair and escort to [address] will be provided.?     What can I eat or drink?  -  You may eat and drink normally up to 8 hours prior to arrival time.   -  You may have clear liquids until 2 hours prior to arrival time.    Examples of clear liquids:  Water  Clear broth  Juices (apple, white grape, white cranberry  and cider) without pulp  Noncarbonated, powder based beverages  (lemonade and Jamie-Aid)  Sodas (Sprite, 7-Up, ginger ale and seltzer)  Coffee or tea (without milk or cream)  Gatorade    -  No Alcohol for at least 24 hours before surgery.     Which medicines can I take?    Hold Aspirin for 7 days before surgery.   Hold Multivitamins for 7 days before surgery.  Hold Supplements for 7 days before surgery.  Hold Ibuprofen (Advil, Motrin) for 1 day(s) before surgery--unless otherwise directed by surgeon.  Hold Naproxen (Aleve) for 4 days before surgery.    -  PLEASE TAKE these medications the day of surgery:  Tylenol if needed; take morning medications.    How do I prepare myself?  - Please take 2 showers (one the night prior to surgery and one the morning of surgery) using Scrubcare or Hibiclens soap.    Use this soap only from the neck to your toes.     Leave the soap on your skin for one minute--then rinse thoroughly.      You may use your own shampoo and conditioner. No other hair products.   - Please remove all jewelry and body  piercings.  - No lotions, deodorants or fragrance.  - No makeup or fingernail polish.   - Bring your ID and insurance card.    -If you have a Deep Brain Stimulator, Spinal Cord Stimulator, or any Neuro Stimulator device---you must bring the remote control to the hospital.      ALL PATIENTS GOING HOME THE SAME DAY OF SURGERY ARE REQUIRED TO HAVE A RESPONSIBLE ADULT TO DRIVE AND BE IN ATTENDANCE WITH THEM FOR 24 HOURS FOLLOWING SURGERY.    Covid testing policy as of 12/06/2022  Your surgeon will notify and schedule you for a COVID test if one is needed before surgery--please direct any questions or COVID symptoms to your surgeon      Questions or Concerns:    - For any questions regarding the day of surgery or your hospital stay, please contact the Pre Admission Nursing Office at [phone].       - If you have health changes between today and your surgery, please call your surgeon.       - For questions after surgery, please call your surgeons office.     Enhanced Recovery After Surgery     This is a team effort, including you, to get you back on your feet, eating and drinking      normally and out of the hospital as quickly as possible.  The goals are: 1) NO INFECTIONS and   2) RETURN TO NORMAL DIET    How can we achieve these goals?  1) STAY ACTIVE: Walk every day before your surgery; try to increase the amount every day.  Walk after surgery as much as you can-the nurses will help you.  Walking speeds healing and gets you home quicker, you heal better at home and have less risk of infection.     2) STAY HYDRATED: Drink clear liquids up until 2 hours prior to arrival. We would like you to purchase a drink such as Gatorade or Ensure Clear (not the milkshake type).  Drink this before bedtime and the morning of surgery, drink between 8-10 ounces or until you feel hydrated.  Keeping well hydrated leads to your veins being plump, you wake up faster, and you are less likely to be nauseated. Start drinking water as soon  as you can after surgery and advance to clear liquids and food as tolerated.  IV fluids contain salt, drinking fluids will minimize the amount of IV fluids you need and decrease the amount of salt you get.    The most common reason for the patient to be readmitted is dehydration. Staying hydrated after you go home from the hospital is very important.  Ensure or Ensure Clear are good options to keep you hydrated.     3) PAIN MANAGEMENT: If we minimize the amount of opioids and narcotics, and use regional blocks (which numb the area where your surgery is) along with oral pain medications; you will have less side effects of nausea and constipation. Narcotics can slow down your bowels and cause you to stay in the hospital longer.     Our goal is to keep you comfortable; eating and drinking normally and back home safely.

## 2023-03-10 NOTE — H&P (VIEW-ONLY)
Pre-Operative H & P     CC:  Preoperative exam to assess for increased cardiopulmonary risk while undergoing surgery and anesthesia.    Date of Encounter: 3/10/2023  Primary Care Physician:  No Ref-Primary, Physician     Reason for visit: Diverticulosis of large intestine without hemorrhage; Diverticulitis of colon     HPI  Sangita Meraz is a 58 y/o female who presents for pre-operative H&P in preparation for COLECTOMY, SIGMOID, LAPAROSCOPIC with Bhavana Johansen MD on 3/31/23 at [facility] for treatment of Diverticulosis of large intestine without hemorrhage; Diverticulitis of colon.       Patient is being evaluated for comorbid conditions of AILEEN, former tobacco use, depression, Meniere s, obesity, PCOS.       Ms. Meraz has a history of recurrent diverticulitis since 07/2020.  In total, she has had 5-7 episodes of acute diverticulitis. Each time she was treated with oral antibiotics. Most recently, she presented to the ED on 12/5/22 and subsequently required Cipro and Flagyl.   In the setting of significant recurrent episodes, the above procedure has been recommended.      History was obtained from patient & chart review.     Hx of abnormal bleeding or anti-platelet use: denies    Menstrual history: Patient's last menstrual period was 12/09/2018.:       Past Medical History  Past Medical History:   Diagnosis Date     Depressive disorder 1982     Diverticulosis of large intestine without hemorrhage      Obstructive sleep apnea syndrome 10/17/2022       Past Surgical History  Past Surgical History:   Procedure Laterality Date     ABDOMEN SURGERY  '92,'09,'15    2 c-secs, abdomenplasty     CHOLECYSTECTOMY  '98     COLONOSCOPY N/A 03/16/2021    Procedure: COLONOSCOPY, WITH BIOPSY AND CLIPPING;  Surgeon: Kemar Mckeon MD;  Location: [facility] OR     DILATION AND CURETTAGE, OPERATIVE HYSTEROSCOPY, COMBINED N/A 10/18/2022    Procedure: HYSTEROSCOPY, WITH DILATION AND  "CURETTAGE OF UTERUS and polypectomy;  Surgeon: Ines Chi MD;  Location: [facility]     ESOPHAGOSCOPY, GASTROSCOPY, DUODENOSCOPY (EGD), COMBINED N/A 12/09/2020    Procedure: ESOPHAGOGASTRODUODENOSCOPY, WITH BIOPSY;  Surgeon: Anton Knott DO;  Location: [facility]     GENITOURINARY SURGERY  '01, '03, '07    tubal ligation, anterior/posterior repair, tubal ligation reversal       Prior to Admission Medications  Current Outpatient Medications   Medication Sig Dispense Refill     acetaminophen (TYLENOL) 500 MG tablet Take 500 mg by mouth every 6 hours as needed for mild pain       estradiol (ESTRING) 2 MG vaginal ring Place 1 each vaginally every 3 months 3 each 4     omeprazole 20 MG tablet Take 20 mg by mouth every evening       ondansetron (ZOFRAN ODT) 4 MG ODT tab Take 1 tablet (4 mg) by mouth every 8 hours as needed for nausea 10 tablet 0     sertraline (ZOLOFT) 100 MG tablet Take 150 mg by mouth every evening       UNABLE TO FIND every morning MEDICATION NAME: Mix of Supplements; Tumeric, Green tea, Vit D and Probiotic       vitamin C (ASCORBIC ACID) 1000 MG TABS Take 1,000 mg by mouth as needed       metroNIDAZOLE (FLAGYL) 500 MG tablet Take 1 tablet (500 mg) by mouth every 6 hours At 8:00 am, 2:00 pm, 8:00 pm the day prior to your surgery with neomycin and zofran. 3 tablet 0     neomycin (MYCIFRADIN) 500 MG tablet Take 2 tablets (1,000 mg) by mouth every 6 hours At 8:00 am, 2:00 pm, 8:00 pm the day prior to your surgery with flagyl and zofran. 6 tablet 0     ondansetron (ZOFRAN) 4 MG tablet Take 1 tablet (4 mg) by mouth every 6 hours At 8:00 am, 2:00 pm, 8:00 pm the day prior to your surgery with neomycin and flagyl. 3 tablet 0     polyethylene glycol (MIRALAX) 17 g packet Take 238 g by mouth See Admin Instructions Starting at 4 pm night prior to surgery. Refer to \"Getting Ready for Surgery\" instructions. 14 packet 0     psyllium (METAMUCIL/KONSYL) 58.6 % powder Take by mouth daily (Patient not " taking: Reported on 2023)         Allergies  Allergies   Allergen Reactions     Ivp Dye [Contrast Dye] Anaphylaxis     Other (Do Not Use)      Other reaction(s): Itching,Watering Eyes, Rash, Sneezing, Itching,Watering Eyes, Rash, Sneezing, Itching,Watering Eyes, Rash, Sneezing       Social History  Social History     Socioeconomic History     Marital status:      Spouse name: Not on file     Number of children: Not on file     Years of education: Not on file     Highest education level: Not on file   Occupational History     Not on file   Tobacco Use     Smoking status: Former     Packs/day: 1.00     Years: 10.00     Pack years: 10.00     Types: Cigarettes     Start date: 10/10/1982     Quit date: 1991     Years since quittin.8     Smokeless tobacco: Never   Vaping Use     Vaping Use: Never used   Substance and Sexual Activity     Alcohol use: Yes     Comment: 1-2 beer/month     Drug use: Never     Sexual activity: Yes     Partners: Male     Birth control/protection: Post-menopausal   Other Topics Concern     Parent/sibling w/ CABG, MI or angioplasty before 65F 55M? No   Social History Narrative     Not on file     Social Determinants of Health     Financial Resource Strain: Not on file   Food Insecurity: Not on file   Transportation Needs: Not on file   Physical Activity: Not on file   Stress: Not on file   Social Connections: Not on file   Intimate Partner Violence: Not on file   Housing Stability: Not on file       Family History  Family History   Problem Relation Age of Onset     Uterine Cancer Mother         endometrial cancer     Diabetes Mother      Colon Polyps Father      Diabetes Father      Hypertension Father      Diabetes Brother      Hypertension Brother      Pulmonary Embolism Brother         following leg surgery     Anesthesia Reaction No family hx of        Review of Systems  The complete review of systems is negative other than noted in the HPI or here.   Anesthesia  Evaluation   Pt has had prior anesthetic.     History of anesthetic complications   Hypotension w/ epidural during childbirth.    ROS/MED HX  ENT/Pulmonary:     (+) sleep apnea, uses CPAP, tobacco use, Past use,  (-) asthma and recent URI   Neurologic: Comment: Meniere's, tinnitus, very mild balance issues. Hearing loss.   (-) no seizures and no CVA   Cardiovascular:       METS/Exercise Tolerance: 4 - Raking leaves, gardening    Hematologic: Comments: Extremely difficult IV access; has required US guidance in past.  PIVs don't last long and tend to infiltrate.   (-) history of blood clots and history of blood transfusion   Musculoskeletal:  - neg musculoskeletal ROS     GI/Hepatic: Comment: Rectal prolapse    Diverticulitis    PUD      (+) GERD, Asymptomatic on medication,  (-) liver disease   Renal/Genitourinary:  - neg Renal ROS  (-) renal disease   Endo: Comment: PCOS    (+) Obesity,  (-) Type II DM   Psychiatric/Substance Use:     (+) psychiatric history depression     Infectious Disease:  - neg infectious disease ROS     Malignancy:  - neg malignancy ROS     Other:  - neg other ROS          Virtual visit -  No vitals were obtained    Physical Exam  Constitutional: Awake, alert, cooperative, no apparent distress, and appears stated age.  HENT: Normocephalic  Respiratory: non labored breathing   Neurologic: Awake, alert, oriented to name, place and time.   Neuropsychiatric: Calm, cooperative. Normal affect.      Prior Labs/Diagnostic Studies   All labs and imaging personally reviewed     CT ABDOMEN PELVIS W/O CONTRAST 12/25/2022   IMPRESSION:    1.  Persistent mild mural thickening of the proximal sigmoid/distal descending colon with adjacent free fluid, when compared to 12/5/2022 the degree of inflammation in this area is decreased consistent with improving but continuing diverticulitis. Within   limits of a noncontrast CT scan no intra-abdominal abscess/fluid collection is seen at this time.   2.  Increased  number of mesenteric lymph nodes with mild mesenteric intravenous again noted, this was present on the prior exam and is a very subtle in appearance, favored to at least in part be reactive in nature secondary to the patient's    diverticulitis, a more chronic process such as mesenteric panniculitis may also be present and would have a similar appearance. Correlation with patient's clinical history is recommended.     The patient's records and results personally reviewed by this provider.     Labs to be collected on 3/13/23:  CMP, CBC, prealb, T&S    Assessment      Sangita Meraz is a 57 year old female seen as a PAC referral for risk assessment and optimization for anesthesia.    Plan/Recommendations  Pt will be optimized for the proposed procedure.  See below for details on the assessment, risk, and preoperative recommendations    NEUROLOGY  - No history of TIA, CVA or seizure    -Post Op delirium risk factors:  No risk identified    ENT  - No current airway concerns.  Will need to be reassessed day of surgery.  Mallampati: Unable to assess  TM: Unable to assess    CARDIAC  - No history of CAD, Hypertension and Afib  - METS (Metabolic Equivalents)  Patient performs 4 or more METS exercise without symptoms            Total Score: 0      RCRI-Low risk: Class 2 0.9% complication rate            Total Score: 1    RCRI: High Risk Surgery        PULMONARY  - AILEEN w/ CPAP    AILEEN High Risk            Total Score: 5    AILEEN: Snores loudly    AILEEN: Often tired    AILEEN: Observed stopped breathing    AILEEN: BMI over 35 kg/m2    AILEEN: Over 50 ys old      - Denies asthma or inhaler use  - Tobacco History      History   Smoking Status     Former     Packs/day: 1.00     Years: 10.00     Types: Cigarettes     Start date: 10/10/1982     Quit date: 5/16/1991   Smokeless Tobacco     Never       GI  - GERD, asymptomatic on prilosec. Hx gastric ulcer.  - Diverticulitis    PONV High Risk  Total Score: 4           1 AN PONV: Pt is Female    1  "AN PONV: Patient is not a current smoker    1 AN PONV: Patient has history of PONV    1 AN PONV: Intended Post Op Opioids          ENDOCRINE    - BMI: Estimated body mass index is 38.48 kg/m  as calculated from the following:    Height as of 1/18/23: 1.6 m (5' 3\").    Weight as of 1/18/23: 98.5 kg (217 lb 3.2 oz).  Obesity (BMI >30)  - No history of Diabetes Mellitus   - PCOS    HEME  VTE Low Risk 0.26%            Total Score: 0      - No history of abnormal bleeding or antiplatelet use.      The patient is aware that the final anesthesia plan will be decided by the assigned anesthesia provider on the date of service.    The patient is optimized for their procedure. AVS with information on surgery time/arrival time, meds and NPO status given by nursing staff. No further diagnostic testing indicated.    Please refer to the physical examination documented by the anesthesiologist in the anesthesia record on the day of surgery.    Video-Visit Details    Type of service:  Video Visit    Provider received verbal consent for a Video Visit from the patient? Yes     Originating Location (pt. Location): Home    Distant Location (provider location):  Off-site  Mode of Communication:  Video Conference via Impressto  On the day of service:     Prep time: 14 minutes  Visit time: 11 minutes  Documentation time: 12 minutes  ------------------------------------------  Total time: 37 minutes      Neelima Swanson PA-C  Preoperative Assessment Center  [facility]  [facility]  Phone: [phone]  Fax: [phone]  "

## 2023-03-10 NOTE — PROGRESS NOTES
Sangita is a 57 year old who is being evaluated via a billable video visit.      How would you like to obtain your AVS? Roman Joshi   Sangita is a 57 year old presenting for the following health issues:  Pre-Op Exam (/)      HPI       Review of Systems       Physical Exam         ELIDA Parham LPN

## 2023-03-12 LAB
ABO/RH(D): NORMAL
ANTIBODY SCREEN: NEGATIVE
SPECIMEN EXPIRATION DATE: NORMAL

## 2023-03-13 ENCOUNTER — LAB (OUTPATIENT)
Dept: LAB | Facility: CLINIC | Age: 58
End: 2023-03-13
Payer: OTHER GOVERNMENT

## 2023-03-13 DIAGNOSIS — Z01.818 PREOP EXAMINATION: ICD-10-CM

## 2023-03-13 DIAGNOSIS — K57.32 DIVERTICULITIS OF COLON: ICD-10-CM

## 2023-03-13 DIAGNOSIS — K57.30 DIVERTICULOSIS OF LARGE INTESTINE WITHOUT HEMORRHAGE: ICD-10-CM

## 2023-03-13 LAB
ALBUMIN SERPL BCG-MCNC: 4.3 G/DL (ref 3.5–5.2)
ALP SERPL-CCNC: 66 U/L (ref 35–104)
ALT SERPL W P-5'-P-CCNC: 29 U/L (ref 10–35)
ANION GAP SERPL CALCULATED.3IONS-SCNC: 9 MMOL/L (ref 7–15)
AST SERPL W P-5'-P-CCNC: 21 U/L (ref 10–35)
BASOPHILS # BLD AUTO: 0 10E3/UL (ref 0–0.2)
BASOPHILS NFR BLD AUTO: 0 %
BILIRUB SERPL-MCNC: 0.4 MG/DL
BUN SERPL-MCNC: 9.5 MG/DL (ref 6–20)
CALCIUM SERPL-MCNC: 9.8 MG/DL (ref 8.6–10)
CHLORIDE SERPL-SCNC: 100 MMOL/L (ref 98–107)
CREAT SERPL-MCNC: 0.87 MG/DL (ref 0.51–0.95)
DEPRECATED HCO3 PLAS-SCNC: 28 MMOL/L (ref 22–29)
EOSINOPHIL # BLD AUTO: 0.2 10E3/UL (ref 0–0.7)
EOSINOPHIL NFR BLD AUTO: 3 %
ERYTHROCYTE [DISTWIDTH] IN BLOOD BY AUTOMATED COUNT: 12.8 % (ref 10–15)
GFR SERPL CREATININE-BSD FRML MDRD: 78 ML/MIN/1.73M2
GLUCOSE SERPL-MCNC: 147 MG/DL (ref 70–99)
HCT VFR BLD AUTO: 42.8 % (ref 35–47)
HGB BLD-MCNC: 14.5 G/DL (ref 11.7–15.7)
IMM GRANULOCYTES # BLD: 0 10E3/UL
IMM GRANULOCYTES NFR BLD: 0 %
LYMPHOCYTES # BLD AUTO: 1.5 10E3/UL (ref 0.8–5.3)
LYMPHOCYTES NFR BLD AUTO: 24 %
MCH RBC QN AUTO: 31 PG (ref 26.5–33)
MCHC RBC AUTO-ENTMCNC: 33.9 G/DL (ref 31.5–36.5)
MCV RBC AUTO: 92 FL (ref 78–100)
MONOCYTES # BLD AUTO: 0.4 10E3/UL (ref 0–1.3)
MONOCYTES NFR BLD AUTO: 6 %
NEUTROPHILS # BLD AUTO: 4.1 10E3/UL (ref 1.6–8.3)
NEUTROPHILS NFR BLD AUTO: 67 %
PLATELET # BLD AUTO: 219 10E3/UL (ref 150–450)
POTASSIUM SERPL-SCNC: 4.8 MMOL/L (ref 3.4–5.3)
PROT SERPL-MCNC: 7.4 G/DL (ref 6.4–8.3)
RBC # BLD AUTO: 4.68 10E6/UL (ref 3.8–5.2)
SODIUM SERPL-SCNC: 137 MMOL/L (ref 136–145)
WBC # BLD AUTO: 6.2 10E3/UL (ref 4–11)

## 2023-03-13 PROCEDURE — 85025 COMPLETE CBC W/AUTO DIFF WBC: CPT

## 2023-03-13 PROCEDURE — 86850 RBC ANTIBODY SCREEN: CPT

## 2023-03-13 PROCEDURE — 86900 BLOOD TYPING SEROLOGIC ABO: CPT

## 2023-03-13 PROCEDURE — 84134 ASSAY OF PREALBUMIN: CPT

## 2023-03-13 PROCEDURE — 80053 COMPREHEN METABOLIC PANEL: CPT

## 2023-03-13 PROCEDURE — 86901 BLOOD TYPING SEROLOGIC RH(D): CPT

## 2023-03-13 PROCEDURE — 36415 COLL VENOUS BLD VENIPUNCTURE: CPT

## 2023-03-14 LAB — PREALB SERPL IA-MCNC: 22 MG/DL (ref 15–45)

## 2023-03-17 ENCOUNTER — TEAM CONFERENCE (OUTPATIENT)
Dept: SURGERY | Facility: CLINIC | Age: 58
End: 2023-03-17
Payer: OTHER GOVERNMENT

## 2023-03-17 NOTE — PROGRESS NOTES
COLON AND RECTAL SURGERY HUDDLE:    Patient was reviewed in preporation for their surgery the following was reviewed and has been completed:    Surgeon: Dr. Bhavana Johansen    Surgery & Date: 3/31 lap sigmoid colectomy      Last MD Note: reviewed    Anesthesia Type: General    Other Providers: No    PAC: Yes    WOC: N/A    Labs: Yes    Bowel Prep: Yes MiraLAX / Gatorade  and Antibiotic    Packet: Yes    Imaging: N/A    Post-Op Appointments: Yes    COVID: N/A    Pre op soap: Yes Questions about shower: no    Is patient on TPN?: N/A   If yes, I contacted the TPN pharmacist by paging the  pharmacy at [phone] or calling [phone]. I also contacted Joslyn KEITH with inpatient colon and rectal team.     Pre op call complete: Yes

## 2023-03-30 ENCOUNTER — ANESTHESIA EVENT (OUTPATIENT)
Dept: SURGERY | Facility: CLINIC | Age: 58
DRG: 331 | End: 2023-03-30
Payer: OTHER GOVERNMENT

## 2023-03-31 ENCOUNTER — ANESTHESIA (OUTPATIENT)
Dept: SURGERY | Facility: CLINIC | Age: 58
DRG: 331 | End: 2023-03-31
Payer: OTHER GOVERNMENT

## 2023-03-31 ENCOUNTER — HOSPITAL ENCOUNTER (INPATIENT)
Facility: CLINIC | Age: 58
LOS: 3 days | Discharge: HOME | DRG: 331 | End: 2023-04-03
Attending: COLON & RECTAL SURGERY | Admitting: COLON & RECTAL SURGERY
Payer: OTHER GOVERNMENT

## 2023-03-31 DIAGNOSIS — K57.92 DIVERTICULITIS: Primary | ICD-10-CM

## 2023-03-31 LAB
CREAT SERPL-MCNC: 0.77 MG/DL (ref 0.51–0.95)
GFR SERPL CREATININE-BSD FRML MDRD: 90 ML/MIN/1.73M2
GLUCOSE BLDC GLUCOMTR-MCNC: 93 MG/DL (ref 70–99)

## 2023-03-31 PROCEDURE — 250N000011 HC RX IP 250 OP 636: Performed by: ANESTHESIOLOGY

## 2023-03-31 PROCEDURE — 250N000011 HC RX IP 250 OP 636: Performed by: STUDENT IN AN ORGANIZED HEALTH CARE EDUCATION/TRAINING PROGRAM

## 2023-03-31 PROCEDURE — C9290 INJ, BUPIVACAINE LIPOSOME: HCPCS

## 2023-03-31 PROCEDURE — 250N000025 HC SEVOFLURANE, PER MIN: Performed by: COLON & RECTAL SURGERY

## 2023-03-31 PROCEDURE — 36415 COLL VENOUS BLD VENIPUNCTURE: CPT | Performed by: STUDENT IN AN ORGANIZED HEALTH CARE EDUCATION/TRAINING PROGRAM

## 2023-03-31 PROCEDURE — 250N000011 HC RX IP 250 OP 636

## 2023-03-31 PROCEDURE — 360N000077 HC SURGERY LEVEL 4, PER MIN: Performed by: COLON & RECTAL SURGERY

## 2023-03-31 PROCEDURE — 94660 CPAP INITIATION&MGMT: CPT

## 2023-03-31 PROCEDURE — 258N000003 HC RX IP 258 OP 636: Performed by: ANESTHESIOLOGY

## 2023-03-31 PROCEDURE — 82565 ASSAY OF CREATININE: CPT | Performed by: STUDENT IN AN ORGANIZED HEALTH CARE EDUCATION/TRAINING PROGRAM

## 2023-03-31 PROCEDURE — 258N000003 HC RX IP 258 OP 636: Performed by: STUDENT IN AN ORGANIZED HEALTH CARE EDUCATION/TRAINING PROGRAM

## 2023-03-31 PROCEDURE — 250N000013 HC RX MED GY IP 250 OP 250 PS 637: Performed by: STUDENT IN AN ORGANIZED HEALTH CARE EDUCATION/TRAINING PROGRAM

## 2023-03-31 PROCEDURE — 250N000013 HC RX MED GY IP 250 OP 250 PS 637: Performed by: COLON & RECTAL SURGERY

## 2023-03-31 PROCEDURE — 250N000009 HC RX 250: Performed by: ANESTHESIOLOGY

## 2023-03-31 PROCEDURE — 44213 LAP MOBIL SPLENIC FL ADD-ON: CPT | Performed by: COLON & RECTAL SURGERY

## 2023-03-31 PROCEDURE — 44207 L COLECTOMY/COLOPROCTOSTOMY: CPT | Mod: 22 | Performed by: COLON & RECTAL SURGERY

## 2023-03-31 PROCEDURE — 88307 TISSUE EXAM BY PATHOLOGIST: CPT | Mod: 26 | Performed by: PATHOLOGY

## 2023-03-31 PROCEDURE — 0DBN4ZZ EXCISION OF SIGMOID COLON, PERCUTANEOUS ENDOSCOPIC APPROACH: ICD-10-PCS | Performed by: COLON & RECTAL SURGERY

## 2023-03-31 PROCEDURE — 370N000017 HC ANESTHESIA TECHNICAL FEE, PER MIN: Performed by: COLON & RECTAL SURGERY

## 2023-03-31 PROCEDURE — 999N000157 HC STATISTIC RCP TIME EA 10 MIN

## 2023-03-31 PROCEDURE — 88307 TISSUE EXAM BY PATHOLOGIST: CPT | Mod: TC | Performed by: COLON & RECTAL SURGERY

## 2023-03-31 PROCEDURE — 0DBM4ZZ EXCISION OF DESCENDING COLON, PERCUTANEOUS ENDOSCOPIC APPROACH: ICD-10-PCS | Performed by: COLON & RECTAL SURGERY

## 2023-03-31 PROCEDURE — 710N000010 HC RECOVERY PHASE 1, LEVEL 2, PER MIN: Performed by: COLON & RECTAL SURGERY

## 2023-03-31 PROCEDURE — 272N000001 HC OR GENERAL SUPPLY STERILE: Performed by: COLON & RECTAL SURGERY

## 2023-03-31 PROCEDURE — 999N000141 HC STATISTIC PRE-PROCEDURE NURSING ASSESSMENT: Performed by: COLON & RECTAL SURGERY

## 2023-03-31 PROCEDURE — 120N000002 HC R&B MED SURG/OB UMMC

## 2023-03-31 PROCEDURE — 0DJD8ZZ INSPECTION OF LOWER INTESTINAL TRACT, VIA NATURAL OR ARTIFICIAL OPENING ENDOSCOPIC: ICD-10-PCS | Performed by: COLON & RECTAL SURGERY

## 2023-03-31 PROCEDURE — 250N000011 HC RX IP 250 OP 636: Performed by: COLON & RECTAL SURGERY

## 2023-03-31 RX ORDER — ONDANSETRON 2 MG/ML
4 INJECTION INTRAMUSCULAR; INTRAVENOUS EVERY 30 MIN PRN
Status: DISCONTINUED | OUTPATIENT
Start: 2023-03-31 | End: 2023-03-31 | Stop reason: HOSPADM

## 2023-03-31 RX ORDER — FENTANYL CITRATE 50 UG/ML
INJECTION, SOLUTION INTRAMUSCULAR; INTRAVENOUS PRN
Status: DISCONTINUED | OUTPATIENT
Start: 2023-03-31 | End: 2023-03-31

## 2023-03-31 RX ORDER — NALOXONE HYDROCHLORIDE 0.4 MG/ML
0.2 INJECTION, SOLUTION INTRAMUSCULAR; INTRAVENOUS; SUBCUTANEOUS
Status: DISCONTINUED | OUTPATIENT
Start: 2023-03-31 | End: 2023-04-03 | Stop reason: HOSPADM

## 2023-03-31 RX ORDER — SODIUM CHLORIDE, SODIUM LACTATE, POTASSIUM CHLORIDE, CALCIUM CHLORIDE 600; 310; 30; 20 MG/100ML; MG/100ML; MG/100ML; MG/100ML
INJECTION, SOLUTION INTRAVENOUS CONTINUOUS
Status: DISCONTINUED | OUTPATIENT
Start: 2023-03-31 | End: 2023-03-31 | Stop reason: HOSPADM

## 2023-03-31 RX ORDER — ONDANSETRON 2 MG/ML
INJECTION INTRAMUSCULAR; INTRAVENOUS PRN
Status: DISCONTINUED | OUTPATIENT
Start: 2023-03-31 | End: 2023-03-31

## 2023-03-31 RX ORDER — OXYCODONE HYDROCHLORIDE 5 MG/1
5 TABLET ORAL EVERY 4 HOURS PRN
Status: DISCONTINUED | OUTPATIENT
Start: 2023-03-31 | End: 2023-04-03 | Stop reason: HOSPADM

## 2023-03-31 RX ORDER — HYDROMORPHONE HCL IN WATER/PF 6 MG/30 ML
0.4 PATIENT CONTROLLED ANALGESIA SYRINGE INTRAVENOUS
Status: DISCONTINUED | OUTPATIENT
Start: 2023-03-31 | End: 2023-04-03 | Stop reason: HOSPADM

## 2023-03-31 RX ORDER — GLYCOPYRROLATE 0.2 MG/ML
INJECTION, SOLUTION INTRAMUSCULAR; INTRAVENOUS PRN
Status: DISCONTINUED | OUTPATIENT
Start: 2023-03-31 | End: 2023-03-31

## 2023-03-31 RX ORDER — BUPIVACAINE HYDROCHLORIDE 2.5 MG/ML
INJECTION, SOLUTION EPIDURAL; INFILTRATION; INTRACAUDAL
Status: COMPLETED | OUTPATIENT
Start: 2023-03-31 | End: 2023-03-31

## 2023-03-31 RX ORDER — ACETAMINOPHEN 325 MG/1
650 TABLET ORAL EVERY 4 HOURS PRN
Status: DISCONTINUED | OUTPATIENT
Start: 2023-04-03 | End: 2023-04-03 | Stop reason: HOSPADM

## 2023-03-31 RX ORDER — FENTANYL CITRATE 50 UG/ML
25-50 INJECTION, SOLUTION INTRAMUSCULAR; INTRAVENOUS
Status: DISCONTINUED | OUTPATIENT
Start: 2023-03-31 | End: 2023-03-31 | Stop reason: HOSPADM

## 2023-03-31 RX ORDER — HYDROMORPHONE HCL IN WATER/PF 6 MG/30 ML
0.2 PATIENT CONTROLLED ANALGESIA SYRINGE INTRAVENOUS
Status: DISCONTINUED | OUTPATIENT
Start: 2023-03-31 | End: 2023-04-03 | Stop reason: HOSPADM

## 2023-03-31 RX ORDER — HYDROMORPHONE HYDROCHLORIDE 1 MG/ML
0.2 INJECTION, SOLUTION INTRAMUSCULAR; INTRAVENOUS; SUBCUTANEOUS EVERY 5 MIN PRN
Status: DISCONTINUED | OUTPATIENT
Start: 2023-03-31 | End: 2023-03-31 | Stop reason: HOSPADM

## 2023-03-31 RX ORDER — FLUMAZENIL 0.1 MG/ML
0.2 INJECTION, SOLUTION INTRAVENOUS
Status: DISCONTINUED | OUTPATIENT
Start: 2023-03-31 | End: 2023-03-31 | Stop reason: HOSPADM

## 2023-03-31 RX ORDER — NALOXONE HYDROCHLORIDE 0.4 MG/ML
0.4 INJECTION, SOLUTION INTRAMUSCULAR; INTRAVENOUS; SUBCUTANEOUS
Status: DISCONTINUED | OUTPATIENT
Start: 2023-03-31 | End: 2023-03-31 | Stop reason: HOSPADM

## 2023-03-31 RX ORDER — FENTANYL CITRATE 50 UG/ML
25 INJECTION, SOLUTION INTRAMUSCULAR; INTRAVENOUS EVERY 5 MIN PRN
Status: DISCONTINUED | OUTPATIENT
Start: 2023-03-31 | End: 2023-03-31 | Stop reason: HOSPADM

## 2023-03-31 RX ORDER — NALOXONE HYDROCHLORIDE 0.4 MG/ML
0.2 INJECTION, SOLUTION INTRAMUSCULAR; INTRAVENOUS; SUBCUTANEOUS
Status: DISCONTINUED | OUTPATIENT
Start: 2023-03-31 | End: 2023-03-31 | Stop reason: HOSPADM

## 2023-03-31 RX ORDER — NALOXONE HYDROCHLORIDE 0.4 MG/ML
0.4 INJECTION, SOLUTION INTRAMUSCULAR; INTRAVENOUS; SUBCUTANEOUS
Status: DISCONTINUED | OUTPATIENT
Start: 2023-03-31 | End: 2023-04-03 | Stop reason: HOSPADM

## 2023-03-31 RX ORDER — ONDANSETRON 4 MG/1
4 TABLET, ORALLY DISINTEGRATING ORAL EVERY 30 MIN PRN
Status: DISCONTINUED | OUTPATIENT
Start: 2023-03-31 | End: 2023-03-31 | Stop reason: HOSPADM

## 2023-03-31 RX ORDER — HYDROMORPHONE HYDROCHLORIDE 1 MG/ML
0.4 INJECTION, SOLUTION INTRAMUSCULAR; INTRAVENOUS; SUBCUTANEOUS EVERY 5 MIN PRN
Status: DISCONTINUED | OUTPATIENT
Start: 2023-03-31 | End: 2023-03-31 | Stop reason: HOSPADM

## 2023-03-31 RX ORDER — ONDANSETRON 4 MG/1
4 TABLET, ORALLY DISINTEGRATING ORAL EVERY 6 HOURS PRN
Status: DISCONTINUED | OUTPATIENT
Start: 2023-03-31 | End: 2023-04-03 | Stop reason: HOSPADM

## 2023-03-31 RX ORDER — OXYCODONE HYDROCHLORIDE 10 MG/1
10 TABLET ORAL EVERY 4 HOURS PRN
Status: DISCONTINUED | OUTPATIENT
Start: 2023-03-31 | End: 2023-04-03 | Stop reason: HOSPADM

## 2023-03-31 RX ORDER — LIDOCAINE 40 MG/G
CREAM TOPICAL
Status: DISCONTINUED | OUTPATIENT
Start: 2023-03-31 | End: 2023-03-31 | Stop reason: HOSPADM

## 2023-03-31 RX ORDER — HEPARIN SODIUM 5000 [USP'U]/.5ML
5000 INJECTION, SOLUTION INTRAVENOUS; SUBCUTANEOUS
Status: COMPLETED | OUTPATIENT
Start: 2023-03-31 | End: 2023-03-31

## 2023-03-31 RX ORDER — SODIUM CHLORIDE, SODIUM LACTATE, POTASSIUM CHLORIDE, CALCIUM CHLORIDE 600; 310; 30; 20 MG/100ML; MG/100ML; MG/100ML; MG/100ML
INJECTION, SOLUTION INTRAVENOUS CONTINUOUS PRN
Status: DISCONTINUED | OUTPATIENT
Start: 2023-03-31 | End: 2023-03-31

## 2023-03-31 RX ORDER — ERTAPENEM 1 G/1
1 INJECTION, POWDER, LYOPHILIZED, FOR SOLUTION INTRAMUSCULAR; INTRAVENOUS
Status: COMPLETED | OUTPATIENT
Start: 2023-03-31 | End: 2023-03-31

## 2023-03-31 RX ORDER — FENTANYL CITRATE 50 UG/ML
50 INJECTION, SOLUTION INTRAMUSCULAR; INTRAVENOUS EVERY 5 MIN PRN
Status: DISCONTINUED | OUTPATIENT
Start: 2023-03-31 | End: 2023-03-31 | Stop reason: HOSPADM

## 2023-03-31 RX ORDER — SODIUM CHLORIDE, SODIUM LACTATE, POTASSIUM CHLORIDE, CALCIUM CHLORIDE 600; 310; 30; 20 MG/100ML; MG/100ML; MG/100ML; MG/100ML
INJECTION, SOLUTION INTRAVENOUS CONTINUOUS
Status: DISCONTINUED | OUTPATIENT
Start: 2023-03-31 | End: 2023-04-02

## 2023-03-31 RX ORDER — ACETAMINOPHEN 325 MG/1
975 TABLET ORAL EVERY 8 HOURS
Status: COMPLETED | OUTPATIENT
Start: 2023-03-31 | End: 2023-04-03

## 2023-03-31 RX ORDER — LIDOCAINE HYDROCHLORIDE 20 MG/ML
INJECTION, SOLUTION INFILTRATION; PERINEURAL PRN
Status: DISCONTINUED | OUTPATIENT
Start: 2023-03-31 | End: 2023-03-31

## 2023-03-31 RX ORDER — LIDOCAINE 40 MG/G
CREAM TOPICAL
Status: DISCONTINUED | OUTPATIENT
Start: 2023-03-31 | End: 2023-04-03 | Stop reason: HOSPADM

## 2023-03-31 RX ORDER — PROPOFOL 10 MG/ML
INJECTION, EMULSION INTRAVENOUS PRN
Status: DISCONTINUED | OUTPATIENT
Start: 2023-03-31 | End: 2023-03-31

## 2023-03-31 RX ORDER — ONDANSETRON 2 MG/ML
4 INJECTION INTRAMUSCULAR; INTRAVENOUS EVERY 6 HOURS PRN
Status: DISCONTINUED | OUTPATIENT
Start: 2023-03-31 | End: 2023-04-03 | Stop reason: HOSPADM

## 2023-03-31 RX ORDER — ACETAMINOPHEN 325 MG/1
975 TABLET ORAL ONCE
Status: COMPLETED | OUTPATIENT
Start: 2023-03-31 | End: 2023-03-31

## 2023-03-31 RX ORDER — DEXAMETHASONE SODIUM PHOSPHATE 4 MG/ML
INJECTION, SOLUTION INTRA-ARTICULAR; INTRALESIONAL; INTRAMUSCULAR; INTRAVENOUS; SOFT TISSUE PRN
Status: DISCONTINUED | OUTPATIENT
Start: 2023-03-31 | End: 2023-03-31

## 2023-03-31 RX ORDER — ENOXAPARIN SODIUM 100 MG/ML
40 INJECTION SUBCUTANEOUS EVERY 24 HOURS
Status: DISCONTINUED | OUTPATIENT
Start: 2023-04-01 | End: 2023-04-03 | Stop reason: HOSPADM

## 2023-03-31 RX ADMIN — FENTANYL CITRATE 25 MCG: 50 INJECTION, SOLUTION INTRAMUSCULAR; INTRAVENOUS at 20:20

## 2023-03-31 RX ADMIN — SODIUM CHLORIDE, POTASSIUM CHLORIDE, SODIUM LACTATE AND CALCIUM CHLORIDE: 600; 310; 30; 20 INJECTION, SOLUTION INTRAVENOUS at 15:38

## 2023-03-31 RX ADMIN — SERTRALINE HYDROCHLORIDE 150 MG: 100 TABLET ORAL at 21:24

## 2023-03-31 RX ADMIN — ERTAPENEM SODIUM 1 G: 1 INJECTION, POWDER, LYOPHILIZED, FOR SOLUTION INTRAMUSCULAR; INTRAVENOUS at 15:19

## 2023-03-31 RX ADMIN — ACETAMINOPHEN 975 MG: 325 TABLET ORAL at 14:59

## 2023-03-31 RX ADMIN — Medication 20 MG: at 18:18

## 2023-03-31 RX ADMIN — HEPARIN SODIUM 5000 UNITS: 5000 INJECTION, SOLUTION INTRAVENOUS; SUBCUTANEOUS at 15:17

## 2023-03-31 RX ADMIN — DEXAMETHASONE SODIUM PHOSPHATE 8 MG: 4 INJECTION, SOLUTION INTRA-ARTICULAR; INTRALESIONAL; INTRAMUSCULAR; INTRAVENOUS; SOFT TISSUE at 15:55

## 2023-03-31 RX ADMIN — GLYCOPYRROLATE 0.4 MG: 0.2 INJECTION, SOLUTION INTRAMUSCULAR; INTRAVENOUS at 16:05

## 2023-03-31 RX ADMIN — FENTANYL CITRATE 25 MCG: 50 INJECTION, SOLUTION INTRAMUSCULAR; INTRAVENOUS at 20:07

## 2023-03-31 RX ADMIN — Medication 20 MG: at 17:34

## 2023-03-31 RX ADMIN — OMEPRAZOLE 20 MG: 20 CAPSULE, DELAYED RELEASE ORAL at 21:30

## 2023-03-31 RX ADMIN — Medication 50 MG: at 16:15

## 2023-03-31 RX ADMIN — SODIUM CHLORIDE, POTASSIUM CHLORIDE, SODIUM LACTATE AND CALCIUM CHLORIDE: 600; 310; 30; 20 INJECTION, SOLUTION INTRAVENOUS at 14:59

## 2023-03-31 RX ADMIN — PHENYLEPHRINE HYDROCHLORIDE 100 MCG: 10 INJECTION INTRAVENOUS at 18:30

## 2023-03-31 RX ADMIN — Medication 20 MG: at 18:56

## 2023-03-31 RX ADMIN — ONDANSETRON 4 MG: 2 INJECTION INTRAMUSCULAR; INTRAVENOUS at 21:21

## 2023-03-31 RX ADMIN — SUGAMMADEX 200 MG: 100 INJECTION, SOLUTION INTRAVENOUS at 19:38

## 2023-03-31 RX ADMIN — LIDOCAINE HYDROCHLORIDE 60 MG: 20 INJECTION, SOLUTION INFILTRATION; PERINEURAL at 15:51

## 2023-03-31 RX ADMIN — HYDROMORPHONE HYDROCHLORIDE 0.4 MG: 0.2 INJECTION, SOLUTION INTRAMUSCULAR; INTRAVENOUS; SUBCUTANEOUS at 21:23

## 2023-03-31 RX ADMIN — HYDROMORPHONE HYDROCHLORIDE 0.4 MG: 0.2 INJECTION, SOLUTION INTRAMUSCULAR; INTRAVENOUS; SUBCUTANEOUS at 23:14

## 2023-03-31 RX ADMIN — FENTANYL CITRATE 100 MCG: 50 INJECTION, SOLUTION INTRAMUSCULAR; INTRAVENOUS at 15:51

## 2023-03-31 RX ADMIN — HYDROMORPHONE HYDROCHLORIDE 0.5 MG: 1 INJECTION, SOLUTION INTRAMUSCULAR; INTRAVENOUS; SUBCUTANEOUS at 19:20

## 2023-03-31 RX ADMIN — OXYCODONE HYDROCHLORIDE 10 MG: 10 TABLET ORAL at 22:22

## 2023-03-31 RX ADMIN — MIDAZOLAM 1 MG: 1 INJECTION INTRAMUSCULAR; INTRAVENOUS at 15:09

## 2023-03-31 RX ADMIN — ONDANSETRON 4 MG: 2 INJECTION INTRAMUSCULAR; INTRAVENOUS at 19:14

## 2023-03-31 RX ADMIN — BUPIVACAINE 20 ML: 13.3 INJECTION, SUSPENSION, LIPOSOMAL INFILTRATION at 15:10

## 2023-03-31 RX ADMIN — SODIUM CHLORIDE, POTASSIUM CHLORIDE, SODIUM LACTATE AND CALCIUM CHLORIDE: 600; 310; 30; 20 INJECTION, SOLUTION INTRAVENOUS at 21:25

## 2023-03-31 RX ADMIN — PROPOFOL 200 MG: 10 INJECTION, EMULSION INTRAVENOUS at 15:51

## 2023-03-31 RX ADMIN — MIDAZOLAM 2 MG: 1 INJECTION INTRAMUSCULAR; INTRAVENOUS at 15:42

## 2023-03-31 RX ADMIN — ACETAMINOPHEN 975 MG: 325 TABLET, FILM COATED ORAL at 21:22

## 2023-03-31 RX ADMIN — FENTANYL CITRATE 50 MCG: 50 INJECTION, SOLUTION INTRAMUSCULAR; INTRAVENOUS at 15:09

## 2023-03-31 RX ADMIN — Medication 50 MG: at 15:52

## 2023-03-31 RX ADMIN — PHENYLEPHRINE HYDROCHLORIDE 100 MCG: 10 INJECTION INTRAVENOUS at 19:18

## 2023-03-31 RX ADMIN — BUPIVACAINE HYDROCHLORIDE 20 ML: 2.5 INJECTION, SOLUTION EPIDURAL; INFILTRATION; INTRACAUDAL; PERINEURAL at 15:10

## 2023-03-31 RX ADMIN — SODIUM CHLORIDE, POTASSIUM CHLORIDE, SODIUM LACTATE AND CALCIUM CHLORIDE: 600; 310; 30; 20 INJECTION, SOLUTION INTRAVENOUS at 17:49

## 2023-03-31 ASSESSMENT — ACTIVITIES OF DAILY LIVING (ADL)
ADLS_ACUITY_SCORE: 20

## 2023-03-31 NOTE — ANESTHESIA PREPROCEDURE EVALUATION
Anesthesia Pre-Procedure Evaluation    Patient: Sangita Meraz   MRN: [id number] : 1965        Procedure : Procedure(s):  COLECTOMY, SIGMOID, LAPAROSCOPIC          Past Medical History:   Diagnosis Date     Depressive disorder      Diverticulosis of large intestine without hemorrhage      Obstructive sleep apnea syndrome 10/17/2022      Past Surgical History:   Procedure Laterality Date     ABDOMEN SURGERY  ,',15    2 c-secs, abdomenplasty     CHOLECYSTECTOMY       COLONOSCOPY N/A 2021    Procedure: COLONOSCOPY, WITH BIOPSY AND CLIPPING;  Surgeon: Kemar Mckeon MD;  [place] [facility] [place]     DILATION AND CURETTAGE, OPERATIVE HYSTEROSCOPY, COMBINED N/A 10/18/2022    Procedure: HYSTEROSCOPY, WITH DILATION AND CURETTAGE OF UTERUS and polypectomy;  Surgeon: Ines Chi MD;  Location: [facility] OR     ESOPHAGOSCOPY, GASTROSCOPY, DUODENOSCOPY (EGD), COMBINED N/A 2020    Procedure: ESOPHAGOGASTRODUODENOSCOPY, WITH BIOPSY;  Surgeon: Anton Knott DO;  Location: [facility]     GENITOURINARY SURGERY  '01, '03,     tubal ligation, anterior/posterior repair, tubal ligation reversal      Allergies   Allergen Reactions     Ivp Dye [Contrast Dye] Anaphylaxis     Other (Do Not Use)      Other reaction(s): Itching,Watering Eyes, Rash, Sneezing, Itching,Watering Eyes, Rash, Sneezing, Itching,Watering Eyes, Rash, Sneezing      Social History     Tobacco Use     Smoking status: Former     Packs/day: 1.00     Years: 10.00     Pack years: 10.00     Types: Cigarettes     Start date: 10/10/1982     Quit date: 1991     Years since quittin.8     Smokeless tobacco: Never   Substance Use Topics     Alcohol use: Yes     Comment: 1-2 beer/month      Wt Readings from Last 1 Encounters:   23 95.2 kg (209 lb 14.1 oz)        Anesthesia Evaluation   Pt has had prior anesthetic. Type: General.        ROS/MED HX  ENT/Pulmonary:     (+) sleep apnea, moderate, uses CPAP,     Neurologic:        Cardiovascular:       METS/Exercise Tolerance:     Hematologic:       Musculoskeletal:       GI/Hepatic:     (+) GERD, Asymptomatic on medication,     Renal/Genitourinary:       Endo:     (+) Obesity,     Psychiatric/Substance Use:       Infectious Disease:       Malignancy:       Other:            Physical Exam    Airway        Mallampati: II   TM distance: > 3 FB   Neck ROM: full   Mouth opening: > 3 cm    Respiratory Devices and Support         Dental       (+) Multiple crowns, permanant bridges      Cardiovascular          Rhythm and rate: regular and normal     Pulmonary           breath sounds clear to auscultation           OUTSIDE LABS:  CBC:   Lab Results   Component Value Date    WBC 6.2 03/13/2023    WBC 6.4 12/25/2022    HGB 14.5 03/13/2023    HGB 14.1 12/25/2022    HCT 42.8 03/13/2023    HCT 40.6 12/25/2022     03/13/2023     12/25/2022     BMP:   Lab Results   Component Value Date     03/13/2023     (L) 12/25/2022    POTASSIUM 4.8 03/13/2023    POTASSIUM 4.2 12/25/2022    CHLORIDE 100 03/13/2023    CHLORIDE 103 12/25/2022    CO2 28 03/13/2023    CO2 25 12/25/2022    BUN 9.5 03/13/2023    BUN 10.1 12/25/2022    CR 0.87 03/13/2023    CR 0.84 12/25/2022    GLC 93 03/31/2023     (H) 03/13/2023     COAGS: No results found for: PTT, INR, FIBR  POC:   Lab Results   Component Value Date    HCGS Negative 07/25/2020     HEPATIC:   Lab Results   Component Value Date    ALBUMIN 4.3 03/13/2023    PROTTOTAL 7.4 03/13/2023    ALT 29 03/13/2023    AST 21 03/13/2023    ALKPHOS 66 03/13/2023    BILITOTAL 0.4 03/13/2023     OTHER:   Lab Results   Component Value Date    A1C 6.2 (H) 03/28/2022    MIRIAM 9.8 03/13/2023    LIPASE 210 12/30/2020    CRP 13.4 (H) 12/30/2020    SED 9 10/17/2022       Anesthesia Plan    ASA Status:  3   NPO Status:  NPO Appropriate    Anesthesia Type: General.     - Airway: ETT   Induction: Intravenous, Propofol.   Maintenance: Balanced.         Consents    Anesthesia Plan(s) and associated risks, benefits, and realistic alternatives discussed. Questions answered and patient/representative(s) expressed understanding.    - Discussed:     - Discussed with:  Patient, Spouse      - Extended Intubation/Ventilatory Support Discussed: No.      - Patient is DNR/DNI Status: No    Use of blood products discussed: No .     Postoperative Care    Pain management: IV analgesics, Oral pain medications, Peripheral nerve block (Single Shot), Multi-modal analgesia.   PONV prophylaxis: Ondansetron (or other 5HT-3)     Comments:                Hussain Constantino MD

## 2023-03-31 NOTE — ANESTHESIA PROCEDURE NOTES
TAP Procedure Note    Pre-Procedure   Staff -        Anesthesiologist:  Kemar Andrade MD       Resident/Fellow: Missy Chu MD       Performed By: with residents       Procedure performed by resident/fellow/CRNA in presence of a teaching physician.         Location: pre-op       Procedure Start/Stop Times: 3/31/2023 3:10 PM       Pre-Anesthestic Checklist: patient identified, IV checked, site marked, risks and benefits discussed, informed consent, monitors and equipment checked, pre-op evaluation, at physician/surgeon's request and post-op pain management  Timeout:       Correct Patient: Yes        Correct Procedure: Yes        Correct Site: Yes        Correct Position: Yes        Correct Laterality: Yes        Site Marked: Yes  Procedure Documentation  Procedure: TAP       Laterality: bilateral       Patient Position: supine       Skin prep: Chloraprep       Insertion Site: T8-9.       Needle Type: short bevel       Needle Gauge: 21.        Needle Length (millimeters): 110        Ultrasound guided       1. Ultrasound was used to identify targeted nerve, plexus, vascular marker, or fascial plane and place a needle adjacent to it in real-time.       2. Ultrasound was used to visualize the spread of anesthetic in close proximity to the above referenced structure.       3. A permanent image is entered into the patient's record.    Assessment/Narrative         The placement was negative for: blood aspirated, painful injection and site bleeding       Paresthesias: No.       Bolus given via needle..        Secured via.        Insertion/Infusion Method: Single Shot       Complications: none    Medication(s) Administered   Bupivacaine 0.25% PF (Infiltration) - Infiltration   20 mL - 3/31/2023 3:10:00 PM  Bupivacaine liposome (Exparel) 1.3% LA inj susp (Infiltration) - Infiltration   20 mL - 3/31/2023 3:10:00 PM  Medication Administration Time: 3/31/2023 3:10 PM      FOR [facility] ([facility]) ONLY:   Pain Team  "Contact information: please page the Pain Team Via [facility]. Search \"Pain\". During daytime hours, please page the attending first. At night please page the resident first.    "

## 2023-03-31 NOTE — ANESTHESIA PROCEDURE NOTES
Airway       Patient location during procedure: OR       Procedure Start/Stop Times: 3/31/2023 3:54 PM  Staff -        CRNA: Walter Jones APRN CRNA       Performed By: CRNA  Consent for Airway        Urgency: elective  Indications and Patient Condition       Indications for airway management: julia-procedural       Induction type:intravenous       Mask difficulty assessment: 2 - vent by mask + OA or adjuvant +/- NMBA    Final Airway Details       Final airway type: endotracheal airway       Successful airway: ETT - single  Endotracheal Airway Details        ETT size (mm): 7.0       Cuffed: yes       Cuff volume (mL): 6       Successful intubation technique: direct laryngoscopy       DL Blade Type: Deal 2       Grade View of Cords: 1       Adjucts: stylet       Position: Right       Measured from: gums/teeth       Secured at (cm): 22       Bite block used: None    Post intubation assessment        Placement verified by: capnometry, equal breath sounds and chest rise        Number of attempts at approach: 1       Number of other approaches attempted: 0       Secured with: pink tape       Ease of procedure: easy       Dentition: Intact and Lips/oral mucosa injury    Medication(s) Administered   Medication Administration Time: 3/31/2023 3:54 PM    Additional Comments       Small lac on upper left lip after intubation, ointment applied.

## 2023-03-31 NOTE — OP NOTE
SURGEON: Bhavana Johansen MD   ASSISTANT: Ade Darnell MD, Colorectal Surgery fellow. Iasiah Ritter MD Surgery Resident, Braydon Ruvalcaba, Medical Student.  PREOPERATIVE DIAGNOSIS: Recurrent diverticulitis.   POSTOPERATIVE DIAGNOSIS: Recurrent diverticulitis.  PROCEDURES: Laparoscopic splenic flexure mobilization, laparoscopic sigmoid colectomy with colorectal anastomosis. Flexible sigmoidoscopy. Please also add a Modifier 22: This case was extremely difficult due to the very large amount of involved viscera and induration from diverticulitis and visceral obesity making it so that retraction, exposure, and identification of appropriate tissue plains was difficult.  This case was at least 50% more difficult and took 2 hours longer than typical for a similar case.  INDICATIONS:  Sangita Meraz is a 57 year old female with recurrent diverticulitis. First episode of acute uncomplicated diverticulitis on 07/2020. She was treated conservatively with Augmentin with complete resolution. In total she has had 5-7 episodes of acute diverticulitis. Each time she was treated with oral antibiotics. Most recently, she presented to the ED on 12/5/22 with uncomplicated acute diverticulitis. She was discharged with Augmentin. She improved with this but then began developing left lower quadrant abdominal pain that radiated to her left lower back. She saw her PCP on 12/23/22 and was started on Cipro and Flagyl. She continued to worsen and presented to the Emergency Department on 12/25/22. CT was actually improved from prior, and she was discharged home with instructions to complete the course of Cipro and Flagyl. She had a previous colonoscopy 2/7/2020 and a previous one with a sessile serrated adenoma (next due in 2024). The risks and benefits of surgery were thoroughly discussed with the patient and she agreed to proceed.  DESCRIPTION OF PROCEDURE: The patient was brought to the operating room, placed supine on the  operating table with a pink pad in place. General endotracheal anesthesia was induced. Todd catheter was placed. she was placed in modified lithotomy position with Yellofin stirrups and carefully secured to the table. The abdomen was prepped and draped in the usual sterile fashion. We began the procedure with a timeout and placed a periumbilical port under direct visualization with a Collins technique (12 mm). Under direct visualization, 5 mm ports were place in the right lower quadrant, right upper quadrant, and left mid positions. The stomach, small bowel, and pelvis appeared normal including the uterus and ovaries. The sigmoid colon had moderate inflammation along its more proximal aspect including adhesions and some induration along the descending colon. The rectum was redundant. We performed a lateral dissection of the left side, opening the retroperitoneal plane and identifying the ureter. We developed the retroperitoneal place in the left upper quadrant and left lateral abdomen. There was significant induration along the proximal sigmoid and descending colon. There was also moderate to severe visceral obesity. This made the dissection more tedious including the descending colon and splenic flexure. The dissection of the descending attachments was complete. We then took down the splenic flexure and getting into the lesser sac and dividing the transverse colon from the omentum. This was slow and challenging but very safe with all of the plains visualized. Once the splenic flexure was fully mobilized and free and able to be medialized, we proceeded with defining our proximal end of our dissection. We created a window under the colon. We did so as well distally along the more distal sigmoid. An endo-KYARA green load was used more distally and this was satisfactory. At this point, we divided the mesentery. A 3 cm supraumbilical incision was made to exteriorize the incision with a wound protector.  We clamped the end  of the specimen distally and exteriorized our specimen through the exteriorization site. The descending colon was divided using a bowel clamp, and a pursestring suture was placed followed by a 29 anvil for the EEA stapler. We then proceeded with our colorectal anastomosis with a end-to end configuration. We found that the rectosigmoid stump with the degree of redundancy was too long. Another 5 cm was require. Again, we created a window around the rectosigmoid and then divided the mesentery with this additional 5 cm distally. An endo-KYARA green load was used more distally to divide the additional 5 cm of rectosigmoid. The 29 EEA stapler was placed through the end of the staple time under direct visualization and the anvil was mated with the pin. This was successful, and the orientation of the descending was confirmed. The stapler was fired and there were 2 intact anastomotic rings. A flexible sigmoidoscopy was performed and anastomosis was submerged and the colon proximally occluded. This demonstrated a health anastomosis with no bleeding which was airtight with no tension. The abdomen was irrigated out with a liter of sterile water. The exteriorization site was closed with #1 PDS suture. Subcutaneous tissue was irrigated out. The skin closed with 4-0 Monocryl subcuticular suture followed by the application of skin glue. The patient was emerged from general endotracheal anesthesia, taken postoperative anesthesia care unit in good condition. All instruments and sponge counts were correct x2 at the end the case.   SPECIMEN: Sigmoid and descending colon (2 pieces).   Bhavana Johansen MD  Colon and Rectal Surgery Attending  Department of Surgery  [facility]

## 2023-03-31 NOTE — PHARMACY-ADMISSION MEDICATION HISTORY
"Admission Medication History    Admission medication history is complete. The information provided in this note is only as accurate as the sources available at the time of the update.    Information Source(s): Pre-op RN assessment, Dispense history      Medication History Completed By: Leonie Andre [facility] 3/31/2023 3:26 PM    PTA Med List   Medication Sig Last Dose     acetaminophen (TYLENOL) 500 MG tablet Take 500 mg by mouth every 6 hours as needed for mild pain Past Week     estradiol (ESTRING) 2 MG vaginal ring Place 1 each vaginally every 3 months Past Week     metroNIDAZOLE (FLAGYL) 500 MG tablet Take 1 tablet (500 mg) by mouth every 6 hours At 8:00 am, 2:00 pm, 8:00 pm the day prior to your surgery with neomycin and zofran. 3/30/2023 at 0800     neomycin (MYCIFRADIN) 500 MG tablet Take 2 tablets (1,000 mg) by mouth every 6 hours At 8:00 am, 2:00 pm, 8:00 pm the day prior to your surgery with flagyl and zofran. 3/30/2023 at 0800     omeprazole 20 MG tablet Take 20 mg by mouth every evening 3/30/2023 at 0800     ondansetron (ZOFRAN ODT) 4 MG ODT tab Take 1 tablet (4 mg) by mouth every 8 hours as needed for nausea 3/30/2023 at 0800     ondansetron (ZOFRAN) 4 MG tablet Take 1 tablet (4 mg) by mouth every 6 hours At 8:00 am, 2:00 pm, 8:00 pm the day prior to your surgery with neomycin and flagyl. 3/30/2023 at 0800     polyethylene glycol (MIRALAX) 17 g packet Take 238 g by mouth See Admin Instructions Starting at 4 pm night prior to surgery. Refer to \"Getting Ready for Surgery\" instructions. 3/30/2023 at 2030     psyllium (METAMUCIL/KONSYL) 58.6 % powder Take by mouth daily More than a month     sertraline (ZOLOFT) 100 MG tablet Take 150 mg by mouth every evening 3/30/2023 at 2130     "

## 2023-04-01 LAB
ANION GAP SERPL CALCULATED.3IONS-SCNC: 12 MMOL/L (ref 7–15)
BUN SERPL-MCNC: 9.3 MG/DL (ref 6–20)
CALCIUM SERPL-MCNC: 9 MG/DL (ref 8.6–10)
CHLORIDE SERPL-SCNC: 102 MMOL/L (ref 98–107)
CREAT SERPL-MCNC: 0.78 MG/DL (ref 0.51–0.95)
DEPRECATED HCO3 PLAS-SCNC: 24 MMOL/L (ref 22–29)
ERYTHROCYTE [DISTWIDTH] IN BLOOD BY AUTOMATED COUNT: 13.2 % (ref 10–15)
GFR SERPL CREATININE-BSD FRML MDRD: 89 ML/MIN/1.73M2
GLUCOSE BLDC GLUCOMTR-MCNC: 112 MG/DL (ref 70–99)
GLUCOSE SERPL-MCNC: 129 MG/DL (ref 70–99)
HCT VFR BLD AUTO: 37.2 % (ref 35–47)
HGB BLD-MCNC: 12.4 G/DL (ref 11.7–15.7)
MAGNESIUM SERPL-MCNC: 1.7 MG/DL (ref 1.7–2.3)
MCH RBC QN AUTO: 31.1 PG (ref 26.5–33)
MCHC RBC AUTO-ENTMCNC: 33.3 G/DL (ref 31.5–36.5)
MCV RBC AUTO: 93 FL (ref 78–100)
PHOSPHATE SERPL-MCNC: 2.4 MG/DL (ref 2.5–4.5)
PLATELET # BLD AUTO: 166 10E3/UL (ref 150–450)
POTASSIUM SERPL-SCNC: 3.6 MMOL/L (ref 3.4–5.3)
RBC # BLD AUTO: 3.99 10E6/UL (ref 3.8–5.2)
SODIUM SERPL-SCNC: 138 MMOL/L (ref 136–145)
WBC # BLD AUTO: 9.1 10E3/UL (ref 4–11)

## 2023-04-01 PROCEDURE — 250N000011 HC RX IP 250 OP 636: Performed by: STUDENT IN AN ORGANIZED HEALTH CARE EDUCATION/TRAINING PROGRAM

## 2023-04-01 PROCEDURE — 258N000003 HC RX IP 258 OP 636: Performed by: STUDENT IN AN ORGANIZED HEALTH CARE EDUCATION/TRAINING PROGRAM

## 2023-04-01 PROCEDURE — 85027 COMPLETE CBC AUTOMATED: CPT | Performed by: STUDENT IN AN ORGANIZED HEALTH CARE EDUCATION/TRAINING PROGRAM

## 2023-04-01 PROCEDURE — 83735 ASSAY OF MAGNESIUM: CPT | Performed by: STUDENT IN AN ORGANIZED HEALTH CARE EDUCATION/TRAINING PROGRAM

## 2023-04-01 PROCEDURE — 250N000013 HC RX MED GY IP 250 OP 250 PS 637: Performed by: STUDENT IN AN ORGANIZED HEALTH CARE EDUCATION/TRAINING PROGRAM

## 2023-04-01 PROCEDURE — 120N000002 HC R&B MED SURG/OB UMMC

## 2023-04-01 PROCEDURE — 80048 BASIC METABOLIC PNL TOTAL CA: CPT | Performed by: STUDENT IN AN ORGANIZED HEALTH CARE EDUCATION/TRAINING PROGRAM

## 2023-04-01 PROCEDURE — 84100 ASSAY OF PHOSPHORUS: CPT | Performed by: STUDENT IN AN ORGANIZED HEALTH CARE EDUCATION/TRAINING PROGRAM

## 2023-04-01 PROCEDURE — 36415 COLL VENOUS BLD VENIPUNCTURE: CPT | Performed by: STUDENT IN AN ORGANIZED HEALTH CARE EDUCATION/TRAINING PROGRAM

## 2023-04-01 RX ADMIN — OXYCODONE HYDROCHLORIDE 10 MG: 10 TABLET ORAL at 18:13

## 2023-04-01 RX ADMIN — SERTRALINE HYDROCHLORIDE 150 MG: 100 TABLET ORAL at 20:38

## 2023-04-01 RX ADMIN — ACETAMINOPHEN 975 MG: 325 TABLET, FILM COATED ORAL at 04:34

## 2023-04-01 RX ADMIN — OXYCODONE HYDROCHLORIDE 10 MG: 10 TABLET ORAL at 22:43

## 2023-04-01 RX ADMIN — SODIUM CHLORIDE, POTASSIUM CHLORIDE, SODIUM LACTATE AND CALCIUM CHLORIDE: 600; 310; 30; 20 INJECTION, SOLUTION INTRAVENOUS at 10:12

## 2023-04-01 RX ADMIN — OXYCODONE HYDROCHLORIDE 10 MG: 10 TABLET ORAL at 02:47

## 2023-04-01 RX ADMIN — ENOXAPARIN SODIUM 40 MG: 40 INJECTION SUBCUTANEOUS at 13:10

## 2023-04-01 RX ADMIN — ACETAMINOPHEN 975 MG: 325 TABLET, FILM COATED ORAL at 13:10

## 2023-04-01 RX ADMIN — OXYCODONE HYDROCHLORIDE 10 MG: 10 TABLET ORAL at 13:10

## 2023-04-01 RX ADMIN — HYDROMORPHONE HYDROCHLORIDE 0.4 MG: 0.2 INJECTION, SOLUTION INTRAMUSCULAR; INTRAVENOUS; SUBCUTANEOUS at 04:32

## 2023-04-01 RX ADMIN — OXYCODONE HYDROCHLORIDE 10 MG: 10 TABLET ORAL at 09:03

## 2023-04-01 RX ADMIN — HYDROMORPHONE HYDROCHLORIDE 0.2 MG: 0.2 INJECTION, SOLUTION INTRAMUSCULAR; INTRAVENOUS; SUBCUTANEOUS at 20:43

## 2023-04-01 RX ADMIN — ACETAMINOPHEN 975 MG: 325 TABLET, FILM COATED ORAL at 20:40

## 2023-04-01 RX ADMIN — OMEPRAZOLE 20 MG: 20 CAPSULE, DELAYED RELEASE ORAL at 20:38

## 2023-04-01 RX ADMIN — HYDROMORPHONE HYDROCHLORIDE 0.4 MG: 0.2 INJECTION, SOLUTION INTRAMUSCULAR; INTRAVENOUS; SUBCUTANEOUS at 01:40

## 2023-04-01 ASSESSMENT — ACTIVITIES OF DAILY LIVING (ADL)
ADLS_ACUITY_SCORE: 20
ADLS_ACUITY_SCORE: 23
ADLS_ACUITY_SCORE: 23
ADLS_ACUITY_SCORE: 20
ADLS_ACUITY_SCORE: 21
ADLS_ACUITY_SCORE: 23
ADLS_ACUITY_SCORE: 20
ADLS_ACUITY_SCORE: 23

## 2023-04-01 NOTE — ANESTHESIA POSTPROCEDURE EVALUATION
Patient: Sangita Meraz    Procedure: Procedure(s):  LAPAROSCOPIC extended LEFT HEMICOLECTOMY  Sigmoidoscopy flexible       Anesthesia Type:  General    Note:  Disposition: Inpatient; Admission   Postop Pain Control: Uneventful            Sign Out: Well controlled pain   PONV: No   Neuro/Psych: Uneventful            Sign Out: Acceptable/Baseline neuro status   Airway/Respiratory: Uneventful            Sign Out: Acceptable/Baseline resp. status   CV/Hemodynamics: Uneventful            Sign Out: Acceptable CV status; No obvious hypovolemia; No obvious fluid overload   Other NRE: NONE   DID A NON-ROUTINE EVENT OCCUR? No           Last vitals:  Vitals Value Taken Time   /46 03/31/23 2000   Temp     Pulse 73 03/31/23 2009   Resp 14 03/31/23 2009   SpO2 95 % 03/31/23 2009   Vitals shown include unvalidated device data.    Electronically Signed By: Dez King MD  March 31, 2023  8:11 PM

## 2023-04-01 NOTE — PROGRESS NOTES
Pt was placed on CPAP overnight. Requested to be taken off. Pulled machine from room- pt is comfortable on 1LNC.     Yesi Hampton RT on 4/1/2023 at 5:37 AM

## 2023-04-01 NOTE — PLAN OF CARE
Goal Outcome Evaluation:      POD 0 - nausea when first came to floor, Zofran given w/ relief. Hypoactive bowel sounds. Tolerating ice chips & H20. Pt taking Oxycodone for pain w/ relief. Todd cath patent & good urine output. 2 PIVs. A&O x4. Able to make needs known. Will monitor.

## 2023-04-01 NOTE — PLAN OF CARE
VSS. Pt up with SBA. Ambulated in halls. Voids spont, post montana removal. No gas, no BM. Pain controlled with oxycodone Q4-5H. MIV infusing through PIV. Abdominal binder on. Tolerating full liquid diet. Cont. POC.

## 2023-04-01 NOTE — PROGRESS NOTES
Colorectal Surgery Progress Note  [facility]  POD#1      Subjective:  Patient reports her pain is well controlled. She has not had n/v, but didn't take in much PO due to her late surgery. She is making good urine via montana catheter. She is not passing flatus or having a BM. She has not yet ambulated.    Vitals:  Vitals:    03/31/23 2030 03/31/23 2100 04/01/23 0225 04/01/23 0432   BP: 127/75 135/76 132/63    BP Location:  Right arm Right arm    Pulse: 74 64 77    Resp: 12 12 16    Temp: 98.1  F (36.7  C) 97  F (36.1  C) 99.5  F (37.5  C)    TempSrc: Core Core Oral    SpO2: 97% (!) 88% 97% 91%   Weight:       Height:         I/O:  I/O last 3 completed shifts:  In: 1800 [I.V.:1800]  Out: 545 [Urine:495; Blood:50]    Physical Exam:  Gen: AAOx3, NAD  Pulm: Non-labored breathing  Abd: Soft, non-distended, appropriately tender, no guarding   Incision C/D/I with dermabond  Ext:  Warm and well-perfused    BMP  Recent Labs   Lab 03/31/23  2116 03/31/23  1428   CR 0.77  --    GLC  --  93     CBCNo lab results found in last 7 days.      ASSESSMENT: This is a 57 year old female with recurrent diverticulitis, AILEEN and depression now s/p lap sigmoidectomy on 3/31/23. Recovering appropriately      Neuro/Pain: had a preop block, continue beulah tylenol, prn oxy and dilauded  CV: PEDRO, CTM  PULM: encourage IS. Pulmonary hygiene, ambulation, consider home CPAP  GI/FEN: FLD, awaiting ROBF  : Remove montana today  Heme: Hgb this morning, then begin lovenox  ID: No acute concerns   Endocrine: PEDRO, CTM  Lines: PIV  Activity: as tolerated.  Ppx: Lovenox  Dispo: Pending pain control, diet, CARLOS Ritter MD  Intern, General Surgery  Colorectal Intern     Patient seen and discussed with Dr. Carl Ruggiero, who discussed with staff

## 2023-04-01 NOTE — ANESTHESIA CARE TRANSFER NOTE
Patient: Sangita Meraz    Procedure: Procedure(s):  LAPAROSCOPIC extended LEFT HEMICOLECTOMY  Sigmoidoscopy flexible       Diagnosis: Diverticulosis of large intestine without hemorrhage [K57.30]  Diverticulitis of colon [K57.32]  Diagnosis Additional Information: No value filed.    Anesthesia Type:   General     Note:    Oropharynx: oropharynx clear of all foreign objects and spontaneously breathing  Level of Consciousness: drowsy  Oxygen Supplementation: nasal cannula  Level of Supplemental Oxygen (L/min / FiO2): 3  Independent Airway: airway patency satisfactory and stable  Dentition: dentition unchanged  Vital Signs Stable: post-procedure vital signs reviewed and stable  Report to RN Given: handoff report given  Patient transferred to: PACU    Handoff Report: Identifed the Patient, Identified the Reponsible Provider, Reviewed the pertinent medical history, Discussed the surgical course, Reviewed Intra-OP anesthesia mangement and issues during anesthesia, Set expectations for post-procedure period and Allowed opportunity for questions and acknowledgement of understanding      Vitals:  Vitals Value Taken Time   /64 03/31/23 1950   Temp     Pulse 81 03/31/23 1954   Resp 19 03/31/23 1954   SpO2 97 % 03/31/23 1954   Vitals shown include unvalidated device data.    Electronically Signed By: CL Cox CRNA  March 31, 2023  7:55 PM

## 2023-04-01 NOTE — BRIEF OP NOTE
[facility]    Brief Operative Note    Pre-operative diagnosis: Diverticulosis of large intestine without hemorrhage [K57.30]  Diverticulitis of colon [K57.32]  Post-operative diagnosis Same as pre-operative diagnosis    Procedure: Procedure(s):  LAPAROSCOPIC extended LEFT HEMICOLECTOMY  Sigmoidoscopy flexible  Surgeon: Surgeon(s) and Role:     * Bhavana Johansen MD - Primary     * Isaiah Ritter MD - Resident - Assisting     * Ade Darnell MD - Fellow - Assisting  Anesthesia: General with Block   Estimated Blood Loss: 50 mL from 3/31/2023  3:41 PM to 3/31/2023  7:46 PM      Drains: None  Specimens:   ID Type Source Tests Collected by Time Destination   1 : DECENDING AND SIGMOID COLON Tissue Large Intestine, Colon, Descending/Sigmoid SURGICAL PATHOLOGY EXAM Bhavana Johansen MD 3/31/2023  6:06 PM      Findings:   mostly diseased descending colon, splenic flexure mobilized, extended left completed with an end to end anastomosis.  Anastomosis intact on flex sig and negative leak test. .  Complications: None.  Implants: * No implants in log *

## 2023-04-01 NOTE — PROGRESS NOTES
Admitted/transferred from:   2 RN  skin assessment completed by Aurora Herrera RN and Svetlana STEWART RN.  Skin assessment finding:all skin intact except 4 lap sites w/ glue, PIV x2. Interventions/actions: No interventions needed @ this time.    Will continue to monitor.

## 2023-04-01 NOTE — PROGRESS NOTES
"Post Op Check    04/01/2023    Sangita Meraz is a 57 year old female with h/o diverticulosis now POD#0 s/p laparoscopic extended left hemicolectomy.    Pt reports she is quite sore. Denies SOB, chest pain, or dizziness. Has not gotten out of bed. Denies nausea/vomiting.    /76 (BP Location: Right arm)   Pulse 64   Temp 97  F (36.1  C) (Core)   Resp 12   Ht 1.6 m (5' 3\")   Wt 95.2 kg (209 lb 14.1 oz)   LMP 12/09/2018   SpO2 (!) 88%   BMI 37.18 kg/m      Gen: A&O x3, NAD, CPAP on   Chest: breathing non-labored on CPAP  Abdomen: soft, appropriately tender, non distended  Incision: clean, dry, intact with dermabond  Extremities: warm and well perfused    A/P: No acute post-op issues. Continue plan of care per primary team. Please call with any questions.    Kane Baker MD  Surgery resident      "

## 2023-04-02 PROCEDURE — 250N000011 HC RX IP 250 OP 636: Performed by: STUDENT IN AN ORGANIZED HEALTH CARE EDUCATION/TRAINING PROGRAM

## 2023-04-02 PROCEDURE — 250N000013 HC RX MED GY IP 250 OP 250 PS 637: Performed by: STUDENT IN AN ORGANIZED HEALTH CARE EDUCATION/TRAINING PROGRAM

## 2023-04-02 PROCEDURE — 258N000003 HC RX IP 258 OP 636: Performed by: STUDENT IN AN ORGANIZED HEALTH CARE EDUCATION/TRAINING PROGRAM

## 2023-04-02 PROCEDURE — 120N000002 HC R&B MED SURG/OB UMMC

## 2023-04-02 RX ADMIN — ACETAMINOPHEN 975 MG: 325 TABLET, FILM COATED ORAL at 05:08

## 2023-04-02 RX ADMIN — OXYCODONE HYDROCHLORIDE 10 MG: 10 TABLET ORAL at 12:11

## 2023-04-02 RX ADMIN — SODIUM CHLORIDE, POTASSIUM CHLORIDE, SODIUM LACTATE AND CALCIUM CHLORIDE: 600; 310; 30; 20 INJECTION, SOLUTION INTRAVENOUS at 03:12

## 2023-04-02 RX ADMIN — OXYCODONE HYDROCHLORIDE 10 MG: 10 TABLET ORAL at 16:08

## 2023-04-02 RX ADMIN — OMEPRAZOLE 20 MG: 20 CAPSULE, DELAYED RELEASE ORAL at 21:09

## 2023-04-02 RX ADMIN — OXYCODONE HYDROCHLORIDE 5 MG: 5 TABLET ORAL at 03:12

## 2023-04-02 RX ADMIN — ACETAMINOPHEN 975 MG: 325 TABLET, FILM COATED ORAL at 21:08

## 2023-04-02 RX ADMIN — ACETAMINOPHEN 975 MG: 325 TABLET, FILM COATED ORAL at 13:34

## 2023-04-02 RX ADMIN — SERTRALINE HYDROCHLORIDE 150 MG: 100 TABLET ORAL at 21:08

## 2023-04-02 RX ADMIN — OXYCODONE HYDROCHLORIDE 10 MG: 10 TABLET ORAL at 21:07

## 2023-04-02 RX ADMIN — OXYCODONE HYDROCHLORIDE 5 MG: 5 TABLET ORAL at 08:03

## 2023-04-02 RX ADMIN — ENOXAPARIN SODIUM 40 MG: 40 INJECTION SUBCUTANEOUS at 12:11

## 2023-04-02 ASSESSMENT — ACTIVITIES OF DAILY LIVING (ADL)
ADLS_ACUITY_SCORE: 23
ADLS_ACUITY_SCORE: 23
ADLS_ACUITY_SCORE: 20
ADLS_ACUITY_SCORE: 23
ADLS_ACUITY_SCORE: 20

## 2023-04-02 NOTE — PROGRESS NOTES
Colorectal Surgery Progress Note  [facility]  POD#2      Subjective:  Patient reports her pain is well controlled. Tolerating full liquid diet. Having flatus and no BM. Her montana is out and she is voiding. Ambulating well.     Vitals:  Vitals:    04/01/23 1100 04/01/23 1400 04/01/23 2215 04/02/23 0718   BP: 114/59 128/63 125/62 120/59   BP Location: Right arm Right arm Right arm Right arm   Pulse: 51 55 67 68   Resp: 16 16 16 16   Temp: 97.8  F (36.6  C) 98.3  F (36.8  C) 98.2  F (36.8  C) 97.8  F (36.6  C)   TempSrc: Temporal Temporal Temporal Temporal   SpO2: 93% 94% 91% 92%   Weight:       Height:         I/O:  I/O last 3 completed shifts:  In: 2220 [P.O.:720; I.V.:1500]  Out: 3325 [Urine:3325]    Physical Exam:  Gen: AAOx3, NAD  Pulm: Non-labored breathing  Abd: Soft, non-distended, appropriately tender, no guarding   Incision C/D/I with dermabond  Ext:  Warm and well-perfused    BMP  Recent Labs   Lab 04/01/23  1630 04/01/23  0730 03/31/23  2116 03/31/23  1428     --   --   --    POTASSIUM 3.6  --   --   --    CHLORIDE 102  --   --   --    CO2 24  --   --   --    BUN 9.3  --   --   --    CR 0.78  --  0.77  --    * 112*  --  93   MAG 1.7  --   --   --    PHOS 2.4*  --   --   --      CBC  Recent Labs   Lab 04/01/23  1630   WBC 9.1   HGB 12.4   HCT 37.2            ASSESSMENT: This is a 57 year old female with recurrent diverticulitis, AILEEN and depression now s/p lap sigmoidectomy on 3/31/23. Recovering appropriately      Neuro/Pain: had a preop block, continue beulah tylenol, prn oxy and dilauded  CV: PEDRO, CTM  PULM: encourage IS. Pulmonary hygiene, ambulation, consider home CPAP  GI/FEN: advance Low Fiber diet today  : I/Os  Heme: Lovenox VTE prophylaxis  ID: No acute concerns   Endocrine: PEDRO, CTM  Lines: PIV  Activity: as tolerated.  Ppx: Lovenox  Dispo: Pending pain control, diet, CARLOS Ruggiero MD, MPH  Fellow in Colon and Rectal  Surgery  [facility]

## 2023-04-02 NOTE — PLAN OF CARE
Goal Outcome Evaluation:      Plan of Care Reviewed With: patient    Up walking halls. Tolerating full liquid diet, voiding w/ good urine output, small incont loose stool, lap sites open to air, clean/dry & intact. 1 PIV infusing.

## 2023-04-02 NOTE — PLAN OF CARE
VSS. Pt up ad nichole. Voids spont, not saving. Passing gas, no BM. Tolerating low fiber diet. Pain controlled with oxycodone x3. Cont. POC.

## 2023-04-03 VITALS
DIASTOLIC BLOOD PRESSURE: 67 MMHG | HEART RATE: 53 BPM | WEIGHT: 209.88 LBS | HEIGHT: 63 IN | TEMPERATURE: 98.1 F | OXYGEN SATURATION: 94 % | SYSTOLIC BLOOD PRESSURE: 138 MMHG | RESPIRATION RATE: 16 BRPM | BODY MASS INDEX: 37.19 KG/M2

## 2023-04-03 LAB
HGB BLD-MCNC: 12.7 G/DL (ref 11.7–15.7)
HOLD SPECIMEN: NORMAL
PHOSPHATE SERPL-MCNC: 3.2 MG/DL (ref 2.5–4.5)
PLATELET # BLD AUTO: 177 10E3/UL (ref 150–450)

## 2023-04-03 PROCEDURE — 250N000011 HC RX IP 250 OP 636: Performed by: STUDENT IN AN ORGANIZED HEALTH CARE EDUCATION/TRAINING PROGRAM

## 2023-04-03 PROCEDURE — 84100 ASSAY OF PHOSPHORUS: CPT | Performed by: STUDENT IN AN ORGANIZED HEALTH CARE EDUCATION/TRAINING PROGRAM

## 2023-04-03 PROCEDURE — 85018 HEMOGLOBIN: CPT | Performed by: STUDENT IN AN ORGANIZED HEALTH CARE EDUCATION/TRAINING PROGRAM

## 2023-04-03 PROCEDURE — 250N000013 HC RX MED GY IP 250 OP 250 PS 637: Performed by: STUDENT IN AN ORGANIZED HEALTH CARE EDUCATION/TRAINING PROGRAM

## 2023-04-03 PROCEDURE — 36415 COLL VENOUS BLD VENIPUNCTURE: CPT | Performed by: STUDENT IN AN ORGANIZED HEALTH CARE EDUCATION/TRAINING PROGRAM

## 2023-04-03 PROCEDURE — 85049 AUTOMATED PLATELET COUNT: CPT | Performed by: STUDENT IN AN ORGANIZED HEALTH CARE EDUCATION/TRAINING PROGRAM

## 2023-04-03 RX ORDER — SIMETHICONE 80 MG
80 TABLET,CHEWABLE ORAL EVERY 6 HOURS PRN
Qty: 20 TABLET | Refills: 0 | Status: SHIPPED | OUTPATIENT
Start: 2023-04-03 | End: 2023-05-17

## 2023-04-03 RX ORDER — SIMETHICONE 80 MG
80 TABLET,CHEWABLE ORAL EVERY 6 HOURS PRN
Status: DISCONTINUED | OUTPATIENT
Start: 2023-04-03 | End: 2023-04-03 | Stop reason: HOSPADM

## 2023-04-03 RX ORDER — OXYCODONE HYDROCHLORIDE 5 MG/1
5 TABLET ORAL EVERY 4 HOURS PRN
Qty: 20 TABLET | Refills: 0 | Status: SHIPPED | OUTPATIENT
Start: 2023-04-03 | End: 2023-05-17

## 2023-04-03 RX ADMIN — ACETAMINOPHEN 975 MG: 325 TABLET, FILM COATED ORAL at 13:52

## 2023-04-03 RX ADMIN — ACETAMINOPHEN 975 MG: 325 TABLET, FILM COATED ORAL at 05:11

## 2023-04-03 RX ADMIN — OXYCODONE HYDROCHLORIDE 10 MG: 10 TABLET ORAL at 16:26

## 2023-04-03 RX ADMIN — OXYCODONE HYDROCHLORIDE 10 MG: 10 TABLET ORAL at 05:11

## 2023-04-03 RX ADMIN — ONDANSETRON 4 MG: 4 TABLET, ORALLY DISINTEGRATING ORAL at 12:23

## 2023-04-03 RX ADMIN — SIMETHICONE 80 MG: 80 TABLET, CHEWABLE ORAL at 13:54

## 2023-04-03 RX ADMIN — ENOXAPARIN SODIUM 40 MG: 40 INJECTION SUBCUTANEOUS at 12:19

## 2023-04-03 RX ADMIN — OXYCODONE HYDROCHLORIDE 5 MG: 5 TABLET ORAL at 09:37

## 2023-04-03 RX ADMIN — OXYCODONE HYDROCHLORIDE 10 MG: 10 TABLET ORAL at 00:59

## 2023-04-03 ASSESSMENT — ACTIVITIES OF DAILY LIVING (ADL)
ADLS_ACUITY_SCORE: 20

## 2023-04-03 NOTE — DISCHARGE SUMMARY
[facility]  Discharge Summary  Colon and Rectal Surgery     Sangita Meraz MRN# [id number]   YOB: 1965 Age: 57 year old     Date of Admission:  3/31/2023  Date of Discharge::  4/3/2023  Admitting Physician:  Bhavana Johansen MD  Discharge Physician:  Bhavana Johansen MD  Primary Care Physician:        No Ref-Primary, Physician          Admission Diagnoses:   Diverticulosis of large intestine without hemorrhage [K57.30]  Diverticulitis of colon [K57.32]  Diverticulitis [K57.92]  Obesity          Discharge Diagnosis:   Diverticulosis of large intestine without hemorrhage [K57.30]  Diverticulitis of colon [K57.32]  Diverticulitis [K57.92]  Obesity         Procedures:   LAPAROSCOPIC extended LEFT HEMICOLECTOMY  Sigmoidoscopy flexible              Consultations:   None         Imaging Studies:     Results for orders placed or performed during the hospital encounter of 12/25/22   CT Abdomen Pelvis w/o Contrast    Narrative    EXAM: CT ABDOMEN PELVIS W/O CONTRAST  LOCATION: [facility]  DATE/TIME: 12/25/2022 6:42 AM    INDICATION: llq abd pain  COMPARISON: 12/5/2022  TECHNIQUE: CT scan of the abdomen and pelvis was performed without IV contrast. Multiplanar reformats were obtained. Dose reduction techniques were used.  CONTRAST: None.    FINDINGS:   LOWER CHEST: Normal.    HEPATOBILIARY: Mild hepatic steatosis. No significant mass. No bile duct dilatation. No calcified gallstones. Gallbladder is surgically absent.    PANCREAS: No significant mass, duct dilatation, or inflammatory change.    SPLEEN: Normal size.    ADRENAL GLANDS: Normal.    KIDNEYS/BLADDER: Normal.    BOWEL: Extensive diverticula are again seen throughout the colon with mild mural thickening of the proximal sigmoid/distal descending colon which when compared with prior study is slightly improved from prior exam. The small large bowel are otherwise    normal in size and caliber.    LYMPH NODES: There is a increased number of mesenteric lymph nodes again seen, with mild haziness of the root of the mesentery which is unchanged from the prior exam.    VASCULATURE: No abdominal aortic aneurysm.    PELVIC ORGANS: A pessary device is present, the uterus is unremarkable within limits of a noncontrast exam.    MUSCULOSKELETAL: Multi-level discogenic and posterior facet degenerative changes of the lumbar spine as well as degenerative osteoarthritic changes of the SI joint are again noted.      Impression    IMPRESSION:   1.  Persistent mild mural thickening of the proximal sigmoid/distal descending colon with adjacent free fluid, when compared to 12/5/2022 the degree of inflammation in this area is decreased consistent with improving but continuing diverticulitis. Within   limits of a noncontrast CT scan no intra-abdominal abscess/fluid collection is seen at this time.  2.  Increased number of mesenteric lymph nodes with mild mesenteric intravenous again noted, this was present on the prior exam and is a very subtle in appearance, favored to at least in part be reactive in nature secondary to the patient's   diverticulitis, a more chronic process such as mesenteric panniculitis may also be present and would have a similar appearance. Correlation with patient's clinical history is recommended.                Medications Prior to Admission:     Medications Prior to Admission   Medication Sig Dispense Refill Last Dose     acetaminophen (TYLENOL) 500 MG tablet Take 500 mg by mouth every 6 hours as needed for mild pain   Past Week     estradiol (ESTRING) 2 MG vaginal ring Place 1 each vaginally every 3 months 3 each 4 Past Week     omeprazole 20 MG tablet Take 20 mg by mouth every evening   3/30/2023 at 0800     sertraline (ZOLOFT) 100 MG tablet Take 150 mg by mouth every evening   3/30/2023 at 2130     UNABLE TO FIND every morning MEDICATION NAME: Mix of Supplements;  "Tumeric, Green tea, Vit D and Probiotic   3/26/2023     [DISCONTINUED] metroNIDAZOLE (FLAGYL) 500 MG tablet Take 1 tablet (500 mg) by mouth every 6 hours At 8:00 am, 2:00 pm, 8:00 pm the day prior to your surgery with neomycin and zofran. 3 tablet 0 3/30/2023 at 0800     [DISCONTINUED] neomycin (MYCIFRADIN) 500 MG tablet Take 2 tablets (1,000 mg) by mouth every 6 hours At 8:00 am, 2:00 pm, 8:00 pm the day prior to your surgery with flagyl and zofran. 6 tablet 0 3/30/2023 at 0800     [DISCONTINUED] ondansetron (ZOFRAN ODT) 4 MG ODT tab Take 1 tablet (4 mg) by mouth every 8 hours as needed for nausea 10 tablet 0 3/30/2023 at 0800     [DISCONTINUED] ondansetron (ZOFRAN) 4 MG tablet Take 1 tablet (4 mg) by mouth every 6 hours At 8:00 am, 2:00 pm, 8:00 pm the day prior to your surgery with neomycin and flagyl. 3 tablet 0 3/30/2023 at 0800     [DISCONTINUED] polyethylene glycol (MIRALAX) 17 g packet Take 238 g by mouth See Admin Instructions Starting at 4 pm night prior to surgery. Refer to \"Getting Ready for Surgery\" instructions. 14 packet 0 3/30/2023 at 2030     [DISCONTINUED] psyllium (METAMUCIL/KONSYL) 58.6 % powder Take by mouth daily   More than a month              Discharge Medications:     Current Discharge Medication List      START taking these medications    Details   oxyCODONE (ROXICODONE) 5 MG tablet Take 1 tablet (5 mg) by mouth every 4 hours as needed for moderate pain  Qty: 20 tablet, Refills: 0    Associated Diagnoses: Diverticulitis         CONTINUE these medications which have NOT CHANGED    Details   acetaminophen (TYLENOL) 500 MG tablet Take 500 mg by mouth every 6 hours as needed for mild pain      estradiol (ESTRING) 2 MG vaginal ring Place 1 each vaginally every 3 months  Qty: 3 each, Refills: 4    Associated Diagnoses: Hormone replacement therapy      omeprazole 20 MG tablet Take 20 mg by mouth every evening      sertraline (ZOLOFT) 100 MG tablet Take 150 mg by mouth every evening      UNABLE TO " FIND every morning MEDICATION NAME: Mix of Supplements; Tumeric, Green tea, Vit D and Probiotic         STOP taking these medications       metroNIDAZOLE (FLAGYL) 500 MG tablet Comments:   Reason for Stopping:         neomycin (MYCIFRADIN) 500 MG tablet Comments:   Reason for Stopping:         ondansetron (ZOFRAN ODT) 4 MG ODT tab Comments:   Reason for Stopping:         ondansetron (ZOFRAN) 4 MG tablet Comments:   Reason for Stopping:         polyethylene glycol (MIRALAX) 17 g packet Comments:   Reason for Stopping:         psyllium (METAMUCIL/KONSYL) 58.6 % powder Comments:   Reason for Stopping:                        Brief History of Illness:   Sangita Meraz is a very pleasant 57 year old female with recurrent diverticulitis.      First episode of acute uncomplicated diverticulitis on 07/2020. She was treated conservatively with Augmentin with complete resolution.    In total she has had 5-7 episodes of acute diverticulitis. Each time she was treated with oral antibiotics.    Most recently, she presented to the ED on 12/5/22 with uncomplicated acute diverticulitis. She was discharged with Augmentin. She improved with this but then began developing left lower quadrant abdominal pain that radiated to her left lower back. She saw her PCP on 12/23/22 and was started on Cipro and Flagyl. She continued to worsen and presented to the ED on 12/25/22. CT was actually improved from prior, and she was discharged home with instructions to complete the course of Cipro and Flagyl.           Hospital Course:   Post-operatively pt was gently fluid resuscitated.  Todd was removed and pt was able to void apropriately.  Pt had eventual return of bowel function and was able to tolerate small amounts of a low fiber diet.     Pt was deemed appropriate for discharge home. Post-operative pain was controlled with scheduled tylenol and prn oxycodone.       Patient is to follow up in the Colon and Rectal Surgery Clinic in 2-3 week with  "Lindy Dailey NP or Verena Clay PA-C and then with Dr. Johansen in 2-3 weeks after.          Day of Discharge Physical Exam:   Blood pressure 138/67, pulse 53, temperature 98.1  F (36.7  C), temperature source Temporal, resp. rate 16, height 1.6 m (5' 3\"), weight 95.2 kg (209 lb 14.1 oz), last menstrual period 12/09/2018, SpO2 94 %, not currently breastfeeding.    Gen:      AAOx3, NAD  Pulm:    Non-labored breathing  Abd:      Soft, non-distended, appropriately tender, no guarding               Incision C/D/I with dermabond  Ext:       Warm and well-perfused            Final Pathology Result:   Pending at time of discharge           Discharge Instructions and Follow-Up:     Discharge Procedure Orders   Reason for your hospital stay   Order Comments: Sigmoidectomy for recurrent diverticulitis     Activity   Order Comments: Your activity upon discharge: activity as tolerated     Order Specific Question Answer Comments   Is discharge order? Yes      Follow Up ([facility])   Order Comments: You will have follow-up in clinic with Lindy Dailey NP or Verena Clay PA-C in 2 week(s) and then Dr. Johansen 6-8 weeks.     Appointments on [place] and/or [facility] (with [facility] or [facility] provider or service). Call [phone] if you haven't heard regarding these appointments within 7 days of discharge.     Diet   Order Comments: Follow this diet upon discharge: Orders Placed This Encounter      Low Fiber Diet     Order Specific Question Answer Comments   Is discharge order? Yes             Home Health Care:     Not needed           Discharge Disposition:     Discharged to home      Condition at discharge: Tony Canseco PA-C ..................4/3/2023   2:27 PM  Colon and Rectal Surgery     Pt was seen and discussed with Dr. Darnell on 04/03/23 who discussed with CRS Staff: Dr. Johansen    Staff Addendum:  Agree with the discharge summary as documented. I was personally " involved with the discharge planning and hospital decision-making for this patient.  Bhavana Johansen MD  Colon and Rectal Surgery Staff  [facility]

## 2023-04-03 NOTE — PLAN OF CARE
Goal Outcome Evaluation:      Plan of Care Reviewed With: patient    Overall Patient Progress: improvingOverall Patient Progress: improving    VSS. A+Ox4.  HR regular. LS clear-IS done  +BS, +flatus and 2 small loose BM this shift.  Voiding spontaneously.  Tolerating LFD. But c/o's nausea at noon time, with some relief from Zofran ODT and passing 2nd BM. Given simethicone as well.  UAL   Pain in abd incision controlled with sched tylenol and prn oxycodone.  Lap sites with intact derma bond AARON.  P: to discharge in the mraio(ride is available after 6pm).

## 2023-04-03 NOTE — PROGRESS NOTES
[id number]  Patient discharged home, discharge education reviewed with patient and pt verbalized understanding. PIV was removed prior to discharge and questions were answered. Patient did not  simethicone from the pharmacy and said she has some at home. Patient left the unit via w/c transport.

## 2023-04-03 NOTE — DISCHARGE INSTRUCTIONS
DIET  -Low Residue Diet for at least 4-6 weeks unless cleared by Colorectal surgery.  No raw vegetables, fruit skins, fibrous foods that require a lot of chewing, nuts, seeds, corn, popcorn.   -We recommend eating slowly, chewing thoroughly, eating small frequent meals throughout the day  -Stay well hydrated.      ACTIVITY  -No lifting, pushing, pulling greater than 10 lbs and no strenuous exercise for 6 weeks   -Do not insert anything into your anus or rectum for 6 weeks  -No driving while on narcotic analgesics (i.e. Percocet, oxycodone, Vicodin)  -No driving until you are able to fully twist to both sides or slam on brakes quickly and without any pain  -We encourage walking at least 4-5 times per day    WOUND CARE  -Inspect your wounds daily for signs of infection (increased redness, drainage, pain)  -Keep your wound clean and dry  -You may shower, but do not soak in tub or pool    NOTIFY  Please contact Lizzie Marroquin RN or Victorina Forde RN at [phone] for problems after discharge such as:  -Temperature > 101F, chills, rigors, dizziness  -Redness around or purulent drainage from wound  -Inability to tolerate diet, nausea or vomiting  -You stop passing gas, develop significant bloating, abdominal pain  -Have blood in stools/vomit  -Have severe diarrhea/constipation  -Any other questions or concerns.  - At nights (after 4:30pm), on weekends, or if urgent, call [phone] and ask the  to speak with the on-call Colorectal Surgery resident or fellow      Medication Instructions  Some of your medications may have changed. Please take only prescribed and resumed medications     FOLLOW-UP  1.  You will need to follow-up with Lindy Dailey NP or Verena Clay PA-C in the Colon and Rectal Surgery clinic in 2-3 week(s) and then with CRS Staff: Dr. Johansen in 4-5 weeks after.  Please contact our Nurses (phone # [phone]) if you have not heard from our clinic in 3 business days afer discharge  to schedule a follow-up appointment.    2.  Follow up with your primary care provider in 1-2 weeks after discharge from the hospital to review this hospitalization.

## 2023-04-03 NOTE — PLAN OF CARE
Goal Outcome Evaluation:      Plan of Care Reviewed With: patient    Overall Patient Progress: improvingOverall Patient Progress: improving      Up walking halls independenly. Tolerating low fiber diet, voiding w/ good urine output, multiple BMs,  lap sites open to air, clean/dry & intact. No IV.   Able to make needs known. Pleasant & cooperative

## 2023-04-04 LAB
PATH REPORT.COMMENTS IMP SPEC: NORMAL
PATH REPORT.FINAL DX SPEC: NORMAL
PATH REPORT.GROSS SPEC: NORMAL
PATH REPORT.MICROSCOPIC SPEC OTHER STN: NORMAL
PATH REPORT.RELEVANT HX SPEC: NORMAL
PHOTO IMAGE: NORMAL

## 2023-04-05 ENCOUNTER — PATIENT OUTREACH (OUTPATIENT)
Dept: SURGERY | Facility: CLINIC | Age: 58
End: 2023-04-05
Payer: OTHER GOVERNMENT

## 2023-04-05 NOTE — PROGRESS NOTES
Post Op Note     Called to check on patient postoperatively after hospital discharge.     Patient is s/p Sigmoid colectomy with primary anastomosis with Dr. Bhavana Johansen for diverticulitis.   Admitted 3/31 and discharged on 4/3.      Pain is well controlled with oxycodone and tylenol     Patient is eating and drinking normally. Patient is on a low fiber diet.  Encouraged patient to drink 8-10 glasses of water a day.     Patient is passing flats, is having loose bowel movements.    Patient is voiding normally and urine is light in color.    Patient is not set up with home care.     Patient Reports nausea and denies vomiting. The nausea self resolved     Patient Denies any fevers or chills.    Patient's incision is C/D/I. Patient reminded NOT to remove any dressings over their incisions.     Patient is on a activity restriction. Lifting 10 pounds for 6 weeks.     Patient Denies needing any forms completed.     Follow up is set up with Verena Clay PA-C on 4/17 and with Dr. Bhavana Johansen on 5/31.   Encouraged the patient to contact the clinic in the meantime with any fevers, chills, nausea, vomiting, increased colostomy output, no colostomy output, dizziness, lightheadedness, uncontrolled pain, changes to the incisions, or with any questions or concerns.    Patient's questions were answered to their stated satisfaction and they are in agreement with this plan.    BETO Samuel [phone]  Colon & Rectal Surgery Clinic  [facility]

## 2023-04-07 DIAGNOSIS — R19.7 DIARRHEA: ICD-10-CM

## 2023-04-07 DIAGNOSIS — R11.0 NAUSEA: Primary | ICD-10-CM

## 2023-04-07 LAB — C DIFF TOX B STL QL: NEGATIVE

## 2023-04-07 PROCEDURE — 87493 C DIFF AMPLIFIED PROBE: CPT

## 2023-04-07 RX ORDER — ONDANSETRON 4 MG/1
4 TABLET, ORALLY DISINTEGRATING ORAL EVERY 8 HOURS PRN
Qty: 10 TABLET | Refills: 0 | Status: SHIPPED | OUTPATIENT
Start: 2023-04-07 | End: 2023-11-28

## 2023-04-12 ENCOUNTER — PATIENT OUTREACH (OUTPATIENT)
Dept: SURGERY | Facility: CLINIC | Age: 58
End: 2023-04-12
Payer: OTHER GOVERNMENT

## 2023-04-12 NOTE — PROGRESS NOTES
I spoke with Sangita who states she is still having diarrhea with gas pain. She is taking 4 imodium daily and 1 serving of metamucil. I told her she can go to BID metamucil. C Diff neg. Discussed with Verena Clay PA-C. Ok to go to regular diet. We will see her 4/17

## 2023-04-17 ENCOUNTER — OFFICE VISIT (OUTPATIENT)
Dept: SURGERY | Facility: CLINIC | Age: 58
End: 2023-04-17
Payer: OTHER GOVERNMENT

## 2023-04-17 VITALS
HEART RATE: 61 BPM | DIASTOLIC BLOOD PRESSURE: 77 MMHG | WEIGHT: 204.3 LBS | BODY MASS INDEX: 36.2 KG/M2 | SYSTOLIC BLOOD PRESSURE: 129 MMHG | HEIGHT: 63 IN | OXYGEN SATURATION: 97 %

## 2023-04-17 DIAGNOSIS — Z09 FOLLOW-UP EXAMINATION AFTER COLORECTAL SURGERY: Primary | ICD-10-CM

## 2023-04-17 DIAGNOSIS — Z90.49 S/P PARTIAL COLECTOMY: ICD-10-CM

## 2023-04-17 PROCEDURE — 99024 POSTOP FOLLOW-UP VISIT: CPT | Performed by: NURSE PRACTITIONER

## 2023-04-17 ASSESSMENT — PAIN SCALES - GENERAL: PAINLEVEL: NO PAIN (0)

## 2023-04-17 NOTE — LETTER
"2023       RE: Sangita Meraz  [address]  [facility] [address]       Dear Colleague,    Thank you for referring your patient, Sangita Meraz, to the [facility] [place] at [facility]. Please see a copy of my visit note below.    Colon and Rectal Surgery Postoperative Clinic Note    RE: Sangita Meraz  : 1965  JAE: 2023    Sangita Meraz is a very pleasant 57 year old female with a history of recurrent diverticulitis now status post laparoscopic extended left hemicolectomy with Dr. Johansen on 3/31/23.     Final Diagnosis   DESCENDING AND SIGMOID COLON; RESECTION:  Multiple diverticula with one focus of non-perforated diverticulitis:   -Negative for dysplasia or malignancy; reactive/benign local lymph nodes   -Ischemic changes present focally (See Comment)      Interval history: Sangita is doing well. No longer needing any pain medications. Has some occasional nausea but no vomiting. No fevers or chills. Tolerating a low residue diet. She was having watery stools but started Metamucil and stools are now soft formed.     Physical Examination:  /77 (BP Location: Left arm, Patient Position: Sitting, Cuff Size: Adult Large)   Pulse 61   Ht 5' 3\"   Wt 204 lb 4.8 oz   LMP 2018   SpO2 97%   BMI 36.19 kg/m    General: alert, oriented, in no acute distress, sitting comfortably  HEENT: moist mucous membranes  Abdomen: soft, non distended, non tender on palpation; incision well approximated without any erythema or drainage.    Assessment/Plan:  57 year old female with a history of recurrent diverticulitis now status post laparoscopic extended left hemicolectomy with Dr. Johansen on 3/31/23. Low residue diet for another 3-4 weeks given intermittent nausea and notify the clinic if this gets worse. Avoid lifting >10 lb for another month. Scheduled to see Dr. Johansen on 23. Contact us in " the meantime with questions. Patient's questions were answered to her stated satisfaction and she is in agreement with this plan.     Medical history:  Past Medical History:   Diagnosis Date    Depressive disorder 1982    Diverticulosis of large intestine without hemorrhage     Obstructive sleep apnea syndrome 10/17/2022       Surgical history:  Past Surgical History:   Procedure Laterality Date    ABDOMEN SURGERY  '92,'09,'15    2 c-secs, abdomenplasty    CHOLECYSTECTOMY  '98    COLONOSCOPY N/A 03/16/2021    Procedure: COLONOSCOPY, WITH BIOPSY AND CLIPPING;  Surgeon: Kemar Mckeon MD;  Location: [facility] OR    DILATION AND CURETTAGE, OPERATIVE HYSTEROSCOPY, COMBINED N/A 10/18/2022    Procedure: HYSTEROSCOPY, WITH DILATION AND CURETTAGE OF UTERUS and polypectomy;  Surgeon: Ines Chi MD;  Location: [facility] OR    ESOPHAGOSCOPY, GASTROSCOPY, DUODENOSCOPY (EGD), COMBINED N/A 12/09/2020    Procedure: ESOPHAGOGASTRODUODENOSCOPY, WITH BIOPSY;  Surgeon: Anton Knott DO;  Location: [facility] GI    GENITOURINARY SURGERY  '01, '03, '07    tubal ligation, anterior/posterior repair, tubal ligation reversal    LAPAROSCOPIC ASSISTED SIGMOID COLECTOMY N/A 3/31/2023    Procedure: LAPAROSCOPIC extended LEFT HEMICOLECTOMY;  Surgeon: Bhavana Johansen MD;  Location: [facility] OR    SIGMOIDOSCOPY FLEXIBLE N/A 3/31/2023    Procedure: Sigmoidoscopy flexible;  Surgeon: Bhavana Johansen MD;  Location: [facility] OR       Problem list:    Patient Active Problem List    Diagnosis Date Noted    Diverticulitis 03/31/2023     Priority: Medium    Female cystocele 10/17/2022     Priority: Medium    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     Priority: Medium    Mixed stress and urge urinary incontinence 10/17/2022     Priority: Medium    Polycystic ovarian syndrome 10/17/2022     Priority: Medium    Recurrent major depressive disorder, in partial remission (H) 10/17/2022     Priority: Medium    Obstructive sleep apnea  syndrome 10/17/2022     Priority: Medium     severe      Uterovaginal prolapse 10/17/2022     Priority: Medium    Meniere's disease, unspecified laterality 10/17/2022     Priority: Medium    Morbid obesity (H) 07/13/2021     Priority: Medium       Medications:  Current Outpatient Medications   Medication Sig Dispense Refill    acetaminophen (TYLENOL) 500 MG tablet Take 500 mg by mouth every 6 hours as needed for mild pain      estradiol (ESTRING) 2 MG vaginal ring Place 1 each vaginally every 3 months 3 each 4    omeprazole 20 MG tablet Take 20 mg by mouth every evening      ondansetron (ZOFRAN ODT) 4 MG ODT tab Take 1 tablet (4 mg) by mouth every 8 hours as needed for nausea 10 tablet 0    oxyCODONE (ROXICODONE) 5 MG tablet Take 1 tablet (5 mg) by mouth every 4 hours as needed for moderate pain (Patient not taking: Reported on 4/17/2023) 20 tablet 0    sertraline (ZOLOFT) 100 MG tablet Take 150 mg by mouth every evening      simethicone (MYLICON) 80 MG chewable tablet Take 1 tablet (80 mg) by mouth every 6 hours as needed for cramping 20 tablet 0    UNABLE TO FIND every morning MEDICATION NAME: Mix of Supplements; Tumeric, Green tea, Vit D and Probiotic         Allergies:  Allergies   Allergen Reactions    Ivp Dye [Contrast Dye] Anaphylaxis    Other (Do Not Use)      Other reaction(s): Itching,Watering Eyes, Rash, Sneezing, Itching,Watering Eyes, Rash, Sneezing, Itching,Watering Eyes, Rash, Sneezing       Family history:  Family History   Problem Relation Age of Onset    Uterine Cancer Mother         endometrial cancer    Diabetes Mother     Colon Polyps Father     Diabetes Father     Hypertension Father     Diabetes Brother     Hypertension Brother     Pulmonary Embolism Brother         following leg surgery    Anesthesia Reaction No family hx of        Social history:  Social History     Tobacco Use    Smoking status: Former     Packs/day: 1.00     Years: 10.00     Pack years: 10.00     Types: Cigarettes      "Start date: 10/10/1982     Quit date: 1991     Years since quittin.9    Smokeless tobacco: Never   Vaping Use    Vaping status: Never Used   Substance Use Topics    Alcohol use: Yes     Comment: 1-2 beer/month     Marital status: .    Nursing Notes:   [place], EMT  2023 11:29 AM  Signed  Chief Complaint   Patient presents with    Surgical Followup     DOS 3/31/2023       Vitals:    23 1126   BP: 129/77   BP Location: Left arm   Patient Position: Sitting   Cuff Size: Adult Large   Pulse: 61   SpO2: 97%   Weight: 204 lb 4.8 oz   Height: 5' 3\"       Body mass index is 36.19 kg/m .     [place], EMT- P         15 minutes spent on the date of the encounter doing chart review, history and exam, documentation and further activities as noted above.   This is a postop visit.            Again, thank you for allowing me to participate in the care of your patient.      Sincerely,    CL Espinal CNP      "

## 2023-04-17 NOTE — NURSING NOTE
"Chief Complaint   Patient presents with     Surgical Followup     DOS 3/31/2023       Vitals:    04/17/23 1126   BP: 129/77   BP Location: Left arm   Patient Position: Sitting   Cuff Size: Adult Large   Pulse: 61   SpO2: 97%   Weight: 204 lb 4.8 oz   Height: 5' 3\"       Body mass index is 36.19 kg/m .     [place], EMT- P    "

## 2023-04-17 NOTE — PROGRESS NOTES
"Colon and Rectal Surgery Postoperative Clinic Note    RE: Sangita Meraz  : 1965  JAE: 2023    Sangita Meraz is a very pleasant 57 year old female with a history of recurrent diverticulitis now status post laparoscopic extended left hemicolectomy with Dr. Johansen on 3/31/23.     Final Diagnosis   DESCENDING AND SIGMOID COLON; RESECTION:  Multiple diverticula with one focus of non-perforated diverticulitis:   -Negative for dysplasia or malignancy; reactive/benign local lymph nodes   -Ischemic changes present focally (See Comment)      Interval history: Sangita is doing well. No longer needing any pain medications. Has some occasional nausea but no vomiting. No fevers or chills. Tolerating a low residue diet. She was having watery stools but started Metamucil and stools are now soft formed.     Physical Examination:  /77 (BP Location: Left arm, Patient Position: Sitting, Cuff Size: Adult Large)   Pulse 61   Ht 5' 3\"   Wt 204 lb 4.8 oz   LMP 2018   SpO2 97%   BMI 36.19 kg/m    General: alert, oriented, in no acute distress, sitting comfortably  HEENT: moist mucous membranes  Abdomen: soft, non distended, non tender on palpation; incision well approximated without any erythema or drainage.    Assessment/Plan:  57 year old female with a history of recurrent diverticulitis now status post laparoscopic extended left hemicolectomy with Dr. Johansen on 3/31/23. Low residue diet for another 3-4 weeks given intermittent nausea and notify the clinic if this gets worse. Avoid lifting >10 lb for another month. Scheduled to see Dr. Johansen on 23. Contact us in the meantime with questions. Patient's questions were answered to her stated satisfaction and she is in agreement with this plan.     Medical history:  Past Medical History:   Diagnosis Date     Depressive disorder 1982     Diverticulosis of large intestine without hemorrhage      Obstructive sleep apnea syndrome " 10/17/2022       Surgical history:  Past Surgical History:   Procedure Laterality Date     ABDOMEN SURGERY  '92,'09,'15    2 c-secs, abdomenplasty     CHOLECYSTECTOMY  '98     COLONOSCOPY N/A 03/16/2021    Procedure: COLONOSCOPY, WITH BIOPSY AND CLIPPING;  Surgeon: Kemar Mckeon MD;  Location: [facility] [place]     DILATION AND CURETTAGE, OPERATIVE HYSTEROSCOPY, COMBINED N/A 10/18/2022    Procedure: HYSTEROSCOPY, WITH DILATION AND CURETTAGE OF UTERUS and polypectomy;  Surgeon: Ines Chi MD;  Location: [facility] OR     ESOPHAGOSCOPY, GASTROSCOPY, DUODENOSCOPY (EGD), COMBINED N/A 12/09/2020    Procedure: ESOPHAGOGASTRODUODENOSCOPY, WITH BIOPSY;  Surgeon: Anton Knott DO;  Location: [facility] GI     GENITOURINARY SURGERY  '01, '03, '07    tubal ligation, anterior/posterior repair, tubal ligation reversal     LAPAROSCOPIC ASSISTED SIGMOID COLECTOMY N/A 3/31/2023    Procedure: LAPAROSCOPIC extended LEFT HEMICOLECTOMY;  Surgeon: Bhavana Johansen MD;  Location: [facility] OR     SIGMOIDOSCOPY FLEXIBLE N/A 3/31/2023    Procedure: Sigmoidoscopy flexible;  Surgeon: Bhavana Johansen MD;  Location: [facility] OR       Problem list:    Patient Active Problem List    Diagnosis Date Noted     Diverticulitis 03/31/2023     Priority: Medium     Female cystocele 10/17/2022     Priority: Medium     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     Priority: Medium     Mixed stress and urge urinary incontinence 10/17/2022     Priority: Medium     Polycystic ovarian syndrome 10/17/2022     Priority: Medium     Recurrent major depressive disorder, in partial remission (H) 10/17/2022     Priority: Medium     Obstructive sleep apnea syndrome 10/17/2022     Priority: Medium     severe       Uterovaginal prolapse 10/17/2022     Priority: Medium     Meniere's disease, unspecified laterality 10/17/2022     Priority: Medium     Morbid obesity (H) 07/13/2021     Priority: Medium       Medications:  Current Outpatient Medications    Medication Sig Dispense Refill     acetaminophen (TYLENOL) 500 MG tablet Take 500 mg by mouth every 6 hours as needed for mild pain       estradiol (ESTRING) 2 MG vaginal ring Place 1 each vaginally every 3 months 3 each 4     omeprazole 20 MG tablet Take 20 mg by mouth every evening       ondansetron (ZOFRAN ODT) 4 MG ODT tab Take 1 tablet (4 mg) by mouth every 8 hours as needed for nausea 10 tablet 0     oxyCODONE (ROXICODONE) 5 MG tablet Take 1 tablet (5 mg) by mouth every 4 hours as needed for moderate pain (Patient not taking: Reported on 2023) 20 tablet 0     sertraline (ZOLOFT) 100 MG tablet Take 150 mg by mouth every evening       simethicone (MYLICON) 80 MG chewable tablet Take 1 tablet (80 mg) by mouth every 6 hours as needed for cramping 20 tablet 0     UNABLE TO FIND every morning MEDICATION NAME: Mix of Supplements; Tumeric, Green tea, Vit D and Probiotic         Allergies:  Allergies   Allergen Reactions     Ivp Dye [Contrast Dye] Anaphylaxis     Other (Do Not Use)      Other reaction(s): Itching,Watering Eyes, Rash, Sneezing, Itching,Watering Eyes, Rash, Sneezing, Itching,Watering Eyes, Rash, Sneezing       Family history:  Family History   Problem Relation Age of Onset     Uterine Cancer Mother         endometrial cancer     Diabetes Mother      Colon Polyps Father      Diabetes Father      Hypertension Father      Diabetes Brother      Hypertension Brother      Pulmonary Embolism Brother         following leg surgery     Anesthesia Reaction No family hx of        Social history:  Social History     Tobacco Use     Smoking status: Former     Packs/day: 1.00     Years: 10.00     Pack years: 10.00     Types: Cigarettes     Start date: 10/10/1982     Quit date: 1991     Years since quittin.9     Smokeless tobacco: Never   Vaping Use     Vaping status: Never Used   Substance Use Topics     Alcohol use: Yes     Comment: 1-2 beer/month     Marital status: .    Nursing Notes:  "  [place], EMT  4/17/2023 11:29 AM  Signed  Chief Complaint   Patient presents with     Surgical Followup     DOS 3/31/2023       Vitals:    04/17/23 1126   BP: 129/77   BP Location: Left arm   Patient Position: Sitting   Cuff Size: Adult Large   Pulse: 61   SpO2: 97%   Weight: 204 lb 4.8 oz   Height: 5' 3\"       Body mass index is 36.19 kg/m .     [place], EMT- P         15 minutes spent on the date of the encounter doing chart review, history and exam, documentation and further activities as noted above.   This is a postop visit.      CL Ngo, NP-C  Colon and Rectal Surgery  [facility]      "

## 2023-05-17 ENCOUNTER — ANCILLARY PROCEDURE (OUTPATIENT)
Dept: GENERAL RADIOLOGY | Facility: CLINIC | Age: 58
End: 2023-05-17
Attending: NURSE PRACTITIONER
Payer: OTHER GOVERNMENT

## 2023-05-17 ENCOUNTER — OFFICE VISIT (OUTPATIENT)
Dept: URGENT CARE | Facility: URGENT CARE | Age: 58
End: 2023-05-17
Payer: OTHER GOVERNMENT

## 2023-05-17 VITALS
TEMPERATURE: 98.1 F | WEIGHT: 205 LBS | SYSTOLIC BLOOD PRESSURE: 132 MMHG | HEART RATE: 70 BPM | OXYGEN SATURATION: 97 % | RESPIRATION RATE: 16 BRPM | BODY MASS INDEX: 36.31 KG/M2 | DIASTOLIC BLOOD PRESSURE: 74 MMHG

## 2023-05-17 DIAGNOSIS — M25.472 PAIN AND SWELLING OF LEFT ANKLE: ICD-10-CM

## 2023-05-17 DIAGNOSIS — S93.402A SPRAIN OF LEFT ANKLE, UNSPECIFIED LIGAMENT, INITIAL ENCOUNTER: Primary | ICD-10-CM

## 2023-05-17 DIAGNOSIS — M25.572 PAIN AND SWELLING OF LEFT ANKLE: ICD-10-CM

## 2023-05-17 PROCEDURE — 73610 X-RAY EXAM OF ANKLE: CPT | Mod: TC | Performed by: RADIOLOGY

## 2023-05-17 PROCEDURE — 99213 OFFICE O/P EST LOW 20 MIN: CPT | Performed by: NURSE PRACTITIONER

## 2023-05-17 RX ORDER — HYDROXYZINE HYDROCHLORIDE 10 MG/1
TABLET, FILM COATED ORAL
COMMUNITY
Start: 2023-03-16 | End: 2023-05-17

## 2023-05-17 RX ORDER — HYDROCODONE BITARTRATE AND ACETAMINOPHEN 5; 325 MG/1; MG/1
1 TABLET ORAL EVERY 6 HOURS PRN
Qty: 10 TABLET | Refills: 0 | Status: SHIPPED | OUTPATIENT
Start: 2023-05-17 | End: 2023-05-20

## 2023-05-17 NOTE — PROGRESS NOTES
"Colon and Rectal Surgery Postoperative Video Note    Date: 2023       RE: Sangita Meraz  : 1965  JAE: 2023    Sangita Meraz is a 57 year old female who is being evaluated via a billable video visit.      The patient has been notified of following:     \"This video visit will be conducted via a call between you and your physician/provider. We have found that certain health care needs can be provided without the need for an in-person physical exam.  This service lets us provide the care you need with a video conversation.  If a prescription is necessary we can send it directly to your pharmacy.  If lab work is needed we can place an order for that and you can then stop by our lab to have the test done at a later time.    Video visits are billed at different rates depending on your insurance coverage.  Please reach out to your insurance provider with any questions.    If during the course of the call the physician/provider feels a video visit is not appropriate, you will not be charged for this service.\"    Patient has given verbal consent for Video visit? Yes    Video Start Time: 4:15PM     Sangita Meraz is a very pleasant 57 year old female with a history of recurrent diverticulitis now 2 month(s) status post laparoscopic extended left hemicolectomy.      Final Diagnosis   DESCENDING AND SIGMOID COLON; RESECTION:  Multiple diverticula with one focus of non-perforated diverticulitis:   -Negative for dysplasia or malignancy; reactive/benign local lymph nodes   -Ischemic changes present focally (See Comment)   In addition to diverticula, features that would ordinarily be concerning for ischemic colitis are seen focally (block A2). There is however the likelihood that this could be from perioperative ischemia but ongoing monitoring should have this in mind, especially since this focus is close to one of the margins.       Interval history: Doing well overall. Fiber is helping. She has 4-5 bowel " movements daily. No urgency. Incision are healed.      PLEASE SEE NOTE BELOW FOR PHYSICAL EXAMINATION, REVIEW OF SYSTEMS, AND OTHER HISTORY.    Assessment/Plan:  57 year old female now 2 month(s) status post laparoscopic extended left hemicolectomy.    May liberalize diet.  No activity restrictions  Colonoscopy in 2026 since last was 2021.  Follow up as needed.      Video-Visit Details    Type of service:  Video Visit    Video End Time (time video stopped): 4:20PM    Originating Location (pt. Location): Home    Distant Location (provider location):  [facility] [place]     Mode of Communication:  Video Conference via anydooR    25 minutes spent on the date of the encounter doing chart review, history, review of imaging, documentation, and further activities as noted above, which includes my time spent on video with the patient/family. This is a postoperative visit.      Medical history:  Past Medical History:   Diagnosis Date     Depressive disorder 1982     Diverticulosis of large intestine without hemorrhage      Obstructive sleep apnea syndrome 10/17/2022       Surgical history:  Past Surgical History:   Procedure Laterality Date     ABDOMEN SURGERY  '92,'09,'15    2 c-secs, abdomenplasty     CHOLECYSTECTOMY  '98     COLONOSCOPY N/A 03/16/2021    Procedure: COLONOSCOPY, WITH BIOPSY AND CLIPPING;  Surgeon: Kemar Mckeon MD;  [place] [facility]     DILATION AND CURETTAGE, OPERATIVE HYSTEROSCOPY, COMBINED N/A 10/18/2022    Procedure: HYSTEROSCOPY, WITH DILATION AND CURETTAGE OF UTERUS and polypectomy;  Surgeon: Ines Chi MD;  Location: [facility] OR     ESOPHAGOSCOPY, GASTROSCOPY, DUODENOSCOPY (EGD), COMBINED N/A 12/09/2020    Procedure: ESOPHAGOGASTRODUODENOSCOPY, WITH BIOPSY;  Surgeon: Anton Knott DO;  Location: [facility]     GENITOURINARY SURGERY  '01, '03, '07    tubal ligation, anterior/posterior repair, tubal ligation reversal     LAPAROSCOPIC ASSISTED  SIGMOID COLECTOMY N/A 3/31/2023    Procedure: LAPAROSCOPIC extended LEFT HEMICOLECTOMY;  Surgeon: Bhavana Johansen MD;  Location: [facility] OR     SIGMOIDOSCOPY FLEXIBLE N/A 3/31/2023    Procedure: Sigmoidoscopy flexible;  Surgeon: Bhavana Johansen MD;  Location: [facility] OR       Problem list:    Patient Active Problem List    Diagnosis Date Noted     Diverticulitis 03/31/2023     Priority: Medium     Female cystocele 10/17/2022     Priority: Medium     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     Priority: Medium     Mixed stress and urge urinary incontinence 10/17/2022     Priority: Medium     Polycystic ovarian syndrome 10/17/2022     Priority: Medium     Recurrent major depressive disorder, in partial remission (H) 10/17/2022     Priority: Medium     Obstructive sleep apnea syndrome 10/17/2022     Priority: Medium     severe       Uterovaginal prolapse 10/17/2022     Priority: Medium     Meniere's disease, unspecified laterality 10/17/2022     Priority: Medium     Morbid obesity (H) 07/13/2021     Priority: Medium       Medications:  Current Outpatient Medications   Medication Sig Dispense Refill     acetaminophen (TYLENOL) 500 MG tablet Take 500 mg by mouth every 6 hours as needed for mild pain       estradiol (ESTRING) 2 MG vaginal ring Place 1 each vaginally every 3 months 3 each 4     omeprazole 20 MG tablet Take 20 mg by mouth every evening       ondansetron (ZOFRAN ODT) 4 MG ODT tab Take 1 tablet (4 mg) by mouth every 8 hours as needed for nausea 10 tablet 0     sertraline (ZOLOFT) 100 MG tablet Take 150 mg by mouth every evening       UNABLE TO FIND every morning MEDICATION NAME: Mix of Supplements; Tumeric, Green tea, Vit D and Probiotic         Allergies:  Allergies   Allergen Reactions     Ivp Dye [Contrast Dye] Anaphylaxis     Other (Do Not Use)      Other reaction(s): Itching,Watering Eyes, Rash, Sneezing, Itching,Watering Eyes, Rash, Sneezing, Itching,Watering Eyes, Rash, Sneezing  "      Family history:  Family History   Problem Relation Age of Onset     Uterine Cancer Mother         endometrial cancer     Diabetes Mother      Colon Polyps Father      Diabetes Father      Hypertension Father      Diabetes Brother      Hypertension Brother      Pulmonary Embolism Brother         following leg surgery     Anesthesia Reaction No family hx of        Social history:  Social History     Tobacco Use     Smoking status: Former     Packs/day: 1.00     Years: 10.00     Pack years: 10.00     Types: Cigarettes     Start date: 10/10/1982     Quit date: 1991     Years since quittin.0     Smokeless tobacco: Never   Vaping Use     Vaping status: Never Used   Substance Use Topics     Alcohol use: Yes     Comment: 1-2 beer/month    Marital status: .    Nursing Notes:   Mary Borja  2023  3:27 PM  Signed  Chief Complaint   Patient presents with     Post-op Visit       Vitals:    23 1526   Weight: 205 lb   Height: 5' 3\"       Body mass index is 36.31 kg/m .    NEELAM Kuhn MD  Colon and Rectal Surgery Staff  [facility]      This note was created using speech recognition software and may contain unintended word substitutions.    "

## 2023-05-17 NOTE — PATIENT INSTRUCTIONS
1) Advance weight bearing as tolerated.   2) Wear ankle brace while up and about.  3) Continue icing on and off for 15 min at a time at least 3 times per day.   4) When you are at home elevate the ankle on a chair or with pillows when you lounge. Try to make it so your   5) Tylenol 1000mg three times a day for the next 3 days then three times a day as needed.   6) Follow up with your primary care provider if no improvement in 10 days. Or Ortho. Orthopedic referral placed today.        Treating Ankle Sprains  Treatment will depend on how bad your sprain is. For a severe sprain, healing may take 3 months or more.    Right After Your Injury: Use R.I.C.E.  Rest: At first, keep weight off the ankle as much as you can. You may be given crutches to help you walk without putting weight on the ankle.  Ice: Put an ice pack on the ankle for 15 minutes. Remove the pack and wait at least 30 minutes. Repeat for up to 3 days. This helps reduce swelling.  Compression: To reduce swelling and keep the joint stable, you may need to wrap the ankle with an elastic bandage. For more severe sprains, you may need an ankle brace or a cast.  Elevation: To reduce swelling, keep your ankle raised above your heart when you sit or lie down.  Medication  Your doctor may suggest an oral anti-inflammatory medication, such as ibuprofen. This relieves the pain and helps reduce any swelling. Be sure to take your medication as directed.  Contrast Baths  After 3 days, soak your ankle in warm water for 30 seconds, then in cool water for 30 seconds. Go back and forth for 5 minutes. Doing this every 2 hours will help keep the swelling down.    Exercises  After about 2-3 weeks, you may be given exercises to strengthen the ligaments and muscles in the ankle. Doing these exercises will help prevent another ankle sprain. Exercises may include standing on your toes and then on your heels and doing ankle curls.  Ankle Curls  Sit on the edge of a sturdy table or  lie on your back.  Pull your toes toward you. Then point them away from you. Repeat for 2-3 minutes.

## 2023-05-17 NOTE — PROGRESS NOTES
SUBJECTIVE  Sangita Meraz is a 57 year old female who presents today with left ankle pain that occurred 1 day(s) ago.    The mechanism of injury includes: eversion strain. Pain was sudden onset and moderate to severe. Pt was knocked over by one of her goats. Hear a pop in the ankle before landing on her left side.  Therapies to improve symptoms include: ice, Tylenol and elevation  History of recurrent ankle injuries: previous fracture in college  Pain is not changed since onset, swelling is worse  Aggravating factors: walking, weight-bearing, stairs and standing,   Relieved byice and elevation.    Past Medical History:   Diagnosis Date     Depressive disorder 1982     Diverticulosis of large intestine without hemorrhage      Obstructive sleep apnea syndrome 10/17/2022     Current Outpatient Medications   Medication Sig Dispense Refill     acetaminophen (TYLENOL) 500 MG tablet Take 500 mg by mouth every 6 hours as needed for mild pain       estradiol (ESTRING) 2 MG vaginal ring Place 1 each vaginally every 3 months 3 each 4     omeprazole 20 MG tablet Take 20 mg by mouth every evening       ondansetron (ZOFRAN ODT) 4 MG ODT tab Take 1 tablet (4 mg) by mouth every 8 hours as needed for nausea 10 tablet 0     sertraline (ZOLOFT) 100 MG tablet Take 150 mg by mouth every evening       UNABLE TO FIND every morning MEDICATION NAME: Mix of Supplements; Tumeric, Green tea, Vit D and Probiotic       hydrOXYzine (ATARAX) 10 MG tablet TAKE 1 TABLET TWICE DAILY AS NEEDED FOR ANXIETY. (Patient not taking: Reported on 5/17/2023)       oxyCODONE (ROXICODONE) 5 MG tablet Take 1 tablet (5 mg) by mouth every 4 hours as needed for moderate pain (Patient not taking: Reported on 5/17/2023) 20 tablet 0     simethicone (MYLICON) 80 MG chewable tablet Take 1 tablet (80 mg) by mouth every 6 hours as needed for cramping (Patient not taking: Reported on 5/17/2023) 20 tablet 0     Social History     Tobacco Use     Smoking status: Former      Packs/day: 1.00     Years: 10.00     Pack years: 10.00     Types: Cigarettes     Start date: 10/10/1982     Quit date: 1991     Years since quittin.0     Smokeless tobacco: Never   Vaping Use     Vaping status: Never Used   Substance Use Topics     Alcohol use: Yes     Comment: 1-2 beer/month         OBJECTIVE:  /74   Pulse 70   Temp 98.1  F (36.7  C) (Tympanic)   Resp 16   Wt 93 kg (205 lb)   LMP 2018   SpO2 97%   BMI 36.31 kg/m    EXAM: Patient appears alert,no apparent distress.  Ankle Exam: left    Inspection:swelling around the lateral malleolus    Palpation: tender over lateral malleolus    Both doralis pedis and posterior tibial pulses intact     Special Maneuvers: Pain with inversion  Pain with eversion  Pain with flexion  Pain with extension       X-Ray: Soft tissue swelling over the lateral malleolus but no  evidence for fracture or disruption of the ankle mortise. Plantar  calcaneal spurring.      ASSESSMENT:  1. Sprain of left ankle, unspecified ligament, initial encounter  - Ankle/Foot Bracing Supplies DME Ankle Brace; Left  - Orthopedic  Referral; Future  - HYDROcodone-acetaminophen (NORCO) 5-325 MG tablet; Take 1 tablet by mouth every 6 hours as needed for severe pain  Dispense: 10 tablet; Refill: 0    2. Pain and swelling of left ankle  - XR Ankle Left G/E 3 Views; Future    PLAN:  1) Advance weight bearing as tolerated.   2) Wear ankle brace while up and about.  3) Continue icing on and off for 15 min at a time at least 3 times per day.   4) When you are at home elevate the ankle on a chair or with pillows when you lounge. Try to make it so your.  5) Tylenol 1000mg three times a day for the next 3 days then three times a day as needed.   6) Follow up with your primary care provider if no improvement in 10 days. Or Ortho. Orthopedic referral placed today.

## 2023-05-31 ENCOUNTER — VIRTUAL VISIT (OUTPATIENT)
Dept: SURGERY | Facility: CLINIC | Age: 58
End: 2023-05-31
Payer: OTHER GOVERNMENT

## 2023-05-31 VITALS — HEIGHT: 63 IN | WEIGHT: 205 LBS | BODY MASS INDEX: 36.32 KG/M2

## 2023-05-31 DIAGNOSIS — Z90.49 S/P PARTIAL COLECTOMY: ICD-10-CM

## 2023-05-31 DIAGNOSIS — Z09 FOLLOW-UP EXAMINATION AFTER COLORECTAL SURGERY: Primary | ICD-10-CM

## 2023-05-31 PROCEDURE — 99024 POSTOP FOLLOW-UP VISIT: CPT | Mod: VID | Performed by: COLON & RECTAL SURGERY

## 2023-05-31 ASSESSMENT — ENCOUNTER SYMPTOMS
JOINT SWELLING: 0
CONSTIPATION: 0
BREAST MASS: 0
DIZZINESS: 0
DIARRHEA: 0
ARTHRALGIAS: 1
HEMATURIA: 0
NERVOUS/ANXIOUS: 1
MYALGIAS: 0
PALPITATIONS: 0
COUGH: 0
HEARTBURN: 0
PARESTHESIAS: 0
SHORTNESS OF BREATH: 0
WEAKNESS: 0
FREQUENCY: 0
EYE PAIN: 0
HEADACHES: 0
SORE THROAT: 0
CHILLS: 0
ABDOMINAL PAIN: 0
DYSURIA: 0
NAUSEA: 1
HEMATOCHEZIA: 0
FEVER: 0

## 2023-05-31 ASSESSMENT — PAIN SCALES - GENERAL: PAINLEVEL: NO PAIN (0)

## 2023-05-31 NOTE — LETTER
"2023       RE: Sangita Meraz  [address]  [facility] [address]       Dear Colleague,    Thank you for referring your patient, Sangita Meraz, to the [facility] RE[facility] SURGERY [facility]IN[facility] [place] at [facility]. Please see a copy of my visit note below.    Colon and Rectal Surgery Postoperative Video Note    Date: 2023       RE: Sangita Meraz  : 1965  JEA: 2023    Sangita Meraz is a 57 year old female who is being evaluated via a billable video visit.      The patient has been notified of following:     \"This video visit will be conducted via a call between you and your physician/provider. We have found that certain health care needs can be provided without the need for an in-person physical exam.  This service lets us provide the care you need with a video conversation.  If a prescription is necessary we can send it directly to your pharmacy.  If lab work is needed we can place an order for that and you can then stop by our lab to have the test done at a later time.    Video visits are billed at different rates depending on your insurance coverage.  Please reach out to your insurance provider with any questions.    If during the course of the call the physician/provider feels a video visit is not appropriate, you will not be charged for this service.\"    Patient has given verbal consent for Video visit? Yes    Video Start Time: 4:15PM     Sangita Meraz is a very pleasant 57 year old female with a history of recurrent diverticulitis now 2 month(s) status post laparoscopic extended left hemicolectomy.      Final Diagnosis   DESCENDING AND SIGMOID COLON; RESECTION:  Multiple diverticula with one focus of non-perforated diverticulitis:   -Negative for dysplasia or malignancy; reactive/benign local lymph nodes   -Ischemic changes present focally (See Comment)   In addition to diverticula, features that would ordinarily be " concerning for ischemic colitis are seen focally (block A2). There is however the likelihood that this could be from perioperative ischemia but ongoing monitoring should have this in mind, especially since this focus is close to one of the margins.       Interval history: Doing well overall. Fiber is helping. She has 4-5 bowel movements daily. No urgency. Incision are healed.      PLEASE SEE NOTE BELOW FOR PHYSICAL EXAMINATION, REVIEW OF SYSTEMS, AND OTHER HISTORY.    Assessment/Plan:  57 year old female now 2 month(s) status post laparoscopic extended left hemicolectomy.    May liberalize diet.  No activity restrictions  Colonoscopy in 2026 since last was 2021.  Follow up as needed.      Video-Visit Details    Type of service:  Video Visit    Video End Time (time video stopped): 4:20PM    Originating Location (pt. Location): Home    Distant Location (provider location):  [facility] RECT[facility]ERY [facility] [place]     Mode of Communication:  Video Conference via [employer]    25 minutes spent on the date of the encounter doing chart review, history, review of imaging, documentation, and further activities as noted above, which includes my time spent on video with the patient/family. This is a postoperative visit.      Medical history:  Past Medical History:   Diagnosis Date    Depressive disorder 1982    Diverticulosis of large intestine without hemorrhage     Obstructive sleep apnea syndrome 10/17/2022       Surgical history:  Past Surgical History:   Procedure Laterality Date    ABDOMEN SURGERY  '92,'09,'15    2 c-secs, abdomenplasty    CHOLECYSTECTOMY  '98    COLONOSCOPY N/A 03/16/2021    Procedure: COLONOSCOPY, WITH BIOPSY AND CLIPPING;  Surgeon: Kemar Mckeon MD;  [place] [facility]    DILATION AND CURETTAGE, OPERATIVE HYSTEROSCOPY, COMBINED N/A 10/18/2022    Procedure: HYSTEROSCOPY, WITH DILATION AND CURETTAGE OF UTERUS and polypectomy;  Surgeon: Ines Chi MD;  Location: [facility] OR     ESOPHAGOSCOPY, GASTROSCOPY, DUODENOSCOPY (EGD), COMBINED N/A 12/09/2020    Procedure: ESOPHAGOGASTRODUODENOSCOPY, WITH BIOPSY;  Surgeon: Anton Knott DO;  Location: [facility] GI    GENITOURINARY SURGERY  '01, '03, '07    tubal ligation, anterior/posterior repair, tubal ligation reversal    LAPAROSCOPIC ASSISTED SIGMOID COLECTOMY N/A 3/31/2023    Procedure: LAPAROSCOPIC extended LEFT HEMICOLECTOMY;  Surgeon: Bhavana Johansen MD;  Location: [facility] OR    SIGMOIDOSCOPY FLEXIBLE N/A 3/31/2023    Procedure: Sigmoidoscopy flexible;  Surgeon: Bhavana Johansen MD;  Location: [facility] OR       Problem list:    Patient Active Problem List    Diagnosis Date Noted    Diverticulitis 03/31/2023     Priority: Medium    Female cystocele 10/17/2022     Priority: Medium    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     Priority: Medium    Mixed stress and urge urinary incontinence 10/17/2022     Priority: Medium    Polycystic ovarian syndrome 10/17/2022     Priority: Medium    Recurrent major depressive disorder, in partial remission (H) 10/17/2022     Priority: Medium    Obstructive sleep apnea syndrome 10/17/2022     Priority: Medium     severe      Uterovaginal prolapse 10/17/2022     Priority: Medium    Meniere's disease, unspecified laterality 10/17/2022     Priority: Medium    Morbid obesity (H) 07/13/2021     Priority: Medium       Medications:  Current Outpatient Medications   Medication Sig Dispense Refill    acetaminophen (TYLENOL) 500 MG tablet Take 500 mg by mouth every 6 hours as needed for mild pain      estradiol (ESTRING) 2 MG vaginal ring Place 1 each vaginally every 3 months 3 each 4    omeprazole 20 MG tablet Take 20 mg by mouth every evening      ondansetron (ZOFRAN ODT) 4 MG ODT tab Take 1 tablet (4 mg) by mouth every 8 hours as needed for nausea 10 tablet 0    sertraline (ZOLOFT) 100 MG tablet Take 150 mg by mouth every evening      UNABLE TO FIND every morning MEDICATION NAME: Mix of  "Supplements; Tumeric, Green tea, Vit D and Probiotic         Allergies:  Allergies   Allergen Reactions    Ivp Dye [Contrast Dye] Anaphylaxis    Other (Do Not Use)      Other reaction(s): Itching,Watering Eyes, Rash, Sneezing, Itching,Watering Eyes, Rash, Sneezing, Itching,Watering Eyes, Rash, Sneezing       Family history:  Family History   Problem Relation Age of Onset    Uterine Cancer Mother         endometrial cancer    Diabetes Mother     Colon Polyps Father     Diabetes Father     Hypertension Father     Diabetes Brother     Hypertension Brother     Pulmonary Embolism Brother         following leg surgery    Anesthesia Reaction No family hx of        Social history:  Social History     Tobacco Use    Smoking status: Former     Packs/day: 1.00     Years: 10.00     Pack years: 10.00     Types: Cigarettes     Start date: 10/10/1982     Quit date: 1991     Years since quittin.0    Smokeless tobacco: Never   Vaping Use    Vaping status: Never Used   Substance Use Topics    Alcohol use: Yes     Comment: 1-2 beer/month    Marital status: .    Nursing Notes:   Mary Borja  2023  3:27 PM  Signed  Chief Complaint   Patient presents with    Post-op Visit     Vitals:    23 1526   Weight: 205 lb   Height: 5' 3\"       Body mass index is 36.31 kg/m .    Mary Borja CMA    This note was created using speech recognition software and may contain unintended word substitutions.          Again, thank you for allowing me to participate in the care of your patient.      Sincerely,    Bhavana Johansen MD      "

## 2023-05-31 NOTE — NURSING NOTE
"Chief Complaint   Patient presents with     Post-op Visit       Vitals:    05/31/23 1526   Weight: 205 lb   Height: 5' 3\"       Body mass index is 36.31 kg/m .    Mary Borja CMA    "

## 2023-06-05 ENCOUNTER — OFFICE VISIT (OUTPATIENT)
Dept: ORTHOPEDICS | Facility: CLINIC | Age: 58
End: 2023-06-05
Payer: OTHER GOVERNMENT

## 2023-06-05 ENCOUNTER — ANCILLARY PROCEDURE (OUTPATIENT)
Dept: GENERAL RADIOLOGY | Facility: CLINIC | Age: 58
End: 2023-06-05
Attending: FAMILY MEDICINE
Payer: OTHER GOVERNMENT

## 2023-06-05 VITALS
SYSTOLIC BLOOD PRESSURE: 138 MMHG | HEIGHT: 63 IN | BODY MASS INDEX: 36.32 KG/M2 | WEIGHT: 205 LBS | HEART RATE: 64 BPM | DIASTOLIC BLOOD PRESSURE: 82 MMHG

## 2023-06-05 DIAGNOSIS — M54.12 CERVICAL RADICULOPATHY: ICD-10-CM

## 2023-06-05 DIAGNOSIS — G89.29 CHRONIC LEFT SHOULDER PAIN: ICD-10-CM

## 2023-06-05 DIAGNOSIS — M25.512 CHRONIC LEFT SHOULDER PAIN: ICD-10-CM

## 2023-06-05 DIAGNOSIS — G89.29 CHRONIC LEFT SHOULDER PAIN: Primary | ICD-10-CM

## 2023-06-05 DIAGNOSIS — M25.512 CHRONIC LEFT SHOULDER PAIN: Primary | ICD-10-CM

## 2023-06-05 PROCEDURE — 72040 X-RAY EXAM NECK SPINE 2-3 VW: CPT | Mod: TC | Performed by: RADIOLOGY

## 2023-06-05 PROCEDURE — 73030 X-RAY EXAM OF SHOULDER: CPT | Mod: TC | Performed by: RADIOLOGY

## 2023-06-05 PROCEDURE — 99204 OFFICE O/P NEW MOD 45 MIN: CPT | Performed by: FAMILY MEDICINE

## 2023-06-05 RX ORDER — CYCLOBENZAPRINE HCL 10 MG
10 TABLET ORAL
Qty: 30 TABLET | Refills: 0 | Status: SHIPPED | OUTPATIENT
Start: 2023-06-05 | End: 2023-07-07

## 2023-06-05 RX ORDER — METHYLPREDNISOLONE 4 MG
TABLET, DOSE PACK ORAL
Qty: 21 TABLET | Refills: 0 | Status: SHIPPED | OUTPATIENT
Start: 2023-06-05 | End: 2023-06-07

## 2023-06-05 ASSESSMENT — ANXIETY QUESTIONNAIRES
4. TROUBLE RELAXING: NOT AT ALL
1. FEELING NERVOUS, ANXIOUS, OR ON EDGE: NOT AT ALL
GAD7 TOTAL SCORE: 0
8. IF YOU CHECKED OFF ANY PROBLEMS, HOW DIFFICULT HAVE THESE MADE IT FOR YOU TO DO YOUR WORK, TAKE CARE OF THINGS AT HOME, OR GET ALONG WITH OTHER PEOPLE?: NOT DIFFICULT AT ALL
5. BEING SO RESTLESS THAT IT IS HARD TO SIT STILL: NOT AT ALL
6. BECOMING EASILY ANNOYED OR IRRITABLE: NOT AT ALL
2. NOT BEING ABLE TO STOP OR CONTROL WORRYING: NOT AT ALL
3. WORRYING TOO MUCH ABOUT DIFFERENT THINGS: NOT AT ALL
7. FEELING AFRAID AS IF SOMETHING AWFUL MIGHT HAPPEN: NOT AT ALL
GAD7 TOTAL SCORE: 0
7. FEELING AFRAID AS IF SOMETHING AWFUL MIGHT HAPPEN: NOT AT ALL
IF YOU CHECKED OFF ANY PROBLEMS ON THIS QUESTIONNAIRE, HOW DIFFICULT HAVE THESE PROBLEMS MADE IT FOR YOU TO DO YOUR WORK, TAKE CARE OF THINGS AT HOME, OR GET ALONG WITH OTHER PEOPLE: NOT DIFFICULT AT ALL

## 2023-06-05 NOTE — PROGRESS NOTES
ASSESSMENT & PLAN    Sangita was seen today for pain.    Diagnoses and all orders for this visit:    Chronic left shoulder pain  -     XR Shoulder Left G/E 3 Views; Future  -     methylPREDNISolone (MEDROL DOSEPAK) 4 MG tablet therapy pack; Follow Package Directions  -     cyclobenzaprine (FLEXERIL) 10 MG tablet; Take 1 tablet (10 mg) by mouth nightly as needed for muscle spasms  -     Physical Therapy Referral; Future    Cervical radiculopathy  -     XR Cervical Spine 2/3 vws; Future  -     methylPREDNISolone (MEDROL DOSEPAK) 4 MG tablet therapy pack; Follow Package Directions  -     cyclobenzaprine (FLEXERIL) 10 MG tablet; Take 1 tablet (10 mg) by mouth nightly as needed for muscle spasms  -     Physical Therapy Referral; Future      This issue is acute on chronic and Worsening.    # Chronic left shoulder/neck pain: Symptoms noted over the past 2 to 3 months without inciting injury.  She does have tenderness to palpation over the left rotator cuff tendons as well as pain with compression of the left cervical nerves.  X-rays today showing straightening of normal cervical curvature, no shoulder arthritis.  Likely cause of her pain due to combination of cervical radiculopathy as well as irritated rotator cuff tendons without concern for significant tear. Counseled patient on nature of condition and treatment options.  Given this plan as below, follow-up 1 month if not improving, sooner if worsening  Testing Findings: Straightening of normal cervical curvature, diffuse degenerative changes in the cervical spine, no shoulder arthritis  Treatment: Home exercises, PT order placed, activities as tolerated  Medications: Medrol Dosepak ordered, Flexeril at night, otherwise Limited tylenol/ibuprofen for pain for 1-2 weeks   Follow-up: 1 month if not improving, sooner if worsening  Can consider: Further imaging, possible steroid injection     Chivo Smallwood MD  [facility]  "[place]    -----  Chief Complaint   Patient presents with     Left Shoulder - Pain       SUBJECTIVE  Sangita Meraz is a/an 57 year old female who is seen as a self referral for evaluation of left shoulder.     The patient is seen by themselves.  The patient is Right handed    Onset: 2-3 month(s) ago. Reports insidious onset without acute precipitating event. Recent fall which has increased pain.   Location of Pain: left anterior shoulder  Worsened by: reaching behind, getting dressed   Better with: rest, activity avoidance   Treatments tried: rest/activity avoidance, HEP, Tylenol   Associated symptoms: sharp shooting pain radiating down arm, tingling in hands     Orthopedic/Surgical history: YES - waxing and waning shoulder pain   Social History/Occupation: works in home     No family history pertinent to patient's problem today.      REVIEW OF SYSTEMS:  Review of Systems  Constitutional, HEENT, cardiovascular, pulmonary, gi and gu systems are negative, except as otherwise noted.    OBJECTIVE:  /82   Pulse 64   Ht 1.6 m (5' 3\")   Wt 93 kg (205 lb)   LMP 12/09/2018   BMI 36.31 kg/m     General: healthy, alert and in no distress  HEENT: no scleral icterus or conjunctival erythema  Skin: no suspicious lesions or rash. No jaundice.  CV: distal perfusion intact    Resp: normal respiratory effort without conversational dyspnea   Psych: normal mood and affect  Gait: normal steady gait with appropriate coordination and balance    Neuro: Normal light sensory exam of left upper extremity     Ortho Exam   LEFT SHOULDER  Inspection:    no swelling, bruising, discoloration, or obvious deformity or asymmetry  Palpation:    Tender about the supraspinatus insertion. Remainder of bony and tendinous landmarks are nontender.  Active Range of Motion:     Abduction 1800, FF 1800, , IR L1.    Strength:    Scapular plane abduction 5/5,  ER 5/5, IR 5/5, biceps 5/5, triceps 5/5  Special Tests:    Positive: Neer's and " Rodriguez'    Negative: supraspinatus (empty can)    CERVICAL SPINE  Inspection:    normal cervical lordosis present, rounded shoulders, forward head posture  Palpation:    Nontender.  Range of Motion:     Flexion full    Extension full    Right side bend full    Left side bend full    Right rotation full    Left rotation full  Strength:    Full strength throughout all neck muscles  Special Tests:    Positive: Spurling's Left    Negative: Spurling's (Right)        RADIOLOGY:  I independently ordered, visualized and reviewed these images with the patient  Left shoulder x-ray negative for arthritis  Cervical spine loss of normal cervical curvature, mild degeneration      Review of external notes as documented elsewhere in note  Review of the result(s) of each unique test - Left shoulder and cervical x-rays      Disclaimer: This note consists of symbols derived from keyboarding, dictation and/or voice recognition software. As a result, there may be errors in the script that have gone undetected. Please consider this when interpreting information found in this chart.

## 2023-06-05 NOTE — PATIENT INSTRUCTIONS
# Chronic left shoulder/neck pain: Symptoms noted over the past 2 to 3 months without inciting injury.  She does have tenderness to palpation over the left rotator cuff tendons as well as pain with compression of the left cervical nerves.  X-rays today showing straightening of normal cervical curvature, no shoulder arthritis.  Likely cause of her pain due to combination of cervical radiculopathy as well as irritated rotator cuff tendons without concern for significant tear. Counseled patient on nature of condition and treatment options.  Given this plan as below, follow-up 1 month if not improving, sooner if worsening  Testing Findings: Straightening of normal cervical curvature, diffuse degenerative changes in the cervical spine, no shoulder arthritis  Treatment: Home exercises, PT order placed, activities as tolerated  Medications: Medrol Dosepak ordered, Flexeril at night, otherwise Limited tylenol/ibuprofen for pain for 1-2 weeks   Follow-up: 1 month if not improving, sooner if worsening  Can consider: Further imaging, possible steroid injection    If you have not yet received the influenza vaccine but would like to get one, please call  [phone] or you can schedule via [employer]    It was great seeing you today!    Chivo Smallwood MD, CAQSM

## 2023-06-05 NOTE — LETTER
6/5/2023         RE: Sangita Meraz  [address]  [facility] [address]        Dear Colleague,    Thank you for referring your patient, Sangita Meraz, to the [facility] [place]. Please see a copy of my visit note below.    ASSESSMENT & PLAN    Sangita was seen today for pain.    Diagnoses and all orders for this visit:    Chronic left shoulder pain  -     XR Shoulder Left G/E 3 Views; Future  -     methylPREDNISolone (MEDROL DOSEPAK) 4 MG tablet therapy pack; Follow Package Directions  -     cyclobenzaprine (FLEXERIL) 10 MG tablet; Take 1 tablet (10 mg) by mouth nightly as needed for muscle spasms  -     Physical Therapy Referral; Future    Cervical radiculopathy  -     XR Cervical Spine 2/3 vws; Future  -     methylPREDNISolone (MEDROL DOSEPAK) 4 MG tablet therapy pack; Follow Package Directions  -     cyclobenzaprine (FLEXERIL) 10 MG tablet; Take 1 tablet (10 mg) by mouth nightly as needed for muscle spasms  -     Physical Therapy Referral; Future      This issue is acute on chronic and Worsening.    # Chronic left shoulder/neck pain: Symptoms noted over the past 2 to 3 months without inciting injury.  She does have tenderness to palpation over the left rotator cuff tendons as well as pain with compression of the left cervical nerves.  X-rays today showing straightening of normal cervical curvature, no shoulder arthritis.  Likely cause of her pain due to combination of cervical radiculopathy as well as irritated rotator cuff tendons without concern for significant tear. Counseled patient on nature of condition and treatment options.  Given this plan as below, follow-up 1 month if not improving, sooner if worsening  Testing Findings: Straightening of normal cervical curvature, diffuse degenerative changes in the cervical spine, no shoulder arthritis  Treatment: Home exercises, PT order placed, activities as tolerated  Medications: Medrol Dosepak ordered, Flexeril at night, otherwise  "Limited tylenol/ibuprofen for pain for 1-2 weeks   Follow-up: 1 month if not improving, sooner if worsening  Can consider: Further imaging, possible steroid injection     Chivo Smallwood MD  [facility]    -----  Chief Complaint   Patient presents with     Left Shoulder - Pain       SUBJECTIVE  Sangita Meraz is a/an 57 year old female who is seen as a self referral for evaluation of left shoulder.     The patient is seen by themselves.  The patient is Right handed    Onset: 2-3 month(s) ago. Reports insidious onset without acute precipitating event. Recent fall which has increased pain.   Location of Pain: left anterior shoulder  Worsened by: reaching behind, getting dressed   Better with: rest, activity avoidance   Treatments tried: rest/activity avoidance, HEP, Tylenol   Associated symptoms: sharp shooting pain radiating down arm, tingling in hands     Orthopedic/Surgical history: YES - waxing and waning shoulder pain   Social History/Occupation: works in home     No family history pertinent to patient's problem today.      REVIEW OF SYSTEMS:  Review of Systems  Constitutional, HEENT, cardiovascular, pulmonary, gi and gu systems are negative, except as otherwise noted.    OBJECTIVE:  /82   Pulse 64   Ht 1.6 m (5' 3\")   Wt 93 kg (205 lb)   LMP 12/09/2018   BMI 36.31 kg/m     General: healthy, alert and in no distress  HEENT: no scleral icterus or conjunctival erythema  Skin: no suspicious lesions or rash. No jaundice.  CV: distal perfusion intact    Resp: normal respiratory effort without conversational dyspnea   Psych: normal mood and affect  Gait: normal steady gait with appropriate coordination and balance    Neuro: Normal light sensory exam of left upper extremity     Ortho Exam   LEFT SHOULDER  Inspection:    no swelling, bruising, discoloration, or obvious deformity or asymmetry  Palpation:    Tender about the supraspinatus insertion. Remainder of bony and " tendinous landmarks are nontender.  Active Range of Motion:     Abduction 1800, FF 1800, , IR L1.    Strength:    Scapular plane abduction 5/5,  ER 5/5, IR 5/5, biceps 5/5, triceps 5/5  Special Tests:    Positive: Neer's and Rodriguez'    Negative: supraspinatus (empty can)    CERVICAL SPINE  Inspection:    normal cervical lordosis present, rounded shoulders, forward head posture  Palpation:    Nontender.  Range of Motion:     Flexion full    Extension full    Right side bend full    Left side bend full    Right rotation full    Left rotation full  Strength:    Full strength throughout all neck muscles  Special Tests:    Positive: Spurling's Left    Negative: Spurling's (Right)        RADIOLOGY:  I independently ordered, visualized and reviewed these images with the patient  Left shoulder x-ray negative for arthritis  Cervical spine loss of normal cervical curvature, mild degeneration      Review of external notes as documented elsewhere in note  Review of the result(s) of each unique test - Left shoulder and cervical x-rays      Disclaimer: This note consists of symbols derived from keyboarding, dictation and/or voice recognition software. As a result, there may be errors in the script that have gone undetected. Please consider this when interpreting information found in this chart.        Again, thank you for allowing me to participate in the care of your patient.        Sincerely,        Chivo Smallwood MD

## 2023-06-06 ASSESSMENT — PATIENT HEALTH QUESTIONNAIRE - PHQ9
SUM OF ALL RESPONSES TO PHQ QUESTIONS 1-9: 3
SUM OF ALL RESPONSES TO PHQ QUESTIONS 1-9: 3
10. IF YOU CHECKED OFF ANY PROBLEMS, HOW DIFFICULT HAVE THESE PROBLEMS MADE IT FOR YOU TO DO YOUR WORK, TAKE CARE OF THINGS AT HOME, OR GET ALONG WITH OTHER PEOPLE: SOMEWHAT DIFFICULT

## 2023-06-07 ENCOUNTER — OFFICE VISIT (OUTPATIENT)
Dept: FAMILY MEDICINE | Facility: CLINIC | Age: 58
End: 2023-06-07
Payer: OTHER GOVERNMENT

## 2023-06-07 ENCOUNTER — MYC MEDICAL ADVICE (OUTPATIENT)
Dept: FAMILY MEDICINE | Facility: CLINIC | Age: 58
End: 2023-06-07

## 2023-06-07 VITALS
BODY MASS INDEX: 37.1 KG/M2 | WEIGHT: 209.4 LBS | RESPIRATION RATE: 22 BRPM | OXYGEN SATURATION: 96 % | SYSTOLIC BLOOD PRESSURE: 115 MMHG | HEIGHT: 63 IN | HEART RATE: 69 BPM | DIASTOLIC BLOOD PRESSURE: 79 MMHG | TEMPERATURE: 97.1 F

## 2023-06-07 DIAGNOSIS — Z11.59 NEED FOR HEPATITIS C SCREENING TEST: ICD-10-CM

## 2023-06-07 DIAGNOSIS — R73.03 PREDIABETES: ICD-10-CM

## 2023-06-07 DIAGNOSIS — Z13.220 SCREENING FOR HYPERLIPIDEMIA: ICD-10-CM

## 2023-06-07 DIAGNOSIS — Z13.1 SCREENING FOR DIABETES MELLITUS: ICD-10-CM

## 2023-06-07 DIAGNOSIS — Z11.4 SCREENING FOR HIV (HUMAN IMMUNODEFICIENCY VIRUS): ICD-10-CM

## 2023-06-07 DIAGNOSIS — E66.01 MORBID OBESITY (H): Primary | ICD-10-CM

## 2023-06-07 DIAGNOSIS — E66.01 MORBID OBESITY (H): ICD-10-CM

## 2023-06-07 DIAGNOSIS — Z12.31 ENCOUNTER FOR SCREENING MAMMOGRAM FOR BREAST CANCER: ICD-10-CM

## 2023-06-07 DIAGNOSIS — Z00.00 ROUTINE HISTORY AND PHYSICAL EXAMINATION OF ADULT: Primary | ICD-10-CM

## 2023-06-07 LAB
CHOLEST SERPL-MCNC: 210 MG/DL
HBA1C MFR BLD: 6.2 % (ref 0–5.6)
HCV AB SERPL QL IA: NONREACTIVE
HDLC SERPL-MCNC: 37 MG/DL
HIV 1+2 AB+HIV1 P24 AG SERPL QL IA: NONREACTIVE
LDLC SERPL CALC-MCNC: 133 MG/DL
NONHDLC SERPL-MCNC: 173 MG/DL
TRIGL SERPL-MCNC: 200 MG/DL
TSH SERPL DL<=0.005 MIU/L-ACNC: 1.86 UIU/ML (ref 0.3–4.2)

## 2023-06-07 PROCEDURE — 87389 HIV-1 AG W/HIV-1&-2 AB AG IA: CPT | Performed by: FAMILY MEDICINE

## 2023-06-07 PROCEDURE — 86803 HEPATITIS C AB TEST: CPT | Performed by: FAMILY MEDICINE

## 2023-06-07 PROCEDURE — 99396 PREV VISIT EST AGE 40-64: CPT | Performed by: FAMILY MEDICINE

## 2023-06-07 PROCEDURE — 80061 LIPID PANEL: CPT | Performed by: FAMILY MEDICINE

## 2023-06-07 PROCEDURE — 99213 OFFICE O/P EST LOW 20 MIN: CPT | Mod: 25 | Performed by: FAMILY MEDICINE

## 2023-06-07 PROCEDURE — 83036 HEMOGLOBIN GLYCOSYLATED A1C: CPT | Performed by: FAMILY MEDICINE

## 2023-06-07 PROCEDURE — 36415 COLL VENOUS BLD VENIPUNCTURE: CPT | Performed by: FAMILY MEDICINE

## 2023-06-07 PROCEDURE — 84443 ASSAY THYROID STIM HORMONE: CPT | Performed by: FAMILY MEDICINE

## 2023-06-07 ASSESSMENT — ENCOUNTER SYMPTOMS
ARTHRALGIAS: 1
PALPITATIONS: 0
DIZZINESS: 0
FREQUENCY: 0
COUGH: 0
ABDOMINAL PAIN: 0
HEADACHES: 0
FEVER: 0
SORE THROAT: 0
MYALGIAS: 0
DIARRHEA: 0
WEAKNESS: 0
EYE PAIN: 0
CONSTIPATION: 0
NERVOUS/ANXIOUS: 0
SHORTNESS OF BREATH: 0
BREAST MASS: 0
DYSURIA: 0
JOINT SWELLING: 0
HEMATOCHEZIA: 0
HEARTBURN: 0
CHILLS: 0
HEMATURIA: 0
PARESTHESIAS: 0
NAUSEA: 1

## 2023-06-07 ASSESSMENT — PAIN SCALES - GENERAL: PAINLEVEL: NO PAIN (0)

## 2023-06-07 NOTE — PROGRESS NOTES
SUBJECTIVE:   CC: Sangita is an 57 year old who presents for preventive health visit.       2023     7:00 AM   Additional Questions   Roomed by Jaja SKELTON CMA     Healthy Habits:     Getting at least 3 servings of Calcium per day:  Yes    Bi-annual eye exam:  Yes    Dental care twice a year:  Yes    Sleep apnea or symptoms of sleep apnea:  Daytime drowsiness and Excessive snoring    Diet:  Regular (no restrictions)    Frequency of exercise:  None    Taking medications regularly:  Yes    Medication side effects:  None    PHQ-2 Total Score: 0  -Would like labs checked to rule out diabetes.  Levels have been higher in the past.        Social History     Tobacco Use     Smoking status: Former     Packs/day: 1.00     Years: 10.00     Pack years: 10.00     Types: Cigarettes     Start date: 10/10/1982     Quit date: 1991     Years since quittin.0     Smokeless tobacco: Never   Vaping Use     Vaping status: Never Used   Substance Use Topics     Alcohol use: Yes     Comment: 1-2 beer/month           2023     9:26 AM   Alcohol Use   Prescreen: >3 drinks/day or >7 drinks/week? No     Reviewed orders with patient.  Reviewed health maintenance and updated orders accordingly - Yes  Lab work is in process  Labs reviewed in EPIC  BP Readings from Last 3 Encounters:   23 115/79   23 138/82   23 132/74    Wt Readings from Last 3 Encounters:   23 95 kg (209 lb 6.4 oz)   23 93 kg (205 lb)   23 93 kg (205 lb)                  Patient Active Problem List   Diagnosis     Morbid obesity (H)     Female cystocele     Herniation of rectum into vagina     Mixed stress and urge urinary incontinence     Polycystic ovarian syndrome     Recurrent major depressive disorder, in partial remission (H)     Obstructive sleep apnea syndrome     Uterovaginal prolapse     Meniere's disease, unspecified laterality     Diverticulitis     Past Surgical History:   Procedure Laterality Date     ABDOMEN  SURGERY  ,,'15    2 c-secs, abdomenplasty     CHOLECYSTECTOMY       COLONOSCOPY N/A 2021    Procedure: COLONOSCOPY, WITH BIOPSY AND CLIPPING;  Surgeon: Kemar Mckeon MD;  [place] [facility]     COSMETIC SURGERY  '15    Abdomenoplasty     DILATION AND CURETTAGE, OPERATIVE HYSTEROSCOPY, COMBINED N/A 10/18/2022    Procedure: HYSTEROSCOPY, WITH DILATION AND CURETTAGE OF UTERUS and polypectomy;  Surgeon: Ines Chi MD;  Location: [facility]     ESOPHAGOSCOPY, GASTROSCOPY, DUODENOSCOPY (EGD), COMBINED N/A 2020    Procedure: ESOPHAGOGASTRODUODENOSCOPY, WITH BIOPSY;  Surgeon: Anton Knott DO;  Location: [facility]     GENITOURINARY SURGERY  , ,     tubal ligation, anterior/posterior repair, tubal ligation reversal     LAPAROSCOPIC ASSISTED SIGMOID COLECTOMY N/A 2023    Procedure: LAPAROSCOPIC extended LEFT HEMICOLECTOMY;  Surgeon: Bhavana Johansen MD;  Location: [facility]     SIGMOIDOSCOPY FLEXIBLE N/A 2023    Procedure: Sigmoidoscopy flexible;  Surgeon: Bhavana Johansen MD;  [facility]       Social History     Tobacco Use     Smoking status: Former     Packs/day: 1.00     Years: 10.00     Pack years: 10.00     Types: Cigarettes     Start date: 10/10/1982     Quit date: 1991     Years since quittin.0     Smokeless tobacco: Never   Vaping Use     Vaping status: Never Used   Substance Use Topics     Alcohol use: Yes     Comment: 1-2 beer/month     Family History   Problem Relation Age of Onset     Uterine Cancer Mother         endometrial cancer     Diabetes Mother      Colon Polyps Father      Diabetes Father      Hypertension Father      Diabetes Brother      Hypertension Brother      Pulmonary Embolism Brother         following leg surgery     Anesthesia Reaction No family hx of          Current Outpatient Medications   Medication Sig Dispense Refill     acetaminophen (TYLENOL) 500 MG tablet Take 500 mg by mouth every 6 hours as  needed for mild pain       cyclobenzaprine (FLEXERIL) 10 MG tablet Take 1 tablet (10 mg) by mouth nightly as needed for muscle spasms 30 tablet 0     estradiol (ESTRING) 2 MG vaginal ring Place 1 each vaginally every 3 months 3 each 4     omeprazole 20 MG tablet Take 20 mg by mouth every evening       ondansetron (ZOFRAN ODT) 4 MG ODT tab Take 1 tablet (4 mg) by mouth every 8 hours as needed for nausea 10 tablet 0     sertraline (ZOLOFT) 100 MG tablet Take 150 mg by mouth every evening       UNABLE TO FIND every morning MEDICATION NAME: Mix of Supplements; Tumeric, Green tea, Vit D and Probiotic       methylPREDNISolone (MEDROL DOSEPAK) 4 MG tablet therapy pack Follow Package Directions (Patient not taking: Reported on 2023) 21 tablet 0     Allergies   Allergen Reactions     Ivp Dye [Contrast Dye] Anaphylaxis     Other (Do Not Use)      Other reaction(s): Itching,Watering Eyes, Rash, Sneezing, Itching,Watering Eyes, Rash, Sneezing, Itching,Watering Eyes, Rash, Sneezing     Recent Labs   Lab Test 23  1630 23  2116 23  0849 22  0615 22  1127 22  1042 21  1010 20  1703 20  0850 20  1221   A1C  --   --   --   --   --  6.2*  --  6.3*  --   --    LDL  --   --   --   --   --   --  90  --   --   --    HDL  --   --   --   --   --   --  46*  --   --   --    TRIG  --   --   --   --   --   --  186*  --   --   --    ALT  --   --  29 34  --   --   --  87* 40 46   CR 0.78 0.77 0.87 0.84   < >  --   --   --  1.05* 0.81   GFRESTIMATED 89 90 78 81   < >  --   --   --  60* 82   GFRESTBLACK  --   --   --   --   --   --   --   --  70 >90   POTASSIUM 3.6  --  4.8 4.2   < >  --   --   --  4.5 3.8    < > = values in this interval not displayed.        Breast Cancer Screenin/8/2021     7:37 PM 10/6/2021    11:33 AM   Breast CA Risk Assessment (FHS-7)   Do you have a family history of breast, colon, or ovarian cancer? No / Unknown No / Unknown       click delete  "button to remove this line now    Pertinent mammograms are reviewed under the imaging tab.    History of Pap smear: Last 3 Pap Results: No results found for: PAP      Latest Ref Rng & Units 9/9/2021     9:23 AM   PAP / HPV   PAP  Negative for Intraepithelial Lesion or Malignancy (NILM)     HPV 16 DNA Negative Negative     HPV 18 DNA Negative Negative     Other HR HPV Negative Negative       Reviewed and updated as needed this visit by clinical staff   Tobacco  Allergies  Meds              Reviewed and updated as needed this visit by Provider                     Review of Systems   Constitutional: Negative for chills and fever.   HENT: Positive for hearing loss. Negative for congestion, ear pain and sore throat.    Eyes: Negative for pain and visual disturbance.   Respiratory: Negative for cough and shortness of breath.    Cardiovascular: Negative for chest pain, palpitations and peripheral edema.   Gastrointestinal: Positive for nausea. Negative for abdominal pain, constipation, diarrhea, heartburn and hematochezia.   Endocrine: Negative for cold intolerance and heat intolerance.   Breasts:  Negative for tenderness, breast mass and discharge.   Genitourinary: Negative for dysuria, frequency, genital sores, hematuria, pelvic pain, urgency, vaginal bleeding and vaginal discharge.   Musculoskeletal: Positive for arthralgias. Negative for joint swelling and myalgias.   Skin: Negative for rash.   Allergic/Immunologic: Negative for environmental allergies and food allergies.   Neurological: Negative for dizziness, weakness, headaches and paresthesias.   Psychiatric/Behavioral: Negative for mood changes. The patient is not nervous/anxious.        OBJECTIVE:   /79 (BP Location: Right arm, Patient Position: Sitting, Cuff Size: Adult Large)   Pulse 69   Temp 97.1  F (36.2  C) (Tympanic)   Resp 22   Ht 1.6 m (5' 3\")   Wt 95 kg (209 lb 6.4 oz)   LMP 12/09/2018   SpO2 96%   BMI 37.09 kg/m    Physical " Exam  GENERAL: alert and no distress  EYES: Eyes grossly normal to inspection, PERRL and conjunctivae and sclerae normal  HENT: normal cephalic/atraumatic, right ear: normal: no effusions, no erythema, normal landmarks, left ear: normal: no effusions, no erythema, normal landmarks, nose and mouth without ulcers or lesions, oropharynx clear and oral mucous membranes moist  NECK: no adenopathy, no asymmetry, masses, or scars and thyroid normal to palpation  RESP: lungs clear to auscultation - no rales, rhonchi or wheezes  CV: regular rate and rhythm, normal S1 S2, no S3 or S4, no murmur, click or rub, no peripheral edema and peripheral pulses strong  ABDOMEN: soft, nontender, no hepatosplenomegaly, no masses and bowel sounds normal  MS: no gross musculoskeletal defects noted, no edema  SKIN: no suspicious lesions or rashes  NEURO: Normal strength and tone, mentation intact and speech normal  PSYCH: mentation appears normal, affect normal/bright    Wt Readings from Last 10 Encounters:   06/07/23 95 kg (209 lb 6.4 oz)   06/05/23 93 kg (205 lb)   05/31/23 93 kg (205 lb)   05/17/23 93 kg (205 lb)   04/17/23 92.7 kg (204 lb 4.8 oz)   03/31/23 95.2 kg (209 lb 14.1 oz)   01/18/23 98.5 kg (217 lb 3.2 oz)   01/17/23 98.5 kg (217 lb 3.2 oz)   12/23/22 98.4 kg (217 lb)   12/05/22 102.1 kg (225 lb)       ASSESSMENT/PLAN:   (Z00.00) Routine history and physical examination of adult  (primary encounter diagnosis)  Comment: Medically doing well.  Physical examination unremarkable.  Recommended regular exercise, healthy diet and weight loss.  Screening mammogram, hepatitis C and HIV screening test ordered.  Patient deferred routine vaccines.  Colon and cervical cancer screening up-to-date.      (Z11.4) Screening for HIV (human immunodeficiency virus)  Comment:   Plan: HIV Antigen Antibody Combo            (Z11.59) Need for hepatitis C screening test  Comment:   Plan: Hepatitis C Screen Reflex to HCV RNA Quant and         Genotype      "       (Z13.1) Screening for diabetes mellitus  Comment: Known to have borderline diabetes.  Recommended regular exercise, healthy diet and weight loss.  Plan: Hemoglobin A1c            (Z13.220) Screening for hyperlipidemia  Comment:   Plan: Lipid panel            (E66.01) Morbid obesity (H)  Comment: Recommended regular exercise, healthy diet and weight loss  Plan: TSH with free T4 reflex            (Z12.31) Encounter for screening mammogram for breast cancer  Comment:   Plan: *MA Screening Digital Bilateral            (R73.03) Prediabetes  Comment: As above      COUNSELING:  Reviewed preventive health counseling, as reflected in patient instructions      BMI:   Estimated body mass index is 37.09 kg/m  as calculated from the following:    Height as of this encounter: 1.6 m (5' 3\").    Weight as of this encounter: 95 kg (209 lb 6.4 oz).   Weight management plan: Discussed healthy diet and exercise guidelines      She reports that she quit smoking about 32 years ago. Her smoking use included cigarettes. She started smoking about 40 years ago. She has a 10.00 pack-year smoking history. She has never used smokeless tobacco.      Reji Cai MD  [facility]  Answers for HPI/ROS submitted by the patient on 6/6/2023  If you checked off any problems, how difficult have these problems made it for you to do your work, take care of things at home, or get along with other people?: Somewhat difficult  PHQ9 TOTAL SCORE: 3  FRANCISCO 7 TOTAL SCORE: 0      "

## 2023-06-08 ENCOUNTER — TELEPHONE (OUTPATIENT)
Dept: FAMILY MEDICINE | Facility: CLINIC | Age: 58
End: 2023-06-08

## 2023-06-08 NOTE — TELEPHONE ENCOUNTER
Prior Authorization Retail Medication Request    Medication/Dose: Wegovy 0.5mg   ICD code (if different than what is on RX):    Previously Tried and Failed:    Rationale:  Benji mcneal    Insurance Name:  [employer] [phone]  Insurance ID:  [id number]      Pharmacy Information (if different than what is on RX)  Name:    Phone:

## 2023-06-14 NOTE — TELEPHONE ENCOUNTER
Central Prior Authorization Team   Phone: [phone]      PA Initiation    Medication: WEGOVY 0.5 MG/0.5ML SC SOAJ  Insurance Company: [employer] - Phone [phone] Fax [phone]  Pharmacy Filling the Rx: [facility] [place], [place] - [address] [place] [address]  Filling Pharmacy Phone: [phone]  Filling Pharmacy Fax:    Start Date: 6/14/2023

## 2023-06-18 NOTE — TELEPHONE ENCOUNTER
PRIOR AUTHORIZATION DENIED    Medication: WEGOVY 0.5 MG/0.5ML SC SOAJ  Insurance Company: [employer] - Phone [phone] Fax [phone]  Denial Date: 6/15/2023  Denial Rational:           Appeal Information:         Patient Notified: No

## 2023-06-26 DIAGNOSIS — R73.03 PREDIABETES: ICD-10-CM

## 2023-06-26 DIAGNOSIS — E66.01 MORBID OBESITY (H): Primary | ICD-10-CM

## 2023-07-06 ENCOUNTER — MYC MEDICAL ADVICE (OUTPATIENT)
Dept: FAMILY MEDICINE | Facility: CLINIC | Age: 58
End: 2023-07-06
Payer: OTHER GOVERNMENT

## 2023-07-06 DIAGNOSIS — E66.01 MORBID OBESITY (H): ICD-10-CM

## 2023-07-06 DIAGNOSIS — R73.03 PREDIABETES: Primary | ICD-10-CM

## 2023-07-07 RX ORDER — METFORMIN HCL 500 MG
500 TABLET, EXTENDED RELEASE 24 HR ORAL 2 TIMES DAILY WITH MEALS
Qty: 180 TABLET | Refills: 1 | Status: SHIPPED | OUTPATIENT
Start: 2023-07-07 | End: 2023-12-21

## 2023-08-11 ENCOUNTER — TELEPHONE (OUTPATIENT)
Dept: URGENT CARE | Facility: URGENT CARE | Age: 58
End: 2023-08-11
Payer: OTHER GOVERNMENT

## 2023-08-11 DIAGNOSIS — Z79.890 HORMONE REPLACEMENT THERAPY: ICD-10-CM

## 2023-08-11 RX ORDER — ESTRADIOL 2 MG/1
1 RING VAGINAL
Qty: 1 EACH | Refills: 0 | Status: SHIPPED | OUTPATIENT
Start: 2023-08-11 | End: 2023-09-18

## 2023-08-11 NOTE — TELEPHONE ENCOUNTER
"Last Written Prescription Date:  9/20/22  Last Fill Quantity: 3,  # refills: 4   Last office visit: 9/20/2022 with Katherine GUTIERREZ     Future Office Visit:   Next 5 appointments (look out 90 days)      Sep 14, 2023 12:15 PM  (Arrive by 12:00 PM)  Return Visit with ANUPAMA [facility] ([facility] [place] [facility] [address] [facility] [address]  [phone]             Requested Prescriptions   Pending Prescriptions Disp Refills    estradiol (ESTRING) 7.5 MCG/24HR vaginal ring 1 each 0     Sig: Place 1 each vaginally every 3 months       Hormone Replacement Therapy Passed - 8/11/2023 12:33 PM        Passed - Blood pressure under 140/90 in past 12 months     BP Readings from Last 3 Encounters:   06/07/23 115/79   06/05/23 138/82   05/17/23 132/74                 Passed - Recent (12 mo) or future (30 days) visit within the authorizing provider's specialty     Patient has had an office visit with the authorizing provider or a provider within the authorizing providers department within the previous 12 mos or has a future within next 30 days. See \"Patient Info\" tab in inbasket, or \"Choose Columns\" in Meds & Orders section of the refill encounter.              Passed - Patient has mammogram in past 2 years on file if age 50-75        Passed - Medication is active on med list        Passed - Patient is 18 years of age or older        Passed - No active pregnancy on record        Passed - No positive pregnancy test on record in past 12 months           Routing refill request to provider for review/approval because:  Refill being filled at new pharmacy d/t mail order pharmacy no longer sending medication.  Submitting request for one 90day supply - pt will need appointment as of 9/2023       Thank you!!    Rehana RN  [facility]    "

## 2023-08-11 NOTE — TELEPHONE ENCOUNTER
Called pt to follow-up on need for prescription and alternative pharmacy  Pt not available - left voicemail to call care team re: prescription question    BETO Kimball  [facility]

## 2023-08-11 NOTE — TELEPHONE ENCOUNTER
[employer] reached out regarding rx for estradiol vaginal ring  They are unable to send this rx through the mail and are wondering if there is an alternative rx prescriber would like    Phone number for [employer]: [phone]  Ref number for prescription case: [id number]    Will call patient to find out if prescription is still needed and alternative preferred pharmacy    Thank you,     BETO Kimball  [facility]

## 2023-08-11 NOTE — TELEPHONE ENCOUNTER
"Pt returning call to clinic    Pt reports needing a refill on estradiol ring by 9/1/23    Will send refill request to [facility] [place] per pt request because [facility] can not mail this prescription - see earlier notes    Called [facility] because pt says that she has received rx from this pharmacy in the past    \"DJ\" - pharmacy tech with [facility] reports that this medication has never been mailed as far as she knows  She reports that [facility] does not send this rx anymore and that's why it is no longer being sent to the patient    Will submit refill through pt's preferred pharmacy and update pt via Roman Kimball, RN  [facility]     "

## 2023-09-14 ENCOUNTER — OFFICE VISIT (OUTPATIENT)
Dept: DERMATOLOGY | Facility: CLINIC | Age: 58
End: 2023-09-14
Payer: OTHER GOVERNMENT

## 2023-09-14 DIAGNOSIS — L82.1 SEBORRHEIC KERATOSIS: ICD-10-CM

## 2023-09-14 DIAGNOSIS — D18.01 ANGIOMA OF SKIN: ICD-10-CM

## 2023-09-14 DIAGNOSIS — L81.4 LENTIGO: ICD-10-CM

## 2023-09-14 DIAGNOSIS — D22.9 NEVUS: ICD-10-CM

## 2023-09-14 DIAGNOSIS — L30.9 HAND ECZEMA: ICD-10-CM

## 2023-09-14 DIAGNOSIS — L66.10 LICHEN PLANOPILARIS: Primary | ICD-10-CM

## 2023-09-14 PROCEDURE — 99214 OFFICE O/P EST MOD 30 MIN: CPT | Performed by: PHYSICIAN ASSISTANT

## 2023-09-14 RX ORDER — CLOBETASOL PROPIONATE 0.5 MG/ML
SOLUTION TOPICAL
Qty: 50 ML | Refills: 5 | Status: SHIPPED | OUTPATIENT
Start: 2023-09-14 | End: 2024-01-05

## 2023-09-14 RX ORDER — CLOBETASOL PROPIONATE 0.5 MG/G
CREAM TOPICAL
Qty: 60 G | Refills: 3 | Status: SHIPPED | OUTPATIENT
Start: 2023-09-14 | End: 2024-01-05

## 2023-09-14 RX ORDER — FINASTERIDE 5 MG/1
TABLET, FILM COATED ORAL
Qty: 45 TABLET | Refills: 3 | Status: SHIPPED | OUTPATIENT
Start: 2023-09-14 | End: 2023-11-28

## 2023-09-14 NOTE — PROGRESS NOTES
HPI:   Chief complaints: Sangita Meraz is a pleasant 57 year old female who presents for Full skin cancer screening to rule out skin cancer   Last Skin Exam: 2 years ago      1st Baseline: no  Personal HX of Skin Cancer: no   Personal HX of Malignant Melanoma: no   Family HX of Skin Cancer / Malignant Melanoma: no  Personal HX of Atypical Moles:   no  Risk factors: history of sun exposure and burns; blistering sunburn in the past  New / Changing lesions:no  Social History: moved from [place] recently;  family and have lived all over - she has 5 children  On review of systems, there are no further skin complaints, patient is feeling otherwise well.  See patient intake sheet.  ROS of the following were done and are negative: Constitutional, Eyes, Ears, Nose,   Mouth, Throat, Cardiovascular, Respiratory, GI, Genitourinary, Musculoskeletal,   Psychiatric, Endocrine, Allergic/Immunologic.    PHYSICAL EXAM:   [facility] 12/09/2018   Skin exam performed as follows: Type 2 skin. Mood appropriate  Alert and Oriented X 3. Well developed, well nourished in no distress.  General appearance: Normal  Head including face: Normal  Eyes: conjunctiva and lids: Normal  Mouth: Lips, teeth, gums: Normal  Neck: Normal  Chest-breast/axillae: Normal  Back: Normal  Spleen and liver: Normal  Cardiovascular: Exam of peripheral vascular system by observation for swelling, varicosities, edema: Normal  Genitalia: groin, buttocks: Normal  Extremities: digits/nails (clubbing): Normal  Eccrine and Apocrine glands: Normal  Right upper extremity: Normal  Left upper extremity: Normal  Right lower extremity: Normal  Left lower extremity: Normal  Skin: Scalp and body hair: See below    Pt deferred exam of breasts, groin, buttocks: No    Other physical findings:  1. Multiple pigmented macules on extremities and trunk  2. Multiple pigmented macules on face, trunk and extremities  3. Multiple vascular papules on trunk, arms and legs  4. Multiple scattered  keratotic plaques  5. Smooth hair loss and perifollicular plugging on the vertex scalp       Except as noted above, no other signs of skin cancer or melanoma.     ASSESSMENT/PLAN:   Benign Full skin cancer screening today. . Patient with history of none  Advised on monthly self exams and 1 year  Patient Education: Appropriate brochures given.    Multiple benign appearing nevi on arms, legs and trunk. Discussed ABCDEs of melanoma and sunscreen.   Multiple lentigos on arms, legs and trunk. Advised benign, no treatment needed.  Multiple scattered angiomas. Advised benign, no treatment needed.   Seborrheic keratosis on arms, legs and trunk. Advised benign, no treatment needed.  Biopsy proven lichen planopilaris - flaring  --Start finasteride daily. Discussed risks of decreased libido, decreased spermatogenesis, erectile dysfunction and increased risk of breast cancer in men.   --Start clobetasol solution on M, W F indefinitely  --Continue minoxidil daily  Hand eczema  --Start clobetasol cream BID PRN  --Continue thick emollients             Follow-up: yearly FSE/PRN sooner    1.) Patient was asked about new and changing moles. YES  2.) Patient received a complete physical skin examination: YES  3.) Patient was counseled to perform a monthly self skin examination: YES  Scribed By: Lizzie Landry MS, PALISA

## 2023-09-14 NOTE — LETTER
9/14/2023         RE: Sangita Meraz  [address]  [facility] [address]        Dear Colleague,    Thank you for referring your patient, Sangita Meraz, to the [facility]. Please see a copy of my visit note below.    HPI:   Chief complaints: Sangita Meraz is a pleasant 57 year old female who presents for Full skin cancer screening to rule out skin cancer   Last Skin Exam: 2 years ago      1st Baseline: no  Personal HX of Skin Cancer: no   Personal HX of Malignant Melanoma: no   Family HX of Skin Cancer / Malignant Melanoma: no  Personal HX of Atypical Moles:   no  Risk factors: history of sun exposure and burns; blistering sunburn in the past  New / Changing lesions:no  Social History: moved from [place] recently;  family and have lived all over - she has 5 children  On review of systems, there are no further skin complaints, patient is feeling otherwise well.  See patient intake sheet.  ROS of the following were done and are negative: Constitutional, Eyes, Ears, Nose,   Mouth, Throat, Cardiovascular, Respiratory, GI, Genitourinary, Musculoskeletal,   Psychiatric, Endocrine, Allergic/Immunologic.    PHYSICAL EXAM:   [facility] 12/09/2018   Skin exam performed as follows: Type 2 skin. Mood appropriate  Alert and Oriented X 3. Well developed, well nourished in no distress.  General appearance: Normal  Head including face: Normal  Eyes: conjunctiva and lids: Normal  Mouth: Lips, teeth, gums: Normal  Neck: Normal  Chest-breast/axillae: Normal  Back: Normal  Spleen and liver: Normal  Cardiovascular: Exam of peripheral vascular system by observation for swelling, varicosities, edema: Normal  Genitalia: groin, buttocks: Normal  Extremities: digits/nails (clubbing): Normal  Eccrine and Apocrine glands: Normal  Right upper extremity: Normal  Left upper extremity: Normal  Right lower extremity: Normal  Left lower extremity: Normal  Skin: Scalp and body hair: See below    Pt deferred exam of breasts,  groin, buttocks: No    Other physical findings:  1. Multiple pigmented macules on extremities and trunk  2. Multiple pigmented macules on face, trunk and extremities  3. Multiple vascular papules on trunk, arms and legs  4. Multiple scattered keratotic plaques  5. Smooth hair loss and perifollicular plugging on the vertex scalp       Except as noted above, no other signs of skin cancer or melanoma.     ASSESSMENT/PLAN:   Benign Full skin cancer screening today. . Patient with history of none  Advised on monthly self exams and 1 year  Patient Education: Appropriate brochures given.    Multiple benign appearing nevi on arms, legs and trunk. Discussed ABCDEs of melanoma and sunscreen.   Multiple lentigos on arms, legs and trunk. Advised benign, no treatment needed.  Multiple scattered angiomas. Advised benign, no treatment needed.   Seborrheic keratosis on arms, legs and trunk. Advised benign, no treatment needed.  Biopsy proven lichen planopilaris - flaring  --Start finasteride daily. Discussed risks of decreased libido, decreased spermatogenesis, erectile dysfunction and increased risk of breast cancer in men.   --Start clobetasol solution on M, W F indefinitely  --Continue minoxidil daily  Hand eczema  --Start clobetasol cream BID PRN  --Continue thick emollients             Follow-up: yearly FSE/PRN sooner    1.) Patient was asked about new and changing moles. YES  2.) Patient received a complete physical skin examination: YES  3.) Patient was counseled to perform a monthly self skin examination: YES  Scribed By: Lizzie Landry MS, PALISA      Again, thank you for allowing me to participate in the care of your patient.        Sincerely,        Lizzie Landry PA-C

## 2023-09-18 ENCOUNTER — OFFICE VISIT (OUTPATIENT)
Dept: OBGYN | Facility: CLINIC | Age: 58
End: 2023-09-18
Payer: OTHER GOVERNMENT

## 2023-09-18 VITALS
WEIGHT: 220 LBS | SYSTOLIC BLOOD PRESSURE: 135 MMHG | HEIGHT: 63 IN | DIASTOLIC BLOOD PRESSURE: 70 MMHG | RESPIRATION RATE: 14 BRPM | HEART RATE: 66 BPM | BODY MASS INDEX: 38.98 KG/M2 | TEMPERATURE: 98.1 F

## 2023-09-18 DIAGNOSIS — Z12.31 ENCOUNTER FOR SCREENING MAMMOGRAM FOR BREAST CANCER: Primary | ICD-10-CM

## 2023-09-18 DIAGNOSIS — Z79.890 HORMONE REPLACEMENT THERAPY: ICD-10-CM

## 2023-09-18 DIAGNOSIS — N95.2 VAGINAL ATROPHY: ICD-10-CM

## 2023-09-18 PROCEDURE — 99396 PREV VISIT EST AGE 40-64: CPT | Performed by: OBSTETRICS & GYNECOLOGY

## 2023-09-18 RX ORDER — ESTRADIOL 2 MG/1
1 RING VAGINAL
Qty: 1 EACH | Refills: 4 | Status: SHIPPED | OUTPATIENT
Start: 2023-09-18 | End: 2023-09-18
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
## 2023-11-15 ENCOUNTER — OFFICE VISIT (OUTPATIENT)
Dept: FAMILY MEDICINE | Facility: CLINIC | Age: 58
End: 2023-11-15
Payer: OTHER GOVERNMENT

## 2023-11-15 VITALS
BODY MASS INDEX: 39.65 KG/M2 | WEIGHT: 223.8 LBS | TEMPERATURE: 97.1 F | RESPIRATION RATE: 18 BRPM | OXYGEN SATURATION: 95 % | HEIGHT: 63 IN | HEART RATE: 67 BPM | DIASTOLIC BLOOD PRESSURE: 84 MMHG | SYSTOLIC BLOOD PRESSURE: 132 MMHG

## 2023-11-15 DIAGNOSIS — R73.03 PREDIABETES: ICD-10-CM

## 2023-11-15 DIAGNOSIS — R10.32 LLQ ABDOMINAL PAIN: Primary | ICD-10-CM

## 2023-11-15 LAB
ALBUMIN SERPL BCG-MCNC: 4.4 G/DL (ref 3.5–5.2)
ALP SERPL-CCNC: 67 U/L (ref 40–150)
ALT SERPL W P-5'-P-CCNC: 38 U/L (ref 0–50)
ANION GAP SERPL CALCULATED.3IONS-SCNC: 10 MMOL/L (ref 7–15)
AST SERPL W P-5'-P-CCNC: 27 U/L (ref 0–45)
BASOPHILS # BLD AUTO: 0 10E3/UL (ref 0–0.2)
BASOPHILS NFR BLD AUTO: 0 %
BILIRUB SERPL-MCNC: 0.4 MG/DL
BUN SERPL-MCNC: 11.9 MG/DL (ref 6–20)
CALCIUM SERPL-MCNC: 9.7 MG/DL (ref 8.6–10)
CHLORIDE SERPL-SCNC: 102 MMOL/L (ref 98–107)
CREAT SERPL-MCNC: 0.88 MG/DL (ref 0.51–0.95)
CRP SERPL-MCNC: 7.35 MG/L
DEPRECATED HCO3 PLAS-SCNC: 29 MMOL/L (ref 22–29)
EGFRCR SERPLBLD CKD-EPI 2021: 76 ML/MIN/1.73M2
EOSINOPHIL # BLD AUTO: 0.3 10E3/UL (ref 0–0.7)
EOSINOPHIL NFR BLD AUTO: 4 %
ERYTHROCYTE [DISTWIDTH] IN BLOOD BY AUTOMATED COUNT: 12.7 % (ref 10–15)
ERYTHROCYTE [SEDIMENTATION RATE] IN BLOOD BY WESTERGREN METHOD: 9 MM/HR (ref 0–30)
GLUCOSE SERPL-MCNC: 116 MG/DL (ref 70–99)
HBA1C MFR BLD: 6.1 % (ref 0–5.6)
HCT VFR BLD AUTO: 40.2 % (ref 35–47)
HGB BLD-MCNC: 14 G/DL (ref 11.7–15.7)
IMM GRANULOCYTES # BLD: 0 10E3/UL
IMM GRANULOCYTES NFR BLD: 1 %
LYMPHOCYTES # BLD AUTO: 1.8 10E3/UL (ref 0.8–5.3)
LYMPHOCYTES NFR BLD AUTO: 29 %
MCH RBC QN AUTO: 31.7 PG (ref 26.5–33)
MCHC RBC AUTO-ENTMCNC: 34.8 G/DL (ref 31.5–36.5)
MCV RBC AUTO: 91 FL (ref 78–100)
MONOCYTES # BLD AUTO: 0.4 10E3/UL (ref 0–1.3)
MONOCYTES NFR BLD AUTO: 7 %
NEUTROPHILS # BLD AUTO: 3.6 10E3/UL (ref 1.6–8.3)
NEUTROPHILS NFR BLD AUTO: 59 %
PLATELET # BLD AUTO: 182 10E3/UL (ref 150–450)
POTASSIUM SERPL-SCNC: 4.3 MMOL/L (ref 3.4–5.3)
PROT SERPL-MCNC: 7.1 G/DL (ref 6.4–8.3)
RBC # BLD AUTO: 4.41 10E6/UL (ref 3.8–5.2)
SODIUM SERPL-SCNC: 141 MMOL/L (ref 135–145)
WBC # BLD AUTO: 6.1 10E3/UL (ref 4–11)

## 2023-11-15 PROCEDURE — 85025 COMPLETE CBC W/AUTO DIFF WBC: CPT | Performed by: PHYSICIAN ASSISTANT

## 2023-11-15 PROCEDURE — 99214 OFFICE O/P EST MOD 30 MIN: CPT | Performed by: PHYSICIAN ASSISTANT

## 2023-11-15 PROCEDURE — 86140 C-REACTIVE PROTEIN: CPT | Performed by: PHYSICIAN ASSISTANT

## 2023-11-15 PROCEDURE — 80053 COMPREHEN METABOLIC PANEL: CPT | Performed by: PHYSICIAN ASSISTANT

## 2023-11-15 PROCEDURE — 85652 RBC SED RATE AUTOMATED: CPT | Performed by: PHYSICIAN ASSISTANT

## 2023-11-15 PROCEDURE — 83036 HEMOGLOBIN GLYCOSYLATED A1C: CPT | Performed by: PHYSICIAN ASSISTANT

## 2023-11-15 PROCEDURE — 36415 COLL VENOUS BLD VENIPUNCTURE: CPT | Performed by: PHYSICIAN ASSISTANT

## 2023-11-15 ASSESSMENT — PAIN SCALES - GENERAL: PAINLEVEL: MILD PAIN (3)

## 2023-11-15 NOTE — PATIENT INSTRUCTIONS
Lab work looks good today as well as her vital signs and exam.  Other lab work is pending and [facility]    Schedule CT scan at your convenience to be done in the next 1 to 2 weeks.

## 2023-11-15 NOTE — PROGRESS NOTES
Assessment & Plan   [facility] abdominal pain  Patient presents with 1 to 2 months of left lower quadrant abdominal pain.  There is no real other associated symptoms.  No recent fevers or chills either.  No change in bowel movements.  Vitals are within normal limits today.  Exam does demonstrate left lower quadrant tenderness with rebound and very mild discomfort with percussion and heeltap.  She was able to get up and down from the exam table without hesitation.  Labs done in clinic today including CBC and ESR were all within normal limits.  Given the duration of her pain and lack of concerning objective findings I think it is okay to wait for a CT scan to be done at a later date.  I do think this is still warranted given her recent colectomy earlier this year for recurrent diverticulitis.  Today we will hold off on treating with antibiotics as this could be some other sort of pathology including colitis or an issue related to her previous surgery.    Of note she recently started metformin for prediabetes.  If her CT scan and lab work are all unremarkable and I did recommend a trial off of metformin to see if this would resolve.  - CBC with Platelets & Differential; Future  - CRP, inflammation; Future  - ESR: Erythrocyte sedimentation rate; Future  - Comprehensive metabolic panel; Future  - CT Abdomen Pelvis w/o Contrast; Future    Prediabetes  Due for recheck.   - Hemoglobin A1c; Future    ANUPAMA Delgado [facility]    Adi Quesada is a 58 year old, presenting for the following health issues:  Abdominal Pain        11/15/2023     8:25 AM   Additional Questions   Roomed by Victorina ELI CMA   Accompanied by Self       HPI   History of Present Illness   Reason for visit:  q discomfort/pain  Symptom onset:  More than a month  Symptoms include:  Nausea, bloating and more watery stool  Symptom intensity:  Mild    She eats 4 or more servings of fruits and vegetables daily.She consumes 0  "sweetened beverage(s) daily.She exercises with enough effort to increase her heart rate 9 or less minutes per day.  She exercises with enough effort to increase her heart rate 3 or less days per week.   She is taking medications regularly.    Review of Systems   See HPI       Objective    /84 (BP Location: Right arm, Patient Position: Sitting, Cuff Size: Adult Large)   Pulse 67   Temp 97.1  F (36.2  C) (Tympanic)   Resp 18   Ht 1.599 m (5' 2.95\")   Wt 101.5 kg (223 lb 12.8 oz)   LMP 12/09/2018   SpO2 95%   BMI 39.71 kg/m    Body mass index is 39.71 kg/m .  Physical Exam   Constitutional: healthy, alert, and no distress  Head: Normocephalic. Atraumatic  Eyes: No conjunctival injection, sclera anicteric  Cardiovascular: RRR. No murmurs, clicks, gallops, or rubs. No peripheral edema.   Respiratory: No resp distress. Lungs CTAB bilaterally.  Abdomen: soft, left lower quadrant tenderness with rebound, mild tenderness/discomfort with percussion in the left lower quadrant.  Mild positive heeltap.  Musculoskeletal: extremities normal- no gross deformities noted, and normal muscle tone.  No tenderness with movement of the left hip or with straight leg raise.  Skin: no suspicious lesions or rashes  Neurologic: Gait normal. CN 2-12 grossly intact. Sensation, strength and coordination are grossly intact.   Psychiatric: mentation appears normal and affect normal/bright      Results for orders placed or performed in visit on 11/15/23   Hemoglobin A1c     Status: Abnormal   Result Value Ref Range    Hemoglobin A1C 6.1 (H) 0.0 - 5.6 %   ESR: Erythrocyte sedimentation rate     Status: Normal   Result Value Ref Range    Erythrocyte Sedimentation Rate 9 0 - 30 mm/hr   CBC with platelets and differential     Status: None   Result Value Ref Range    WBC Count 6.1 4.0 - 11.0 10e3/uL    RBC Count 4.41 3.80 - 5.20 10e6/uL    Hemoglobin 14.0 11.7 - 15.7 g/dL    Hematocrit 40.2 35.0 - 47.0 %    MCV 91 78 - 100 fL    MCH 31.7 " 26.5 - 33.0 pg    MCHC 34.8 31.5 - 36.5 g/dL    RDW 12.7 10.0 - 15.0 %    Platelet Count 182 150 - 450 10e3/uL    % Neutrophils 59 %    % Lymphocytes 29 %    % Monocytes 7 %    % Eosinophils 4 %    % Basophils 0 %    % Immature Granulocytes 1 %    Absolute Neutrophils 3.6 1.6 - 8.3 10e3/uL    Absolute Lymphocytes 1.8 0.8 - 5.3 10e3/uL    Absolute Monocytes 0.4 0.0 - 1.3 10e3/uL    Absolute Eosinophils 0.3 0.0 - 0.7 10e3/uL    Absolute Basophils 0.0 0.0 - 0.2 10e3/uL    Absolute Immature Granulocytes 0.0 <=0.4 10e3/uL   CBC with Platelets & Differential     Status: None    Narrative    The following orders were created for panel order CBC with Platelets & Differential.  Procedure                               Abnormality         Status                     ---------                               -----------         ------                     CBC with platelets and d...[[id number]]                      Final result                 Please view results for these tests on the individual orders.

## 2023-11-21 ENCOUNTER — HOSPITAL ENCOUNTER (OUTPATIENT)
Dept: CT IMAGING | Facility: CLINIC | Age: 58
Discharge: HOME | End: 2023-11-21
Attending: PHYSICIAN ASSISTANT | Admitting: PHYSICIAN ASSISTANT
Payer: OTHER GOVERNMENT

## 2023-11-21 ENCOUNTER — MYC MEDICAL ADVICE (OUTPATIENT)
Dept: FAMILY MEDICINE | Facility: CLINIC | Age: 58
End: 2023-11-21

## 2023-11-21 DIAGNOSIS — R10.32 LLQ ABDOMINAL PAIN: ICD-10-CM

## 2023-11-21 DIAGNOSIS — R11.0 NAUSEA: ICD-10-CM

## 2023-11-21 DIAGNOSIS — R10.32 LLQ ABDOMINAL PAIN: Primary | ICD-10-CM

## 2023-11-21 PROCEDURE — 74176 CT ABD & PELVIS W/O CONTRAST: CPT

## 2023-11-27 ENCOUNTER — HOSPITAL ENCOUNTER (EMERGENCY)
Facility: CLINIC | Age: 58
Discharge: HOME | End: 2023-11-27
Attending: FAMILY MEDICINE | Admitting: FAMILY MEDICINE
Payer: OTHER GOVERNMENT

## 2023-11-27 ENCOUNTER — APPOINTMENT (OUTPATIENT)
Dept: ULTRASOUND IMAGING | Facility: CLINIC | Age: 58
End: 2023-11-27
Attending: FAMILY MEDICINE
Payer: OTHER GOVERNMENT

## 2023-11-27 VITALS
DIASTOLIC BLOOD PRESSURE: 80 MMHG | BODY MASS INDEX: 39.03 KG/M2 | TEMPERATURE: 98 F | HEART RATE: 63 BPM | SYSTOLIC BLOOD PRESSURE: 143 MMHG | WEIGHT: 220 LBS | RESPIRATION RATE: 16 BRPM | OXYGEN SATURATION: 92 %

## 2023-11-27 DIAGNOSIS — N39.0 UTI (URINARY TRACT INFECTION), BACTERIAL: ICD-10-CM

## 2023-11-27 DIAGNOSIS — R10.9 ABDOMINAL PAIN, UNSPECIFIED ABDOMINAL LOCATION: ICD-10-CM

## 2023-11-27 DIAGNOSIS — A49.9 UTI (URINARY TRACT INFECTION), BACTERIAL: ICD-10-CM

## 2023-11-27 LAB
ALBUMIN SERPL BCG-MCNC: 4.6 G/DL (ref 3.5–5.2)
ALBUMIN UR-MCNC: NEGATIVE MG/DL
ALP SERPL-CCNC: 71 U/L (ref 40–150)
ALT SERPL W P-5'-P-CCNC: 44 U/L (ref 0–50)
ANION GAP SERPL CALCULATED.3IONS-SCNC: 12 MMOL/L (ref 7–15)
APPEARANCE UR: CLEAR
AST SERPL W P-5'-P-CCNC: 36 U/L (ref 0–45)
BASOPHILS # BLD AUTO: 0.1 10E3/UL (ref 0–0.2)
BASOPHILS NFR BLD AUTO: 1 %
BILIRUB SERPL-MCNC: 0.4 MG/DL
BILIRUB UR QL STRIP: NEGATIVE
BUN SERPL-MCNC: 11.9 MG/DL (ref 6–20)
CALCIUM SERPL-MCNC: 10 MG/DL (ref 8.6–10)
CHLORIDE SERPL-SCNC: 102 MMOL/L (ref 98–107)
COLOR UR AUTO: YELLOW
CREAT SERPL-MCNC: 0.86 MG/DL (ref 0.51–0.95)
CRP SERPL-MCNC: 6.41 MG/L
DEPRECATED HCO3 PLAS-SCNC: 26 MMOL/L (ref 22–29)
EGFRCR SERPLBLD CKD-EPI 2021: 78 ML/MIN/1.73M2
EOSINOPHIL # BLD AUTO: 0.3 10E3/UL (ref 0–0.7)
EOSINOPHIL NFR BLD AUTO: 4 %
ERYTHROCYTE [DISTWIDTH] IN BLOOD BY AUTOMATED COUNT: 12.7 % (ref 10–15)
GLUCOSE SERPL-MCNC: 112 MG/DL (ref 70–99)
GLUCOSE UR STRIP-MCNC: NEGATIVE MG/DL
HCT VFR BLD AUTO: 44 % (ref 35–47)
HGB BLD-MCNC: 15.7 G/DL (ref 11.7–15.7)
HGB UR QL STRIP: ABNORMAL
HOLD SPECIMEN: NORMAL
HOLD SPECIMEN: NORMAL
IMM GRANULOCYTES # BLD: 0 10E3/UL
IMM GRANULOCYTES NFR BLD: 0 %
KETONES UR STRIP-MCNC: NEGATIVE MG/DL
LEUKOCYTE ESTERASE UR QL STRIP: ABNORMAL
LIPASE SERPL-CCNC: 35 U/L (ref 13–60)
LYMPHOCYTES # BLD AUTO: 2.3 10E3/UL (ref 0.8–5.3)
LYMPHOCYTES NFR BLD AUTO: 32 %
MCH RBC QN AUTO: 32.2 PG (ref 26.5–33)
MCHC RBC AUTO-ENTMCNC: 35.7 G/DL (ref 31.5–36.5)
MCV RBC AUTO: 90 FL (ref 78–100)
MONOCYTES # BLD AUTO: 0.5 10E3/UL (ref 0–1.3)
MONOCYTES NFR BLD AUTO: 7 %
MUCOUS THREADS #/AREA URNS LPF: PRESENT /LPF
NEUTROPHILS # BLD AUTO: 4.1 10E3/UL (ref 1.6–8.3)
NEUTROPHILS NFR BLD AUTO: 56 %
NITRATE UR QL: NEGATIVE
NRBC # BLD AUTO: 0 10E3/UL
NRBC BLD AUTO-RTO: 0 /100
PH UR STRIP: 6 [PH] (ref 5–7)
PLATELET # BLD AUTO: 233 10E3/UL (ref 150–450)
POTASSIUM SERPL-SCNC: 4.3 MMOL/L (ref 3.4–5.3)
PROT SERPL-MCNC: 7.9 G/DL (ref 6.4–8.3)
RBC # BLD AUTO: 4.88 10E6/UL (ref 3.8–5.2)
RBC URINE: 8 /HPF
SODIUM SERPL-SCNC: 140 MMOL/L (ref 135–145)
SP GR UR STRIP: 1.02 (ref 1–1.03)
SQUAMOUS EPITHELIAL: 1 /HPF
UROBILINOGEN UR STRIP-MCNC: NORMAL MG/DL
WBC # BLD AUTO: 7.1 10E3/UL (ref 4–11)
WBC URINE: 11 /HPF

## 2023-11-27 PROCEDURE — 250N000013 HC RX MED GY IP 250 OP 250 PS 637: Performed by: FAMILY MEDICINE

## 2023-11-27 PROCEDURE — 85025 COMPLETE CBC W/AUTO DIFF WBC: CPT | Performed by: FAMILY MEDICINE

## 2023-11-27 PROCEDURE — 80053 COMPREHEN METABOLIC PANEL: CPT | Performed by: FAMILY MEDICINE

## 2023-11-27 PROCEDURE — 99284 EMERGENCY DEPT VISIT MOD MDM: CPT | Performed by: FAMILY MEDICINE

## 2023-11-27 PROCEDURE — 87086 URINE CULTURE/COLONY COUNT: CPT | Performed by: FAMILY MEDICINE

## 2023-11-27 PROCEDURE — 96374 THER/PROPH/DIAG INJ IV PUSH: CPT | Performed by: FAMILY MEDICINE

## 2023-11-27 PROCEDURE — 76830 TRANSVAGINAL US NON-OB: CPT

## 2023-11-27 PROCEDURE — 36415 COLL VENOUS BLD VENIPUNCTURE: CPT | Performed by: FAMILY MEDICINE

## 2023-11-27 PROCEDURE — 81001 URINALYSIS AUTO W/SCOPE: CPT | Performed by: FAMILY MEDICINE

## 2023-11-27 PROCEDURE — 83690 ASSAY OF LIPASE: CPT | Performed by: FAMILY MEDICINE

## 2023-11-27 PROCEDURE — 96375 TX/PRO/DX INJ NEW DRUG ADDON: CPT | Performed by: FAMILY MEDICINE

## 2023-11-27 PROCEDURE — 96361 HYDRATE IV INFUSION ADD-ON: CPT | Performed by: FAMILY MEDICINE

## 2023-11-27 PROCEDURE — 250N000011 HC RX IP 250 OP 636: Performed by: FAMILY MEDICINE

## 2023-11-27 PROCEDURE — 258N000003 HC RX IP 258 OP 636: Performed by: FAMILY MEDICINE

## 2023-11-27 PROCEDURE — 86140 C-REACTIVE PROTEIN: CPT | Performed by: FAMILY MEDICINE

## 2023-11-27 PROCEDURE — 99285 EMERGENCY DEPT VISIT HI MDM: CPT | Mod: 25 | Performed by: FAMILY MEDICINE

## 2023-11-27 RX ORDER — HYDROMORPHONE HYDROCHLORIDE 1 MG/ML
0.5 INJECTION, SOLUTION INTRAMUSCULAR; INTRAVENOUS; SUBCUTANEOUS
Status: DISCONTINUED | OUTPATIENT
Start: 2023-11-27 | End: 2023-11-27 | Stop reason: HOSPADM

## 2023-11-27 RX ORDER — ONDANSETRON 2 MG/ML
4 INJECTION INTRAMUSCULAR; INTRAVENOUS ONCE
Status: COMPLETED | OUTPATIENT
Start: 2023-11-27 | End: 2023-11-27

## 2023-11-27 RX ORDER — CIPROFLOXACIN 500 MG/1
500 TABLET, FILM COATED ORAL ONCE
Status: COMPLETED | OUTPATIENT
Start: 2023-11-27 | End: 2023-11-27

## 2023-11-27 RX ORDER — OXYCODONE HYDROCHLORIDE 5 MG/1
5 TABLET ORAL EVERY 6 HOURS PRN
Qty: 12 TABLET | Refills: 0 | Status: SHIPPED | OUTPATIENT
Start: 2023-11-27 | End: 2023-11-30

## 2023-11-27 RX ORDER — CIPROFLOXACIN 500 MG/1
500 TABLET, FILM COATED ORAL 2 TIMES DAILY
Qty: 6 TABLET | Refills: 0 | Status: SHIPPED | OUTPATIENT
Start: 2023-11-27 | End: 2023-11-30

## 2023-11-27 RX ADMIN — HYDROMORPHONE HYDROCHLORIDE 0.5 MG: 1 INJECTION, SOLUTION INTRAMUSCULAR; INTRAVENOUS; SUBCUTANEOUS at 18:45

## 2023-11-27 RX ADMIN — SODIUM CHLORIDE 1000 ML: 9 INJECTION, SOLUTION INTRAVENOUS at 18:45

## 2023-11-27 RX ADMIN — CIPROFLOXACIN HYDROCHLORIDE 500 MG: 500 TABLET, FILM COATED ORAL at 20:46

## 2023-11-27 RX ADMIN — ONDANSETRON 4 MG: 2 INJECTION INTRAMUSCULAR; INTRAVENOUS at 18:37

## 2023-11-27 ASSESSMENT — ACTIVITIES OF DAILY LIVING (ADL): ADLS_ACUITY_SCORE: 35

## 2023-11-27 NOTE — ED NOTES
My assessment, based on my discussion with patient and and chart review , established that Sangita Meraz has a potential emergent condition, which requires further testing and/or treatment beyond the capabilities of the current urgent care setting.  This condition was discussed with the patient as being an appropriate for urgent care.  Testing may require imaging, blood testing.  As such, I have recommended that the patient be transferred to an emergency department for further cares.  Patient is agreeable to be seen in the emergency department today.     Jose Bowser, APRN CNP  11/27/23 1537

## 2023-11-27 NOTE — ED TRIAGE NOTES
Here concerned for lower belly pain, ongoing for at least a month, had OP CT & found nothing, told to come in for US as OP & is here today for this. Nauseated without vomiting, normal BMs for her, denies fever/chills.      Triage Assessment (Adult)       Row Name 11/27/23 1533          Triage Assessment    Airway WDL WDL        Respiratory WDL    Respiratory WDL WDL        Skin Circulation/Temperature WDL    Skin Circulation/Temperature WDL WDL        Cardiac WDL    Cardiac WDL WDL        Peripheral/Neurovascular WDL    Peripheral Neurovascular WDL WDL        Cognitive/Neuro/Behavioral WDL    Cognitive/Neuro/Behavioral WDL WDL

## 2023-11-28 DIAGNOSIS — L66.10 LICHEN PLANOPILARIS: ICD-10-CM

## 2023-11-28 LAB — BACTERIA UR CULT: NORMAL

## 2023-11-28 RX ORDER — FINASTERIDE 5 MG/1
TABLET, FILM COATED ORAL
Qty: 45 TABLET | Refills: 2 | Status: SHIPPED | OUTPATIENT
Start: 2023-11-28 | End: 2024-01-05

## 2023-11-28 RX ORDER — ONDANSETRON 4 MG/1
4 TABLET, ORALLY DISINTEGRATING ORAL EVERY 8 HOURS PRN
Qty: 10 TABLET | Refills: 0 | Status: SHIPPED | OUTPATIENT
Start: 2023-11-28 | End: 2024-05-03

## 2023-11-28 NOTE — TELEPHONE ENCOUNTER
Requested Prescriptions   Pending Prescriptions Disp Refills    finasteride (PROSCAR) 5 MG tablet 45 tablet 3     Sig: Take 1/2 tablet daily       There is no refill protocol information for this order          Last office visit: 9/14/2023 ; last virtual visit: Visit date not found with prescribing provider:  Lizzie Del Toro     Future Office Visit:

## 2023-11-28 NOTE — ED PROVIDER NOTES
History     Chief Complaint   Patient presents with    Abdominal Pain     Lower x at least 1 month     HPI  Sangita Meraz is a 58 year old female, past medical history is significant for diverticulosis/diverticulitis, cystocele, herniation of rectum and the vagina, mixed stress and urgency urinary incontinence, PCOS, depression, AILEEN, ureterovaginal prolapse, Ménière's disease, morbid obesity, presents to the emergency department with concerns of 1.5 months of abdominal pain.  Initially seen in urgent care and transition to ER.  As this pain has been going on for over a month the patient has already had a CT abdomen pelvis as outpatient on 11/21/2023, office visit on 11/15/2023, and apparently ultrasound as an outpatient here today before coming to the ER.  History today from the patient with her significant other present identifies primarily left lower quadrant abdominal pain with radiation to the left flank, suprapubic area dull achy occasionally sharp associate with nausea but no vomiting.  Bowel habit has been looser and more frequent over the last month and a half.  No urinary tract symptoms such as frequency, urgency or dysuria.  Nausea but no vomiting.  No fever chills or sweats.  Reviewed abdominal CT done without contrast due to patient contrast allergy on 11/21/2023 approximately week ago which revealed no acute findings.  It was recommended to the patient that she come in for an ultrasound to evaluate uterus and ovaries.  She has the order for that right now but has not had the test done yet.      Allergies:  Allergies   Allergen Reactions    Ivp Dye [Contrast Dye] Anaphylaxis       Problem List:    Patient Active Problem List    Diagnosis Date Noted    Diverticulitis 03/31/2023     Priority: Medium    Female cystocele 10/17/2022     Priority: Medium    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     Priority: Medium    Mixed stress and urge urinary incontinence 10/17/2022     Priority: Medium     Polycystic ovarian syndrome 10/17/2022     Priority: Medium    Recurrent major depressive disorder, in partial remission (H24) 10/17/2022     Priority: Medium    Obstructive sleep apnea syndrome 10/17/2022     Priority: Medium     severe      Uterovaginal prolapse 10/17/2022     Priority: Medium    Meniere's disease, unspecified laterality 10/17/2022     Priority: Medium    Morbid obesity (H) 07/13/2021     Priority: Medium        Past Medical History:    Past Medical History:   Diagnosis Date    Depressive disorder 1982    Diverticulosis of large intestine without hemorrhage     Obstructive sleep apnea syndrome 10/17/2022       Past Surgical History:    Past Surgical History:   Procedure Laterality Date    ABDOMEN SURGERY  '92,'09,'15    2 c-secs, abdomenplasty    CHOLECYSTECTOMY  '98    COLONOSCOPY N/A 03/16/2021    Procedure: COLONOSCOPY, WITH BIOPSY AND CLIPPING;  Surgeon: Kemar Mckeon MD;  Location: [facility] [place] OR    COSMETIC SURGERY  '15    Abdomenoplasty    DILATION AND CURETTAGE, OPERATIVE HYSTEROSCOPY, COMBINED N/A 10/18/2022    Procedure: HYSTEROSCOPY, WITH DILATION AND CURETTAGE OF UTERUS and polypectomy;  Surgeon: Ines Chi MD;  Location: [facility] OR    ESOPHAGOSCOPY, GASTROSCOPY, DUODENOSCOPY (EGD), COMBINED N/A 12/09/2020    Procedure: ESOPHAGOGASTRODUODENOSCOPY, WITH BIOPSY;  Surgeon: Anton Knott DO;  Location: [facility] GI    GENITOURINARY SURGERY  '01, '03, '07    tubal ligation, anterior/posterior repair, tubal ligation reversal    LAPAROSCOPIC ASSISTED SIGMOID COLECTOMY N/A 03/31/2023    Procedure: LAPAROSCOPIC extended LEFT HEMICOLECTOMY;  Surgeon: Bhavana Johansen MD;  Location:  OR    SIGMOIDOSCOPY FLEXIBLE N/A 03/31/2023    Procedure: Sigmoidoscopy flexible;  Surgeon: Bhavana Johansen MD;  Location:  OR       Family History:    Family History   Problem Relation Age of Onset    Uterine Cancer Mother         endometrial cancer    Diabetes Mother     Colon  Polyps Father     Diabetes Father     Hypertension Father     Diabetes Brother     Hypertension Brother     Pulmonary Embolism Brother         following leg surgery    Anesthesia Reaction No family hx of        Social History:  Marital Status:   [2]  Social History     Tobacco Use    Smoking status: Former     Packs/day: 1.00     Years: 10.00     Additional pack years: 0.00     Total pack years: 10.00     Types: Cigarettes     Start date: 10/10/1982     Quit date: 1991     Years since quittin.5    Smokeless tobacco: Never   Vaping Use    Vaping Use: Never used   Substance Use Topics    Alcohol use: Yes     Comment: 1-2 beer/month    Drug use: Never        Medications:    ciprofloxacin (CIPRO) 500 MG tablet  acetaminophen (TYLENOL) 500 MG tablet  clobetasol (TEMOVATE) 0.05 % external cream  clobetasol (TEMOVATE) 0.05 % external solution  estradiol (ESTRING) 7.5 MCG/24HR vaginal ring  finasteride (PROSCAR) 5 MG tablet  metFORMIN (GLUCOPHAGE XR) 500 MG 24 hr tablet  omeprazole 20 MG tablet  ondansetron (ZOFRAN ODT) 4 MG ODT tab  sertraline (ZOLOFT) 100 MG tablet  UNABLE TO FIND          Review of Systems   All other systems reviewed and are negative.      Physical Exam   BP: 137/78  Pulse: 71  Temp: 98.5  F (36.9  C)  Resp: 16  Weight: 99.8 kg (220 lb)  SpO2: 97 %      Physical Exam  Vitals and nursing note reviewed.   Constitutional:       General: She is not in acute distress.     Appearance: She is well-developed. She is not ill-appearing.   HENT:      Head: Normocephalic and atraumatic.      Mouth/Throat:      Mouth: Mucous membranes are moist.      Pharynx: Oropharynx is clear.   Eyes:      Extraocular Movements: Extraocular movements intact.      Pupils: Pupils are equal, round, and reactive to light.   Cardiovascular:      Rate and Rhythm: Normal rate and regular rhythm.      Heart sounds: Normal heart sounds.   Pulmonary:      Effort: Pulmonary effort is normal.      Breath sounds: Normal  breath sounds.   Abdominal:      Comments: Soft bowel sounds present tender left lower quadrant without palpable abnormality.  Tender left CVA.  No rebound or guarding.  Distractible.   Skin:     General: Skin is warm and dry.      Capillary Refill: Capillary refill takes 2 to 3 seconds.   Neurological:      General: No focal deficit present.      Mental Status: She is alert.   Psychiatric:         Mood and Affect: Mood normal.         Behavior: Behavior normal.         ED Course                 Procedures              Results for orders placed or performed during the hospital encounter of 11/27/23 (from the past 24 hour(s))   CBC with platelets, differential    Narrative    The following orders were created for panel order CBC with platelets, differential.  Procedure                               Abnormality         Status                     ---------                               -----------         ------                     CBC with platelets and d...[[id number]]                      Final result                 Please view results for these tests on the individual orders.   Comprehensive metabolic panel   Result Value Ref Range    Sodium 140 135 - 145 mmol/L    Potassium 4.3 3.4 - 5.3 mmol/L    Carbon Dioxide (CO2) 26 22 - 29 mmol/L    Anion Gap 12 7 - 15 mmol/L    Urea Nitrogen 11.9 6.0 - 20.0 mg/dL    Creatinine 0.86 0.51 - 0.95 mg/dL    GFR Estimate 78 >60 mL/min/1.73m2    Calcium 10.0 8.6 - 10.0 mg/dL    Chloride 102 98 - 107 mmol/L    Glucose 112 (H) 70 - 99 mg/dL    Alkaline Phosphatase 71 40 - 150 U/L    AST 36 0 - 45 U/L    ALT 44 0 - 50 U/L    Protein Total 7.9 6.4 - 8.3 g/dL    Albumin 4.6 3.5 - 5.2 g/dL    Bilirubin Total 0.4 <=1.2 mg/dL   Glenn Draw    Narrative    The following orders were created for panel order Glenn Draw.  Procedure                               Abnormality         Status                     ---------                               -----------         ------                      Extra Blue Top Tube[[id number]]                              Final result               Extra Red Top Tube[[id number]]                               Final result                 Please view results for these tests on the individual orders.   CBC with platelets and differential   Result Value Ref Range    WBC Count 7.1 4.0 - 11.0 10e3/uL    RBC Count 4.88 3.80 - 5.20 10e6/uL    Hemoglobin 15.7 11.7 - 15.7 g/dL    Hematocrit 44.0 35.0 - 47.0 %    MCV 90 78 - 100 fL    MCH 32.2 26.5 - 33.0 pg    MCHC 35.7 31.5 - 36.5 g/dL    RDW 12.7 10.0 - 15.0 %    Platelet Count 233 150 - 450 10e3/uL    % Neutrophils 56 %    % Lymphocytes 32 %    % Monocytes 7 %    % Eosinophils 4 %    % Basophils 1 %    % Immature Granulocytes 0 %    NRBCs per 100 WBC 0 <1 /100    Absolute Neutrophils 4.1 1.6 - 8.3 10e3/uL    Absolute Lymphocytes 2.3 0.8 - 5.3 10e3/uL    Absolute Monocytes 0.5 0.0 - 1.3 10e3/uL    Absolute Eosinophils 0.3 0.0 - 0.7 10e3/uL    Absolute Basophils 0.1 0.0 - 0.2 10e3/uL    Absolute Immature Granulocytes 0.0 <=0.4 10e3/uL    Absolute NRBCs 0.0 10e3/uL   Extra Blue Top Tube   Result Value Ref Range    Hold Specimen JIC    Extra Red Top Tube   Result Value Ref Range    Hold Specimen JIC    Lipase   Result Value Ref Range    Lipase 35 13 - 60 U/L   CRP Inflammation   Result Value Ref Range    CRP Inflammation 6.41 (H) <5.00 mg/L   UA Macroscopic with reflex to Microscopic and Culture    Specimen: Urine, Midstream   Result Value Ref Range    Color Urine Yellow Colorless, Straw, Light Yellow, Yellow    Appearance Urine Clear Clear    Glucose Urine Negative Negative mg/dL    Bilirubin Urine Negative Negative    Ketones Urine Negative Negative mg/dL    Specific Gravity Urine 1.018 1.003 - 1.035    Blood Urine Small (A) Negative    pH Urine 6.0 5.0 - 7.0    Protein Albumin Urine Negative Negative mg/dL    Urobilinogen Urine Normal Normal, 2.0 mg/dL    Nitrite Urine Negative Negative    Leukocyte Esterase Urine Trace (A)  Negative    Mucus Urine Present (A) None Seen /LPF    RBC Urine 8 (H) <=2 /HPF    WBC Urine 11 (H) <=5 /HPF    Squamous Epithelials Urine 1 <=1 /HPF    Narrative    Urine Culture ordered based on laboratory criteria   US Pelvic Complete with Transvaginal    Narrative    EXAM: US PELVIC TRANSABDOMINAL AND TRANSVAGINAL  LOCATION: [facility]  DATE: 11/27/2023    INDICATION: Subacute abdominal pain left lower quadrant suprapubic area  COMPARISON: CT 11/21/2023  TECHNIQUE: Transabdominal scans were performed. Endovaginal ultrasound was performed to better visualize the adnexa.    FINDINGS:    UTERUS: 8.3 x 4.3 x 3.4 cm. Normal in size and position with no masses.    ENDOMETRIUM: 3 mm. Normal smooth endometrium.    RIGHT OVARY: Not visualized due to overlying bowel gas.    LEFT OVARY: 1.7 x 1.3 x 1.3 cm. Normal grayscale appearance transabdominally. Not visualized transvaginally.    No significant free fluid.        Impression    IMPRESSION:  1.  Unremarkable pelvic ultrasound without visualized explanation for patient's pain.             Medications   HYDROmorphone (PF) (DILAUDID) injection 0.5 mg (0.5 mg Intravenous $Given 11/27/23 1845)   ciprofloxacin (CIPRO) tablet 500 mg (has no administration in time range)   ondansetron (ZOFRAN) injection 4 mg (4 mg Intravenous $Given 11/27/23 1837)   sodium chloride 0.9% BOLUS 1,000 mL (1,000 mLs Intravenous $New Bag 11/27/23 1845)   8:41 PM  Reviewed all lab diagnostics and imaging in the room with the patient and her .  Really no significant abnormals.  The urine is mildly abnormal and we will send this for culture and treat her with a short course of Cipro but I do not expect that this is the explanation for her pain.  CRP is only mildly elevated.  CBC and CMP show no significant abnormals.  I recommended the patient follow-up in clinic with her primary as well as with OB/GYN before considering any further imaging type diagnostics.  Her CT  abdomen 11/21/2023 showed no acute process.  I recommended against repeating that here today 6 days later given her presentation, vital sign stability, lack of fever and lack of significant abnormals on exam or on lab diagnostics.  The patient agrees.      Assessments & Plan (with Medical Decision Making)   Assessments and plan with medical decision making at the time stamp above.    Disclaimer: This note consists of symbols derived from keyboarding, dictation and/or voice recognition software. As a result, there may be errors in the script that have gone undetected. Please consider this when interpreting information found in this chart.      I have reviewed the nursing notes.    I have reviewed the findings, diagnosis, plan and need for follow up with the patient.        New Prescriptions    CIPROFLOXACIN (CIPRO) 500 MG TABLET    Take 1 tablet (500 mg) by mouth 2 times daily for 3 days       Final diagnoses:   Abdominal pain, unspecified abdominal location - Etiology unclear   UTI (urinary tract infection), bacterial       11/27/2023   [facility] EMERGENCY DEPT       Will Poon MD  11/27/23 2043

## 2023-11-28 NOTE — DISCHARGE INSTRUCTIONS
I would recommend outpatient discussion with your OB/GYN before pursuing additional workup.  Tylenol/ibuprofen as needed for pain.  Oxycodone for breakthrough pain.  As we discussed there is some evidence for a UTI, minimal, I do not think that this accounts for your pain as I discussed this with you.

## 2023-12-12 ENCOUNTER — OFFICE VISIT (OUTPATIENT)
Dept: OBGYN | Facility: CLINIC | Age: 58
End: 2023-12-12
Payer: OTHER GOVERNMENT

## 2023-12-12 VITALS
HEART RATE: 70 BPM | RESPIRATION RATE: 18 BRPM | WEIGHT: 227.1 LBS | TEMPERATURE: 98.2 F | BODY MASS INDEX: 40.24 KG/M2 | HEIGHT: 63 IN | DIASTOLIC BLOOD PRESSURE: 66 MMHG | SYSTOLIC BLOOD PRESSURE: 122 MMHG

## 2023-12-12 DIAGNOSIS — R10.32 ABDOMINAL PAIN, LEFT LOWER QUADRANT: Primary | ICD-10-CM

## 2023-12-12 PROCEDURE — 99214 OFFICE O/P EST MOD 30 MIN: CPT | Performed by: OBSTETRICS & GYNECOLOGY

## 2023-12-12 NOTE — NURSING NOTE
"Initial /66 (BP Location: Left arm, Patient Position: Chair, Cuff Size: Adult Large)   Pulse 70   Temp 98.2  F (36.8  C) (Tympanic)   Resp 18   Ht 1.599 m (5' 2.95\")   Wt 103 kg (227 lb 1.6 oz)   LMP 12/09/2018   BMI 40.29 kg/m   Estimated body mass index is 40.29 kg/m  as calculated from the following:    Height as of this encounter: 1.599 m (5' 2.95\").    Weight as of this encounter: 103 kg (227 lb 1.6 oz). .    Gloria Kearns, NEELAM    "

## 2023-12-12 NOTE — PROGRESS NOTES
"[facility]     Sangita Meraz MRN# [id number]   Age: 58 year old YOB: 1965             Assessment and Plan (Student):   Assessment/Plan:  Sangita Meraz is 57 y/o postmenopausal woman with a PMHx of diverticulitis s/p colectomy 3/2023, 2x C/S, tubal ligation presenting today with LLQ pain.  Differential includes urinary, GI, gynecological, and MSK causes.  No significant gynecological pathology seen on US; no uterine masses, endometrial thickness was 3mm, left ovary appeared normal.  R ovary not visualized, though pain is primarily L-sided.  No notable physical exam findings on external inspection or speculum exam that might suggest gyn cause.  Pelvic floor pain is possible.  Because of extensive abdominal surgical history, adhesions cannot be ruled out.  Cannot rule out stones; UTI less likely d/t negative culture on 11/27.  Lack of significant change in stool quality and consistency, but because of her history, cannot r/o recurrence of diverticulitis.     -Unlikely to be of gyn origin  -Referral to surgery  -Consider pelvic floor physical therapist       -Orders placed today:    Orders Placed This Encounter   Procedures    Adult General Surg Referral        DANIAL GARCIA 12/12/2023 1:35 PM            History of Present Illness (Student):   Sangita Meraz is 57 y/o postmenopausal woman with a PMHx of diverticulitis s/p colectomy 3/2023, 2x C/S, tubal ligation presenting today with LLQ pain without diagnosis.  She states it started in early October as a \"crampy twinge\" or a \"pinch\" and got progressively more severe over the last couple months.  Now she describes it as a dull ache that is always there, better in the morning and with the application of external pressure and worse during activity.  It  radiates to her back and towards midline.  At worst, she rates it at a 7 out of 10.  It is beginning to affect her life, in that she is not able to cook or do the activities she " wants to because of it.  States she has diarrhea and constipation at baseline due to her history of IBS.  No history of trauma.  Denies fever, dysuria, hematuria, hematochezia, dyspareunia, vaginal bleeding.      DANIAL GARCIA 2023 1:46 PM          Gynecologic History:      Menses:   Age of menopause: early 50s, had an episode of PMB on HRT, hysteroscopy benign   Age of menarche:  12 years old  Interval:  29 days    PAP smear history:  Last PAP was normal; 2021       Last mammogram:  Mammogram was normal, 10/2021          Obstetrical History:    , postmenopausal.  C/Sx2, 3 vaginal births, 1 assisted.         Past Medical History:   .   Patient Active Problem List   Diagnosis    Morbid obesity (H)    Female cystocele    Herniation of rectum into vagina    Mixed stress and urge urinary incontinence    Polycystic ovarian syndrome    Recurrent major depressive disorder, in partial remission (H24)    Obstructive sleep apnea syndrome    Uterovaginal prolapse    Meniere's disease, unspecified laterality    Diverticulitis         Past Medical History:   Diagnosis Date    Depressive disorder 1982    Diverticulosis of large intestine without hemorrhage     Obstructive sleep apnea syndrome 10/17/2022            Past Surgical History:     Past Surgical History:   Procedure Laterality Date    ABDOMEN SURGERY  ,,'15    2 c-secs, abdomenplasty    CHOLECYSTECTOMY      COLONOSCOPY N/A 2021    Procedure: COLONOSCOPY, WITH BIOPSY AND CLIPPING;  Surgeon: Kemar Mckeon MD;  [place] [facility] [place]    COSMETIC SURGERY  '15    Abdomenoplasty    DILATION AND CURETTAGE, OPERATIVE HYSTEROSCOPY, COMBINED N/A 10/18/2022    Procedure: HYSTEROSCOPY, WITH DILATION AND CURETTAGE OF UTERUS and polypectomy;  Surgeon: Ines Chi MD;  Location: [facility] OR    ESOPHAGOSCOPY, GASTROSCOPY, DUODENOSCOPY (EGD), COMBINED N/A 2020    Procedure: ESOPHAGOGASTRODUODENOSCOPY, WITH BIOPSY;  Surgeon: Anton Knott,  DO;  Location: [facility] GI    GENITOURINARY SURGERY  ', ',     tubal ligation, anterior/posterior repair, tubal ligation reversal    LAPAROSCOPIC ASSISTED SIGMOID COLECTOMY N/A 2023    Procedure: LAPAROSCOPIC extended LEFT HEMICOLECTOMY;  Surgeon: Bhavana Johansen MD;  Location: [facility] OR    SIGMOIDOSCOPY FLEXIBLE N/A 2023    Procedure: Sigmoidoscopy flexible;  Surgeon: Bhavana Johansen MD;  Location: [facility] OR             Social History:     Social History     Tobacco Use    Smoking status: Former     Packs/day: 1.00     Years: 10.00     Additional pack years: 0.00     Total pack years: 10.00     Types: Cigarettes     Start date: 10/10/1982     Quit date: 1991     Years since quittin.5    Smokeless tobacco: Never   Substance Use Topics    Alcohol use: Yes     Comment: 1-2 beer/month             Family History:     Family History   Problem Relation Age of Onset    Uterine Cancer Mother         endometrial cancer    Diabetes Mother     Colon Polyps Father     Diabetes Father     Hypertension Father     Diabetes Brother     Hypertension Brother     Pulmonary Embolism Brother         following leg surgery    Anesthesia Reaction No family hx of      Family history reviewed          Immunizations:     Immunization History   Administered Date(s) Administered    COVID-19 MONOVALENT 12+ (Pfizer) 2021, 2021, 2022    Influenza (IIV3) PF 1999    Influenza Vaccine 18-64 (Flublok) 2021, 2022    TDAP (Adacel,Boostrix) 2006, 2021             Allergies:     Allergies   Allergen Reactions    Ivp Dye [Contrast Dye] Anaphylaxis             Medications:     Current Outpatient Medications   Medication Sig    acetaminophen (TYLENOL) 500 MG tablet Take 500 mg by mouth every 6 hours as needed for mild pain    clobetasol (TEMOVATE) 0.05 % external cream Apply to AA BID x 1-2 weeks then PRN. Do not apply to face.    clobetasol (TEMOVATE) 0.05 % external  "solution Apply to scalp BID x 1-2 weeks PRN    estradiol (ESTRING) 7.5 MCG/24HR vaginal ring Place 1 each (1 Vaginal ring) vaginally every 3 months    metFORMIN (GLUCOPHAGE XR) 500 MG 24 hr tablet Take 1 tablet (500 mg) by mouth 2 times daily (with meals)    omeprazole 20 MG tablet Take 20 mg by mouth every evening    ondansetron (ZOFRAN ODT) 4 MG ODT tab Take 1 tablet (4 mg) by mouth every 8 hours as needed for nausea    sertraline (ZOLOFT) 100 MG tablet Take 150 mg by mouth every evening    UNABLE TO FIND every morning MEDICATION NAME: Mix of Supplements; Tumeric, Green tea, Vit D and Probiotic    finasteride (PROSCAR) 5 MG tablet Take 1/2 tablet daily (Patient not taking: Reported on 12/12/2023)     No current facility-administered medications for this visit.             Physical Exam:   Vitals were reviewed  Patient Vitals for the past 8 hrs:   BP Temp Temp src Pulse Resp Height Weight   12/12/23 1259 122/66 98.2  F (36.8  C) Tympanic 70 18 1.599 m (5' 2.95\") 103 kg (227 lb 1.6 oz)     Abdomen:   Soft, non-tender, no guarding     Genitourinary:   External Genitalia: appearance normal, mild vulvar erythema and mild atrophy, no lesions  Cervix:  General appearance normal.  No bleeding or discharge.   Uterus:  mid-position, no enlargement  Adenexa:  Masses absent             Data:     CT A/P 11/21/2023  IMPRESSION:   1.  No acute intra-abdominal or intrapelvic process.  2.  No KUB calculi or hydronephrosis.  3.  Colonic diverticulosis without signs of diverticulitis.  4.  Prior sigmoidectomy and cholecystectomy.    Transvaginal US 11/27/2023  IMPRESSION:  1.  Unremarkable pelvic ultrasound without visualized explanation for patient's pain.    Attestation:  I have reviewed today's vital signs, notes, medications, labs and imaging.    DANIAL GARCIA    I agree with the medical student's documentation above and have edited it for accuracy. I was present for and performed the physical exam and interview of the patient " myself. All medical decision-making was performed by me. If a procedure was performed, that was performed by me. Additionally, if billing is based on time, I am only using the time that I personally spent with the patient in my time statement.     Ines Chi MD  OB/GYN

## 2023-12-14 ENCOUNTER — OFFICE VISIT (OUTPATIENT)
Dept: SURGERY | Facility: CLINIC | Age: 58
End: 2023-12-14
Payer: OTHER GOVERNMENT

## 2023-12-14 VITALS
WEIGHT: 226 LBS | TEMPERATURE: 97.3 F | DIASTOLIC BLOOD PRESSURE: 80 MMHG | OXYGEN SATURATION: 100 % | BODY MASS INDEX: 40.1 KG/M2 | HEART RATE: 72 BPM | RESPIRATION RATE: 16 BRPM | SYSTOLIC BLOOD PRESSURE: 137 MMHG

## 2023-12-14 DIAGNOSIS — R10.32 ABDOMINAL PAIN, LEFT LOWER QUADRANT: ICD-10-CM

## 2023-12-14 LAB
ALBUMIN SERPL BCG-MCNC: 4.6 G/DL (ref 3.5–5.2)
ALP SERPL-CCNC: 68 U/L (ref 40–150)
ALT SERPL W P-5'-P-CCNC: 39 U/L (ref 0–50)
ANION GAP SERPL CALCULATED.3IONS-SCNC: 8 MMOL/L (ref 7–15)
AST SERPL W P-5'-P-CCNC: 28 U/L (ref 0–45)
BASOPHILS # BLD AUTO: 0 10E3/UL (ref 0–0.2)
BASOPHILS NFR BLD AUTO: 1 %
BILIRUB SERPL-MCNC: 0.2 MG/DL
BUN SERPL-MCNC: 12.3 MG/DL (ref 6–20)
CALCIUM SERPL-MCNC: 9.9 MG/DL (ref 8.6–10)
CHLORIDE SERPL-SCNC: 103 MMOL/L (ref 98–107)
CREAT SERPL-MCNC: 0.84 MG/DL (ref 0.51–0.95)
CRP SERPL-MCNC: 7.58 MG/L
DEPRECATED HCO3 PLAS-SCNC: 29 MMOL/L (ref 22–29)
EGFRCR SERPLBLD CKD-EPI 2021: 81 ML/MIN/1.73M2
EOSINOPHIL # BLD AUTO: 0.4 10E3/UL (ref 0–0.7)
EOSINOPHIL NFR BLD AUTO: 6 %
ERYTHROCYTE [DISTWIDTH] IN BLOOD BY AUTOMATED COUNT: 12.7 % (ref 10–15)
GLUCOSE SERPL-MCNC: 119 MG/DL (ref 70–99)
HCT VFR BLD AUTO: 43.2 % (ref 35–47)
HGB BLD-MCNC: 14.5 G/DL (ref 11.7–15.7)
IMM GRANULOCYTES # BLD: 0 10E3/UL
IMM GRANULOCYTES NFR BLD: 0 %
LYMPHOCYTES # BLD AUTO: 2.1 10E3/UL (ref 0.8–5.3)
LYMPHOCYTES NFR BLD AUTO: 32 %
MCH RBC QN AUTO: 30.5 PG (ref 26.5–33)
MCHC RBC AUTO-ENTMCNC: 33.6 G/DL (ref 31.5–36.5)
MCV RBC AUTO: 91 FL (ref 78–100)
MONOCYTES # BLD AUTO: 0.5 10E3/UL (ref 0–1.3)
MONOCYTES NFR BLD AUTO: 7 %
NEUTROPHILS # BLD AUTO: 3.6 10E3/UL (ref 1.6–8.3)
NEUTROPHILS NFR BLD AUTO: 55 %
PLATELET # BLD AUTO: 200 10E3/UL (ref 150–450)
POTASSIUM SERPL-SCNC: 4.5 MMOL/L (ref 3.4–5.3)
PROT SERPL-MCNC: 7.4 G/DL (ref 6.4–8.3)
RBC # BLD AUTO: 4.76 10E6/UL (ref 3.8–5.2)
SODIUM SERPL-SCNC: 140 MMOL/L (ref 135–145)
WBC # BLD AUTO: 6.6 10E3/UL (ref 4–11)

## 2023-12-14 PROCEDURE — 86140 C-REACTIVE PROTEIN: CPT | Performed by: SURGERY

## 2023-12-14 PROCEDURE — 80053 COMPREHEN METABOLIC PANEL: CPT | Performed by: SURGERY

## 2023-12-14 PROCEDURE — 36415 COLL VENOUS BLD VENIPUNCTURE: CPT | Performed by: SURGERY

## 2023-12-14 PROCEDURE — 99203 OFFICE O/P NEW LOW 30 MIN: CPT | Performed by: SURGERY

## 2023-12-14 PROCEDURE — 85025 COMPLETE CBC W/AUTO DIFF WBC: CPT | Performed by: SURGERY

## 2023-12-14 NOTE — PROGRESS NOTES
Surgical Consultation/History and Physical  [facility] Surgery    Sangita is seen in consultation for Pain, at the request of Physician No Ref-Primary.    Chief Complaint:  Abdominal pain    History of Present Illness: Sangita Meraz is a 58 year old female presents with abdominal pain.  No inciting events. Ongoing since 2 months prior.  Pain is in LLQ.  Worse with movement, better with rest.  Pain has remained relatively unchanged since onset.  It has not improved, but has not worsened either.  She notes no changes in bowel habits.  She notes worse with bowel movements.  Occasional nausea. Last coonoscopy was 2021, diverticula were noted.    Patient Active Problem List   Diagnosis    Morbid obesity (H)    Female cystocele    Herniation of rectum into vagina    Mixed stress and urge urinary incontinence    Polycystic ovarian syndrome    Recurrent major depressive disorder, in partial remission (H24)    Obstructive sleep apnea syndrome    Uterovaginal prolapse    Meniere's disease, unspecified laterality    Diverticulitis     Past Medical History:   Diagnosis Date    Depressive disorder 1982    Diverticulosis of large intestine without hemorrhage     Obstructive sleep apnea syndrome 10/17/2022     Past Surgical History:   Procedure Laterality Date    ABDOMEN SURGERY  '92,'09,'15    2 c-secs, abdomenplasty    CHOLECYSTECTOMY  '98    COLONOSCOPY N/A 03/16/2021    Procedure: COLONOSCOPY, WITH BIOPSY AND CLIPPING;  Surgeon: Kemar Mckeon MD;  Location: [facility] OR    COSMETIC SURGERY  '15    Abdomenoplasty    DILATION AND CURETTAGE, OPERATIVE HYSTEROSCOPY, COMBINED N/A 10/18/2022    Procedure: HYSTEROSCOPY, WITH DILATION AND CURETTAGE OF UTERUS and polypectomy;  Surgeon: Ines Chi MD;  Location: [facility] OR    ESOPHAGOSCOPY, GASTROSCOPY, DUODENOSCOPY (EGD), COMBINED N/A 12/09/2020    Procedure: ESOPHAGOGASTRODUODENOSCOPY, WITH BIOPSY;  Surgeon: Anton Knott DO;  Location: [facility]     GENITOURINARY SURGERY  '01, '03, '    tubal ligation, anterior/posterior repair, tubal ligation reversal    LAPAROSCOPIC ASSISTED SIGMOID COLECTOMY N/A 2023    Procedure: LAPAROSCOPIC extended LEFT HEMICOLECTOMY;  Surgeon: Bhavana Johansen MD;  Location: [facility] OR    SIGMOIDOSCOPY FLEXIBLE N/A 2023    Procedure: Sigmoidoscopy flexible;  Surgeon: Bhavana Johansen MD;  Location: [facility] OR     Family History   Problem Relation Age of Onset    Uterine Cancer Mother         endometrial cancer    Diabetes Mother     Colon Polyps Father     Diabetes Father     Hypertension Father     Diabetes Brother     Hypertension Brother     Pulmonary Embolism Brother         following leg surgery    Anesthesia Reaction No family hx of      Social History     Tobacco Use    Smoking status: Former     Packs/day: 1.00     Years: 10.00     Additional pack years: 0.00     Total pack years: 10.00     Types: Cigarettes     Start date: 10/10/1982     Quit date: 1991     Years since quittin.6    Smokeless tobacco: Never   Substance Use Topics    Alcohol use: Yes     Comment: 1-2 beer/month      History   Drug Use Unknown     Current Outpatient Medications   Medication Sig Dispense Refill    acetaminophen (TYLENOL) 500 MG tablet Take 500 mg by mouth every 6 hours as needed for mild pain      clobetasol (TEMOVATE) 0.05 % external cream Apply to AA BID x 1-2 weeks then PRN. Do not apply to face. 60 g 3    clobetasol (TEMOVATE) 0.05 % external solution Apply to scalp BID x 1-2 weeks PRN 50 mL 5    estradiol (ESTRING) 7.5 MCG/24HR vaginal ring Place 1 each (1 Vaginal ring) vaginally every 3 months 1 each 4    metFORMIN (GLUCOPHAGE XR) 500 MG 24 hr tablet Take 1 tablet (500 mg) by mouth 2 times daily (with meals) 180 tablet 1    omeprazole 20 MG tablet Take 20 mg by mouth every evening      ondansetron (ZOFRAN ODT) 4 MG ODT tab Take 1 tablet (4 mg) by mouth every 8 hours as needed for nausea 10 tablet 0     sertraline (ZOLOFT) 100 MG tablet Take 150 mg by mouth every evening      UNABLE TO FIND every morning MEDICATION NAME: Mix of Supplements; Tumeric, Green tea, Vit D and Probiotic      finasteride (PROSCAR) 5 MG tablet Take 1/2 tablet daily (Patient not taking: Reported on 12/12/2023) 45 tablet 2     Allergies   Allergen Reactions    Ivp Dye [Contrast Dye] Anaphylaxis     Review of Systems:   ROS otherwise negative except aforementioned positives    Physical Exam:  /80   Pulse 72   Temp 97.3  F (36.3  C) (Tympanic)   Resp 16   Wt 102.5 kg (226 lb)   LMP 12/09/2018   SpO2 100%   BMI 40.10 kg/m      Constitutional- No acute distress, well nourished, non-toxic  Eyes: Anicteric, no injection.  PERRL  ENT:  Normocephalic, atraumatic, Nose midline, moist mucus membranes  Neck - supple, no LAD  Respiratory- Good inspiratory effort  Cardiovascular -  No peripheral edema.  No clubbing.  Abdomen - Soft, non-tender, +BS, no hepatosplenomegaly, no palpable masses, mild TTP LLQ without R/R/G  Neuro - No focal neuro deficits, Alert and oriented x 3  Psych: Appropriate mood and affect  Musculoskeletal: Normal gait, symmetric strength.  FROM upper and lower extremities.  Skin: Warm, Dry    Lab Results   Component Value Date    WBC 6.6 12/14/2023    WBC 8.8 01/20/2021     Lab Results   Component Value Date    RBC 4.76 12/14/2023    RBC 4.39 01/20/2021     Lab Results   Component Value Date    HGB 14.5 12/14/2023    HGB 14.2 01/20/2021     Lab Results   Component Value Date    HCT 43.2 12/14/2023    HCT 39.5 01/20/2021     Lab Results   Component Value Date    MCV 91 12/14/2023    MCV 90 01/20/2021     Lab Results   Component Value Date    MCH 30.5 12/14/2023    MCH 32.3 01/20/2021     Lab Results   Component Value Date    MCHC 33.6 12/14/2023    MCHC 35.9 01/20/2021     Lab Results   Component Value Date    RDW 12.7 12/14/2023    RDW 12.9 01/20/2021     Lab Results   Component Value Date     12/14/2023      01/20/2021     Last Comprehensive Metabolic Panel:  Sodium   Date Value Ref Range Status   12/14/2023 140 135 - 145 mmol/L Final     Comment:     Reference intervals for this test were updated on 09/26/2023 to more accurately reflect our healthy population. There may be differences in the flagging of prior results with similar values performed with this method. Interpretation of those prior results can be made in the context of the updated reference intervals.    11/16/2020 137 133 - 144 mmol/L Final     Potassium   Date Value Ref Range Status   12/14/2023 4.5 3.4 - 5.3 mmol/L Final   05/12/2022 4.3 3.4 - 5.3 mmol/L Final   11/16/2020 4.5 3.4 - 5.3 mmol/L Final     Chloride   Date Value Ref Range Status   12/14/2023 103 98 - 107 mmol/L Final   05/12/2022 106 94 - 109 mmol/L Final   11/16/2020 104 94 - 109 mmol/L Final     Carbon Dioxide   Date Value Ref Range Status   11/16/2020 29 20 - 32 mmol/L Final     Carbon Dioxide (CO2)   Date Value Ref Range Status   12/14/2023 29 22 - 29 mmol/L Final   05/12/2022 28 20 - 32 mmol/L Final     Anion Gap   Date Value Ref Range Status   12/14/2023 8 7 - 15 mmol/L Final   05/12/2022 2 (L) 3 - 14 mmol/L Final   11/16/2020 4 3 - 14 mmol/L Final     Glucose   Date Value Ref Range Status   12/14/2023 119 (H) 70 - 99 mg/dL Final   05/12/2022 122 (H) 70 - 99 mg/dL Final   11/16/2020 135 (H) 70 - 99 mg/dL Final     GLUCOSE BY METER POCT   Date Value Ref Range Status   04/01/2023 112 (H) 70 - 99 mg/dL Final     Urea Nitrogen   Date Value Ref Range Status   12/14/2023 12.3 6.0 - 20.0 mg/dL Final   05/12/2022 12 7 - 30 mg/dL Final   11/16/2020 12 7 - 30 mg/dL Final     Creatinine   Date Value Ref Range Status   12/14/2023 0.84 0.51 - 0.95 mg/dL Final   11/16/2020 1.05 (H) 0.52 - 1.04 mg/dL Final     GFR Estimate   Date Value Ref Range Status   12/14/2023 81 >60 mL/min/1.73m2 Final   11/16/2020 60 (L) >60 mL/min/[1.73_m2] Final     Comment:     Non  GFR Calc  Starting  12/18/2018, serum creatinine based estimated GFR (eGFR) will be   calculated using the Chronic Kidney Disease Epidemiology Collaboration   (CKD-EPI) equation.       Calcium   Date Value Ref Range Status   12/14/2023 9.9 8.6 - 10.0 mg/dL Final   11/16/2020 9.9 8.5 - 10.1 mg/dL Final     Bilirubin Total   Date Value Ref Range Status   12/14/2023 0.2 <=1.2 mg/dL Final   12/30/2020 0.3 0.2 - 1.3 mg/dL Final     Alkaline Phosphatase   Date Value Ref Range Status   12/14/2023 68 40 - 150 U/L Final     Comment:     Reference intervals for this test were updated on 11/14/2023 to more accurately reflect our healthy population. There may be differences in the flagging of prior results with similar values performed with this method. Interpretation of those prior results can be made in the context of the updated reference intervals.   12/30/2020 91 40 - 150 U/L Final     ALT   Date Value Ref Range Status   12/14/2023 39 0 - 50 U/L Final     Comment:     Reference intervals for this test were updated on 6/12/2023 to more accurately reflect our healthy population. There may be differences in the flagging of prior results with similar values performed with this method. Interpretation of those prior results can be made in the context of the updated reference intervals.     12/30/2020 87 (H) 0 - 50 U/L Final     AST   Date Value Ref Range Status   12/14/2023 28 0 - 45 U/L Final     Comment:     Reference intervals for this test were updated on 6/12/2023 to more accurately reflect our healthy population. There may be differences in the flagging of prior results with similar values performed with this method. Interpretation of those prior results can be made in the context of the updated reference intervals.   12/30/2020 35 0 - 45 U/L Final     CRP Inflammation   Date Value Ref Range Status   12/14/2023 7.58 (H) <5.00 mg/L Final     Assessment:  1. Abdominal pain, left lower quadrant      Plan:   Ms. Meraz presents with LLQ abdominal  pain for past 2 months.  I performed an independent history, physical, reviewed her labs, images personally.  Her exam is benign at this time.  We discussed repeat labs which she agrees to have done.  I cannot find a surgically correctable cause at this time.  Pending labs may proceed with imaging vs endoscopy.  Her CT is rather unremarkable.  She does have a fair stool burden within her colon and I advised she may need a better bowel regimen.  She does follow with gastroenterology and may benefit from repeat evaluation.  In the interim I recommended a short course of laxatives to see if this improves her symptoms.      Labs resulted, negative other than very slight persistent elevation of her CRP.  I advised her to follow-up with her gastroenterologist.  Consideration for repeat colonoscopy could be given after evaluation and initiation of better bowel regimen.        Anton Knott, DO on 12/14/2023 at 11:48 AM

## 2023-12-14 NOTE — NURSING NOTE
No chief complaint on file.      Vitals:    12/14/23 1144   Pulse: 72   Resp: 16   SpO2: 100%   Weight: 102.5 kg (226 lb)     Wt Readings from Last 1 Encounters:   12/14/23 102.5 kg (226 lb)       Raissa Branham MA

## 2023-12-14 NOTE — LETTER
12/14/2023         RE: Sangita Meraz  [address]  [place] [facility] [id number]        Dear Colleague,    Thank you for referring your patient, Sangita Meraz, to the [facility]. Please see a copy of my visit note below.    Surgical Consultation/History and Physical  [facility] Surgery    Sanigta is seen in consultation for Pain, at the request of Physician No Ref-Primary.    Chief Complaint:  Abdominal pain    History of Present Illness: Sangita Meraz is a 58 year old female presents with abdominal pain.  No inciting events. Ongoing since 2 months prior.  Pain is in LLQ.  Worse with movement, better with rest.  Pain has remained relatively unchanged since onset.  It has not improved, but has not worsened either.  She notes no changes in bowel habits.  She notes worse with bowel movements.  Occasional nausea. Last coonoscopy was 2021, diverticula were noted.    Patient Active Problem List   Diagnosis     Morbid obesity (H)     Female cystocele     Herniation of rectum into vagina     Mixed stress and urge urinary incontinence     Polycystic ovarian syndrome     Recurrent major depressive disorder, in partial remission (H24)     Obstructive sleep apnea syndrome     Uterovaginal prolapse     Meniere's disease, unspecified laterality     Diverticulitis     Past Medical History:   Diagnosis Date     Depressive disorder 1982     Diverticulosis of large intestine without hemorrhage      Obstructive sleep apnea syndrome 10/17/2022     Past Surgical History:   Procedure Laterality Date     ABDOMEN SURGERY  '92,'09,'15    2 c-secs, abdomenplasty     CHOLECYSTECTOMY  '98     COLONOSCOPY N/A 03/16/2021    Procedure: COLONOSCOPY, WITH BIOPSY AND CLIPPING;  Surgeon: Kemar Mckeon MD;  Location: [facility] OR     COSMETIC SURGERY  '15    Abdomenoplasty     DILATION AND CURETTAGE, OPERATIVE HYSTEROSCOPY, COMBINED N/A 10/18/2022    Procedure: HYSTEROSCOPY, WITH DILATION AND CURETTAGE OF UTERUS and  polypectomy;  Surgeon: Ines Chi MD;  Location: [facility] OR     ESOPHAGOSCOPY, GASTROSCOPY, DUODENOSCOPY (EGD), COMBINED N/A 2020    Procedure: ESOPHAGOGASTRODUODENOSCOPY, WITH BIOPSY;  Surgeon: Anton Knott DO;  Location: [facility]     GENITOURINARY SURGERY  '01, '03, '07    tubal ligation, anterior/posterior repair, tubal ligation reversal     LAPAROSCOPIC ASSISTED SIGMOID COLECTOMY N/A 2023    Procedure: LAPAROSCOPIC extended LEFT HEMICOLECTOMY;  Surgeon: Bhavana Johansen MD;  Location:  OR     SIGMOIDOSCOPY FLEXIBLE N/A 2023    Procedure: Sigmoidoscopy flexible;  Surgeon: Bhavana Johansen MD;  [facility]     Family History   Problem Relation Age of Onset     Uterine Cancer Mother         endometrial cancer     Diabetes Mother      Colon Polyps Father      Diabetes Father      Hypertension Father      Diabetes Brother      Hypertension Brother      Pulmonary Embolism Brother         following leg surgery     Anesthesia Reaction No family hx of      Social History     Tobacco Use     Smoking status: Former     Packs/day: 1.00     Years: 10.00     Additional pack years: 0.00     Total pack years: 10.00     Types: Cigarettes     Start date: 10/10/1982     Quit date: 1991     Years since quittin.6     Smokeless tobacco: Never   Substance Use Topics     Alcohol use: Yes     Comment: 1-2 beer/month      History   Drug Use Unknown     Current Outpatient Medications   Medication Sig Dispense Refill     acetaminophen (TYLENOL) 500 MG tablet Take 500 mg by mouth every 6 hours as needed for mild pain       clobetasol (TEMOVATE) 0.05 % external cream Apply to AA BID x 1-2 weeks then PRN. Do not apply to face. 60 g 3     clobetasol (TEMOVATE) 0.05 % external solution Apply to scalp BID x 1-2 weeks PRN 50 mL 5     estradiol (ESTRING) 7.5 MCG/24HR vaginal ring Place 1 each (1 Vaginal ring) vaginally every 3 months 1 each 4     metFORMIN (GLUCOPHAGE XR)  500 MG 24 hr tablet Take 1 tablet (500 mg) by mouth 2 times daily (with meals) 180 tablet 1     omeprazole 20 MG tablet Take 20 mg by mouth every evening       ondansetron (ZOFRAN ODT) 4 MG ODT tab Take 1 tablet (4 mg) by mouth every 8 hours as needed for nausea 10 tablet 0     sertraline (ZOLOFT) 100 MG tablet Take 150 mg by mouth every evening       UNABLE TO FIND every morning MEDICATION NAME: Mix of Supplements; Tumeric, Green tea, Vit D and Probiotic       finasteride (PROSCAR) 5 MG tablet Take 1/2 tablet daily (Patient not taking: Reported on 12/12/2023) 45 tablet 2     Allergies   Allergen Reactions     Ivp Dye [Contrast Dye] Anaphylaxis     Review of Systems:   ROS otherwise negative except aforementioned positives    Physical Exam:  /80   Pulse 72   Temp 97.3  F (36.3  C) (Tympanic)   Resp 16   Wt 102.5 kg (226 lb)   LMP 12/09/2018   SpO2 100%   BMI 40.10 kg/m      Constitutional- No acute distress, well nourished, non-toxic  Eyes: Anicteric, no injection.  PERRL  ENT:  Normocephalic, atraumatic, Nose midline, moist mucus membranes  Neck - supple, no LAD  Respiratory- Good inspiratory effort  Cardiovascular -  No peripheral edema.  No clubbing.  Abdomen - Soft, non-tender, +BS, no hepatosplenomegaly, no palpable masses, mild TTP LLQ without R/R/G  Neuro - No focal neuro deficits, Alert and oriented x 3  Psych: Appropriate mood and affect  Musculoskeletal: Normal gait, symmetric strength.  FROM upper and lower extremities.  Skin: Warm, Dry    Lab Results   Component Value Date    WBC 6.6 12/14/2023    WBC 8.8 01/20/2021     Lab Results   Component Value Date    RBC 4.76 12/14/2023    RBC 4.39 01/20/2021     Lab Results   Component Value Date    HGB 14.5 12/14/2023    HGB 14.2 01/20/2021     Lab Results   Component Value Date    HCT 43.2 12/14/2023    HCT 39.5 01/20/2021     Lab Results   Component Value Date    MCV 91 12/14/2023    MCV 90 01/20/2021     Lab Results   Component Value Date    MCH  30.5 12/14/2023    MCH 32.3 01/20/2021     Lab Results   Component Value Date    MCHC 33.6 12/14/2023    MCHC 35.9 01/20/2021     Lab Results   Component Value Date    RDW 12.7 12/14/2023    RDW 12.9 01/20/2021     Lab Results   Component Value Date     12/14/2023     01/20/2021     Last Comprehensive Metabolic Panel:  Sodium   Date Value Ref Range Status   12/14/2023 140 135 - 145 mmol/L Final     Comment:     Reference intervals for this test were updated on 09/26/2023 to more accurately reflect our healthy population. There may be differences in the flagging of prior results with similar values performed with this method. Interpretation of those prior results can be made in the context of the updated reference intervals.    11/16/2020 137 133 - 144 mmol/L Final     Potassium   Date Value Ref Range Status   12/14/2023 4.5 3.4 - 5.3 mmol/L Final   05/12/2022 4.3 3.4 - 5.3 mmol/L Final   11/16/2020 4.5 3.4 - 5.3 mmol/L Final     Chloride   Date Value Ref Range Status   12/14/2023 103 98 - 107 mmol/L Final   05/12/2022 106 94 - 109 mmol/L Final   11/16/2020 104 94 - 109 mmol/L Final     Carbon Dioxide   Date Value Ref Range Status   11/16/2020 29 20 - 32 mmol/L Final     Carbon Dioxide (CO2)   Date Value Ref Range Status   12/14/2023 29 22 - 29 mmol/L Final   05/12/2022 28 20 - 32 mmol/L Final     Anion Gap   Date Value Ref Range Status   12/14/2023 8 7 - 15 mmol/L Final   05/12/2022 2 (L) 3 - 14 mmol/L Final   11/16/2020 4 3 - 14 mmol/L Final     Glucose   Date Value Ref Range Status   12/14/2023 119 (H) 70 - 99 mg/dL Final   05/12/2022 122 (H) 70 - 99 mg/dL Final   11/16/2020 135 (H) 70 - 99 mg/dL Final     GLUCOSE BY METER POCT   Date Value Ref Range Status   04/01/2023 112 (H) 70 - 99 mg/dL Final     Urea Nitrogen   Date Value Ref Range Status   12/14/2023 12.3 6.0 - 20.0 mg/dL Final   05/12/2022 12 7 - 30 mg/dL Final   11/16/2020 12 7 - 30 mg/dL Final     Creatinine   Date Value Ref Range Status    12/14/2023 0.84 0.51 - 0.95 mg/dL Final   11/16/2020 1.05 (H) 0.52 - 1.04 mg/dL Final     GFR Estimate   Date Value Ref Range Status   12/14/2023 81 >60 mL/min/1.73m2 Final   11/16/2020 60 (L) >60 mL/min/[1.73_m2] Final     Comment:     Non  GFR Calc  Starting 12/18/2018, serum creatinine based estimated GFR (eGFR) will be   calculated using the Chronic Kidney Disease Epidemiology Collaboration   (CKD-EPI) equation.       Calcium   Date Value Ref Range Status   12/14/2023 9.9 8.6 - 10.0 mg/dL Final   11/16/2020 9.9 8.5 - 10.1 mg/dL Final     Bilirubin Total   Date Value Ref Range Status   12/14/2023 0.2 <=1.2 mg/dL Final   12/30/2020 0.3 0.2 - 1.3 mg/dL Final     Alkaline Phosphatase   Date Value Ref Range Status   12/14/2023 68 40 - 150 U/L Final     Comment:     Reference intervals for this test were updated on 11/14/2023 to more accurately reflect our healthy population. There may be differences in the flagging of prior results with similar values performed with this method. Interpretation of those prior results can be made in the context of the updated reference intervals.   12/30/2020 91 40 - 150 U/L Final     ALT   Date Value Ref Range Status   12/14/2023 39 0 - 50 U/L Final     Comment:     Reference intervals for this test were updated on 6/12/2023 to more accurately reflect our healthy population. There may be differences in the flagging of prior results with similar values performed with this method. Interpretation of those prior results can be made in the context of the updated reference intervals.     12/30/2020 87 (H) 0 - 50 U/L Final     AST   Date Value Ref Range Status   12/14/2023 28 0 - 45 U/L Final     Comment:     Reference intervals for this test were updated on 6/12/2023 to more accurately reflect our healthy population. There may be differences in the flagging of prior results with similar values performed with this method. Interpretation of those prior results can be made in  the context of the updated reference intervals.   12/30/2020 35 0 - 45 U/L Final     CRP Inflammation   Date Value Ref Range Status   12/14/2023 7.58 (H) <5.00 mg/L Final     Assessment:  1. Abdominal pain, left lower quadrant      Plan:   Ms. Meraz presents with LLQ abdominal pain for past 2 months.  I performed an independent history, physical, reviewed her labs, images personally.  Her exam is benign at this time.  We discussed repeat labs which she agrees to have done.  I cannot find a surgically correctable cause at this time.  Pending labs may proceed with imaging vs endoscopy.  Her CT is rather unremarkable.  She does have a fair stool burden within her colon and I advised she may need a better bowel regimen.  She does follow with gastroenterology and may benefit from repeat evaluation.  In the interim I recommended a short course of laxatives to see if this improves her symptoms.      Labs resulted, negative other than very slight persistent elevation of her CRP.  I advised her to follow-up with her gastroenterologist.  Consideration for repeat colonoscopy could be given after evaluation and initiation of better bowel regimen.        Anton Knott DO on 12/14/2023 at 11:48 AM      Again, thank you for allowing me to participate in the care of your patient.        Sincerely,        Anton Knott DO

## 2023-12-15 ENCOUNTER — MYC MEDICAL ADVICE (OUTPATIENT)
Dept: SURGERY | Facility: CLINIC | Age: 58
End: 2023-12-15
Payer: OTHER GOVERNMENT

## 2023-12-18 ENCOUNTER — MYC MEDICAL ADVICE (OUTPATIENT)
Dept: GASTROENTEROLOGY | Facility: CLINIC | Age: 58
End: 2023-12-18
Payer: OTHER GOVERNMENT

## 2023-12-18 NOTE — PROGRESS NOTES
GASTROENTEROLOGY RETURN PATIENT VIRTUAL VISIT      How would you like to obtain your AVS? MyChart  If the video visit is dropped, the invitation should be resent by: Text to cell phone: [phone]  Will anyone else be joining your video visit? No    Video-Visit Details     Type of service:  Video Visit     Video Start Time (time video started): 1354     Video End Time (time video stopped): 1412     Physician has received verbal consent for a Video Visit from the patient? Yes     Originating Location (pt. Location): Home    Distant Location (provider location):  Off-site    Platform used for Video Visit: [facility]      GASTROENTEROLOGY RETURN PATIENT VIDEO VISIT    CC/REFERRING MD:    No Ref-Primary, Physician  Referred Self  REASON FOR CONSULTATION:   Referred for Follow Up (Persistent LLQ pain)      HISTORY OF PRESENT ILLNESS:  Sangita Meraz is 58 year old female who presents for a follow up. I am not familiar with the patient. Noted that she was seen by Dr. Mckeon since 2020 for functional dyspepsia, IBS, and recurrent diverticulitis.  Other PMH include cystocele, herniation of rectum and the vagina, mixed stress and urgency urinary incontinence, PCOS, depression, AILEEN, ureterovaginal prolapse, Ménière's disease, cholecystectomy, sigmoidectomy, and morbid obesity.    Was seen at the ER on 11/27 for lower abdominal pain and diarrhea. Ongoing since 2 months ago and mainly located in LLQ.  Worse with movement, better with rest.  Stated that pain  remains relatively unchanged since onset, rates it at 4 of of 10. Pain character varies, but feels dull most of the time. Radiates to back and left leg. She notes no changes in bowel habits. Complains of occasional nausea but no emesis. Patient stated that she is not eating as well as before, complains of increased bloating.  She generally avoids milk, using almond milk with her cereal, but consumes cream in her coffee.  Last coonoscopy was in 2021, diverticula were noted. Her  CT was unremarkable. Was seen by another provider yesterday and started antibiotic for UTI.      PREVIOUS ENDOSCOPY:  3/16/21 Colonoscopy  Findings:       Multiple small and large-mouthed diverticula were found in the sigmoid        colon and descending colon.        The exam was otherwise normal throughout the examined colon.        The terminal ileum appeared normal.        Biopsies for histology were taken with a cold forceps from the right        colon and left colon for evaluation of microscopic colitis.   FINAL DIAGNOSIS:   A. Right Colon, Biopsy:   Colonic mucosa with no evidence of microscopic or chronic colitis or other    significant histologic abnormality     B. Left Colon, Biopsy:   Colonic mucosa with no evidence of microscopic or chronic colitis or other    significant histologic abnormality     12/9/20 EGD  Findings:       The examined duodenum was normal.        Scattered mild inflammation characterized by erythema was found in the        gastric antrum and in the prepyloric region of the stomach. Biopsies        were taken with a cold forceps for Helicobacter pylori testing.        Verification of patient identification for the specimen was done by the        nurse and technician using the patient's name and birth date. Estimated        blood loss was minimal.        One 2 mm sessile polyp with no bleeding and no stigmata of recent        bleeding was found in the cardia. The polyp was removed with a cold        biopsy forceps. Resection and retrieval were complete. Verification of        patient identification for the specimen was done by the nurse and        technician using the patient's name and birth date. Estimated blood loss        was minimal.        The gastroesophageal flap valve was visualized endoscopically and        classified as Hill Grade II (fold present, opens with respiration).        A small hiatal hernia was present.        The Z-line was regular and was found 39 cm from the  incisors.     RADIOLOGY:   11/21/23 CT of abdomen  IMPRESSION:   1.  No acute intra-abdominal or intrapelvic process.  2.  No KUB calculi or hydronephrosis.  3.  Colonic diverticulosis without signs of diverticulitis.  4.  Prior sigmoidectomy and cholecystectomy.    11/27/23 Pelvic US  IMPRESSION:  1.  Unremarkable pelvic ultrasound without visualized explanation for patient's pain.    HISTORY:   has a past medical history of Depressive disorder (1982), Diverticulosis of large intestine without hemorrhage, and Obstructive sleep apnea syndrome (10/17/2022).     has a past surgical history that includes Esophagoscopy, gastroscopy, duodenoscopy (EGD), combined (N/A, 12/09/2020); Colonoscopy (N/A, 03/16/2021); Abdomen surgery ('92,'09,'15); Cholecystectomy ('98); Abdomen surgery ('01, '03, '07); Dilation and curettage, operative hysteroscopy, combined (N/A, 10/18/2022); Laparoscopic Assisted Sigmoid Colectomy (N/A, 03/31/2023); Sigmoidoscopy flexible (N/A, 03/31/2023); and Cosmetic surgery ('15).     reports that she quit smoking about 32 years ago. Her smoking use included cigarettes. She started smoking about 41 years ago. She has a 10.00 pack-year smoking history. She has never used smokeless tobacco. She reports current alcohol use. She reports that she does not use drugs.    family history includes Colon Polyps in her father; Diabetes in her brother, father, and mother; Hypertension in her brother and father; Pulmonary Embolism in her brother; Uterine Cancer in her mother.    ALLERGIES:     Allergies   Allergen Reactions    Ivp Dye [Contrast Dye] Anaphylaxis       PERTINENT MEDICATIONS:    Current Outpatient Medications:     acetaminophen (TYLENOL) 500 MG tablet, Take 500 mg by mouth every 6 hours as needed for mild pain, Disp: , Rfl:     clobetasol (TEMOVATE) 0.05 % external cream, Apply to AA BID x 1-2 weeks then PRN. Do not apply to face., Disp: 60 g, Rfl: 3    clobetasol (TEMOVATE) 0.05 % external solution,  Apply to scalp BID x 1-2 weeks PRN, Disp: 50 mL, Rfl: 5    estradiol (ESTRING) 7.5 MCG/24HR vaginal ring, Place 1 each (1 Vaginal ring) vaginally every 3 months, Disp: 1 each, Rfl: 4    finasteride (PROSCAR) 5 MG tablet, Take 1/2 tablet daily (Patient not taking: Reported on 12/12/2023), Disp: 45 tablet, Rfl: 2    metFORMIN (GLUCOPHAGE XR) 500 MG 24 hr tablet, Take 1 tablet (500 mg) by mouth 2 times daily (with meals), Disp: 180 tablet, Rfl: 1    nitroFURantoin macrocrystal-monohydrate (MACROBID) 100 MG capsule, Take 1 capsule (100 mg) by mouth 2 times daily for 5 days, Disp: 10 capsule, Rfl: 0    omeprazole 20 MG tablet, Take 20 mg by mouth every evening, Disp: , Rfl:     ondansetron (ZOFRAN ODT) 4 MG ODT tab, Take 1 tablet (4 mg) by mouth every 8 hours as needed for nausea, Disp: 10 tablet, Rfl: 0    phenazopyridine (PYRIDIUM) 200 MG tablet, Take 1 tablet (200 mg) by mouth 3 times daily as needed for irritation, Disp: 6 tablet, Rfl: 0    sertraline (ZOLOFT) 100 MG tablet, Take 150 mg by mouth every evening, Disp: , Rfl:     UNABLE TO FIND, every morning MEDICATION NAME: Mix of Supplements; Tumeric, Green tea, Vit D and Probiotic, Disp: , Rfl:       ROS: 10pt ROS performed and otherwise negative.    PHYSICAL EXAMINATION:  Wt:   Wt Readings from Last 2 Encounters:   12/19/23 102.1 kg (225 lb)   12/14/23 102.5 kg (226 lb)      Physical Exam  Vitals reviewed: LMP 12/09/2018    Constitutional: aaox3, cooperative, pleasant, not dyspneic/diaphoretic, no acute distress  Eyes: Sclera anicteric/injected  Respiratory: Unlabored breathing, speaking in full sentences.   Psych: Normal affect, speech is clear and appropriate.Neatly groomed    RECENT LABS:   Recent Labs   Lab Test 12/14/23  1225 11/27/23  1825   WBC 6.6 7.1   HGB 14.5 15.7   HCT 43.2 44.0    233     Recent Labs   Lab Test 12/14/23  1225 11/27/23  1825   ALT 39 44   AST 28 36     Recent Labs   Lab Test 12/14/23  1225 11/27/23  1825   CR 0.84 0.86     TSH    Date Value Ref Range Status   06/07/2023 1.86 0.30 - 4.20 uIU/mL Final         ASSESSMENT/PLAN:    ICD-10-CM    1. Abdominal bloating  R14.0 Adult GI Clinic Follow-Up Order (Blank)      2. Abdominal pain, generalized  R10.84 Adult GI Clinic Follow-Up Order (Blank)      3. Nausea  R11.0       4. Diverticulosis of colon  K57.30            Sangita Meraz is a 58 year old female who presents for a follow-up on ER visit for abdominal pain, located in the left lower quadrant and associated with abdominal bloating and nausea.  She had unremarkable CT scan except of chronic diverticulosis.  No signs of diverticulitis.  It was thought that patient might have constipation with overflow diarrhea.  Today, she reported no changes in her pain or nausea.  Stated that she noted reduced appetite.  She is currently on antibiotic for E. coli urinary tract infection.  History of functional dyspepsia and irritable bowel syndrome, was followed by Dr. Mckeon.I suggested to continue MiraLAX and take it daily, 1-2 caps a day.  Resume Metamucil, starting with a small dose of half of the teaspoon at supper and gradually taper the dose up.  We briefly discussed correlation between bloating and high carb diet, particularly in light of taking metformin. I recommended low-carb diet.  I mentioned an option for a trial of GI antispasmodic or a tricyclic antidepressant for abdominal discomfort.  Decided to postpone any new medications at this time as the patient is feeling stable and her pain level is acceptable.  Educated on red flag symptoms.  Encouraged to contact the provider if her symptoms become worse.  Briefly discussed gastric emptying study as a possible next step in evaluation of nausea and poor appetite. Explained that I cannot exclude musculoskeletal etiology of her left lower quadrant pain, given described radiation to low back and left leg. May try a topical lidocaine or capsaicin.    Patient verbalized understanding and appreciation  of care provided. Stated that all of the questions were answered to her/his satisfaction.  Follow up in 2 months  This note was created with Dragon voice recognition software. Inadvertent minor typographic errors may occur in transcription. Feel free to contact the provider if you have any questions.  I sincerely appreciate an opportunity to provide consultation for this pleasant patient.    JUAN DAVID Luke  [facility],  Gastroenterology,  [place], [place]

## 2023-12-19 ENCOUNTER — OFFICE VISIT (OUTPATIENT)
Dept: URGENT CARE | Facility: URGENT CARE | Age: 58
End: 2023-12-19
Payer: OTHER GOVERNMENT

## 2023-12-19 VITALS
DIASTOLIC BLOOD PRESSURE: 84 MMHG | SYSTOLIC BLOOD PRESSURE: 149 MMHG | RESPIRATION RATE: 17 BRPM | HEART RATE: 73 BPM | OXYGEN SATURATION: 95 % | TEMPERATURE: 97.2 F | WEIGHT: 225 LBS | BODY MASS INDEX: 39.92 KG/M2

## 2023-12-19 DIAGNOSIS — N30.00 ACUTE CYSTITIS WITHOUT HEMATURIA: Primary | ICD-10-CM

## 2023-12-19 DIAGNOSIS — R30.0 DYSURIA: ICD-10-CM

## 2023-12-19 LAB
ALBUMIN UR-MCNC: NEGATIVE MG/DL
APPEARANCE UR: CLEAR
BACTERIA #/AREA URNS HPF: ABNORMAL /HPF
BILIRUB UR QL STRIP: NEGATIVE
CLUE CELLS: NORMAL
COLOR UR AUTO: YELLOW
GLUCOSE UR STRIP-MCNC: NEGATIVE MG/DL
HGB UR QL STRIP: NEGATIVE
KETONES UR STRIP-MCNC: NEGATIVE MG/DL
LEUKOCYTE ESTERASE UR QL STRIP: ABNORMAL
NITRATE UR QL: NEGATIVE
PH UR STRIP: 7 [PH] (ref 5–7)
RBC #/AREA URNS AUTO: ABNORMAL /HPF
SP GR UR STRIP: 1.01 (ref 1–1.03)
SQUAMOUS #/AREA URNS AUTO: ABNORMAL /LPF
TRICHOMONAS, WET PREP: NORMAL
UROBILINOGEN UR STRIP-ACNC: 0.2 E.U./DL
WBC #/AREA URNS AUTO: ABNORMAL /HPF
WBC'S/HIGH POWER FIELD, WET PREP: NORMAL
YEAST, WET PREP: NORMAL

## 2023-12-19 PROCEDURE — 87086 URINE CULTURE/COLONY COUNT: CPT | Performed by: PHYSICIAN ASSISTANT

## 2023-12-19 PROCEDURE — 81001 URINALYSIS AUTO W/SCOPE: CPT | Performed by: PHYSICIAN ASSISTANT

## 2023-12-19 PROCEDURE — 87210 SMEAR WET MOUNT SALINE/INK: CPT | Performed by: PHYSICIAN ASSISTANT

## 2023-12-19 PROCEDURE — 99214 OFFICE O/P EST MOD 30 MIN: CPT | Performed by: PHYSICIAN ASSISTANT

## 2023-12-19 PROCEDURE — 87186 SC STD MICRODIL/AGAR DIL: CPT | Performed by: PHYSICIAN ASSISTANT

## 2023-12-19 RX ORDER — PHENAZOPYRIDINE HYDROCHLORIDE 200 MG/1
200 TABLET, FILM COATED ORAL 3 TIMES DAILY PRN
Qty: 6 TABLET | Refills: 0 | Status: SHIPPED | OUTPATIENT
Start: 2023-12-19 | End: 2024-04-08

## 2023-12-19 RX ORDER — NITROFURANTOIN 25; 75 MG/1; MG/1
100 CAPSULE ORAL 2 TIMES DAILY
Qty: 10 CAPSULE | Refills: 0 | Status: SHIPPED | OUTPATIENT
Start: 2023-12-19 | End: 2023-12-24

## 2023-12-19 NOTE — PROGRESS NOTES
Assessment & Plan     Acute cystitis without hematuria  Culture pending. Will treat with macrobid x 5 days. Push fluids. Return to clinic if symptoms worsen or do not improve; otherwise follow up as needed      - nitroFURantoin macrocrystal-monohydrate (MACROBID) 100 MG capsule; Take 1 capsule (100 mg) by mouth 2 times daily for 5 days  - phenazopyridine (PYRIDIUM) 200 MG tablet; Take 1 tablet (200 mg) by mouth 3 times daily as needed for irritation    Dysuria    - UA Macroscopic with reflex to Microscopic and Culture  - Wet preparation  - UA Microscopic with Reflex to Culture  - Urine Culture                 Return in about 3 days (around 12/22/2023), or if symptoms worsen or fail to improve.    ANUPAMA Riggs [facility]              Subjective   Chief Complaint   Patient presents with    Urinary Problem     Pain with urination, frequency, lower abdominal pain. Started Sunday evening.        HPI     UTI    Onset of symptoms was 2day(s).  Course of illness is same  Severity moderate  Current and associated symptoms dysuria, frequency, and burning  Treatment and measures tried Increase fluid intake  Predisposing factors include none  Patient denies rigors, flank pain, and temperature > 101 degrees F.                    Review of Systems         Objective    BP (!) 149/84   Pulse 73   Temp 97.2  F (36.2  C) (Tympanic)   Resp 17   Wt 102.1 kg (225 lb)   LMP 12/09/2018   SpO2 95%   BMI 39.92 kg/m    Body mass index is 39.92 kg/m .  Physical Exam  Constitutional:       General: She is not in acute distress.     Appearance: She is well-developed.   Psychiatric:         Behavior: Behavior normal.            Results for orders placed or performed in visit on 12/19/23 (from the past 24 hour(s))   UA Macroscopic with reflex to Microscopic and Culture    Specimen: Urine, Clean Catch   Result Value Ref Range    Color Urine Yellow Colorless, Straw, Light Yellow, Yellow     Appearance Urine Clear Clear    Glucose Urine Negative Negative mg/dL    Bilirubin Urine Negative Negative    Ketones Urine Negative Negative mg/dL    Specific Gravity Urine 1.015 1.003 - 1.035    Blood Urine Negative Negative    pH Urine 7.0 5.0 - 7.0    Protein Albumin Urine Negative Negative mg/dL    Urobilinogen Urine 0.2 0.2, 1.0 E.U./dL    Nitrite Urine Negative Negative    Leukocyte Esterase Urine Small (A) Negative   Wet preparation    Specimen: Vagina; Swab   Result Value Ref Range    Trichomonas Absent Absent    Yeast Absent Absent    Clue Cells Absent Absent    WBCs/high power field None None   UA Microscopic with Reflex to Culture   Result Value Ref Range    Bacteria Urine Few (A) None Seen /HPF    RBC Urine 0-2 0-2 /HPF /HPF    WBC Urine 25-50 (A) 0-5 /HPF /HPF    Squamous Epithelials Urine Few (A) None Seen /LPF

## 2023-12-20 ENCOUNTER — VIRTUAL VISIT (OUTPATIENT)
Dept: GASTROENTEROLOGY | Facility: CLINIC | Age: 58
End: 2023-12-20
Payer: OTHER GOVERNMENT

## 2023-12-20 DIAGNOSIS — R11.0 NAUSEA: ICD-10-CM

## 2023-12-20 DIAGNOSIS — K57.30 DIVERTICULOSIS OF COLON: ICD-10-CM

## 2023-12-20 DIAGNOSIS — R10.84 ABDOMINAL PAIN, GENERALIZED: ICD-10-CM

## 2023-12-20 DIAGNOSIS — R14.0 ABDOMINAL BLOATING: Primary | ICD-10-CM

## 2023-12-20 LAB — BACTERIA UR CULT: ABNORMAL

## 2023-12-20 PROCEDURE — 99214 OFFICE O/P EST MOD 30 MIN: CPT | Mod: VID | Performed by: NURSE PRACTITIONER

## 2023-12-20 NOTE — PATIENT INSTRUCTIONS
It was a pleasure taking care of you today.  I've included a brief summary of our discussion and care plan from today's visit below.  Please review this information with your primary care provider.  ______________________________________________________________________    My recommendations are summarized as follows:    1.  Take MiraLAX 1-2 caps a day.  You had signs of constipation on the CT scan of abdomen.    2.  Resume Metamucil at a small dose (half to 1 teaspoon a day) and take it with supper.    3.  Watch your diet for carbohydrate intake.  Diet high in sugar/starches can lead to bloating and diarrhea, particularly when you are taking metformin.    4.  Try lactose-free diet for 1 week and see if that helps your symptoms.    Return to GI Clinic in 2 months to review your progress.    ______________________________________________________________________    Constipation refers to a change in bowel habits, but it has varied meanings. Stools may be too hard or too small, difficult to pass, or infrequent (less than three times per week). People with constipation may also notice a frequent need to strain and a sense that the bowels are not empty.  Constipation is a very common problem. Each year more than 2.5 million Americans visit their healthcare provider for relief from this problem. Many factors can contribute to or cause constipation, although in most people, no single cause can be found. In general, constipation occurs more frequently as you get older.     Treatment for constipation includes changing some behaviors, eating foods high in fiber, and using medications if needed.  Behavior changes -- The bowels are most active following meals, and this is often the time when stools will pass most readily. If you ignore your body's signals to have a bowel movement, the signals become weaker and weaker over time.By paying close attention to these signals, you may have an easier time moving your bowels. Drinking a  caffeine-containing beverage in the morning may also be helpful.  Increase fiber -- Increasing fiber in your diet may reduce or eliminate constipation. The recommended amount of dietary fiber is 20 to 35 grams of fiber per day. By reading the product information panel on the side of the package, you can determine the number of grams of fiber per serving .Many fruits and vegetables can be particularly helpful in preventing and treating constipation .This is especially true of citrus fruits, prunes, and prune juice. Some breakfast cereals are also an excellent source of dietary fiber.  Start your day off with a high-fiber breakfast such as whole grain oatmeal sprinkled with  raisins, blueberries, pecans or macadamia nuts.   Slice up raw veggies (carrots, celery, bell peppers) and fruit (apples, pears) and keep  them in baggies for quick high-fiber snacks.   Munch on a small handful of nuts, such as peanuts, walnuts, and almonds.   Look for individually wrapped prunes in the grocery store for a quick fiber snack.   When buying frozen veggies, look for ones that have been  flash frozen.    Add half a cup of garbanzo or black beans or peas to your salad to add fiber, texture, and flavor.   EXAMPLES OF HIGH-FIBER FOODS   1 large apple or pear (5g)   1 cup Raisin Bran (5g)     cup raspberries (9g)   1 cup cooked broccoli (5g)   23 almonds (3.5g)   1 cup black beans (15g)   2 brazil nuts (2.5g)   1 cup peas (16g)    Anti-constipation fruit paste:  Ingredients  1 cup pitted prunes  1 cup raisins  1 cup pitted dates  1/2 cup orange juice  2/3 cup prune juice  Tip:  For even more fiber add 1 cup of natural wheat bran to the fruit mixture.  Directions (Makes 25 servings)  Combine all ingredients in a bowl and soak overnight in the refrigerator. Blend in  or  until smooth. Keep in the refrigerator (can be kept frozen in small containers).   Serving size: 2 tablespoons. Spread it on toast or eat from a  spoon.  Caution: May cause bloating and abdominal cramping. Start with smaller portion and increase it gradually over a few weeks as tolerate.    Medications:  Bulk forming -- These include natural fiber and commercial fiber preparations such as Psyllium (Konsyl; Metamucil; Perdiem), Methylcellulose (Citrucel),  or Wheat dextrin (Benefiber);  You should increase the dose of fiber supplements slowly to prevent gas and cramping, and you should always take the supplement with plenty of fluid.  2. Hyperosmolar medications  -- such as   ?Polyethylene glycol (MiraLAX, GlycoLax)  ? Lactulose  ? Sorbitol  Polyethylene glycol is generally preferred since it does not cause gas or bloating and is available without a prescription. Lactulose and sorbitol can produce gas and bloating. Sorbitol works as well as lactulose and is much less expensive.  3.Saline laxatives -- such as Milk of Magnesia and magnesium citrate (Evac-Q-Mag) act similarly to the hyperosmolar drugs.  4. Stimulant drugs -- include Senna (eg, Black Draught, Ex-Lax, Senokot) and Bisacodil (eg, Correctol, Doxidan, Dulcolax).     Constipation treatments to avoid:  ? Emollients - Emollient laxatives, principally mineral oil, soften stools by moisturizing them. However, other treatments have fewer risks and equal benefit.  ? Natural products - A wide variety of natural products are advertised for constipation. Some of them contain the active ingredients found in commercially available laxatives. However, their dose and purity may not be carefully controlled. Thus, these products are not generally recommended.  ? A variety of home-made enema preparations have been used throughout the years, such as soapsuds, hydrogen peroxide, and household detergents. These can be extremely irritating to the lining of the intestine and should be avoided.        DIVERTICULOSIS  A diverticulum is a pouch-like structure that can form through points of weakness in the muscular wall of  the colon (ie, at points where blood vessels pass through the wall).   Diverticulosis merely describes the presence of diverticula. Diverticulosis is often found during a test done for other reasons, such as flexible sigmoidoscopy, colonoscopy, or barium enema. Most people with diverticulosis have no symptoms and will remain symptom free for the rest of their lives.     People with diverticulosis who do not have symptoms do not require treatment. However, most clinicians recommend increasing fiber in the diet, which can help to bulk the stools and possibly prevent the development of new diverticula, diverticulitis, or diverticular bleeding. Fiber is not proven to prevent these conditions in all patients but may help to control recurrent episodes in some.   Fruits and vegetables are a good source of fiber. Patients with diverticular disease have historically been advised to avoid whole pieces of fiber (such as seeds, corn, and nuts) because of concern that these foods could cause an episode of diverticulitis. However, this belief is completely unproven. We do not suggest that patients with diverticulosis avoid seeds, corn, or nuts.     Approximately 15 to 25 percent of people with diverticulosis may develop diverticulitis, which is inflammation of diverticulum.  This may be caused by increased pressure within the colon or by hardened particles of stool, which can become lodged within the diverticulum. The symptoms of diverticulitis depend upon the degree of inflammation present. The most common symptom is pain in the left lower abdomen. Other symptoms can include nausea and vomiting, constipation, diarrhea, and urinary symptoms such as pain or burning when urinating or the frequent need to urinate.   If your disease is mild and you are otherwise healthy, you might be treated at home. This usually involves a liquid diet and pain-relieving medication. However, if you develop one or more of the following signs or  symptoms, you should seek immediate medical attention:  ?Temperature >100.1 F (38 C)  ?Worsening or severe abdominal pain  ?Inability to tolerate fluids      ______________________________________________________________________    Who do I call with any questions after my visit?  Please be in touch if there are any further questions that arise following today's visit.  There are multiple ways to contact your gastroenterology care team.      During business hours, you may reach a Gastroenterology nurse at [phone], option 3.     To schedule or reschedule an appointment, please call [phone].   To schedule your imaging studies (CT, MRI, ultrasound)  call [phone] (or toll-free # [phone])  To schedule your lab work at [facility], please call [phone]    You can always send a secure message through [employer].  [employer] messages are answered by your nurse or doctor typically within 24 hours.  Please allow extra time on weekends and holidays.      For urgent/emergent questions after business hours, you may reach the on-call GI Fellow by contacting the [facility]  at [phone].    In order for your refill to be processed in a timely fashion, it is your responsibility to ensure you follow the recommendations from your provider regarding your laboratory studies and follow up appointments.       How will I get the results of any tests ordered?    You will receive all of your results.  If you have signed up for [employer], any tests ordered at your visit will be available to you after your physician reviews them.  Typically this takes 1-2 weeks.  If there are urgent results that require a change in your care plan, your physician or nurse will call you to discuss the next steps.   What is [employer]?  [employer] is a secure way for you to access all of your healthcare records from the [facility].  It is a web based computer program, so you can sign on to  it from any location.  It also allows you to send secure messages to your care team.  I recommend signing up for [employer] access if you have not already done so and are comfortable with using a computer.    How to I schedule a follow-up visit?  If you did not schedule a follow-up visit today, please call [phone] to schedule a follow-up office visit.      Sincerely,  JUAN DAVID Luke,  [facility],  Division of Gastroenterology   ([facility])

## 2023-12-21 DIAGNOSIS — R73.03 PREDIABETES: ICD-10-CM

## 2023-12-21 DIAGNOSIS — E66.01 MORBID OBESITY (H): ICD-10-CM

## 2023-12-21 RX ORDER — METFORMIN HCL 500 MG
TABLET, EXTENDED RELEASE 24 HR ORAL
Qty: 180 TABLET | Refills: 1 | Status: SHIPPED | OUTPATIENT
Start: 2023-12-21 | End: 2024-06-24

## 2023-12-21 NOTE — TELEPHONE ENCOUNTER
Prescription approved per [facility] Refill Protocol.  Julie Behrendt RN

## 2024-01-05 ENCOUNTER — MYC MEDICAL ADVICE (OUTPATIENT)
Dept: DERMATOLOGY | Facility: CLINIC | Age: 59
End: 2024-01-05
Payer: OTHER GOVERNMENT

## 2024-01-05 DIAGNOSIS — L66.10 LICHEN PLANOPILARIS: ICD-10-CM

## 2024-01-05 DIAGNOSIS — L30.9 HAND ECZEMA: ICD-10-CM

## 2024-01-05 RX ORDER — CLOBETASOL PROPIONATE 0.5 MG/ML
SOLUTION TOPICAL
Qty: 50 ML | Refills: 5 | Status: SHIPPED | OUTPATIENT
Start: 2024-01-05

## 2024-01-05 RX ORDER — CLOBETASOL PROPIONATE 0.5 MG/G
CREAM TOPICAL
Qty: 60 G | Refills: 3 | Status: SHIPPED | OUTPATIENT
Start: 2024-01-05

## 2024-01-05 RX ORDER — FINASTERIDE 5 MG/1
TABLET, FILM COATED ORAL
Qty: 45 TABLET | Refills: 2 | Status: SHIPPED | OUTPATIENT
Start: 2024-01-05 | End: 2024-09-27

## 2024-01-07 ENCOUNTER — HEALTH MAINTENANCE LETTER (OUTPATIENT)
Age: 59
End: 2024-01-07

## 2024-01-13 ENCOUNTER — OFFICE VISIT (OUTPATIENT)
Dept: URGENT CARE | Facility: URGENT CARE | Age: 59
End: 2024-01-13
Payer: OTHER GOVERNMENT

## 2024-01-13 VITALS
RESPIRATION RATE: 20 BRPM | WEIGHT: 228 LBS | TEMPERATURE: 98.3 F | OXYGEN SATURATION: 96 % | SYSTOLIC BLOOD PRESSURE: 135 MMHG | DIASTOLIC BLOOD PRESSURE: 83 MMHG | HEART RATE: 74 BPM | BODY MASS INDEX: 40.45 KG/M2

## 2024-01-13 DIAGNOSIS — B37.31 YEAST INFECTION OF THE VAGINA: ICD-10-CM

## 2024-01-13 DIAGNOSIS — R05.1 ACUTE COUGH: ICD-10-CM

## 2024-01-13 DIAGNOSIS — J40 BRONCHITIS: Primary | ICD-10-CM

## 2024-01-13 DIAGNOSIS — N30.00 ACUTE CYSTITIS WITHOUT HEMATURIA: ICD-10-CM

## 2024-01-13 DIAGNOSIS — J11.1 INFLUENZA-LIKE ILLNESS: ICD-10-CM

## 2024-01-13 DIAGNOSIS — R30.0 DYSURIA: ICD-10-CM

## 2024-01-13 LAB
ALBUMIN UR-MCNC: NEGATIVE MG/DL
APPEARANCE UR: CLEAR
BACTERIA #/AREA URNS HPF: ABNORMAL /HPF
BILIRUB UR QL STRIP: NEGATIVE
CLUE CELLS: ABNORMAL
COLOR UR AUTO: YELLOW
FLUAV AG SPEC QL IA: NEGATIVE
FLUBV AG SPEC QL IA: NEGATIVE
GLUCOSE UR STRIP-MCNC: NEGATIVE MG/DL
HGB UR QL STRIP: ABNORMAL
KETONES UR STRIP-MCNC: NEGATIVE MG/DL
LEUKOCYTE ESTERASE UR QL STRIP: ABNORMAL
MUCOUS THREADS #/AREA URNS LPF: PRESENT /LPF
NITRATE UR QL: NEGATIVE
PH UR STRIP: 6.5 [PH] (ref 5–7)
RBC #/AREA URNS AUTO: ABNORMAL /HPF
SP GR UR STRIP: 1.01 (ref 1–1.03)
SQUAMOUS #/AREA URNS AUTO: ABNORMAL /LPF
TRICHOMONAS, WET PREP: ABNORMAL
UROBILINOGEN UR STRIP-ACNC: 0.2 E.U./DL
WBC #/AREA URNS AUTO: ABNORMAL /HPF
WBC'S/HIGH POWER FIELD, WET PREP: ABNORMAL
YEAST, WET PREP: ABNORMAL

## 2024-01-13 PROCEDURE — 99214 OFFICE O/P EST MOD 30 MIN: CPT | Performed by: NURSE PRACTITIONER

## 2024-01-13 PROCEDURE — 87804 INFLUENZA ASSAY W/OPTIC: CPT | Performed by: NURSE PRACTITIONER

## 2024-01-13 PROCEDURE — 81001 URINALYSIS AUTO W/SCOPE: CPT | Performed by: NURSE PRACTITIONER

## 2024-01-13 PROCEDURE — 87210 SMEAR WET MOUNT SALINE/INK: CPT | Performed by: NURSE PRACTITIONER

## 2024-01-13 PROCEDURE — 87086 URINE CULTURE/COLONY COUNT: CPT | Performed by: NURSE PRACTITIONER

## 2024-01-13 RX ORDER — FLUCONAZOLE 150 MG/1
150 TABLET ORAL
Qty: 3 TABLET | Refills: 0 | Status: SHIPPED | OUTPATIENT
Start: 2024-01-13 | End: 2024-01-20

## 2024-01-13 RX ORDER — BENZONATATE 100 MG/1
200 CAPSULE ORAL 3 TIMES DAILY PRN
Qty: 60 CAPSULE | Refills: 0 | Status: SHIPPED | OUTPATIENT
Start: 2024-01-13 | End: 2024-02-20

## 2024-01-13 RX ORDER — CIPROFLOXACIN 500 MG/1
500 TABLET, FILM COATED ORAL 2 TIMES DAILY
Qty: 10 TABLET | Refills: 0 | Status: SHIPPED | OUTPATIENT
Start: 2024-01-13 | End: 2024-01-19

## 2024-01-13 NOTE — PATIENT INSTRUCTIONS
1. Increase fluid intake  2. Complete antibiotic regimen as prescribed. You will be notified if the treatment plan needs to be changed based on the urine culture results.   3. Follow if you have a fever of 100.4 F (38 C) or higher, no improvement after three days of treatment, trouble urinating because of pain,new or increased discharge from the vagina, rash or joint pain, Increased back or abdominal pain.  ____________  Take diflucan at first sign of yeast infection, may repeat every 3 days if symptoms persist up to 3 doses.  ___________  See handout on bronchitis.   Tessalon pearls one three times a day as needed for cough.

## 2024-01-13 NOTE — PROGRESS NOTES
SUBJECTIVE:   Sangita Meraz is a 58 year old female presenting with a chief complaint of   Chief Complaint   Patient presents with    Urinary Problem     UTI Sx Started 3-4 Days Ago - Burning and Fullness Feeling     Influenza     Sx Started Tuesday     Fever     Low Grade     Pt taking ucora to prevent UTI's, and improve gut yaima.     S.T. Tuesday, Wednesday started coughing, has headaches, and low grade temps.   Stuffy nose.  Tried mucinex, robitussin, nasal lavage, and tylenol.    Past Medical History:   Diagnosis Date    Depressive disorder 1982    Diverticulosis of large intestine without hemorrhage     Obstructive sleep apnea syndrome 10/17/2022     Family History   Problem Relation Age of Onset    Uterine Cancer Mother         endometrial cancer    Diabetes Mother     Colon Polyps Father     Diabetes Father     Hypertension Father     Diabetes Brother     Hypertension Brother     Pulmonary Embolism Brother         following leg surgery    Anesthesia Reaction No family hx of      Current Outpatient Medications   Medication Sig Dispense Refill    benzonatate (TESSALON) 100 MG capsule Take 2 capsules (200 mg) by mouth 3 times daily as needed for cough 60 capsule 0    ciprofloxacin (CIPRO) 500 MG tablet Take 1 tablet (500 mg) by mouth 2 times daily for 5 days 10 tablet 0    fluconazole (DIFLUCAN) 150 MG tablet Take 1 tablet (150 mg) by mouth every 3 days for 3 doses 3 tablet 0    acetaminophen (TYLENOL) 500 MG tablet Take 500 mg by mouth every 6 hours as needed for mild pain      clobetasol (TEMOVATE) 0.05 % external cream Apply to AA BID x 1-2 weeks then PRN. Do not apply to face. 60 g 3    clobetasol (TEMOVATE) 0.05 % external solution Apply to scalp BID x 1-2 weeks PRN 50 mL 5    estradiol (ESTRING) 7.5 MCG/24HR vaginal ring Place 1 each (1 Vaginal ring) vaginally every 3 months 1 each 4    finasteride (PROSCAR) 5 MG tablet Take 1/2 tablet daily 45 tablet 2    metFORMIN (GLUCOPHAGE XR) 500 MG 24 hr tablet  TAKE 1 TABLET (500 MG) BY MOUTH 2 TIMES DAILY WITH MEALS 180 tablet 1    omeprazole 20 MG tablet Take 20 mg by mouth every evening      ondansetron (ZOFRAN ODT) 4 MG ODT tab Take 1 tablet (4 mg) by mouth every 8 hours as needed for nausea 10 tablet 0    phenazopyridine (PYRIDIUM) 200 MG tablet Take 1 tablet (200 mg) by mouth 3 times daily as needed for irritation 6 tablet 0    sertraline (ZOLOFT) 100 MG tablet Take 150 mg by mouth every evening      UNABLE TO FIND every morning MEDICATION NAME: Mix of Supplements; Tumeric, Green tea, Vit D and Probiotic       Social History     Tobacco Use    Smoking status: Former     Packs/day: 1.00     Years: 10.00     Additional pack years: 0.00     Total pack years: 10.00     Types: Cigarettes     Start date: 10/10/1982     Quit date: 1991     Years since quittin.6    Smokeless tobacco: Never   Substance Use Topics    Alcohol use: Yes     Comment: 1-2 beer/month       OBJECTIVE  /83   Pulse 74   Temp 98.3  F (36.8  C)   Resp 20   Wt 103.4 kg (228 lb)   LMP 2018   SpO2 96%   BMI 40.45 kg/m      Physical Exam  Constitutional:       Appearance: Normal appearance.   HENT:      Mouth/Throat:      Mouth: Mucous membranes are moist.      Pharynx: Posterior oropharyngeal erythema present.   Cardiovascular:      Rate and Rhythm: Normal rate and regular rhythm.      Heart sounds: Normal heart sounds.   Pulmonary:      Effort: Pulmonary effort is normal.      Breath sounds: Normal breath sounds.   Abdominal:      Tenderness: There is no right CVA tenderness or left CVA tenderness.   Musculoskeletal:      Cervical back: Neck supple.   Skin:     General: Skin is warm and dry.   Neurological:      General: No focal deficit present.      Mental Status: She is alert and oriented to person, place, and time.   Psychiatric:         Mood and Affect: Mood normal.       Labs:  Results for orders placed or performed in visit on 24 (from the past 24 hour(s))   UA  Macroscopic with reflex to Microscopic and Culture - Clinic Collect    Specimen: Urine, Clean Catch   Result Value Ref Range    Color Urine Yellow Colorless, Straw, Light Yellow, Yellow    Appearance Urine Clear Clear    Glucose Urine Negative Negative mg/dL    Bilirubin Urine Negative Negative    Ketones Urine Negative Negative mg/dL    Specific Gravity Urine 1.015 1.003 - 1.035    Blood Urine Trace (A) Negative    pH Urine 6.5 5.0 - 7.0    Protein Albumin Urine Negative Negative mg/dL    Urobilinogen Urine 0.2 0.2, 1.0 E.U./dL    Nitrite Urine Negative Negative    Leukocyte Esterase Urine Small (A) Negative   UA Microscopic with Reflex to Culture   Result Value Ref Range    Bacteria Urine Few (A) None Seen /HPF    RBC Urine 5-10 (A) 0-2 /HPF /HPF    WBC Urine 10-25 (A) 0-5 /HPF /HPF    Squamous Epithelials Urine Moderate (A) None Seen /LPF    Mucus Urine Present (A) None Seen /LPF   Wet prep - lab collect    Specimen: Vagina; Swab   Result Value Ref Range    Trichomonas Absent Absent    Yeast Absent Absent    Clue Cells Absent Absent    WBCs/high power field 1+ (A) None   Influenza A & B Antigen - Clinic Collect    Specimen: Nose; Swab   Result Value Ref Range    Influenza A antigen Negative Negative    Influenza B antigen Negative Negative    Narrative    Test results must be correlated with clinical data. If necessary, results should be confirmed by a molecular assay or viral culture.         ASSESSMENT:  1. Acute cystitis without hematuria    - ciprofloxacin (CIPRO) 500 MG tablet; Take 1 tablet (500 mg) by mouth 2 times daily for 5 days  Dispense: 10 tablet; Refill: 0    2. Bronchitis      3. Acute cough    - benzonatate (TESSALON) 100 MG capsule; Take 2 capsules (200 mg) by mouth 3 times daily as needed for cough  Dispense: 60 capsule; Refill: 0    4. Dysuria    - UA Macroscopic with reflex to Microscopic and Culture - Clinic Collect  - Wet prep - lab collect; Future  - Wet prep - lab collect  - UA Microscopic  with Reflex to Culture  - Urine Culture    5. Influenza-like illness    - Influenza A & B Antigen - Clinic Collect    6. Yeast infection of the vagina    - fluconazole (DIFLUCAN) 150 MG tablet; Take 1 tablet (150 mg) by mouth every 3 days for 3 doses  Dispense: 3 tablet; Refill: 0    PLAN:  1. Increase fluid intake  2. Complete antibiotic regimen as prescribed. You will be notified if the treatment plan needs to be changed based on the urine culture results.   3. Follow if you have a fever of 100.4 F (38 C) or higher, no improvement after three days of treatment, trouble urinating because of pain,new or increased discharge from the vagina, rash or joint pain, Increased back or abdominal pain.  ____________  Take diflucan at first sign of yeast infection, may repeat every 3 days if symptoms persist up to 3 doses.  ___________  See handout on bronchitis.   Tessalon pearls one three times a day as needed for cough.

## 2024-01-14 LAB — BACTERIA UR CULT: NORMAL

## 2024-01-19 ENCOUNTER — OFFICE VISIT (OUTPATIENT)
Dept: URGENT CARE | Facility: URGENT CARE | Age: 59
End: 2024-01-19
Payer: OTHER GOVERNMENT

## 2024-01-19 VITALS
DIASTOLIC BLOOD PRESSURE: 84 MMHG | BODY MASS INDEX: 40.45 KG/M2 | TEMPERATURE: 98.4 F | SYSTOLIC BLOOD PRESSURE: 143 MMHG | RESPIRATION RATE: 16 BRPM | HEART RATE: 74 BPM | OXYGEN SATURATION: 94 % | WEIGHT: 228 LBS

## 2024-01-19 DIAGNOSIS — R07.0 THROAT PAIN: Primary | ICD-10-CM

## 2024-01-19 DIAGNOSIS — R30.0 DYSURIA: ICD-10-CM

## 2024-01-19 DIAGNOSIS — J06.9 URI WITH COUGH AND CONGESTION: ICD-10-CM

## 2024-01-19 LAB
ALBUMIN UR-MCNC: NEGATIVE MG/DL
APPEARANCE UR: CLEAR
BILIRUB UR QL STRIP: NEGATIVE
CLUE CELLS: ABNORMAL
COLOR UR AUTO: YELLOW
DEPRECATED S PYO AG THROAT QL EIA: NEGATIVE
GLUCOSE UR STRIP-MCNC: NEGATIVE MG/DL
GROUP A STREP BY PCR: NOT DETECTED
HGB UR QL STRIP: ABNORMAL
KETONES UR STRIP-MCNC: NEGATIVE MG/DL
LEUKOCYTE ESTERASE UR QL STRIP: NEGATIVE
NITRATE UR QL: NEGATIVE
PH UR STRIP: 6.5 [PH] (ref 5–7)
RBC #/AREA URNS AUTO: NORMAL /HPF
SP GR UR STRIP: 1.02 (ref 1–1.03)
TRICHOMONAS, WET PREP: ABNORMAL
UROBILINOGEN UR STRIP-ACNC: 0.2 E.U./DL
WBC #/AREA URNS AUTO: NORMAL /HPF
WBC'S/HIGH POWER FIELD, WET PREP: ABNORMAL
YEAST, WET PREP: ABNORMAL

## 2024-01-19 PROCEDURE — 99213 OFFICE O/P EST LOW 20 MIN: CPT | Performed by: FAMILY MEDICINE

## 2024-01-19 PROCEDURE — 87651 STREP A DNA AMP PROBE: CPT | Performed by: FAMILY MEDICINE

## 2024-01-19 PROCEDURE — 87210 SMEAR WET MOUNT SALINE/INK: CPT | Performed by: FAMILY MEDICINE

## 2024-01-19 PROCEDURE — 81001 URINALYSIS AUTO W/SCOPE: CPT | Performed by: FAMILY MEDICINE

## 2024-01-19 RX ORDER — CODEINE PHOSPHATE AND GUAIFENESIN 10; 100 MG/5ML; MG/5ML
1-2 SOLUTION ORAL EVERY 4 HOURS PRN
Qty: 180 ML | Refills: 0 | Status: SHIPPED | OUTPATIENT
Start: 2024-01-19 | End: 2024-02-20

## 2024-01-19 RX ORDER — DEXAMETHASONE 4 MG/1
8 TABLET ORAL
Qty: 4 TABLET | Refills: 0 | Status: SHIPPED | OUTPATIENT
Start: 2024-01-19 | End: 2024-01-29

## 2024-01-19 NOTE — PATIENT INSTRUCTIONS
Cough may last a few weeks longer than other symptoms. As long as there is improvement over time this is normal as everything heals up from the original virus.   If not caused by the Streptococcus bacteria (strep throat), sore throats are usually caused by viruses.   Antibiotics don't help the vast majority of people recover any quicker.  The body will fight this infection but it needs to be treated well in order to help heal itself.  Rest as needed.  It is ok to reduce food intake if appetite is poor but it is quite important to maintain/increase fluid intake.    For pain and fevers, acetaminophen (Tylenol) is most appropriate.  Ibuprofen (Advil) or naproxen (Aleve) are useful too and last longer but they can cause elevation of blood pressure or stomach problems.    A warm, salt water gargle will help soothe the throat.  This can be made with 1/4 tsp of salt per 8 oz of water).    If the symptoms get worse or last longer than a week, you should consider contacting the clinic to discuss the possibility of infectious mononucleosis.

## 2024-01-19 NOTE — PROGRESS NOTES
SUBJECTIVE:  Sanigta Meraz is a 58 year old female who presents to the clinic today with a chief complaint of cough  for 1 week(s).  Her cough is described as persistent and sore throat .    The patient's symptoms are moderate and worsening.  Associated symptoms include . The patient's symptoms are exacerbated by swallowing   Patient has been using OTC cough suppressants  to improve symptoms. No fever or chills just he bad sore throat     Past Medical History:   Diagnosis Date    Depressive disorder 1982    Diverticulosis of large intestine without hemorrhage     Obstructive sleep apnea syndrome 10/17/2022       Current Outpatient Medications   Medication Sig Dispense Refill    acetaminophen (TYLENOL) 500 MG tablet Take 500 mg by mouth every 6 hours as needed for mild pain      benzonatate (TESSALON) 100 MG capsule Take 2 capsules (200 mg) by mouth 3 times daily as needed for cough 60 capsule 0    clobetasol (TEMOVATE) 0.05 % external cream Apply to AA BID x 1-2 weeks then PRN. Do not apply to face. 60 g 3    clobetasol (TEMOVATE) 0.05 % external solution Apply to scalp BID x 1-2 weeks PRN 50 mL 5    estradiol (ESTRING) 7.5 MCG/24HR vaginal ring Place 1 each (1 Vaginal ring) vaginally every 3 months 1 each 4    finasteride (PROSCAR) 5 MG tablet Take 1/2 tablet daily 45 tablet 2    fluconazole (DIFLUCAN) 150 MG tablet Take 1 tablet (150 mg) by mouth every 3 days for 3 doses 3 tablet 0    metFORMIN (GLUCOPHAGE XR) 500 MG 24 hr tablet TAKE 1 TABLET (500 MG) BY MOUTH 2 TIMES DAILY WITH MEALS 180 tablet 1    omeprazole 20 MG tablet Take 20 mg by mouth every evening      ondansetron (ZOFRAN ODT) 4 MG ODT tab Take 1 tablet (4 mg) by mouth every 8 hours as needed for nausea 10 tablet 0    phenazopyridine (PYRIDIUM) 200 MG tablet Take 1 tablet (200 mg) by mouth 3 times daily as needed for irritation 6 tablet 0    sertraline (ZOLOFT) 100 MG tablet Take 150 mg by mouth every evening      UNABLE TO FIND every morning  MEDICATION NAME: Mix of Supplements; Tumeric, Green tea, Vit D and Probiotic         Social History     Tobacco Use    Smoking status: Former     Packs/day: 1.00     Years: 10.00     Additional pack years: 0.00     Total pack years: 10.00     Types: Cigarettes     Start date: 10/10/1982     Quit date: 1991     Years since quittin.7    Smokeless tobacco: Never   Substance Use Topics    Alcohol use: Yes     Comment: 1-2 beer/month     Benzonatate helps some   ROS  Review of systems negative except as stated above.    OBJECTIVE:  BP (!) 143/84   Pulse 74   Temp 98.4  F (36.9  C) (Tympanic)   Resp 16   Wt 103.4 kg (228 lb)   LMP 2018   SpO2 94%   BMI 40.45 kg/m    GENERAL APPEARANCE: healthy, alert and no distress  EYES: EOMI,  PERRL, conjunctiva clear  HENT: ear canals and TM's normal.  Nose and mouth without ulcers, erythema or lesions  NECK: supple, nontender, no lymphadenopathy  RESP: lungs clear to auscultation - no rales, rhonchi or wheezes  CV: regular rates and rhythm, normal S1 S2, no murmur noted  NEURO: Normal strength and tone, sensory exam grossly normal,  normal speech and mentation  SKIN: no suspicious lesions or rashes    ASSESSMENT:  1. Throat pain    - Streptococcus A Rapid Screen w/Reflex to PCR - Clinic Collect  - Group A Streptococcus PCR Throat Swab    2. Dysuria    - Wet prep - Clinic Collect  - UA Macroscopic with reflex to Microscopic and Culture - Clinic Collect  - UA Microscopic with Reflex to Culture    3. URI with cough and congestion    - guaiFENesin-codeine (ROBITUSSIN AC) 100-10 MG/5ML solution; Take 5-10 mLs by mouth every 4 hours as needed for cough  Dispense: 180 mL; Refill: 0  - dexAMETHasone (DECADRON) 4 MG tablet; Take 2 tablets (8 mg) by mouth daily (with breakfast)  Dispense: 4 tablet; Refill: 0    Please see clinical references for patient education.   Barbara Avalos M.D.

## 2024-01-29 ENCOUNTER — OFFICE VISIT (OUTPATIENT)
Dept: FAMILY MEDICINE | Facility: CLINIC | Age: 59
End: 2024-01-29
Payer: OTHER GOVERNMENT

## 2024-01-29 VITALS
DIASTOLIC BLOOD PRESSURE: 86 MMHG | HEART RATE: 87 BPM | WEIGHT: 229 LBS | BODY MASS INDEX: 40.57 KG/M2 | RESPIRATION RATE: 22 BRPM | TEMPERATURE: 98.1 F | OXYGEN SATURATION: 96 % | SYSTOLIC BLOOD PRESSURE: 146 MMHG | HEIGHT: 63 IN

## 2024-01-29 DIAGNOSIS — G50.0 TRIGEMINAL NEURALGIA: Primary | ICD-10-CM

## 2024-01-29 PROBLEM — K57.92 DIVERTICULITIS: Status: RESOLVED | Noted: 2023-03-31 | Resolved: 2024-01-29

## 2024-01-29 PROCEDURE — 99214 OFFICE O/P EST MOD 30 MIN: CPT | Performed by: PHYSICIAN ASSISTANT

## 2024-01-29 RX ORDER — GABAPENTIN 300 MG/1
300 CAPSULE ORAL 3 TIMES DAILY
Qty: 90 CAPSULE | Refills: 2 | Status: SHIPPED | OUTPATIENT
Start: 2024-01-29 | End: 2024-04-08

## 2024-01-29 ASSESSMENT — PAIN SCALES - GENERAL: PAINLEVEL: MODERATE PAIN (4)

## 2024-01-29 NOTE — PROGRESS NOTES
"Assessment & Plan   Trigeminal neuralgia  History of similar that was treated successfully with gabapentin in the past.  History and exam is again consistent with trigeminal neuralgia today.  Has been ill several times recently which could be the impetus for this.  I do not think imaging is needed at this time.  Will restart gabapentin and patient will update us on her response.  If not successful would recommend Tegretol.  Suspect that she will need treatment for at least 3 to 6 months patient can follow-up as needed.  - gabapentin (NEURONTIN) 300 MG capsule; Take 1 capsule (300 mg) by mouth 3 times daily    Adi Quesada is a 58 year old, presenting for the following health issues:  Headache and Nerve Pain         1/29/2024     1:45 PM   Additional Questions   Roomed by Jacqueline     History of Present Illness       Headaches:   Since the patient's last clinic visit, headaches are: no change  The patient is getting headaches:  2x/week  She is not able to do normal daily activities when she has a migraine.  The patient is taking the following rescue/relief medications:  Tylenol   Patient states \"I get some relief\" from the rescue/relief medications.   The patient is taking the following medications to prevent migraines:  No medications to prevent migraines  In the past 4 weeks, the patient has gone to an Urgent Care or Emergency Room 0 times times due to headaches.    Reason for visit:  Neuralgia on right side of head  Symptom onset:  1-3 days ago  Symptoms include:  Shooting pain to ear, neck, temple  Symptom intensity:  Mild  Symptom progression:  Worsening  Had these symptoms before:  Yes  Has tried/received treatment for these symptoms:  Yes  Previous treatment was successful:  Yes  Prior treatment description:  Last time 5/12/22, it was in a different  location, but treatment was gabapentin.  What makes it worse:  Sudden head movement  What makes it better:  Warmth    She eats 4 or more servings of fruits " "and vegetables daily.She consumes 0 sweetened beverage(s) daily.She exercises with enough effort to increase her heart rate 10 to 19 minutes per day.  She exercises with enough effort to increase her heart rate 3 or less days per week.   She is taking medications regularly.     No change in pain with chewing or opening of the jaw.  No vision changes or eye symptoms.  Pain is worse over the right cheek of the face.  Pain is electric like and last for seconds.    Review of Systems  See HPI       Objective    BP (!) 146/86   Pulse 87   Temp 98.1  F (36.7  C) (Tympanic)   Resp 22   Ht 1.599 m (5' 2.95\")   Wt 103.9 kg (229 lb)   LMP 12/09/2018   SpO2 96%   BMI 40.63 kg/m    Body mass index is 40.63 kg/m .  Physical Exam   Constitutional: healthy, alert, and no distress  Head: Normocephalic. Atraumatic  Eyes: No conjunctival injection, sclera anicteric  Neck: supple, no thyromegaly, nodules or asymmetry of the thyroid. No cervical LAD.  Cardiovascular: RRR. No murmurs, clicks, gallops, or rubs. No peripheral edema.   Respiratory: No resp distress. Lungs CTAB bilaterally.   Musculoskeletal: extremities normal- no gross deformities noted, and normal muscle tone  Skin: no suspicious lesions or rashes  Neurologic: Gait normal. CN 2-12 grossly intact.   Psychiatric: mentation appears normal and affect normal/bright         Signed Electronically by: Anton Arreola PA-C    Physician Attestation   I, Anton Arreola PA-C, was present with the medical/HOLD[employer] student who participated in the service and in the documentation of the note.  I have verified the history and personally performed the physical exam and medical decision making.  I agree with the assessment and plan of care as documented in the note.      Anton Arreola PA-C   "

## 2024-01-31 NOTE — PATIENT INSTRUCTIONS
Restart gabapentin as discussed.  If this is not successful as it was last time please let me know so we can change your medication.  I suspect you will need this for at least 6 months but follow-up in 3 to 6 months and can try to wean off.

## 2024-02-01 ENCOUNTER — PATIENT OUTREACH (OUTPATIENT)
Dept: OBGYN | Facility: CLINIC | Age: 59
End: 2024-02-01
Payer: OTHER GOVERNMENT

## 2024-02-01 NOTE — TELEPHONE ENCOUNTER
"Panel Management Review      Health Maintenance List    Health Maintenance   Topic Date Due    DEPRESSION ACTION PLAN  Never done    HEPATITIS B IMMUNIZATION (1 of 3 - 3-dose series) Never done    ZOSTER IMMUNIZATION (1 of 2) Never done    COVID-19 Vaccine (4 - 2023-24 season) 09/01/2023    MAMMO SCREENING  10/06/2023    INFLUENZA VACCINE (1) 06/30/2024 (Originally 9/1/2023)    PHQ-9  05/15/2024    HPV TEST  09/09/2024    PAP  09/09/2024    YEARLY PREVENTIVE VISIT  09/18/2024    ADVANCE CARE PLANNING  09/09/2026    GLUCOSE  12/14/2026    LIPID  06/07/2028    COLORECTAL CANCER SCREENING  03/16/2031    DTAP/TDAP/TD IMMUNIZATION (3 - Td or Tdap) 09/09/2031    HEPATITIS C SCREENING  Completed    HIV SCREENING  Completed    Pneumococcal Vaccine: Pediatrics (0 to 5 Years) and At-Risk Patients (6 to 64 Years)  Aged Out    IPV IMMUNIZATION  Aged Out    HPV IMMUNIZATION  Aged Out    MENINGITIS IMMUNIZATION  Aged Out    RSV MONOCLONAL ANTIBODY  Aged Out       Composite cancer screening  Chart review shows that this patient is due/due soon for the following Mammogram  No results found for: \"PAP\"  Past Surgical History:   Procedure Laterality Date    ABDOMEN SURGERY  '92,'09,'15    2 c-secs, abdomenplasty    CHOLECYSTECTOMY  '98    COLONOSCOPY N/A 03/16/2021    Procedure: COLONOSCOPY, WITH BIOPSY AND CLIPPING;  Surgeon: Kemar Mckeon MD;  Location: [facility] [place] OR    COSMETIC SURGERY  '15    Abdomenoplasty    DILATION AND CURETTAGE, OPERATIVE HYSTEROSCOPY, COMBINED N/A 10/18/2022    Procedure: HYSTEROSCOPY, WITH DILATION AND CURETTAGE OF UTERUS and polypectomy;  Surgeon: Ines Chi MD;  Location: [facility] OR    ESOPHAGOSCOPY, GASTROSCOPY, DUODENOSCOPY (EGD), COMBINED N/A 12/09/2020    Procedure: ESOPHAGOGASTRODUODENOSCOPY, WITH BIOPSY;  Surgeon: Anton Knott DO;  Location: [facility]    GENITOURINARY SURGERY  '01, '03, '07    tubal ligation, anterior/posterior repair, tubal ligation reversal    LAPAROSCOPIC ASSISTED " SIGMOID COLECTOMY N/A 03/31/2023    Procedure: LAPAROSCOPIC extended LEFT HEMICOLECTOMY;  Surgeon: Bhavana Johansen MD;  Location: [facility] OR    SIGMOIDOSCOPY FLEXIBLE N/A 03/31/2023    Procedure: Sigmoidoscopy flexible;  Surgeon: Bhavana Johansen MD;  [facility]       Is hysterectomy listed in surgical history? No   Is mastectomy listed in surgical history? No     Summary:    Patient is due/failing the following:   Mammogram    Action needed: Patient needs office visit for mammogram.    Type of outreach:   none patient scheduled 2/6/24 for mammogram      Staff Signature:  Gloria Kearns CMA

## 2024-02-06 ENCOUNTER — ANCILLARY PROCEDURE (OUTPATIENT)
Dept: MAMMOGRAPHY | Facility: CLINIC | Age: 59
End: 2024-02-06
Attending: OBSTETRICS & GYNECOLOGY
Payer: OTHER GOVERNMENT

## 2024-02-06 DIAGNOSIS — Z12.31 ENCOUNTER FOR SCREENING MAMMOGRAM FOR BREAST CANCER: ICD-10-CM

## 2024-02-06 PROCEDURE — 77067 SCR MAMMO BI INCL CAD: CPT | Mod: TC | Performed by: RADIOLOGY

## 2024-02-06 PROCEDURE — 77063 BREAST TOMOSYNTHESIS BI: CPT | Mod: TC | Performed by: RADIOLOGY

## 2024-02-12 ENCOUNTER — MYC MEDICAL ADVICE (OUTPATIENT)
Dept: GASTROENTEROLOGY | Facility: CLINIC | Age: 59
End: 2024-02-12
Payer: OTHER GOVERNMENT

## 2024-02-12 NOTE — PROGRESS NOTES
GASTROENTEROLOGY RETURN PATIENT VIRTUAL VISIT      How would you like to obtain your AVS? Danielhardanae  If the video visit is dropped, the invitation should be resent by: sujatha  Will anyone else be joining your video visit? No    Video-Visit Details     Type of service:  Video Visit     Video Start Time (time video started): 0824     Video End Time (time video stopped): 0839     Physician has received verbal consent for a Video Visit from the patient? Yes     Originating Location (pt. Location): Home    Distant Location (provider location):  Off-site    Platform used for Video Visit: [facility]      GASTROENTEROLOGY RETURN PATIENT VIDEO VISIT    CC/REFERRING MD:    Anton Arreola    REASON FOR CONSULTATION:   Referred for Follow Up (LLQ pain/)      HISTORY OF PRESENT ILLNESS:  Sangita Meraz is 58 year old female who presents for a follow up on LLQ abdominal pain with associated abdominal bloating and nausea.    She had unremarkable CT scan except of diverticulosis. History of functional dyspepsia and irritable bowel syndrome, was followed by Dr. Mckeon.   I suggested to continue MiraLAX and take it daily, 1-2 caps a day. Resume Metamucil, starting with a small dose of half of the teaspoon at supper and gradually taper the dose up.   Patient stated that Miralax seems to be helping her symptoms. Asking why at the time of stress, her abdomen hurts and she has irregular bowel movements. Denies nausea, vomiting, weight loss, hematochezia, melena, fever, or chills.    Copy of HPI from 12/20/23 visit:   I am not familiar with the patient. Noted that she was seen by Dr. Mckeon since 2020 for functional dyspepsia, IBS, and recurrent diverticulitis.  Other PMH include cystocele, herniation of rectum and the vagina, mixed stress and urgency urinary incontinence, PCOS, depression, AILEEN, ureterovaginal prolapse, Ménière's disease, cholecystectomy, sigmoidectomy, and morbid obesity.    Was seen at the ER on 11/27 for lower abdominal  pain and diarrhea. Ongoing since 2 months ago and mainly located in LLQ.  Worse with movement, better with rest.  Stated that pain  remains relatively unchanged since onset, rates it at 4 of of 10. Pain character varies, but feels dull most of the time. Radiates to back and left leg. She notes no changes in bowel habits. Complains of occasional nausea but no emesis. Patient stated that she is not eating as well as before, complains of increased bloating.  She generally avoids milk, using almond milk with her cereal, but consumes cream in her coffee.  Last coonoscopy was in 2021, diverticula were noted. Her CT was unremarkable. Was seen by another provider yesterday and started antibiotic for UTI.    PREVIOUS ENDOSCOPY:  3/16/21 Colonoscopy  Findings:       Multiple small and large-mouthed diverticula were found in the sigmoid        colon and descending colon.        The exam was otherwise normal throughout the examined colon.        The terminal ileum appeared normal.        Biopsies for histology were taken with a cold forceps from the right        colon and left colon for evaluation of microscopic colitis.   FINAL DIAGNOSIS:   A. Right Colon, Biopsy:   Colonic mucosa with no evidence of microscopic or chronic colitis or other    significant histologic abnormality     B. Left Colon, Biopsy:   Colonic mucosa with no evidence of microscopic or chronic colitis or other    significant histologic abnormality      12/9/20 EGD  Findings:       The examined duodenum was normal.        Scattered mild inflammation characterized by erythema was found in the        gastric antrum and in the prepyloric region of the stomach. Biopsies        were taken with a cold forceps for Helicobacter pylori testing.        Verification of patient identification for the specimen was done by the        nurse and technician using the patient's name and birth date. Estimated        blood loss was minimal.        One 2 mm sessile polyp with no  bleeding and no stigmata of recent        bleeding was found in the cardia. The polyp was removed with a cold        biopsy forceps. Resection and retrieval were complete. Verification of        patient identification for the specimen was done by the nurse and        technician using the patient's name and birth date. Estimated blood loss        was minimal.        The gastroesophageal flap valve was visualized endoscopically and        classified as Hill Grade II (fold present, opens with respiration).        A small hiatal hernia was present.        The Z-line was regular and was found 39 cm from the incisors.      RADIOLOGY:   11/21/23 CT of abdomen  IMPRESSION:   1.  No acute intra-abdominal or intrapelvic process.  2.  No KUB calculi or hydronephrosis.  3.  Colonic diverticulosis without signs of diverticulitis.  4.  Prior sigmoidectomy and cholecystectomy.     11/27/23 Pelvic US  IMPRESSION:  1.  Unremarkable pelvic ultrasound without visualized explanation for patient's pain    HISTORY:   has a past medical history of Depressive disorder (1982), Diverticulosis of large intestine without hemorrhage, and Obstructive sleep apnea syndrome (10/17/2022).     has a past surgical history that includes Esophagoscopy, gastroscopy, duodenoscopy (EGD), combined (N/A, 12/09/2020); Colonoscopy (N/A, 03/16/2021); Abdomen surgery ('92,'09,'15); Cholecystectomy ('98); Abdomen surgery ('01, '03, '07); Dilation and curettage, operative hysteroscopy, combined (N/A, 10/18/2022); Laparoscopic Assisted Sigmoid Colectomy (N/A, 03/31/2023); Sigmoidoscopy flexible (N/A, 03/31/2023); and Cosmetic surgery ('15).     reports that she quit smoking about 32 years ago. Her smoking use included cigarettes. She started smoking about 41 years ago. She has a 10 pack-year smoking history. She has never used smokeless tobacco. She reports current alcohol use. She reports that she does not use drugs.    family history includes Colon Polyps in her  father; Diabetes in her brother, father, and mother; Hypertension in her brother and father; Pulmonary Embolism in her brother; Uterine Cancer in her mother.    ALLERGIES:     Allergies   Allergen Reactions    Ivp Dye [Contrast Dye] Anaphylaxis       PERTINENT MEDICATIONS:    Current Outpatient Medications:     acetaminophen (TYLENOL) 500 MG tablet, Take 500 mg by mouth every 6 hours as needed for mild pain, Disp: , Rfl:     benzonatate (TESSALON) 100 MG capsule, Take 2 capsules (200 mg) by mouth 3 times daily as needed for cough, Disp: 60 capsule, Rfl: 0    clobetasol (TEMOVATE) 0.05 % external cream, Apply to AA BID x 1-2 weeks then PRN. Do not apply to face., Disp: 60 g, Rfl: 3    clobetasol (TEMOVATE) 0.05 % external solution, Apply to scalp BID x 1-2 weeks PRN, Disp: 50 mL, Rfl: 5    estradiol (ESTRING) 7.5 MCG/24HR vaginal ring, Place 1 each (1 Vaginal ring) vaginally every 3 months, Disp: 1 each, Rfl: 4    finasteride (PROSCAR) 5 MG tablet, Take 1/2 tablet daily, Disp: 45 tablet, Rfl: 2    gabapentin (NEURONTIN) 300 MG capsule, Take 1 capsule (300 mg) by mouth 3 times daily, Disp: 90 capsule, Rfl: 2    guaiFENesin-codeine (ROBITUSSIN AC) 100-10 MG/5ML solution, Take 5-10 mLs by mouth every 4 hours as needed for cough, Disp: 180 mL, Rfl: 0    metFORMIN (GLUCOPHAGE XR) 500 MG 24 hr tablet, TAKE 1 TABLET (500 MG) BY MOUTH 2 TIMES DAILY WITH MEALS, Disp: 180 tablet, Rfl: 1    omeprazole 20 MG tablet, Take 20 mg by mouth every evening, Disp: , Rfl:     ondansetron (ZOFRAN ODT) 4 MG ODT tab, Take 1 tablet (4 mg) by mouth every 8 hours as needed for nausea, Disp: 10 tablet, Rfl: 0    phenazopyridine (PYRIDIUM) 200 MG tablet, Take 1 tablet (200 mg) by mouth 3 times daily as needed for irritation, Disp: 6 tablet, Rfl: 0    sertraline (ZOLOFT) 100 MG tablet, Take 150 mg by mouth every evening, Disp: , Rfl:     UNABLE TO FIND, every morning MEDICATION NAME: Mix of Supplements; Tumeric, Green tea, Vit D and Probiotic,  Disp: , Rfl:       ROS: 10pt ROS performed and otherwise negative.    PHYSICAL EXAMINATION:  Wt:   Wt Readings from Last 2 Encounters:   01/29/24 103.9 kg (229 lb)   01/19/24 103.4 kg (228 lb)      Physical Exam  Vitals reviewed: LMP 12/09/2018    Constitutional: aaox3, cooperative, pleasant, not dyspneic/diaphoretic, no acute distress  Eyes: Sclera anicteric/injected  Respiratory: Unlabored breathing, speaking in full sentences.   Psych: Normal affect, speech is clear and appropriate.Neatly groomed    RECENT LABS:   Recent Labs   Lab Test 12/14/23  1225 11/27/23  1825   WBC 6.6 7.1   HGB 14.5 15.7   HCT 43.2 44.0    233     Recent Labs   Lab Test 12/14/23  1225 11/27/23  1825   ALT 39 44   AST 28 36     Recent Labs   Lab Test 12/14/23  1225 11/27/23  1825   CR 0.84 0.86     TSH   Date Value Ref Range Status   06/07/2023 1.86 0.30 - 4.20 uIU/mL Final         ASSESSMENT/PLAN:    ICD-10-CM    1. Irritable bowel syndrome with diarrhea  K58.0 desipramine (NORPRAMIN) 10 MG tablet     Adult GI Clinic Follow-Up Order (Blank)      2. Abdominal bloating  R14.0 Adult GI Clinic Follow-Up Order (Blank)      3. Abdominal pain, generalized  R10.84 Adult GI Clinic Follow-Up Order (Blank)           Sangita Meraz is a 58 year old female who presents for a follow up on symptoms consistent with functional GI tract disorder. Reported some improvement in intermittent abdominal pain and bloating after she started taking Miralax and fiber supplement daily. Continues having loose small stools, up to 4 stools a day but feeling better overall. Noted correlation between her emotional state and her GI symptoms.  Education on IBS provided.  Suggested to try desipramine before bed. Patient stated that previously prescribed amitriptyline was causing morning drowsiness. If unable to tolerate desipramine, will switch to dicyclomine or Levsin.  Patient admits to having mild symptoms of lactose intolerance; suggested to avoid trigger  foods.  Brief education on low FODMAP diet provided.  No additional studies/tests will be ordered today but may consider ordering hydrogen breath test if continues having significant bloating and diarrhea despite  prescribed medications.    Patient verbalized understanding and appreciation of care provided. Stated that all of the questions were answered to her/his satisfaction.  Follow up in 6 months  This note was created with Dragon voice recognition software. Inadvertent minor typographic errors may occur in transcription. Feel free to contact the provider if you have any questions.  I sincerely appreciate an opportunity to provide consultation for this pleasant patient.    JUAN DAVID Luke  [facility],  Gastroenterology,  [place], [place]

## 2024-02-20 ENCOUNTER — VIRTUAL VISIT (OUTPATIENT)
Dept: GASTROENTEROLOGY | Facility: CLINIC | Age: 59
End: 2024-02-20
Attending: NURSE PRACTITIONER
Payer: OTHER GOVERNMENT

## 2024-02-20 DIAGNOSIS — R14.0 ABDOMINAL BLOATING: ICD-10-CM

## 2024-02-20 DIAGNOSIS — R10.84 ABDOMINAL PAIN, GENERALIZED: ICD-10-CM

## 2024-02-20 DIAGNOSIS — K58.0 IRRITABLE BOWEL SYNDROME WITH DIARRHEA: Primary | ICD-10-CM

## 2024-02-20 PROCEDURE — 99214 OFFICE O/P EST MOD 30 MIN: CPT | Mod: 95 | Performed by: NURSE PRACTITIONER

## 2024-02-20 RX ORDER — DESIPRAMINE HYDROCHLORIDE 10 MG/1
10 TABLET ORAL AT BEDTIME
Qty: 30 TABLET | Refills: 3 | Status: SHIPPED | OUTPATIENT
Start: 2024-02-20 | End: 2024-03-28

## 2024-02-20 ASSESSMENT — PAIN SCALES - GENERAL: PAINLEVEL: NO PAIN (0)

## 2024-02-20 NOTE — PATIENT INSTRUCTIONS
"It was a pleasure taking care of you today.  I've included a brief summary of our discussion and care plan from today's visit below.  Please review this information with your primary care provider.  ______________________________________________________________________    My recommendations are summarized as follows:    Start desipramine 10 mg before bed. It will reduce symptoms of irritable bowel syndrome such as abdominal cramping, pain, and diarrhea.     2. Continue Miralax in the morning and a fiber supplement with supper.    3. Do not skip meals and try not to overeat. Reduce intake of fat. Switch to low dairy and dairy free diet or use Lactaid tablets before consuming dairy.     Return to GI Clinic in 6 months to review your progress.    ______________________________________________________________________    Irritable bowel syndrome (IBS)   Irritable bowel syndrome (IBS) is a chronic condition of the digestive system. Its primary symptoms are abdominal pain and changes in bowel habits (eg, constipation and/or diarrhea).  There are a number of theories about how and why irritable bowel syndrome (IBS) develops. Despite intensive research, the cause is not clear.   -One theory suggests that IBS is caused by abnormal contractions of the colon and intestines (hence the term \"spastic bowel,\" which has sometimes been used to describe IBS).   -Some people develop IBS after a severe gastrointestinal infection (eg, Salmonella or Campylobacter, or viruses). However, it is not clear how the infection triggers IBS to develop, and most people with IBS do not have a history of these infections.  -People with IBS who seek medical help are more likely to suffer from anxiety and stress than those who do not seek help. Stress and anxiety are known to affect the intestine; thus, it is likely that anxiety and stress worsen symptoms.    -Food intolerances are common in patients with IBS, raising the possibility that it is caused " "by food sensitivity or allergy. This theory has been difficult to prove, although it continues to be studied.   A number of foods are known to cause symptoms that mimic or aggravate IBS, including dairy products (which contain lactose), legumes (such as beans), and cruciferous vegetables (such as broccoli, cauliflower, Donie sprouts, and cabbage). These foods increase intestinal gas, which can cause cramps.    -Many researchers believe that IBS is caused by heightened sensitivity of the intestines. The medical term for this is \"visceral hyperalgesia.\" This theory proposes that nerves in the bowels are overactive in people with IBS, so that normal amounts of gas or movement are perceived as excessive and painful.     A person with irritable bowel syndrome may have frequent loose stools. Bowel movements usually occur during the daytime, and most often in the morning or after meals. Diarrhea is often preceded by a sense of extreme urgency and followed by a feeling of incomplete emptying. About one-half of people with IBS also notice mucous discharge with diarrhea. Diarrhea occurring during the night is very unusual with IBS.     Treatments are often given to reduce the pain and other symptoms of IBS, and it may be necessary to try more than one combination of treatments to find the one that is most helpful for you.  Diet:  The first step in treating IBS is usually to monitor your symptoms, daily bowel habits, and any other factors that may affect your bowels. This can help to identify factors that worsen symptoms in some people with IBS, such as lactose or other food intolerances and stress. Keeping a daily diary to track your diet and bowel symptoms can be helpful.  Many clinicians recommend temporarily eliminating milk products, since lactose intolerance is common and can aggravate IBS or cause symptoms similar to IBS. The greatest concentration of lactose is found in milk and ice cream, although it is present in " smaller quantities in yogurt, cottage and other cheeses.Try eliminating dairy for 2 weeks. If IBS symptoms improve, it is reasonable to continue avoiding lactose.   Many foods are only partially digested in the small intestines. When they reach the colon (large intestine), further digestion takes place, which may cause gas and cramps. Eliminating these foods temporarily is reasonable if gas or bloating is bothersome.  The most common gas-producing foods are legumes (such as beans) and cruciferous vegetables (such as cabbage, Munson sprouts, cauliflower, and broccoli). In addition, some people have trouble with onions, celery, carrots, raisins, bananas, apricots, prunes, sprouts, and wheat.    A bulk-forming fiber supplement, such as Psyllium, may also be recommended to increase fiber intake since it is difficult to consume enough fiber in the diet. Fiber supplements should be started at a low dose and increased slowly over several weeks to reduce the symptoms of excessive intestinal gas, which can occur in some people when beginning fiber therapy.     Some foods are easy to digest for people with IBS related diarrhea. These include the following: plain pasta or noodles, white rice. Boiled or baked potato. Whole white bread, Polish bread, plain fish, plain chicken or turkey, soft-boiled eggs, rice or soy milk, cooked carrots,and cereals (plain Cornflakes, Rice Krispies, Corn or Rice Chex, or Cheerios).    Other approaches to management of IBS:  Stress and anxiety can worsen IBS in some people. The best approach for reducing stress and anxiety depends upon your situation and the severity of your symptoms.  Although many drugs are available to treat the symptoms of IBS, these drugs do not cure the condition. They are mainly used to relieve symptoms.             ______________________________________________________________________    Who do I call with any questions after my visit?  Please be in touch if there are  any further questions that arise following today's visit.  There are multiple ways to contact your gastroenterology care team.      During business hours, you may reach a Gastroenterology nurse at [phone], option 3.     To schedule or reschedule an appointment, please call [phone].   To schedule your imaging studies (CT, MRI, ultrasound)  call [phone] (or toll-free # [phone])  To schedule your lab work at [facility], please call [phone]    You can always send a secure message through [employer].  [employer] messages are answered by your nurse or doctor typically within 24 hours.  Please allow extra time on weekends and holidays.      For urgent/emergent questions after business hours, you may reach the on-call GI Fellow by contacting the [facility]  at [phone].    In order for your refill to be processed in a timely fashion, it is your responsibility to ensure you follow the recommendations from your provider regarding your laboratory studies and follow up appointments.       How will I get the results of any tests ordered?    You will receive all of your results.  If you have signed up for [employer], any tests ordered at your visit will be available to you after your physician reviews them.  Typically this takes 1-2 weeks.  If there are urgent results that require a change in your care plan, your physician or nurse will call you to discuss the next steps.   What is [employer]?  [employer] is a secure way for you to access all of your healthcare records from the [facility].  It is a web based computer program, so you can sign on to it from any location.  It also allows you to send secure messages to your care team.  I recommend signing up for [employer] access if you have not already done so and are comfortable with using a computer.    How to I schedule a follow-up visit?  If you did not schedule a follow-up visit today, please call  [phone] to schedule a follow-up office visit.      Sincerely,  JUAN DAVID Luke,  [facility],  Division of Gastroenterology   ([facility])

## 2024-03-28 ENCOUNTER — VIRTUAL VISIT (OUTPATIENT)
Dept: FAMILY MEDICINE | Facility: CLINIC | Age: 59
End: 2024-03-28
Payer: OTHER GOVERNMENT

## 2024-03-28 DIAGNOSIS — G44.209 TENSION HEADACHE: Primary | ICD-10-CM

## 2024-03-28 PROCEDURE — 99213 OFFICE O/P EST LOW 20 MIN: CPT | Mod: 95 | Performed by: PHYSICIAN ASSISTANT

## 2024-03-28 RX ORDER — CYCLOBENZAPRINE HCL 5 MG
5 TABLET ORAL 3 TIMES DAILY PRN
Qty: 30 TABLET | Refills: 0 | Status: SHIPPED | OUTPATIENT
Start: 2024-03-28 | End: 2024-04-08

## 2024-03-28 RX ORDER — MELOXICAM 7.5 MG/1
7.5-15 TABLET ORAL DAILY
Qty: 30 TABLET | Refills: 0 | Status: SHIPPED | OUTPATIENT
Start: 2024-03-28 | End: 2024-04-08

## 2024-03-28 NOTE — PATIENT INSTRUCTIONS
Start Meloxicam and Flexeril. Flexeril can make you sleepy, so take this at night to see how it affects you first.     If no improvement in the next week, please let me know.     Work on stress management, exercise and stretching of the neck.

## 2024-03-28 NOTE — PROGRESS NOTES
"Sangita is a 58 year old who is being evaluated via a billable video visit.    How would you like to obtain your AVS? MyChart  If the video visit is dropped, the invitation should be resent by: Text to cell phone: [phone]  Will anyone else be joining your video visit? No      Assessment & Plan     Tension headache  History is mostly consistent with tension headaches. Considered a more acute process but she lacks other neurologic symptoms at this time. This is different than her history of trigeminal neuralgia as well. Will treat with Meloxicam and Flexeril. Stop gabapentin since it has not been helpful. RTC prn for any new, changing or worsening symptoms.    - meloxicam (MOBIC) 7.5 MG tablet; Take 1-2 tablets (7.5-15 mg) by mouth daily  - cyclobenzaprine (FLEXERIL) 5 MG tablet; Take 1 tablet (5 mg) by mouth 3 times daily as needed for other (tension headache)    BMI  Estimated body mass index is 40.63 kg/m  as calculated from the following:    Height as of 1/29/24: 1.599 m (5' 2.95\").    Weight as of 1/29/24: 103.9 kg (229 lb).     Subjective   Sangita is a 58 year old, presenting for the following health issues:  Headache      3/28/2024     8:14 AM   Additional Questions   Roomed by Jaja SKELTON CMA     Via the Health Maintenance questionnaire, the patient has reported the following services have been completed -Cervical Cancer Screening, this information has been sent to the abstraction team.  History of Present Illness       Headaches:   Since the patient's last clinic visit, headaches are: no change  The patient is getting headaches:  Constantly; tylenol, advil, gabapentin give a little relief  She is not able to do normal daily activities when she has a migraine.  The patient is taking the following rescue/relief medications:  Ibuprofen (Advil, Motrin), Tylenol and other   Patient states \"I get only a small amount of relief\" from the rescue/relief medications.   The patient is taking the following medications " to prevent migraines:  No medications to prevent migraines  In the past 4 weeks, the patient has gone to an Urgent Care or Emergency Room 0 times times due to headaches.    She eats 4 or more servings of fruits and vegetables daily.She consumes 0 sweetened beverage(s) daily.She exercises with enough effort to increase her heart rate 10 to 19 minutes per day.  She exercises with enough effort to increase her heart rate 4 days per week.   She is taking medications regularly.         Review of Systems  See HPI      Objective           Vitals:  No vitals were obtained today due to virtual visit.    Physical Exam   GENERAL: alert and no distress  EYES: Eyes grossly normal to inspection.  No discharge or erythema, or obvious scleral/conjunctival abnormalities.  RESP: No audible wheeze, cough, or visible cyanosis.    SKIN: Visible skin clear. No significant rash, abnormal pigmentation or lesions.  NEURO: Cranial nerves grossly intact.  Mentation and speech appropriate for age.  PSYCH: Appropriate affect, tone, and pace of words      Video-Visit Details    Type of service:  Video Visit   Originating Location (pt. Location): Home    Distant Location (provider location):  Off-site  Platform used for Video Visit: Jayla  Signed Electronically by: Anton Arreola PA-C

## 2024-04-03 ENCOUNTER — MYC MEDICAL ADVICE (OUTPATIENT)
Dept: FAMILY MEDICINE | Facility: CLINIC | Age: 59
End: 2024-04-03
Payer: OTHER GOVERNMENT

## 2024-04-08 ENCOUNTER — OFFICE VISIT (OUTPATIENT)
Dept: FAMILY MEDICINE | Facility: CLINIC | Age: 59
End: 2024-04-08
Payer: OTHER GOVERNMENT

## 2024-04-08 VITALS
SYSTOLIC BLOOD PRESSURE: 138 MMHG | DIASTOLIC BLOOD PRESSURE: 82 MMHG | TEMPERATURE: 98 F | BODY MASS INDEX: 41.11 KG/M2 | WEIGHT: 232 LBS | OXYGEN SATURATION: 97 % | HEART RATE: 74 BPM | HEIGHT: 63 IN

## 2024-04-08 DIAGNOSIS — G44.52 NEW DAILY PERSISTENT HEADACHE: Primary | ICD-10-CM

## 2024-04-08 LAB
ANION GAP SERPL CALCULATED.3IONS-SCNC: 9 MMOL/L (ref 7–15)
BUN SERPL-MCNC: 11.2 MG/DL (ref 6–20)
CALCIUM SERPL-MCNC: 9.8 MG/DL (ref 8.6–10)
CHLORIDE SERPL-SCNC: 104 MMOL/L (ref 98–107)
CREAT SERPL-MCNC: 0.89 MG/DL (ref 0.51–0.95)
DEPRECATED HCO3 PLAS-SCNC: 27 MMOL/L (ref 22–29)
EGFRCR SERPLBLD CKD-EPI 2021: 75 ML/MIN/1.73M2
ERYTHROCYTE [DISTWIDTH] IN BLOOD BY AUTOMATED COUNT: 12.6 % (ref 10–15)
FERRITIN SERPL-MCNC: 144 NG/ML (ref 11–328)
GLUCOSE SERPL-MCNC: 111 MG/DL (ref 70–99)
HCT VFR BLD AUTO: 41.8 % (ref 35–47)
HGB BLD-MCNC: 14.5 G/DL (ref 11.7–15.7)
MCH RBC QN AUTO: 31.9 PG (ref 26.5–33)
MCHC RBC AUTO-ENTMCNC: 34.7 G/DL (ref 31.5–36.5)
MCV RBC AUTO: 92 FL (ref 78–100)
PLATELET # BLD AUTO: 208 10E3/UL (ref 150–450)
POTASSIUM SERPL-SCNC: 4.4 MMOL/L (ref 3.4–5.3)
RBC # BLD AUTO: 4.55 10E6/UL (ref 3.8–5.2)
SODIUM SERPL-SCNC: 140 MMOL/L (ref 135–145)
TSH SERPL DL<=0.005 MIU/L-ACNC: 1.57 UIU/ML (ref 0.3–4.2)
WBC # BLD AUTO: 6.5 10E3/UL (ref 4–11)

## 2024-04-08 PROCEDURE — 80048 BASIC METABOLIC PNL TOTAL CA: CPT | Performed by: PHYSICIAN ASSISTANT

## 2024-04-08 PROCEDURE — 82728 ASSAY OF FERRITIN: CPT | Performed by: PHYSICIAN ASSISTANT

## 2024-04-08 PROCEDURE — 85027 COMPLETE CBC AUTOMATED: CPT | Performed by: PHYSICIAN ASSISTANT

## 2024-04-08 PROCEDURE — 99214 OFFICE O/P EST MOD 30 MIN: CPT | Performed by: PHYSICIAN ASSISTANT

## 2024-04-08 PROCEDURE — 36415 COLL VENOUS BLD VENIPUNCTURE: CPT | Performed by: PHYSICIAN ASSISTANT

## 2024-04-08 PROCEDURE — 84443 ASSAY THYROID STIM HORMONE: CPT | Performed by: PHYSICIAN ASSISTANT

## 2024-04-08 RX ORDER — MELOXICAM 7.5 MG/1
7.5-15 TABLET ORAL DAILY
Qty: 60 TABLET | Refills: 1 | Status: SHIPPED | OUTPATIENT
Start: 2024-04-08

## 2024-04-08 RX ORDER — NORTRIPTYLINE HCL 10 MG
10 CAPSULE ORAL AT BEDTIME
Qty: 90 CAPSULE | Refills: 0 | Status: SHIPPED | OUTPATIENT
Start: 2024-04-08 | End: 2024-05-16

## 2024-04-08 ASSESSMENT — ENCOUNTER SYMPTOMS: HEADACHES: 1

## 2024-04-08 NOTE — PROGRESS NOTES
"  Assessment & Plan   New daily persistent headache  Initially improved with Meloxicam but no longer after a few days. Headaches are daily. She also mentioned a history of exertional headaches that have been going on for years.  She actually does not exercise because of how bad these headaches are.  Given her new daily headaches especially at the age of 58 and his exertional headaches I think it is reasonable to move forward with imaging of her brain and her neck.  Will also check basic labs including CBC, ferritin, BMP and TSH for recheck.  Will also add nortriptyline at bedtime to see if this helps with her headaches and continue tizanidine and meloxicam as needed for symptoms.  - tiZANidine (ZANAFLEX) 4 MG tablet; Take 1 tablet (4 mg) by mouth 3 times daily as needed for muscle spasms (headaches)  - nortriptyline (PAMELOR) 10 MG capsule; Take 1 capsule (10 mg) by mouth at bedtime  - MR Brain w/o & w Contrast; Future  - MRA Brain ([place]) wo & w Contrast; Future  - MRA Neck ([place]) wo & w Contrast; Future  - CBC with platelets; Future  - TSH with free T4 reflex; Future  - Ferritin; Future  - Basic metabolic panel  (Ca, Cl, CO2, Creat, Gluc, K, Na, BUN); Future  - meloxicam (MOBIC) 7.5 MG tablet; Take 1-2 tablets (7.5-15 mg) by mouth daily    BMI  Estimated body mass index is 41.16 kg/m  as calculated from the following:    Height as of this encounter: 1.599 m (5' 2.95\").    Weight as of this encounter: 105.2 kg (232 lb).     Adi Quesada is a 58 year old, presenting for the following health issues:  Headache        4/8/2024     1:36 PM   Additional Questions   Roomed by RADHA Feliz     Via the Health Maintenance questionnaire, the patient has reported the following services have been completed -Cervical Cancer Screening, this information has been sent to the abstraction team.  History of Present Illness       Headaches:   Since the patient's last clinic visit, headaches are: no change  The " "patient is getting headaches:  Constantly; tylenol, advil, gabapentin give a little relief  She is not able to do normal daily activities when she has a migraine.  The patient is taking the following rescue/relief medications:  Ibuprofen (Advil, Motrin), Tylenol and other   Patient states \"I get only a small amount of relief\" from the rescue/relief medications.   The patient is taking the following medications to prevent migraines:  No medications to prevent migraines  In the past 4 weeks, the patient has gone to an Urgent Care or Emergency Room 0 times times due to headaches.    She eats 4 or more servings of fruits and vegetables daily.She consumes 0 sweetened beverage(s) daily.She exercises with enough effort to increase her heart rate 10 to 19 minutes per day.  She exercises with enough effort to increase her heart rate 4 days per week.   She is taking medications regularly.       Review of Systems  See HPI       Objective    /82   Pulse 74   Temp 98  F (36.7  C) (Tympanic)   Ht 1.599 m (5' 2.95\")   Wt 105.2 kg (232 lb)   LMP 12/09/2018   SpO2 97%   BMI 41.16 kg/m    Body mass index is 41.16 kg/m .  Physical Exam   GENERAL: alert and no distress  EYES: Eyes grossly normal to inspection, PERRL and conjunctivae and sclerae normal  NECK: no adenopathy, no asymmetry, masses, or scars  RESP: lungs clear to auscultation - no rales, rhonchi or wheezes  CV: regular rate and rhythm, normal S1 S2, no S3 or S4, no murmur, click or rub, no peripheral edema  MS: no gross musculoskeletal defects noted, no edema  NEURO: Normal strength and tone, sensory exam grossly normal, mentation intact, speech normal, and cranial nerves 2-12 intact          Signed Electronically by: Anton Arreola PA-C    "

## 2024-04-12 NOTE — PATIENT INSTRUCTIONS
Lets do some more evaluation for your new daily headaches and your exertional headaches.  Please schedule the MRI scan your head and your neck.  Lab workup is pending.    Start nortriptyline at bedtime to see if this helps with your headaches.  You can use meloxicam and tizanidine which is a new muscle relaxer to see if this helps improve your symptoms as well.

## 2024-04-16 ENCOUNTER — HOSPITAL ENCOUNTER (OUTPATIENT)
Dept: MRI IMAGING | Facility: CLINIC | Age: 59
Discharge: HOME | End: 2024-04-16
Attending: PHYSICIAN ASSISTANT | Admitting: PHYSICIAN ASSISTANT
Payer: OTHER GOVERNMENT

## 2024-04-16 DIAGNOSIS — G44.52 NEW DAILY PERSISTENT HEADACHE: ICD-10-CM

## 2024-04-16 PROCEDURE — 255N000002 HC RX 255 OP 636: Performed by: PHYSICIAN ASSISTANT

## 2024-04-16 PROCEDURE — A9585 GADOBUTROL INJECTION: HCPCS | Performed by: PHYSICIAN ASSISTANT

## 2024-04-16 PROCEDURE — 258N000003 HC RX IP 258 OP 636: Performed by: PHYSICIAN ASSISTANT

## 2024-04-16 PROCEDURE — 70546 MR ANGIOGRAPH HEAD W/O&W/DYE: CPT

## 2024-04-16 PROCEDURE — 70553 MRI BRAIN STEM W/O & W/DYE: CPT

## 2024-04-16 PROCEDURE — 70549 MR ANGIOGRAPH NECK W/O&W/DYE: CPT

## 2024-04-16 RX ORDER — GADOBUTROL 604.72 MG/ML
10 INJECTION INTRAVENOUS ONCE
Status: COMPLETED | OUTPATIENT
Start: 2024-04-16 | End: 2024-04-16

## 2024-04-16 RX ADMIN — SODIUM CHLORIDE 50 ML: 9 INJECTION, SOLUTION INTRAVENOUS at 19:54

## 2024-04-16 RX ADMIN — GADOBUTROL 10 ML: 604.72 INJECTION INTRAVENOUS at 19:53

## 2024-04-17 ENCOUNTER — MYC MEDICAL ADVICE (OUTPATIENT)
Dept: FAMILY MEDICINE | Facility: CLINIC | Age: 59
End: 2024-04-17
Payer: OTHER GOVERNMENT

## 2024-04-17 DIAGNOSIS — G44.52 NEW DAILY PERSISTENT HEADACHE: ICD-10-CM

## 2024-04-17 DIAGNOSIS — G44.52 NEW DAILY PERSISTENT HEADACHE: Primary | ICD-10-CM

## 2024-04-18 RX ORDER — RIZATRIPTAN BENZOATE 10 MG/1
10 TABLET, ORALLY DISINTEGRATING ORAL
Qty: 20 TABLET | Refills: 1 | Status: SHIPPED | OUTPATIENT
Start: 2024-04-18

## 2024-05-03 DIAGNOSIS — R11.0 NAUSEA: ICD-10-CM

## 2024-05-03 RX ORDER — ONDANSETRON 4 MG/1
4 TABLET, ORALLY DISINTEGRATING ORAL EVERY 8 HOURS PRN
Qty: 10 TABLET | Refills: 0 | Status: SHIPPED | OUTPATIENT
Start: 2024-05-03

## 2024-05-12 DIAGNOSIS — G44.52 NEW DAILY PERSISTENT HEADACHE: ICD-10-CM

## 2024-05-13 RX ORDER — NORTRIPTYLINE HCL 10 MG
10 CAPSULE ORAL AT BEDTIME
Qty: 90 CAPSULE | Refills: 0 | OUTPATIENT
Start: 2024-05-13

## 2024-05-15 ENCOUNTER — MYC MEDICAL ADVICE (OUTPATIENT)
Dept: FAMILY MEDICINE | Facility: CLINIC | Age: 59
End: 2024-05-15
Payer: OTHER GOVERNMENT

## 2024-05-15 DIAGNOSIS — G44.52 NEW DAILY PERSISTENT HEADACHE: ICD-10-CM

## 2024-05-16 ENCOUNTER — OFFICE VISIT (OUTPATIENT)
Dept: URGENT CARE | Facility: URGENT CARE | Age: 59
End: 2024-05-16
Payer: OTHER GOVERNMENT

## 2024-05-16 ENCOUNTER — MYC REFILL (OUTPATIENT)
Dept: FAMILY MEDICINE | Facility: CLINIC | Age: 59
End: 2024-05-16

## 2024-05-16 VITALS
TEMPERATURE: 97.3 F | SYSTOLIC BLOOD PRESSURE: 135 MMHG | BODY MASS INDEX: 41.16 KG/M2 | HEART RATE: 79 BPM | RESPIRATION RATE: 18 BRPM | WEIGHT: 232 LBS | DIASTOLIC BLOOD PRESSURE: 80 MMHG | OXYGEN SATURATION: 97 %

## 2024-05-16 DIAGNOSIS — G89.29 CHRONIC NONINTRACTABLE HEADACHE, UNSPECIFIED HEADACHE TYPE: ICD-10-CM

## 2024-05-16 DIAGNOSIS — R51.9 CHRONIC NONINTRACTABLE HEADACHE, UNSPECIFIED HEADACHE TYPE: ICD-10-CM

## 2024-05-16 DIAGNOSIS — G44.52 NEW DAILY PERSISTENT HEADACHE: ICD-10-CM

## 2024-05-16 DIAGNOSIS — N39.0 ACUTE UTI: Primary | ICD-10-CM

## 2024-05-16 LAB
ALBUMIN UR-MCNC: NEGATIVE MG/DL
APPEARANCE UR: CLEAR
BACTERIA #/AREA URNS HPF: ABNORMAL /HPF
BILIRUB UR QL STRIP: NEGATIVE
CLUE CELLS: NORMAL
COLOR UR AUTO: YELLOW
GLUCOSE UR STRIP-MCNC: NEGATIVE MG/DL
HGB UR QL STRIP: ABNORMAL
KETONES UR STRIP-MCNC: NEGATIVE MG/DL
LEUKOCYTE ESTERASE UR QL STRIP: ABNORMAL
NITRATE UR QL: NEGATIVE
PH UR STRIP: 7 [PH] (ref 5–7)
RBC #/AREA URNS AUTO: ABNORMAL /HPF
SP GR UR STRIP: 1.01 (ref 1–1.03)
TRICHOMONAS, WET PREP: NORMAL
UROBILINOGEN UR STRIP-ACNC: 0.2 E.U./DL
WBC #/AREA URNS AUTO: ABNORMAL /HPF
WBC'S/HIGH POWER FIELD, WET PREP: NORMAL
YEAST, WET PREP: NORMAL

## 2024-05-16 PROCEDURE — 81001 URINALYSIS AUTO W/SCOPE: CPT

## 2024-05-16 PROCEDURE — 87086 URINE CULTURE/COLONY COUNT: CPT

## 2024-05-16 PROCEDURE — 99214 OFFICE O/P EST MOD 30 MIN: CPT

## 2024-05-16 PROCEDURE — 87210 SMEAR WET MOUNT SALINE/INK: CPT

## 2024-05-16 RX ORDER — NORTRIPTYLINE HCL 10 MG
10 CAPSULE ORAL AT BEDTIME
Qty: 14 CAPSULE | Refills: 0 | Status: SHIPPED | OUTPATIENT
Start: 2024-05-16 | End: 2024-08-15

## 2024-05-16 RX ORDER — NITROFURANTOIN 25; 75 MG/1; MG/1
100 CAPSULE ORAL 2 TIMES DAILY
Qty: 10 CAPSULE | Refills: 0 | Status: SHIPPED | OUTPATIENT
Start: 2024-05-16 | End: 2024-05-21

## 2024-05-16 RX ORDER — PHENAZOPYRIDINE HYDROCHLORIDE 200 MG/1
200 TABLET, FILM COATED ORAL 3 TIMES DAILY PRN
Qty: 14 TABLET | Refills: 0 | Status: SHIPPED | OUTPATIENT
Start: 2024-05-16

## 2024-05-16 NOTE — PROGRESS NOTES
URGENT CARE  Assessment & Plan   Assessment:   Sangita Meraz is a 58 year old female who's clinical presentation today is consistent with:   1. Acute UTI - recurrent  - UA Macroscopic with reflex to Microscopic and Culture  - Wet preparation  - nitroFURantoin macrocrystal-monohydrate (MACROBID) 100 MG capsule;   - phenazopyridine (PYRIDIUM) 200 MG tablet  Plan:  Will treat patient's recurrent cystitis, with antibiotics at this time, culture pending and will call if a different antibiotic is needed   Educated patient that for symptomatic relief she my use Azo (Phenazopyridine) as needed, side effects of medications reviewed.}  additionally encouraged patient to increase fluid intake, practice good hygiene (wiping front to back, voiding after sexual intercourse), and avoidance of deodorant sprays.   Educated patient if recurrence is a concern to follow up with her PCP}  Additionally we discussed if symptoms do not improve after starting today's treatment (or if symptoms worsen) to follow up in 5-7 days.    No alarm signs or symptoms present   Differential Diagnoses for this patient's CC include: Bacterial vaginosis, candidiasis, Vulvovaginitis, atrophic vaginitis, Overactive bladder, urethritis, interstitial cystitis, urethral irritation,  Pyelonephritis, nephrolithiasis, Pelvic organ (uterine/bladder) prolapse, Foreign body in bladder,  Atypical etiology of cystitis: fungal, viral, contact vs allergic vaginitis, Malignancy (vaginal, ovarian, cervical     2. Chronic nonintractable headache, unspecified headache type  - nortriptyline (PAMELOR) 10 MG capsule;   Plan:  Patient requesting a refill of her nortriptyline for her chronic headaches, discussed with patient we can do a short-term refill however she will need to follow-up with her primary care in the next 1 to 2 weeks for more of a long-term refill of this medication.  No alarm signs or symptoms present     Patient is agreeable to treatment plan and state they  will follow-up if symptoms do not improve and/or if symptoms worsen   see patient's AVS 'monitor for' section for specific patient instructions given and discussed regarding what to watch for and when to follow up    [facility] [place]      ______________________________________________________________________      Subjective     HPI: Sangita Meraz is a 58 year old  female who presents today for evaluation the following concerns:   The patient presents today complaining of dysuria, burning and urinary urgency,} for a few days    Patient  denies} blood in urine, pelvic pain  Patient endorses having a past history of frequent urinary tract infections, her last one was 4-5 months ago   Patient denies back pain/flank pain, fever, chills, or any abnormal vaginal discharge/odor or bleeding.     Patient also endorses she gets chronic headaches for which she takes nortriptyline, patient states this medication has given her good relief.  Patient requesting a refill today as she states she is not able to get in with her pcp     Review of Systems:  Pertinent review of systems as reflected in HPI, otherwise negative.     Objective    Physical Exam:  Vitals:    05/16/24 1548   BP: 135/80   Pulse: 79   Resp: 18   Temp: 97.3  F (36.3  C)   TempSrc: Tympanic   SpO2: 97%   Weight: 105.2 kg (232 lb)      General: Alert and oriented, no acute distress, afebrile, normotensive  Psy/mental status: Nonanxious, cooperative  SKIN: Intact, no open areas  ABDOMEN:  soft, non-tender, non-distended    No flank pain or CVA tenderness   Pelvic/ :   Deferred    LABS:   Results for orders placed or performed in visit on 05/16/24   UA Macroscopic with reflex to Microscopic and Culture     Status: Abnormal    Specimen: Urine, Clean Catch   Result Value Ref Range    Color Urine Yellow Colorless, Straw, Light Yellow, Yellow    Appearance Urine Clear Clear    Glucose Urine Negative Negative mg/dL     Bilirubin Urine Negative Negative    Ketones Urine Negative Negative mg/dL    Specific Gravity Urine 1.010 1.003 - 1.035    Blood Urine Trace (A) Negative    pH Urine 7.0 5.0 - 7.0    Protein Albumin Urine Negative Negative mg/dL    Urobilinogen Urine 0.2 0.2, 1.0 E.U./dL    Nitrite Urine Negative Negative    Leukocyte Esterase Urine Moderate (A) Negative   Urine Microscopic Exam     Status: Abnormal   Result Value Ref Range    Bacteria Urine Few (A) None Seen /HPF    RBC Urine 0-2 0-2 /HPF /HPF    WBC Urine 10-25 (A) 0-5 /HPF /HPF   Wet preparation     Status: Normal    Specimen: Vagina; Swab   Result Value Ref Range    Trichomonas Absent Absent    Yeast Absent Absent    Clue Cells Absent Absent    WBCs/high power field None None        ______________________________________________________________________    I explained my diagnostic considerations and recommendations to the patient, who voiced understanding and agreement with the treatment plan.   All questions were answered.   We discussed potential side effects, risks and benefits of any prescribed or recommended therapies, as well as expectations for response to treatments.  Please see AVS for any patient instructions & handouts given.   Patient was advised to contact the Nurse Care Line, their Primary Care provider, Urgent Care, or the Emergency Department if there are new or worsening symptoms, or call 911 for emergencies.

## 2024-05-16 NOTE — PATIENT INSTRUCTIONS
Diagnosis: UTI (urinary tract infection)   Possibly a UTI, but only a small amount of wbc seen    Will start antibiotics and call if stop or change needed     Plan:   Lab results today were positive for urinary tract infection.   Urine culture will becompleted and you  will only receive a phone call if antibiotic treatment needs to be adjusted  Start antibiotics today, take full course   Monitor GI distress  Consider a probiotic, kefir, or yogurt with live active cultures to replace good bacteria in the gastrointestinal tract.  Increase fluids   Can try Aso or phridum to help reduce pain   Pyridium will turn your urine orange and may stain your clothing.  Other   Avoid items that can irritate the bladder: caffeine, alcohol, spicy food, nicotine, carbonated drinks, and artificial sweeteners  Empty bladder frequently even if small amounts, helps to remove bacteria       Monitor for:   New Fevers  Symptoms are not getting better   Pain in the belly (abdomen) area below the bellybutton,  Low back back pain, on the sides, below the ribs- flanks   Nausea or vomiting  Unable to control bladder   Feeling confused, lightheaded or dizzy         UTI urinary tract infection, bladder infection   A bladder infection, also called cystitis, or urinary tract infection   Is where the inner lining of the bladder becomes inflamed (red and swollen) and the urine is full of bacteria.   It happens when bacteria travel up the urethra and into the bladder, usually from stool contact   Women are more likely to have bladder infections than men because their urethra is shorter.   The short urethra makes it easier for bacteria from the anus or genital area to reach the bladder.   This can happen during such activities as wiping or sexual intercourse. Most infections of the urinary tract are caused this way.   Bladder infections often occur in young women who have just become sexually active and have sexual intercourse often.   Symptoms: a  frequent and urgent need to urinate, a burning, stinging, or pressure sensation during urination   a crampy pain or discomfort in the lower abdomen just above the pubic bone or sometimes in the lower back   a need to urinate more often in the night, cloudy urine that smells bad, blood in the urine, leaking of urine, fever and occasionally chills.   Prevent  To help prevent a bladder infection from recurring:  -urinate often during the day, and empty your bladder completely each time.   In addition women should follow these guidelines:   - Keep the vaginal area clean.   - Wipe from front to back after a bowel movement.   - Be sure to wash the genital area each time you bathe or shower.   - However, use soap only on the outside of your vagina.   - The chemicals in soap may cause additional irritation.   - Urinate after intercourse. Never combine anal and vaginal intercourse.   - Wear cotton underwear, which allows better air circulation than nylon.    - Avoid tight clothes in the genital area, such as control-top pantyhose and tight jeans.   - Do not wear a wet bathing suit for long periods of time.

## 2024-05-17 ENCOUNTER — MYC REFILL (OUTPATIENT)
Dept: FAMILY MEDICINE | Facility: CLINIC | Age: 59
End: 2024-05-17
Payer: OTHER GOVERNMENT

## 2024-05-17 DIAGNOSIS — G44.52 NEW DAILY PERSISTENT HEADACHE: ICD-10-CM

## 2024-05-17 RX ORDER — NORTRIPTYLINE HCL 10 MG
30 CAPSULE ORAL AT BEDTIME
Qty: 270 CAPSULE | Refills: 3 | Status: SHIPPED | OUTPATIENT
Start: 2024-05-17

## 2024-05-17 RX ORDER — NORTRIPTYLINE HCL 10 MG
10 CAPSULE ORAL AT BEDTIME
Qty: 90 CAPSULE | Refills: 0 | OUTPATIENT
Start: 2024-05-17

## 2024-05-17 RX ORDER — NORTRIPTYLINE HCL 10 MG
30 CAPSULE ORAL AT BEDTIME
Qty: 270 CAPSULE | Refills: 3 | OUTPATIENT
Start: 2024-05-17

## 2024-05-18 LAB — BACTERIA UR CULT: NORMAL

## 2024-05-26 NOTE — TELEPHONE ENCOUNTER
Anesthesia Evaluation     Patient summary reviewed and Nursing notes reviewed   no history of anesthetic complications:   NPO Solid Status: > 8 hours  NPO Liquid Status: > 2 hours           Airway   Mallampati: II  TM distance: >3 FB  Neck ROM: full  No difficulty expected and Large neck circumference  Dental      Pulmonary - normal exam    breath sounds clear to auscultation  (+) ,sleep apnea (no formal diagnosis but likely based on history)  Cardiovascular - normal exam  Exercise tolerance: good (4-7 METS)    PT is on anticoagulation therapy  Rhythm: regular  Rate: normal    (+) hypertension    ROS comment: Last plavix 5/24    Neuro/Psych- negative ROS    ROS Comment: Aneurysm coiled Jan 2024.  On plavix  GI/Hepatic/Renal/Endo    (+) GI bleeding active bleeding, diabetes mellitus (Ozempic, last dose 6 days ago) type 2    ROS Comment: Ozempic this week    Musculoskeletal     Abdominal    Substance History      OB/GYN          Other        ROS/Med Hx Other: PAT Nursing Notes unavailable.     Last ozempic dose on 5/21/24    CT- 8 mm stone with left hydro                  Anesthesia Plan    ASA 3     general     (Patient understands anesthesia not responsible for dental damage.      )  intravenous induction     Anesthetic plan, risks, benefits, and alternatives have been provided, discussed and informed consent has been obtained with: patient and spouse/significant other.  Pre-procedure education provided    CODE STATUS:    Level Of Support Discussed With: Patient  Code Status (Patient has no pulse and is not breathing): CPR (Attempt to Resuscitate)  Medical Interventions (Patient has pulse or is breathing): Full Support       JESICA results rev'd from [facility] and have been scanned into chart.  Message has been sent to provider for review.    Recording Start Date: 1/5/2023    Completed on: 1/6/2023    Duration: 6  Hrs: 58 Minutes: 38 Seconds   Time (min) Spent below 88%: 0.1 MIN      ALBA De Leon             What Type Of Note Output Would You Prefer (Optional)?: Standard Output Hpi Title: Evaluation of Skin Lesions How Severe Are Your Spot(S)?: mild

## 2024-06-23 DIAGNOSIS — E66.01 MORBID OBESITY (H): ICD-10-CM

## 2024-06-23 DIAGNOSIS — R73.03 PREDIABETES: ICD-10-CM

## 2024-06-24 RX ORDER — METFORMIN HCL 500 MG
TABLET, EXTENDED RELEASE 24 HR ORAL
Qty: 180 TABLET | Refills: 1 | Status: SHIPPED | OUTPATIENT
Start: 2024-06-24

## 2024-08-10 ENCOUNTER — TRANSFERRED RECORDS (OUTPATIENT)
Dept: MULTI SPECIALTY CLINIC | Facility: CLINIC | Age: 59
End: 2024-08-10

## 2024-08-10 LAB — RETINOPATHY: NORMAL

## 2024-08-15 ENCOUNTER — OFFICE VISIT (OUTPATIENT)
Dept: URGENT CARE | Facility: URGENT CARE | Age: 59
End: 2024-08-15
Payer: OTHER GOVERNMENT

## 2024-08-15 VITALS
HEART RATE: 81 BPM | TEMPERATURE: 98.1 F | DIASTOLIC BLOOD PRESSURE: 94 MMHG | OXYGEN SATURATION: 97 % | WEIGHT: 235 LBS | BODY MASS INDEX: 41.69 KG/M2 | SYSTOLIC BLOOD PRESSURE: 136 MMHG | RESPIRATION RATE: 15 BRPM

## 2024-08-15 DIAGNOSIS — R30.0 DYSURIA: ICD-10-CM

## 2024-08-15 DIAGNOSIS — N76.0 BV (BACTERIAL VAGINOSIS): Primary | ICD-10-CM

## 2024-08-15 DIAGNOSIS — B96.89 BV (BACTERIAL VAGINOSIS): Primary | ICD-10-CM

## 2024-08-15 LAB
ALBUMIN UR-MCNC: NEGATIVE MG/DL
APPEARANCE UR: CLEAR
BILIRUB UR QL STRIP: NEGATIVE
CLUE CELLS: PRESENT
COLOR UR AUTO: YELLOW
GLUCOSE UR STRIP-MCNC: NEGATIVE MG/DL
HGB UR QL STRIP: NEGATIVE
KETONES UR STRIP-MCNC: NEGATIVE MG/DL
LEUKOCYTE ESTERASE UR QL STRIP: NEGATIVE
NITRATE UR QL: NEGATIVE
PH UR STRIP: 7 [PH] (ref 5–7)
SP GR UR STRIP: 1.01 (ref 1–1.03)
TRICHOMONAS, WET PREP: ABNORMAL
UROBILINOGEN UR STRIP-ACNC: 0.2 E.U./DL
WBC'S/HIGH POWER FIELD, WET PREP: ABNORMAL
YEAST, WET PREP: ABNORMAL

## 2024-08-15 PROCEDURE — 87210 SMEAR WET MOUNT SALINE/INK: CPT | Performed by: PHYSICIAN ASSISTANT

## 2024-08-15 PROCEDURE — 81003 URINALYSIS AUTO W/O SCOPE: CPT | Performed by: PHYSICIAN ASSISTANT

## 2024-08-15 PROCEDURE — 87086 URINE CULTURE/COLONY COUNT: CPT | Performed by: PHYSICIAN ASSISTANT

## 2024-08-15 PROCEDURE — 99214 OFFICE O/P EST MOD 30 MIN: CPT | Performed by: PHYSICIAN ASSISTANT

## 2024-08-15 RX ORDER — METRONIDAZOLE 500 MG/1
500 TABLET ORAL 2 TIMES DAILY
Qty: 14 TABLET | Refills: 0 | Status: SHIPPED | OUTPATIENT
Start: 2024-08-15 | End: 2024-08-22

## 2024-08-15 RX ORDER — BUSPIRONE HYDROCHLORIDE 10 MG/1
1 TABLET ORAL
COMMUNITY
Start: 2024-02-09

## 2024-08-15 NOTE — PROGRESS NOTES
"  Assessment & Plan     BV (bacterial vaginosis)  Will treat with metronidazole 500mg twice daily x 7 days. Discussed how to take this medication and what to expect; cautioned her on alcohol consumption while taking this medication. Follow up as needed.   - metroNIDAZOLE (FLAGYL) 500 MG tablet; Take 1 tablet (500 mg) by mouth 2 times daily for 7 days    Dysuria  Culture pending. Will treat based on culture results.   - UA Macroscopic with reflex to Microscopic and Culture - Clinic Collect  - Wet prep - Clinic Collect  - Urine Culture Aerobic Bacterial - lab collect; Future  - Urine Culture Aerobic Bacterial - lab collect          BMI  Estimated body mass index is 41.69 kg/m  as calculated from the following:    Height as of 4/8/24: 1.599 m (5' 2.95\").    Weight as of this encounter: 106.6 kg (235 lb).             Return in about 3 days (around 8/18/2024), or if symptoms worsen or fail to improve.        Subjective   Chief Complaint   Patient presents with    Dysuria     Pain with urination, urgency x 1 week.       HPI     UTI    Onset of symptoms was 1week(s).  Course of illness is worsening  Severity moderate  Current and associated symptoms dysuria, frequency, and burning  Treatment and measures tried Increase fluid intake  Predisposing factors include none  Patient denies flank pain and temperature > 101 degrees F.                        Objective    BP (!) 136/94   Pulse 81   Temp 98.1  F (36.7  C) (Tympanic)   Resp 15   Wt 106.6 kg (235 lb)   LMP 12/09/2018   SpO2 97%   BMI 41.69 kg/m    Body mass index is 41.69 kg/m .  Physical Exam  Constitutional:       General: She is not in acute distress.     Appearance: She is well-developed.   Psychiatric:         Behavior: Behavior normal.            Results for orders placed or performed in visit on 08/15/24 (from the past 24 hour(s))   UA Macroscopic with reflex to Microscopic and Culture - Clinic Collect    Specimen: Urine, Clean Catch   Result Value Ref " Range    Color Urine Yellow Colorless, Straw, Light Yellow, Yellow    Appearance Urine Clear Clear    Glucose Urine Negative Negative mg/dL    Bilirubin Urine Negative Negative    Ketones Urine Negative Negative mg/dL    Specific Gravity Urine 1.010 1.003 - 1.035    Blood Urine Negative Negative    pH Urine 7.0 5.0 - 7.0    Protein Albumin Urine Negative Negative mg/dL    Urobilinogen Urine 0.2 0.2, 1.0 E.U./dL    Nitrite Urine Negative Negative    Leukocyte Esterase Urine Negative Negative    Narrative    Microscopic not indicated   Wet prep - Clinic Collect    Specimen: Vagina; Swab   Result Value Ref Range    Trichomonas Absent Absent    Yeast Absent Absent    Clue Cells Present (A) Absent    WBCs/high power field 1+ (A) None           Signed Electronically by: Cleo Garcia PA-C

## 2024-08-16 ENCOUNTER — MYC MEDICAL ADVICE (OUTPATIENT)
Dept: GASTROENTEROLOGY | Facility: CLINIC | Age: 59
End: 2024-08-16
Payer: OTHER GOVERNMENT

## 2024-08-17 LAB — BACTERIA UR CULT: NORMAL

## 2024-08-27 ENCOUNTER — HOSPITAL ENCOUNTER (EMERGENCY)
Facility: CLINIC | Age: 59
Discharge: HOME | End: 2024-08-27
Attending: EMERGENCY MEDICINE | Admitting: EMERGENCY MEDICINE
Payer: OTHER GOVERNMENT

## 2024-08-27 ENCOUNTER — APPOINTMENT (OUTPATIENT)
Dept: CT IMAGING | Facility: CLINIC | Age: 59
End: 2024-08-27
Attending: EMERGENCY MEDICINE
Payer: OTHER GOVERNMENT

## 2024-08-27 VITALS
WEIGHT: 235 LBS | DIASTOLIC BLOOD PRESSURE: 73 MMHG | SYSTOLIC BLOOD PRESSURE: 136 MMHG | TEMPERATURE: 97.5 F | BODY MASS INDEX: 41.69 KG/M2 | HEART RATE: 74 BPM | RESPIRATION RATE: 18 BRPM | OXYGEN SATURATION: 97 %

## 2024-08-27 DIAGNOSIS — N39.0 URINARY TRACT INFECTION IN FEMALE: ICD-10-CM

## 2024-08-27 DIAGNOSIS — R10.9 LEFT FLANK PAIN: ICD-10-CM

## 2024-08-27 LAB
ALBUMIN UR-MCNC: NEGATIVE MG/DL
ANION GAP SERPL CALCULATED.3IONS-SCNC: 9 MMOL/L (ref 7–15)
APPEARANCE UR: ABNORMAL
BASOPHILS # BLD AUTO: 0 10E3/UL (ref 0–0.2)
BASOPHILS NFR BLD AUTO: 1 %
BILIRUB UR QL STRIP: NEGATIVE
BUN SERPL-MCNC: 9.1 MG/DL (ref 6–20)
CALCIUM SERPL-MCNC: 9.3 MG/DL (ref 8.8–10.4)
CHLORIDE SERPL-SCNC: 100 MMOL/L (ref 98–107)
COLOR UR AUTO: YELLOW
CREAT SERPL-MCNC: 0.87 MG/DL (ref 0.51–0.95)
EGFRCR SERPLBLD CKD-EPI 2021: 77 ML/MIN/1.73M2
EOSINOPHIL # BLD AUTO: 0.1 10E3/UL (ref 0–0.7)
EOSINOPHIL NFR BLD AUTO: 2 %
ERYTHROCYTE [DISTWIDTH] IN BLOOD BY AUTOMATED COUNT: 12.4 % (ref 10–15)
GLUCOSE SERPL-MCNC: 153 MG/DL (ref 70–99)
GLUCOSE UR STRIP-MCNC: NEGATIVE MG/DL
HCO3 SERPL-SCNC: 28 MMOL/L (ref 22–29)
HCT VFR BLD AUTO: 43.7 % (ref 35–47)
HGB BLD-MCNC: 15.4 G/DL (ref 11.7–15.7)
HGB UR QL STRIP: ABNORMAL
HOLD SPECIMEN: NORMAL
HOLD SPECIMEN: NORMAL
IMM GRANULOCYTES # BLD: 0 10E3/UL
IMM GRANULOCYTES NFR BLD: 0 %
KETONES UR STRIP-MCNC: NEGATIVE MG/DL
LEUKOCYTE ESTERASE UR QL STRIP: ABNORMAL
LYMPHOCYTES # BLD AUTO: 1.3 10E3/UL (ref 0.8–5.3)
LYMPHOCYTES NFR BLD AUTO: 25 %
MCH RBC QN AUTO: 32.2 PG (ref 26.5–33)
MCHC RBC AUTO-ENTMCNC: 35.2 G/DL (ref 31.5–36.5)
MCV RBC AUTO: 91 FL (ref 78–100)
MONOCYTES # BLD AUTO: 0.4 10E3/UL (ref 0–1.3)
MONOCYTES NFR BLD AUTO: 7 %
NEUTROPHILS # BLD AUTO: 3.3 10E3/UL (ref 1.6–8.3)
NEUTROPHILS NFR BLD AUTO: 65 %
NITRATE UR QL: NEGATIVE
NRBC # BLD AUTO: 0 10E3/UL
NRBC BLD AUTO-RTO: 0 /100
PH UR STRIP: 6 [PH] (ref 5–7)
PLATELET # BLD AUTO: 202 10E3/UL (ref 150–450)
POTASSIUM SERPL-SCNC: 3.8 MMOL/L (ref 3.4–5.3)
RBC # BLD AUTO: 4.79 10E6/UL (ref 3.8–5.2)
RBC URINE: 7 /HPF
SODIUM SERPL-SCNC: 137 MMOL/L (ref 135–145)
SP GR UR STRIP: 1.01 (ref 1–1.03)
SQUAMOUS EPITHELIAL: 5 /HPF
UROBILINOGEN UR STRIP-MCNC: NORMAL MG/DL
WBC # BLD AUTO: 5 10E3/UL (ref 4–11)
WBC CLUMPS #/AREA URNS HPF: PRESENT /HPF
WBC URINE: >182 /HPF

## 2024-08-27 PROCEDURE — 81001 URINALYSIS AUTO W/SCOPE: CPT | Performed by: EMERGENCY MEDICINE

## 2024-08-27 PROCEDURE — 80048 BASIC METABOLIC PNL TOTAL CA: CPT | Performed by: EMERGENCY MEDICINE

## 2024-08-27 PROCEDURE — 250N000011 HC RX IP 250 OP 636: Performed by: EMERGENCY MEDICINE

## 2024-08-27 PROCEDURE — 99285 EMERGENCY DEPT VISIT HI MDM: CPT | Performed by: EMERGENCY MEDICINE

## 2024-08-27 PROCEDURE — 99285 EMERGENCY DEPT VISIT HI MDM: CPT | Mod: 25

## 2024-08-27 PROCEDURE — 81001 URINALYSIS AUTO W/SCOPE: CPT | Performed by: FAMILY MEDICINE

## 2024-08-27 PROCEDURE — 258N000003 HC RX IP 258 OP 636: Performed by: EMERGENCY MEDICINE

## 2024-08-27 PROCEDURE — 36415 COLL VENOUS BLD VENIPUNCTURE: CPT | Performed by: EMERGENCY MEDICINE

## 2024-08-27 PROCEDURE — 87086 URINE CULTURE/COLONY COUNT: CPT | Performed by: EMERGENCY MEDICINE

## 2024-08-27 PROCEDURE — 96361 HYDRATE IV INFUSION ADD-ON: CPT

## 2024-08-27 PROCEDURE — 96375 TX/PRO/DX INJ NEW DRUG ADDON: CPT

## 2024-08-27 PROCEDURE — 74176 CT ABD & PELVIS W/O CONTRAST: CPT

## 2024-08-27 PROCEDURE — 87186 SC STD MICRODIL/AGAR DIL: CPT | Performed by: EMERGENCY MEDICINE

## 2024-08-27 PROCEDURE — 85025 COMPLETE CBC W/AUTO DIFF WBC: CPT | Performed by: EMERGENCY MEDICINE

## 2024-08-27 PROCEDURE — 96365 THER/PROPH/DIAG IV INF INIT: CPT | Mod: 59

## 2024-08-27 RX ORDER — HYDROMORPHONE HYDROCHLORIDE 1 MG/ML
0.3 INJECTION, SOLUTION INTRAMUSCULAR; INTRAVENOUS; SUBCUTANEOUS ONCE
Status: COMPLETED | OUTPATIENT
Start: 2024-08-27 | End: 2024-08-27

## 2024-08-27 RX ORDER — KETOROLAC TROMETHAMINE 15 MG/ML
15 INJECTION, SOLUTION INTRAMUSCULAR; INTRAVENOUS ONCE
Status: COMPLETED | OUTPATIENT
Start: 2024-08-27 | End: 2024-08-27

## 2024-08-27 RX ORDER — CEFTRIAXONE 2 G/1
2 INJECTION, POWDER, FOR SOLUTION INTRAMUSCULAR; INTRAVENOUS ONCE
Status: COMPLETED | OUTPATIENT
Start: 2024-08-27 | End: 2024-08-27

## 2024-08-27 RX ORDER — HYDROCODONE BITARTRATE AND ACETAMINOPHEN 5; 325 MG/1; MG/1
1 TABLET ORAL EVERY 6 HOURS PRN
Qty: 4 TABLET | Refills: 0 | Status: SHIPPED | OUTPATIENT
Start: 2024-08-27

## 2024-08-27 RX ORDER — SULFAMETHOXAZOLE/TRIMETHOPRIM 800-160 MG
1 TABLET ORAL 2 TIMES DAILY
Qty: 14 TABLET | Refills: 0 | Status: SHIPPED | OUTPATIENT
Start: 2024-08-27 | End: 2024-08-29

## 2024-08-27 RX ORDER — ONDANSETRON 2 MG/ML
4 INJECTION INTRAMUSCULAR; INTRAVENOUS ONCE
Status: COMPLETED | OUTPATIENT
Start: 2024-08-27 | End: 2024-08-27

## 2024-08-27 RX ADMIN — HYDROMORPHONE HYDROCHLORIDE 0.3 MG: 1 INJECTION, SOLUTION INTRAMUSCULAR; INTRAVENOUS; SUBCUTANEOUS at 20:04

## 2024-08-27 RX ADMIN — KETOROLAC TROMETHAMINE 15 MG: 15 INJECTION, SOLUTION INTRAMUSCULAR; INTRAVENOUS at 18:08

## 2024-08-27 RX ADMIN — CEFTRIAXONE SODIUM 2 G: 2 INJECTION, POWDER, FOR SOLUTION INTRAMUSCULAR; INTRAVENOUS at 20:06

## 2024-08-27 RX ADMIN — ONDANSETRON 4 MG: 2 INJECTION INTRAMUSCULAR; INTRAVENOUS at 18:09

## 2024-08-27 RX ADMIN — SODIUM CHLORIDE 500 ML: 9 INJECTION, SOLUTION INTRAVENOUS at 18:08

## 2024-08-27 ASSESSMENT — ACTIVITIES OF DAILY LIVING (ADL)
ADLS_ACUITY_SCORE: 37

## 2024-08-27 NOTE — ED PROVIDER NOTES
History     Chief Complaint   Patient presents with    Flank Pain     HPI  Sangita Meraz is a 58 year old female past medical history significant for polycystic ovarian disease obstructive sleep apnea Ménière's disease who presents to the emergency department complaining of left flank pain and slight burning pressure with urination.  Patient states flank pain began yesterday and has been constant.  She has developed some nausea today but has not vomited.  Denies history of kidney stones.  She is not any fevers or chills denies any headache or visual changes and managing neck pain denies any chest pain or shortness of breath.  He has not had any anterior abdominal pain denies any bowel or bladder dysfunction.  Has not any leg swelling or focal numbness weakness any extremity.    Allergies:  Allergies   Allergen Reactions    Ivp Dye [Contrast Dye] Anaphylaxis       Problem List:    Patient Active Problem List    Diagnosis Date Noted    Female cystocele 10/17/2022     Priority: Medium    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     Priority: Medium    Mixed stress and urge urinary incontinence 10/17/2022     Priority: Medium    Polycystic ovarian syndrome 10/17/2022     Priority: Medium    Recurrent major depressive disorder, in partial remission (H24) 10/17/2022     Priority: Medium    Obstructive sleep apnea syndrome 10/17/2022     Priority: Medium     severe      Uterovaginal prolapse 10/17/2022     Priority: Medium    Meniere's disease, unspecified laterality 10/17/2022     Priority: Medium    Morbid obesity (H) 07/13/2021     Priority: Medium        Past Medical History:    Past Medical History:   Diagnosis Date    Depressive disorder 1982    Diverticulosis of large intestine without hemorrhage     Obstructive sleep apnea syndrome 10/17/2022       Past Surgical History:    Past Surgical History:   Procedure Laterality Date    ABDOMEN SURGERY  '92,'09,'15    2 c-secs, abdomenplasty    CHOLECYSTECTOMY  '98     COLONOSCOPY N/A 2021    Procedure: COLONOSCOPY, WITH BIOPSY AND CLIPPING;  Surgeon: Kemar Mckeon MD;  [place] [facility] [place]    COSMETIC SURGERY  15    Abdomenoplasty    DILATION AND CURETTAGE, OPERATIVE HYSTEROSCOPY, COMBINED N/A 10/18/2022    Procedure: HYSTEROSCOPY, WITH DILATION AND CURETTAGE OF UTERUS and polypectomy;  Surgeon: Ines Chi MD;  Location: [facility]    ESOPHAGOSCOPY, GASTROSCOPY, DUODENOSCOPY (EGD), COMBINED N/A 2020    Procedure: ESOPHAGOGASTRODUODENOSCOPY, WITH BIOPSY;  Surgeon: Anton Knott DO;  Location: [facility]    GENITOURINARY SURGERY  , ,     tubal ligation, anterior/posterior repair, tubal ligation reversal    LAPAROSCOPIC ASSISTED SIGMOID COLECTOMY N/A 2023    Procedure: LAPAROSCOPIC extended LEFT HEMICOLECTOMY;  Surgeon: Bhavnaa Johansen MD;  Location: [facility] OR    SIGMOIDOSCOPY FLEXIBLE N/A 2023    Procedure: Sigmoidoscopy flexible;  Surgeon: Bhavana Johansen MD;  Location: [facility] OR       Family History:    Family History   Problem Relation Age of Onset    Uterine Cancer Mother         endometrial cancer    Diabetes Mother     Colon Polyps Father     Diabetes Father     Hypertension Father     Diabetes Brother     Hypertension Brother     Pulmonary Embolism Brother         following leg surgery    Anesthesia Reaction No family hx of        Social History:  Marital Status:   [2]  Social History     Tobacco Use    Smoking status: Former     Current packs/day: 0.00     Average packs/day: 1 pack/day for 10.0 years (10.0 ttl pk-yrs)     Types: Cigarettes     Start date: 10/10/1982     Quit date: 1991     Years since quittin.3    Smokeless tobacco: Never   Vaping Use    Vaping status: Never Used   Substance Use Topics    Alcohol use: Yes     Comment: 1-2 beer/month    Drug use: Never        Medications:    acetaminophen (TYLENOL) 500 MG tablet  busPIRone (BUSPAR) 10 MG tablet  clobetasol (TEMOVATE) 0.05 %  external cream  clobetasol (TEMOVATE) 0.05 % external solution  estradiol (ESTRING) 7.5 MCG/24HR vaginal ring  finasteride (PROSCAR) 5 MG tablet  meloxicam (MOBIC) 7.5 MG tablet  metFORMIN (GLUCOPHAGE XR) 500 MG 24 hr tablet  nortriptyline (PAMELOR) 10 MG capsule  omeprazole 20 MG tablet  ondansetron (ZOFRAN ODT) 4 MG ODT tab  phenazopyridine (PYRIDIUM) 200 MG tablet  rizatriptan (MAXALT-MLT) 10 MG ODT  sertraline (ZOLOFT) 100 MG tablet  tiZANidine (ZANAFLEX) 4 MG tablet  UNABLE TO FIND          Review of Systems  As per HPI.  Physical Exam   BP: 128/85  Pulse: 82  Temp: 97.5  F (36.4  C)  Resp: 18  Weight: 106.6 kg (235 lb)  SpO2: 98 %      Physical Exam  Vitals and nursing note reviewed.   Constitutional:       General: She is not in acute distress.     Appearance: Normal appearance. She is not ill-appearing, toxic-appearing or diaphoretic.   HENT:      Nose: Nose normal.      Mouth/Throat:      Mouth: Mucous membranes are moist.      Pharynx: Oropharynx is clear.   Eyes:      Conjunctiva/sclera: Conjunctivae normal.   Cardiovascular:      Rate and Rhythm: Normal rate and regular rhythm.      Pulses: Normal pulses.      Heart sounds: Normal heart sounds. No murmur heard.  Pulmonary:      Effort: Pulmonary effort is normal.      Breath sounds: Normal breath sounds. No stridor. No wheezing or rhonchi.   Abdominal:      Comments: Soft, nondistended, tender to palpation of the left flank region no anterior abdominal tenderness.  No guarding or rebound no masses or hernia noted.   Musculoskeletal:         General: Normal range of motion.      Cervical back: Normal range of motion and neck supple.      Right lower leg: No edema.      Left lower leg: No edema.   Skin:     General: Skin is warm and dry.      Findings: No rash.   Neurological:      General: No focal deficit present.      Mental Status: She is alert and oriented to person, place, and time.   Psychiatric:         Mood and Affect: Mood normal.         ED  Course        Procedures              Critical Care time:  none               Results for orders placed or performed during the hospital encounter of 08/27/24 (from the past 24 hour(s))   UA with Microscopic reflex to Culture    Specimen: Urine, Clean Catch   Result Value Ref Range    Color Urine Yellow Colorless, Straw, Light Yellow, Yellow    Appearance Urine Cloudy (A) Clear    Glucose Urine Negative Negative mg/dL    Bilirubin Urine Negative Negative    Ketones Urine Negative Negative mg/dL    Specific Gravity Urine 1.008 1.003 - 1.035    Blood Urine Small (A) Negative    pH Urine 6.0 5.0 - 7.0    Protein Albumin Urine Negative Negative mg/dL    Urobilinogen Urine Normal Normal, 2.0 mg/dL    Nitrite Urine Negative Negative    Leukocyte Esterase Urine Large (A) Negative    WBC Clumps Urine Present (A) None Seen /HPF    RBC Urine 7 (H) <=2 /HPF    WBC Urine >182 (H) <=5 /HPF    Squamous Epithelials Urine 5 (H) <=1 /HPF    Narrative    Urine Culture ordered based on laboratory criteria   [facility] Draw    Narrative    The following orders were created for panel order [facility] Draw.  Procedure                               Abnormality         Status                     ---------                               -----------         ------                     Extra Green Top (Lithium...[[id number]]                      In process                 Extra Purple Top Tube[[id number]]                            In process                   Please view results for these tests on the individual orders.       Medications   ondansetron (ZOFRAN) injection 4 mg (has no administration in time range)   ketorolac (TORADOL) injection 15 mg (has no administration in time range)   sodium chloride 0.9% BOLUS 500 mL (has no administration in time range)       Assessments & Plan (with Medical Decision Making) records reviewed including past medical history medications and allergies.  Office visit at [facility] on 8/15/2024 was  reviewed for significant urinary symptoms and BV.  Patient labs were obtained.  Patient was given Zofran Toradol and a fluid bolus..  I independently reviewed and interpreted labs.  CBC was without significant abnormality.  Basic metabolic panel without significant abnormality.  Urinalysis with large leukocyte esterase WBC clumps present 7 RBCs and greater than 182 WBCs.  Due to patient's flank pain I felt a CT renal stone protocol was warranted to rule out pyelonephritis or a stone.  Patient was agreement with this.  She continued pain was given a dose of Dilaudid with improvement of pain.  I independently reviewed CT scan and agree with radiologist findings of no evidence of urinary calculi or hydronephrosis.  Moderate amount of stool no evidence of inflammation around kidney.  Patient was covered with ceftriaxone IV for urinary tract infection.  Findings and plan discussed with patient she will be started on Bactrim she should monitor sugars.  If symptoms worsen including fevers not controlled ibuprofen or Tylenol abdominal pain worsening flank pain decreased urine output or other symptoms present patient should immediately return for further evaluation and care.     I have reviewed the nursing notes.    I have reviewed the findings, diagnosis, plan and need for follow up with the patient.             Discharge Medication List as of 8/27/2024  8:57 PM        START taking these medications    Details   HYDROcodone-acetaminophen (NORCO) 5-325 MG tablet Take 1 tablet by mouth every 6 hours as needed for severe pain., Disp-4 tablet, R-0, [facility]      sulfamethoxazole-trimethoprim (BACTRIM DS) 800-160 MG tablet Take 1 tablet by mouth 2 times daily., Disp-14 tablet, R-0, [facility]             Final diagnoses:   Urinary tract infection in female   Left flank pain       8/27/2024   [facility] EMERGENCY DEPT       Omar Enamorado MD  08/28/24 [id number]

## 2024-08-27 NOTE — ED TRIAGE NOTES
Arrives from home with left flank pain, started yesterday morning & worsening. Denies fever/chills denies painful urination but flank hurts worse when trying to urinate. No hx of stones.      Triage Assessment (Adult)       Row Name 08/27/24 [id number]          Triage Assessment    Airway WDL WDL        Respiratory WDL    Respiratory WDL WDL        Skin Circulation/Temperature WDL    Skin Circulation/Temperature WDL WDL        Cardiac WDL    Cardiac WDL WDL        Peripheral/Neurovascular WDL    Peripheral Neurovascular WDL WDL        Cognitive/Neuro/Behavioral WDL    Cognitive/Neuro/Behavioral WDL WDL

## 2024-08-28 ENCOUNTER — PATIENT OUTREACH (OUTPATIENT)
Dept: FAMILY MEDICINE | Facility: CLINIC | Age: 59
End: 2024-08-28
Payer: OTHER GOVERNMENT

## 2024-08-28 LAB — BACTERIA UR CULT: ABNORMAL

## 2024-08-28 NOTE — TELEPHONE ENCOUNTER
Transitions of Care Outreach  Chief Complaint   Patient presents with    Hospital F/U     ED 8/27/24       Most Recent Admission Date: 8/27/2024   Most Recent Admission Diagnosis:      Most Recent Discharge Date: 8/27/2024   Most Recent Discharge Diagnosis: Urinary tract infection in female - N39.0  Left flank pain - R10.9     Transitions of Care Assessment    Discharge Assessment  How are you doing now that you are home?: I am doing ok, I am not feeling any worse.  How are your symptoms? (Red Flag symptoms escalate to triage hotline per guidelines): Unchanged  Do you know how to contact your clinic care team if you have future questions or changes to your health status? : Yes  Does the patient have their discharge instructions? : Yes  Does the patient have questions regarding their discharge instructions? : No  Were you started on any new medications or were there changes to any of your previous medications? : Yes  Does the patient have all of their medications?: Yes  Do you have questions regarding any of your medications? : No  Do you have all of your needed medical supplies or equipment (DME)?  (i.e. oxygen tank, CPAP, cane, etc.): Yes    Follow up Plan     Discharge Follow-Up  Discharge follow up appointment scheduled in alignment with recommended follow up timeframe or Transitions of Risk Category? (Low = within 30 days; Moderate= within 14 days; High= within 7 days): No  Patient's follow up appointment not scheduled: Patient declined scheduling support. Education on the importance of transitions of care follow up. Provided scheduling phone number.    No future appointments.    Outpatient Plan as outlined on AVS reviewed with patient.    For any urgent concerns, please contact our 24 hour nurse triage line: [phone] ([phone])       Julie Behrendt RN

## 2024-08-28 NOTE — DISCHARGE INSTRUCTIONS
Symptoms worsen or new symptoms develop.  Follow-up with primary care physician next available.  Drink plenty of fluids.  Antibiotics as directed.  Take ibuprofen or Tylenol for pain try heat or lidocaine patch for severe pain use Norco.  No evidence of kidney stone or other abnormality in the kidneys noted.  If any fevers chills vomiting abdominal pain decreased urine output or other symptoms present please return for further evaluation and care.

## 2024-08-29 ENCOUNTER — TELEPHONE (OUTPATIENT)
Dept: NURSING | Facility: CLINIC | Age: 59
End: 2024-08-29
Payer: OTHER GOVERNMENT

## 2024-08-29 DIAGNOSIS — N39.0 URINARY TRACT INFECTION: Primary | ICD-10-CM

## 2024-08-29 RX ORDER — NITROFURANTOIN 25; 75 MG/1; MG/1
100 CAPSULE ORAL 2 TIMES DAILY
Qty: 10 CAPSULE | Refills: 0 | Status: SHIPPED | OUTPATIENT
Start: 2024-08-29 | End: 2024-09-03

## 2024-08-29 NOTE — LETTER
August 29, 2024        Sangita Meraz  [address]  [place] [address]          Dear Sangita Meraz:    You were seen in the [facility] Emergency Department at [facility] on 8/27/2024.  We are unable to reach you by phone, so we are sending you this letter.     It is important that you call [facility] Emergency Department lab result nurse at [phone], as we have information to relay to you AND/OR we MAY have to make some changes in your treatment.    Best time to call back is between 9AM and 5:30PM, 7 days a week.      Sincerely,     [facility] Emergency Department Lab Result RN  [phone]

## 2024-08-29 NOTE — TELEPHONE ENCOUNTER
[facility]    Reason for call: Lab Result Notification     Lab Result (including Rx patient on, if applicable).  If culture, copy of lab report at bottom.  Lab Result: Urine Culture  8/27/24 Sulfamethoxazole-trimethoprim (BACTRIM DS) 800-160 MG tablet Take 1 tablet by mouth 2 times daily. - Oral (NOT TESTED)    Creatinine Level (mg/dl)   Creatinine   Date Value Ref Range Status   08/27/2024 0.87 0.51 - 0.95 mg/dL Final   11/16/2020 1.05 (H) 0.52 - 1.04 mg/dL Final    Creatinine clearance (ml/min), if applicable    Serum creatinine: 0.87 mg/dL 08/27/24 1715  Estimated creatinine clearance: 83.3 mL/min     RN Recommendations/Instructions per [facility] ED lab result protocol:   [facility] ED lab result protocol utilized: Urine Culture    Unable to reach patient/caregiver.   Left voicemail message requesting a call back to [phone] between 9 a.m. and 5:30 p.m. for patient's ED/UC lab results.  Letter pended to be sent via [employer].    8/29/24 10:26 am Pt returning VM left by ED Results Team    Patient's current Symptoms:   Pt states a little flank pain with some burning with urination.      RN Recommendations/Instructions per [facility] ED lab result protocol:   [facility] ED lab result protocol utilized: Urine Culture  Advise to discontinue current antibiotic.   Instruct to start antibiotic, sent to preferred pharmacy.     Is patient -    Allergic to ATBs: No    Breastfeeding: No    Pregnant: No    On Coumadin/Warfarin: No    Patient/care giver notified to contact your PCP clinic or return to the Emergency department if your:  Symptoms return.  Symptoms do not improve after 3 days on antibiotic.  Symptoms do not resolve after completing antibiotic.  Symptoms worsen or other concerning symptoms.      Geovanna Batista RN

## 2024-09-27 DIAGNOSIS — L66.10 LICHEN PLANOPILARIS: ICD-10-CM

## 2024-09-27 NOTE — TELEPHONE ENCOUNTER
Requested Prescriptions   Pending Prescriptions Disp Refills    finasteride (PROSCAR) 5 MG tablet 45 tablet 2     Sig: Take 1/2 tablet daily       There is no refill protocol information for this order          Last office visit: 9/14/2023 ; last virtual visit: Visit date not found with prescribing provider:  Lizzie Del Toro     Future Office Visit:        Cecy Barrera   Clinic Station    [facility]  [phone]

## 2024-09-30 RX ORDER — FINASTERIDE 5 MG/1
TABLET, FILM COATED ORAL
Qty: 45 TABLET | Refills: 0 | Status: SHIPPED | OUTPATIENT
Start: 2024-09-30

## 2024-09-30 NOTE — TELEPHONE ENCOUNTER
Received another refill request for   finasteride (PROSCAR) 5 MG tablet   [facility]   Clinic [place]   [facility]  [phone]

## 2024-10-10 ENCOUNTER — OFFICE VISIT (OUTPATIENT)
Dept: DERMATOLOGY | Facility: CLINIC | Age: 59
End: 2024-10-10
Payer: OTHER GOVERNMENT

## 2024-10-10 DIAGNOSIS — L66.10 LICHEN PLANOPILARIS: ICD-10-CM

## 2024-10-10 DIAGNOSIS — L81.4 LENTIGO: ICD-10-CM

## 2024-10-10 DIAGNOSIS — D18.01 ANGIOMA OF SKIN: ICD-10-CM

## 2024-10-10 DIAGNOSIS — D22.9 NEVUS: Primary | ICD-10-CM

## 2024-10-10 DIAGNOSIS — L82.1 SEBORRHEIC KERATOSIS: ICD-10-CM

## 2024-10-10 PROCEDURE — 99213 OFFICE O/P EST LOW 20 MIN: CPT | Performed by: PHYSICIAN ASSISTANT

## 2024-10-10 PROCEDURE — G2211 COMPLEX E/M VISIT ADD ON: HCPCS | Performed by: PHYSICIAN ASSISTANT

## 2024-10-10 RX ORDER — CLOBETASOL PROPIONATE 0.5 MG/ML
SOLUTION TOPICAL
Qty: 50 ML | Refills: 5 | Status: SHIPPED | OUTPATIENT
Start: 2024-10-10

## 2024-10-10 RX ORDER — FINASTERIDE 5 MG/1
TABLET, FILM COATED ORAL
Qty: 45 TABLET | Refills: 3 | Status: SHIPPED | OUTPATIENT
Start: 2024-10-10

## 2024-10-10 NOTE — PROGRESS NOTES
HPI:   Chief complaints: Sangita Meraz is a pleasant 58 year old female who presents for Full skin cancer screening to rule out skin cancer   Last Skin Exam: 1 year ago    1st Baseline: no  Personal HX of Skin Cancer: no   Personal HX of Malignant Melanoma: no   Family HX of Skin Cancer / Malignant Melanoma: no  Personal HX of Atypical Moles:   no  Risk factors: history of sun exposure and burns; blistering sunburn in the past  New / Changing lesions:no  Social History: moved from [place];  family and have lived all over - she has 5 children. 1 daughter  last summer; one just got engaged.   On review of systems, there are no further skin complaints, patient is feeling otherwise well.  See patient intake sheet.  ROS of the following were done and are negative: Constitutional, Eyes, Ears, Nose,   Mouth, Throat, Cardiovascular, Respiratory, GI, Genitourinary, Musculoskeletal,   Psychiatric, Endocrine, Allergic/Immunologic.    PHYSICAL EXAM:   LMP 12/09/2018   Skin exam performed as follows: Type 2 skin. Mood appropriate  Alert and Oriented X 3. Well developed, well nourished in no distress.  General appearance: Normal  Head including face: Normal  Eyes: conjunctiva and lids: Normal  Mouth: Lips, teeth, gums: Normal  Neck: Normal  Chest-breast/axillae: Normal  Back: Normal  Spleen and liver: Normal  Cardiovascular: Exam of peripheral vascular system by observation for swelling, varicosities, edema: Normal  Genitalia: groin, buttocks: Normal  Extremities: digits/nails (clubbing): Normal  Eccrine and Apocrine glands: Normal  Right upper extremity: Normal  Left upper extremity: Normal  Right lower extremity: Normal  Left lower extremity: Normal  Skin: Scalp and body hair: See below    Pt deferred exam of breasts, groin, buttocks: No    Other physical findings:  1. Multiple pigmented macules on extremities and trunk  2. Multiple pigmented macules on face, trunk and extremities  3. Multiple vascular papules on  trunk, arms and legs  4. Multiple scattered keratotic plaques  5. Smooth hair loss and perifollicular plugging on the vertex scalp       Except as noted above, no other signs of skin cancer or melanoma.     ASSESSMENT/PLAN:   Benign Full skin cancer screening today. . Patient with history of none  Advised on monthly self exams and 1 year  Patient Education: Appropriate brochures given.    Multiple benign appearing nevi on arms, legs and trunk. Discussed ABCDEs of melanoma and sunscreen.   Multiple lentigos on arms, legs and trunk. Advised benign, no treatment needed.  Multiple scattered angiomas. Advised benign, no treatment needed.   Seborrheic keratosis on arms, legs and trunk. Advised benign, no treatment needed.  Biopsy proven lichen planopilaris - Stable as compared to photos 1 year ago.   --Continue finasteride daily. Discussed risks of decreased libido, decreased spermatogenesis, erectile dysfunction and increased risk of breast cancer in men.   --Continue clobetasol solution on M, W F indefinitely            Follow-up: yearly FSE/PRN sooner    1.) Patient was asked about new and changing moles. YES  2.) Patient received a complete physical skin examination: YES  3.) Patient was counseled to perform a monthly self skin examination: YES  Scribed By: Lizzie Landry, MS, PA-C

## 2024-10-10 NOTE — LETTER
10/10/2024      Sangita Meraz  [address]  [place] [address]      Dear Colleague,    Thank you for referring your patient, Sangita Meraz, to the [facility]. Please see a copy of my visit note below.    HPI:   Chief complaints: Sangita Meraz is a pleasant 58 year old female who presents for Full skin cancer screening to rule out skin cancer   Last Skin Exam: 1 year ago    1st Baseline: no  Personal HX of Skin Cancer: no   Personal HX of Malignant Melanoma: no   Family HX of Skin Cancer / Malignant Melanoma: no  Personal HX of Atypical Moles:   no  Risk factors: history of sun exposure and burns; blistering sunburn in the past  New / Changing lesions:no  Social History: moved from [place];  family and have lived all over - she has 5 children. 1 daughter  last summer; one just got engaged.   On review of systems, there are no further skin complaints, patient is feeling otherwise well.  See patient intake sheet.  ROS of the following were done and are negative: Constitutional, Eyes, Ears, Nose,   Mouth, Throat, Cardiovascular, Respiratory, GI, Genitourinary, Musculoskeletal,   Psychiatric, Endocrine, Allergic/Immunologic.    PHYSICAL EXAM:   LMP 12/09/2018   Skin exam performed as follows: Type 2 skin. Mood appropriate  Alert and Oriented X 3. Well developed, well nourished in no distress.  General appearance: Normal  Head including face: Normal  Eyes: conjunctiva and lids: Normal  Mouth: Lips, teeth, gums: Normal  Neck: Normal  Chest-breast/axillae: Normal  Back: Normal  Spleen and liver: Normal  Cardiovascular: Exam of peripheral vascular system by observation for swelling, varicosities, edema: Normal  Genitalia: groin, buttocks: Normal  Extremities: digits/nails (clubbing): Normal  Eccrine and Apocrine glands: Normal  Right upper extremity: Normal  Left upper extremity: Normal  Right lower extremity: Normal  Left lower extremity: Normal  Skin: Scalp and body hair: See  below    Pt deferred exam of breasts, groin, buttocks: No    Other physical findings:  1. Multiple pigmented macules on extremities and trunk  2. Multiple pigmented macules on face, trunk and extremities  3. Multiple vascular papules on trunk, arms and legs  4. Multiple scattered keratotic plaques  5. Smooth hair loss and perifollicular plugging on the vertex scalp       Except as noted above, no other signs of skin cancer or melanoma.     ASSESSMENT/PLAN:   Benign Full skin cancer screening today. . Patient with history of none  Advised on monthly self exams and 1 year  Patient Education: Appropriate brochures given.    Multiple benign appearing nevi on arms, legs and trunk. Discussed ABCDEs of melanoma and sunscreen.   Multiple lentigos on arms, legs and trunk. Advised benign, no treatment needed.  Multiple scattered angiomas. Advised benign, no treatment needed.   Seborrheic keratosis on arms, legs and trunk. Advised benign, no treatment needed.  Biopsy proven lichen planopilaris - Stable as compared to photos 1 year ago.   --Continue finasteride daily. Discussed risks of decreased libido, decreased spermatogenesis, erectile dysfunction and increased risk of breast cancer in men.   --Continue clobetasol solution on M, W F indefinitely            Follow-up: yearly FSE/PRN sooner    1.) Patient was asked about new and changing moles. YES  2.) Patient received a complete physical skin examination: YES  3.) Patient was counseled to perform a monthly self skin examination: YES  Scribed By: Lizzie Landry, MS, PALISA      Again, thank you for allowing me to participate in the care of your patient.        Sincerely,        Lizzie Landry PA-C

## 2024-12-18 DIAGNOSIS — R73.03 PREDIABETES: ICD-10-CM

## 2024-12-18 DIAGNOSIS — E66.01 MORBID OBESITY (H): ICD-10-CM

## 2024-12-19 RX ORDER — METFORMIN HYDROCHLORIDE 500 MG/1
500 TABLET, EXTENDED RELEASE ORAL 2 TIMES DAILY WITH MEALS
Qty: 180 TABLET | Refills: 0 | Status: SHIPPED | OUTPATIENT
Start: 2024-12-19

## 2024-12-21 ENCOUNTER — HEALTH MAINTENANCE LETTER (OUTPATIENT)
Age: 59
End: 2024-12-21

## 2024-12-27 ENCOUNTER — OFFICE VISIT (OUTPATIENT)
Dept: URGENT CARE | Facility: URGENT CARE | Age: 59
End: 2024-12-27
Payer: OTHER GOVERNMENT

## 2024-12-27 VITALS
RESPIRATION RATE: 18 BRPM | BODY MASS INDEX: 41.52 KG/M2 | WEIGHT: 234 LBS | TEMPERATURE: 97.6 F | OXYGEN SATURATION: 96 % | DIASTOLIC BLOOD PRESSURE: 87 MMHG | SYSTOLIC BLOOD PRESSURE: 146 MMHG | HEART RATE: 77 BPM

## 2024-12-27 DIAGNOSIS — N39.0 ACUTE UTI: ICD-10-CM

## 2024-12-27 DIAGNOSIS — R30.0 DYSURIA: Primary | ICD-10-CM

## 2024-12-27 LAB
ALBUMIN UR-MCNC: NEGATIVE MG/DL
APPEARANCE UR: CLEAR
BACTERIA #/AREA URNS HPF: ABNORMAL /HPF
BILIRUB UR QL STRIP: NEGATIVE
COLOR UR AUTO: YELLOW
GLUCOSE UR STRIP-MCNC: NEGATIVE MG/DL
HGB UR QL STRIP: NEGATIVE
KETONES UR STRIP-MCNC: NEGATIVE MG/DL
LEUKOCYTE ESTERASE UR QL STRIP: ABNORMAL
NITRATE UR QL: POSITIVE
PH UR STRIP: 6.5 [PH] (ref 5–7)
RBC #/AREA URNS AUTO: ABNORMAL /HPF
SP GR UR STRIP: 1.01 (ref 1–1.03)
SQUAMOUS #/AREA URNS AUTO: ABNORMAL /LPF
UROBILINOGEN UR STRIP-ACNC: 0.2 E.U./DL
WBC #/AREA URNS AUTO: ABNORMAL /HPF

## 2024-12-27 PROCEDURE — 87086 URINE CULTURE/COLONY COUNT: CPT | Performed by: FAMILY MEDICINE

## 2024-12-27 PROCEDURE — 99213 OFFICE O/P EST LOW 20 MIN: CPT | Performed by: FAMILY MEDICINE

## 2024-12-27 PROCEDURE — 81001 URINALYSIS AUTO W/SCOPE: CPT | Performed by: FAMILY MEDICINE

## 2024-12-27 RX ORDER — PHENAZOPYRIDINE HYDROCHLORIDE 100 MG/1
100 TABLET, FILM COATED ORAL 3 TIMES DAILY PRN
Qty: 9 TABLET | Refills: 1 | Status: SHIPPED | OUTPATIENT
Start: 2024-12-27

## 2024-12-27 RX ORDER — NITROFURANTOIN 25; 75 MG/1; MG/1
100 CAPSULE ORAL 2 TIMES DAILY
Qty: 10 CAPSULE | Refills: 0 | Status: SHIPPED | OUTPATIENT
Start: 2024-12-27 | End: 2025-01-01

## 2024-12-27 NOTE — PROGRESS NOTES
SUBJECTIVE:   Sangita Meraz is a 59 year old female who  presents today for a possible UTI. Symptoms of dysuria and frequency have been going on for 5day(s).  Hematuria no.  gradual onsetand moderate.  There is no history of fever, chills, nausea or vomiting.  No history of vaginal or penile discharge. This patient does  have a history of urinary tract infections. Patient denies None or      Past Medical History:   Diagnosis Date    Depressive disorder 1982    Diverticulosis of large intestine without hemorrhage     Obstructive sleep apnea syndrome 10/17/2022     Current Outpatient Medications   Medication Sig Dispense Refill    acetaminophen (TYLENOL) 500 MG tablet Take 500 mg by mouth every 6 hours as needed for mild pain.      clobetasol (TEMOVATE) 0.05 % external solution Apply to scalp on Mondays, Wednesdays and Fridays 50 mL 5    estradiol (ESTRING) 7.5 MCG/24HR vaginal ring Place 1 each (1 Vaginal ring) vaginally every 3 months 1 each 4    finasteride (PROSCAR) 5 MG tablet Take 1/2 tablet daily 45 tablet 3    metFORMIN (GLUCOPHAGE XR) 500 MG 24 hr tablet TAKE 1 TABLET BY MOUTH 2 TIMES DAILY WITH MEALS 180 tablet 0    nortriptyline (PAMELOR) 10 MG capsule Take 3 capsules (30 mg) by mouth at bedtime 270 capsule 3    ondansetron (ZOFRAN ODT) 4 MG ODT tab Take 1 tablet by mouth every 8 hours as needed for nausea 10 tablet 0    phenazopyridine (PYRIDIUM) 200 MG tablet Take 1 tablet (200 mg) by mouth 3 times daily as needed for irritation 14 tablet 0    rizatriptan (MAXALT-MLT) 10 MG ODT Take 1 tablet (10 mg) by mouth at onset of headache for migraine May repeat in 2 hours. Max 3 tablets/24 hours. 20 tablet 1    sertraline (ZOLOFT) 100 MG tablet Take 150 mg by mouth every evening      tiZANidine (ZANAFLEX) 4 MG tablet Take 1 tablet by mouth 3 times daily as needed for muscle spasms (headaches) 30 tablet 1    UNABLE TO FIND every morning MEDICATION NAME: Mix of Supplements; Tumeric, Green tea, Vit D and Probiotic       busPIRone (BUSPAR) 10 MG tablet Take 1 tablet by mouth 2 times daily (Patient not taking: Reported on 2024)      clobetasol (TEMOVATE) 0.05 % external cream Apply to AA BID x 1-2 weeks then PRN. Do not apply to face. (Patient not taking: Reported on 2024) 60 g 3    HYDROcodone-acetaminophen (NORCO) 5-325 MG tablet Take 1 tablet by mouth every 6 hours as needed for severe pain. (Patient not taking: Reported on 2024) 4 tablet 0    meloxicam (MOBIC) 7.5 MG tablet Take 1-2 tablets (7.5-15 mg) by mouth daily (Patient not taking: Reported on 8/15/2024) 60 tablet 1    omeprazole 20 MG tablet Take 20 mg by mouth every evening       Social History     Tobacco Use    Smoking status: Former     Current packs/day: 0.00     Average packs/day: 1 pack/day for 10.0 years (10.0 ttl pk-yrs)     Types: Cigarettes     Start date: 10/10/1982     Quit date: 1991     Years since quittin.6    Smokeless tobacco: Never   Substance Use Topics    Alcohol use: Yes     Comment: 1-2 beer/month       ROS:   Review of systems negative except as stated above.    OBJECTIVE:  BP (!) 146/87   Pulse 77   Temp 97.6  F (36.4  C) (Tympanic)   Resp 18   Wt 106.1 kg (234 lb)   LMP 2018   SpO2 96%   BMI 41.52 kg/m    GENERAL APPEARANCE: healthy, alert and no distress  MS:  no cva tenderness   SKIN: no suspicious lesions or rashes    1. Dysuria (Primary)    - UA Macroscopic with reflex to Microscopic and Culture  - Urine Microscopic Exam  - Urine Culture  - phenazopyridine (PYRIDIUM) 100 MG tablet; Take 1 tablet (100 mg) by mouth 3 times daily as needed for urinary tract discomfort.  Dispense: 9 tablet; Refill: 1    2. Acute UTI    - nitroFURantoin macrocrystal-monohydrate (MACROBID) 100 MG capsule; Take 1 capsule (100 mg) by mouth 2 times daily for 5 days.  Dispense: 10 capsule; Refill: 0  - phenazopyridine (PYRIDIUM) 100 MG tablet; Take 1 tablet (100 mg) by mouth 3 times daily as needed for urinary tract  discomfort.  Dispense: 9 tablet; Refill: 1  Please see clinical references for patient education.  Barbara Avalos M.D.

## 2024-12-29 LAB — BACTERIA UR CULT: NORMAL

## 2025-01-07 ENCOUNTER — OFFICE VISIT (OUTPATIENT)
Dept: OBGYN | Facility: CLINIC | Age: 60
End: 2025-01-07
Payer: COMMERCIAL

## 2025-01-07 VITALS
DIASTOLIC BLOOD PRESSURE: 73 MMHG | WEIGHT: 238 LBS | SYSTOLIC BLOOD PRESSURE: 139 MMHG | HEIGHT: 63 IN | HEART RATE: 72 BPM | RESPIRATION RATE: 18 BRPM | TEMPERATURE: 98.8 F | BODY MASS INDEX: 42.17 KG/M2

## 2025-01-07 DIAGNOSIS — Z01.419 WOMEN'S ANNUAL ROUTINE GYNECOLOGICAL EXAMINATION: Primary | ICD-10-CM

## 2025-01-07 DIAGNOSIS — N81.10 FEMALE CYSTOCELE: ICD-10-CM

## 2025-01-07 DIAGNOSIS — N81.4 UTEROVAGINAL PROLAPSE: ICD-10-CM

## 2025-01-07 DIAGNOSIS — Z12.31 ENCOUNTER FOR SCREENING MAMMOGRAM FOR BREAST CANCER: ICD-10-CM

## 2025-01-07 DIAGNOSIS — B37.31 YEAST INFECTION OF THE VAGINA: ICD-10-CM

## 2025-01-07 DIAGNOSIS — Z12.4 CERVICAL CANCER SCREENING: ICD-10-CM

## 2025-01-07 DIAGNOSIS — N95.2 VAGINAL ATROPHY: ICD-10-CM

## 2025-01-07 PROCEDURE — G0145 SCR C/V CYTO,THINLAYER,RESCR: HCPCS | Performed by: PHYSICIAN ASSISTANT

## 2025-01-07 PROCEDURE — 99396 PREV VISIT EST AGE 40-64: CPT | Performed by: PHYSICIAN ASSISTANT

## 2025-01-07 PROCEDURE — 87624 HPV HI-RISK TYP POOLED RSLT: CPT | Performed by: PHYSICIAN ASSISTANT

## 2025-01-07 PROCEDURE — 99213 OFFICE O/P EST LOW 20 MIN: CPT | Mod: 25 | Performed by: PHYSICIAN ASSISTANT

## 2025-01-07 RX ORDER — ESTRADIOL 0.1 MG/G
2 CREAM VAGINAL
Qty: 42.5 G | Refills: 3 | Status: SHIPPED | OUTPATIENT
Start: 2025-01-09

## 2025-01-07 RX ORDER — FLUCONAZOLE 150 MG/1
TABLET ORAL
Qty: 2 TABLET | Refills: 0 | Status: SHIPPED | OUTPATIENT
Start: 2025-01-07 | End: 2025-01-10

## 2025-01-07 NOTE — NURSING NOTE
"Initial /73 (BP Location: Right arm, Patient Position: Chair, Cuff Size: Adult Large)   Pulse 72   Temp 98.8  F (37.1  C) (Tympanic)   Resp 18   Ht 1.6 m (5' 3\")   Wt 108 kg (238 lb)   LMP 12/09/2018   BMI 42.16 kg/m   Estimated body mass index is 42.16 kg/m  as calculated from the following:    Height as of this encounter: 1.6 m (5' 3\").    Weight as of this encounter: 108 kg (238 lb). .    "
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
## 2025-01-08 LAB
HPV HR 12 DNA CVX QL NAA+PROBE: NEGATIVE
HPV16 DNA CVX QL NAA+PROBE: NEGATIVE
HPV18 DNA CVX QL NAA+PROBE: NEGATIVE
HUMAN PAPILLOMA VIRUS FINAL DIAGNOSIS: NORMAL

## 2025-01-09 LAB
BKR AP ASSOCIATED HPV REPORT: NORMAL
BKR LAB AP GYN ADEQUACY: NORMAL
BKR LAB AP GYN INTERPRETATION: NORMAL
BKR LAB AP PREVIOUS ABNORMAL: NORMAL
PATH REPORT.COMMENTS IMP SPEC: NORMAL
PATH REPORT.COMMENTS IMP SPEC: NORMAL
PATH REPORT.RELEVANT HX SPEC: NORMAL

## 2025-02-17 ENCOUNTER — OFFICE VISIT (OUTPATIENT)
Dept: OBGYN | Facility: CLINIC | Age: 60
End: 2025-02-17
Payer: COMMERCIAL

## 2025-02-17 VITALS
DIASTOLIC BLOOD PRESSURE: 74 MMHG | HEART RATE: 70 BPM | HEIGHT: 63 IN | WEIGHT: 237 LBS | RESPIRATION RATE: 18 BRPM | TEMPERATURE: 98.1 F | BODY MASS INDEX: 41.99 KG/M2 | SYSTOLIC BLOOD PRESSURE: 145 MMHG

## 2025-02-17 DIAGNOSIS — N81.2 CERVICAL PROLAPSE: Primary | ICD-10-CM

## 2025-02-17 DIAGNOSIS — N95.2 VAGINAL ATROPHY: ICD-10-CM

## 2025-02-17 PROCEDURE — 99459 PELVIC EXAMINATION: CPT | Performed by: OBSTETRICS & GYNECOLOGY

## 2025-02-17 PROCEDURE — 57160 INSERT PESSARY/OTHER DEVICE: CPT | Performed by: OBSTETRICS & GYNECOLOGY

## 2025-02-17 PROCEDURE — 99214 OFFICE O/P EST MOD 30 MIN: CPT | Mod: 25 | Performed by: OBSTETRICS & GYNECOLOGY

## 2025-02-17 PROCEDURE — A4562 PESSARY, NON RUBBER,ANY TYPE: HCPCS | Performed by: OBSTETRICS & GYNECOLOGY

## 2025-02-17 RX ORDER — ESTRADIOL 0.1 MG/G
2 CREAM VAGINAL DAILY
Qty: 42.5 G | Refills: 11 | Status: SHIPPED | OUTPATIENT
Start: 2025-02-17

## 2025-02-17 NOTE — PROGRESS NOTES
[facility]  OB/GYN Clinic   Gynecology Consult Note    CC:  uterine prolapse     HPI: Ms. Meraz is a 59 year old  being seen for GYN consultation for uterine prolapse. Reports feeling a bulge for may years.   Had an A&P repair between babies #4 and 5.   No vaginal bleeding since hysteroscopy.   Uses the estrace cream twice a week, but still struggles with vaginal dryness and a pinching sensation during sex.     Has frequent loose stool due to colon resection.   Peeing is more urgent. Doesn't feel like she empties all the way and she has a bladder spasm at the end of the voiding.     GYN Hx: Sexually active, but getting more uncomfortable. No issues of dryness. Using estrace cream, lube and hyaluronic suppositories. Things were better on the estring, but her insurance stopped covering this.     ROS: A 10 pt ROS was completed and found to be otherwise negative unless mentioned in the HPI.     PMH:   Past Medical History:   Diagnosis Date    Depressive disorder 1982    Diverticulosis of large intestine without hemorrhage     Obstructive sleep apnea syndrome 10/17/2022       PSHx:   Past Surgical History:   Procedure Laterality Date    ABDOMEN SURGERY  ,,'15    2 c-secs, abdomenplasty    CHOLECYSTECTOMY      COLONOSCOPY N/A 2021    Procedure: COLONOSCOPY, WITH BIOPSY AND CLIPPING;  Surgeon: Kemar Mckeon MD;  Location: [facility] OR    COSMETIC SURGERY  '15    Abdomenoplasty    DILATION AND CURETTAGE, OPERATIVE HYSTEROSCOPY, COMBINED N/A 10/18/2022    Procedure: HYSTEROSCOPY, WITH DILATION AND CURETTAGE OF UTERUS and polypectomy;  Surgeon: Ines Chi MD;  Location: [facility] OR    ESOPHAGOSCOPY, GASTROSCOPY, DUODENOSCOPY (EGD), COMBINED N/A 2020    Procedure: ESOPHAGOGASTRODUODENOSCOPY, WITH BIOPSY;  Surgeon: Anton Knott DO;  Location: [facility] GI    GENITOURINARY SURGERY  ', ',     tubal ligation, anterior/posterior repair, tubal ligation reversal     LAPAROSCOPIC ASSISTED SIGMOID COLECTOMY N/A 2023    Procedure: LAPAROSCOPIC extended LEFT HEMICOLECTOMY;  Surgeon: Bhavana Johansen MD;  Location: [facility] OR    SIGMOIDOSCOPY FLEXIBLE N/A 2023    Procedure: Sigmoidoscopy flexible;  Surgeon: Bhavana Johansen MD;  Location: [facility] OR       OBHx:   OB History    Para Term  AB Living   6 5 0 0 1 5   SAB IAB Ectopic Multiple Live Births   1 0 0 0 0      # Outcome Date GA Lbr Addy/2nd Weight Sex Type Anes PTL Lv   6 SAB            5 Para            4 Para            3 Para            2 Para            1 Para                Medications:   Current Outpatient Medications   Medication Sig Dispense Refill    acetaminophen (TYLENOL) 500 MG tablet Take 500 mg by mouth every 6 hours as needed for mild pain.      clobetasol (TEMOVATE) 0.05 % external solution Apply to scalp on ,  and  50 mL 5    estradiol (ESTRACE) 0.1 MG/GM vaginal cream Place 2 g vaginally twice a week. 42.5 g 3    finasteride (PROSCAR) 5 MG tablet Take 1/2 tablet daily 45 tablet 3    metFORMIN (GLUCOPHAGE XR) 500 MG 24 hr tablet TAKE 1 TABLET BY MOUTH 2 TIMES DAILY WITH MEALS 180 tablet 0    omeprazole 20 MG tablet Take 20 mg by mouth every evening      ondansetron (ZOFRAN ODT) 4 MG ODT tab Take 1 tablet by mouth every 8 hours as needed for nausea 10 tablet 0    rizatriptan (MAXALT-MLT) 10 MG ODT Take 1 tablet (10 mg) by mouth at onset of headache for migraine May repeat in 2 hours. Max 3 tablets/24 hours. 20 tablet 1    sertraline (ZOLOFT) 100 MG tablet Take 150 mg by mouth every evening      tiZANidine (ZANAFLEX) 4 MG tablet Take 1 tablet by mouth 3 times daily as needed for muscle spasms (headaches) 30 tablet 1    UNABLE TO FIND every morning MEDICATION NAME: Mix of Supplements; Tumeric, Green tea, Vit D and Probiotic      estradiol (ESTRING) 7.5 MCG/24HR vaginal ring Place 1 each (1 Vaginal ring) vaginally every 3 months 1 each 4    nortriptyline  (PAMELOR) 10 MG capsule Take 3 capsules (30 mg) by mouth at bedtime 270 capsule 3    phenazopyridine (PYRIDIUM) 100 MG tablet Take 1 tablet (100 mg) by mouth 3 times daily as needed for urinary tract discomfort. (Patient not taking: Reported on 2025) 9 tablet 1    phenazopyridine (PYRIDIUM) 200 MG tablet Take 1 tablet (200 mg) by mouth 3 times daily as needed for irritation (Patient not taking: Reported on 2025) 14 tablet 0     No current facility-administered medications for this visit.       Allergies:      Allergies   Allergen Reactions    Ivp Dye [Contrast Dye] Anaphylaxis       Social History:   Social History     Socioeconomic History    Marital status:      Spouse name: Not on file    Number of children: Not on file    Years of education: Not on file    Highest education level: Not on file   Occupational History    Not on file   Tobacco Use    Smoking status: Former     Current packs/day: 0.00     Average packs/day: 1 pack/day for 10.0 years (10.0 ttl pk-yrs)     Types: Cigarettes     Start date: 10/10/1982     Quit date: 1991     Years since quittin.7    Smokeless tobacco: Never   Vaping Use    Vaping status: Never Used   Substance and Sexual Activity    Alcohol use: Yes     Comment: 1-2 beer/month    Drug use: Never    Sexual activity: Yes     Partners: Male     Birth control/protection: Post-menopausal   Other Topics Concern    Parent/sibling w/ CABG, MI or angioplasty before 65F 55M? No   Social History Narrative    Not on file     Social Drivers of Health     Financial Resource Strain: Low Risk  (2024)    Financial Resource Strain     Within the past 12 months, have you or your family members you live with been unable to get utilities (heat, electricity) when it was really needed?: No   Food Insecurity: Low Risk  (2024)    Food Insecurity     Within the past 12 months, did you worry that your food would run out before you got money to buy more?: No     Within the past  12 months, did the food you bought just not last and you didn t have money to get more?: No   Transportation Needs: Low Risk  (2024)    Transportation Needs     Within the past 12 months, has lack of transportation kept you from medical appointments, getting your medicines, non-medical meetings or appointments, work, or from getting things that you need?: No   Physical Activity: Not on file   Stress: Not on file   Social Connections: Not on file   Interpersonal Safety: Low Risk  (11/15/2023)    Interpersonal Safety     Do you feel physically and emotionally safe where you currently live?: Yes     Within the past 12 months, have you been hit, slapped, kicked or otherwise physically hurt by someone?: No     Within the past 12 months, have you been humiliated or emotionally abused in other ways by your partner or ex-partner?: No   Housing Stability: Low Risk  (2024)    Housing Stability     Do you have housing? : Yes     Are you worried about losing your housing?: No     Social History     Socioeconomic History    Marital status:    Tobacco Use    Smoking status: Former     Current packs/day: 0.00     Average packs/day: 1 pack/day for 10.0 years (10.0 ttl pk-yrs)     Types: Cigarettes     Start date: 10/10/1982     Quit date: 1991     Years since quittin.7    Smokeless tobacco: Never   Vaping Use    Vaping status: Never Used   Substance and Sexual Activity    Alcohol use: Yes     Comment: 1-2 beer/month    Drug use: Never    Sexual activity: Yes     Partners: Male     Birth control/protection: Post-menopausal   Other Topics Concern    Parent/sibling w/ CABG, MI or angioplasty before 65F 55M? No     Social Drivers of Health     Financial Resource Strain: Low Risk  (2024)    Financial Resource Strain     Within the past 12 months, have you or your family members you live with been unable to get utilities (heat, electricity) when it was really needed?: No   Food Insecurity: Low Risk   "(1/29/2024)    Food Insecurity     Within the past 12 months, did you worry that your food would run out before you got money to buy more?: No     Within the past 12 months, did the food you bought just not last and you didn t have money to get more?: No   Transportation Needs: Low Risk  (1/29/2024)    Transportation Needs     Within the past 12 months, has lack of transportation kept you from medical appointments, getting your medicines, non-medical meetings or appointments, work, or from getting things that you need?: No   Interpersonal Safety: Low Risk  (11/15/2023)    Interpersonal Safety     Do you feel physically and emotionally safe where you currently live?: Yes     Within the past 12 months, have you been hit, slapped, kicked or otherwise physically hurt by someone?: No     Within the past 12 months, have you been humiliated or emotionally abused in other ways by your partner or ex-partner?: No   Housing Stability: Low Risk  (1/29/2024)    Housing Stability     Do you have housing? : Yes     Are you worried about losing your housing?: No       Family History:   Family History   Problem Relation Age of Onset    Uterine Cancer Mother         endometrial cancer    Diabetes Mother     Colon Polyps Father     Diabetes Father     Hypertension Father     Diabetes Brother     Hypertension Brother     Pulmonary Embolism Brother         following leg surgery    Anesthesia Reaction No family hx of      Physical Exam:   Vitals:    02/17/25 1417 02/17/25 1418   BP: (!) 146/88 (!) 145/74   BP Location: Left arm Left arm   Patient Position: Chair Chair   Cuff Size: Adult Regular Adult Regular   Pulse: 70    Resp: 18    Temp: 98.1  F (36.7  C)    TempSrc: Tympanic    Weight: 107.5 kg (237 lb)    Height: 1.6 m (5' 3\")       Estimated body mass index is 41.98 kg/m  as calculated from the following:    Height as of this encounter: 1.6 m (5' 3\").    Weight as of this encounter: 107.5 kg (237 lb).    Gen: Pleasant, talkative " female in no apparent distress   Respiratory: Lungs clear, breathing comfortably on room air   Cardiac: Regular rate, warm and well-perfused.   GI: Abd soft and non-tender,  : External genitalia is free of lesion, but severe vulvovaginal atrophy noted. Vaginal mucosa with moderate loss of ruggations. Cream is present in the vagina. With valsalva, cystocele is noted to prolapse to 2cm behind the introitus, rectocele noted to prolapse to 2cm and the cervix/apex is noted to prolapse to 1cm.   MSK: Grossly normal movement of all four extremities  Psych: mood and affect bright   Lower extremity: edema not present     A&P: Ms. Meraz is a 59 year old  being seen for GYN consultation for uterine prolapse and vulvovaginal atrophy, grade 1 cystocele, grade 1 rectocele and grade 2 apical prolapse.   I did  the patient on the cause of pelvic organ prolapse the the benign nature of this disorder. I reviewed treatment options of PT referral and kegel exercises/training. I also reviewed the option of placement of a pessary versus definitive surgical management. As far as the pessary, I reviewed routine removal and cleaning practices and precautions around vaginal discharge and bleeding. I also reviewed the steps of surgical management. Given her extensive pelvic/abdominal surgical history, I recommended referral to UroGyn if surgery is desidred. She is interested in placement of this referral, but trying a pessary in the meantime.     Pessary Placement Procedure Note:   I placed a #4 ring with support. The patient was able to walk the hallways and bear down over the toilet without pain, pinching or losing the pessary. The trial pessary was removed and the permanent pessary placed. Routine cleaning was discussed.     I also recommended increasing estrace cream to daily estrace cream for two weeks, then 3x a week for maintenance of vulvovaginal atrophy.     Ines Chi MD  OB/GYN  2025

## 2025-02-17 NOTE — NURSING NOTE
"Initial BP (!) 146/88 (BP Location: Left arm, Patient Position: Chair, Cuff Size: Adult Regular)   Pulse 70   Temp 98.1  F (36.7  C) (Tympanic)   Resp 18   Ht 1.6 m (5' 3\")   Wt 107.5 kg (237 lb)   LMP 12/09/2018   BMI 41.98 kg/m   Estimated body mass index is 41.98 kg/m  as calculated from the following:    Height as of this encounter: 1.6 m (5' 3\").    Weight as of this encounter: 107.5 kg (237 lb). .    "

## 2025-03-17 SDOH — HEALTH STABILITY: PHYSICAL HEALTH: ON AVERAGE, HOW MANY MINUTES DO YOU ENGAGE IN EXERCISE AT THIS LEVEL?: 20 MIN

## 2025-03-17 SDOH — HEALTH STABILITY: PHYSICAL HEALTH: ON AVERAGE, HOW MANY DAYS PER WEEK DO YOU ENGAGE IN MODERATE TO STRENUOUS EXERCISE (LIKE A BRISK WALK)?: 1 DAY

## 2025-03-17 ASSESSMENT — SOCIAL DETERMINANTS OF HEALTH (SDOH): HOW OFTEN DO YOU GET TOGETHER WITH FRIENDS OR RELATIVES?: ONCE A WEEK

## 2025-03-19 ENCOUNTER — OFFICE VISIT (OUTPATIENT)
Dept: FAMILY MEDICINE | Facility: CLINIC | Age: 60
End: 2025-03-19
Payer: COMMERCIAL

## 2025-03-19 VITALS
OXYGEN SATURATION: 96 % | WEIGHT: 236.2 LBS | BODY MASS INDEX: 40.33 KG/M2 | TEMPERATURE: 98 F | HEART RATE: 70 BPM | HEIGHT: 64 IN | RESPIRATION RATE: 18 BRPM | SYSTOLIC BLOOD PRESSURE: 130 MMHG | DIASTOLIC BLOOD PRESSURE: 86 MMHG

## 2025-03-19 DIAGNOSIS — E66.01 MORBID OBESITY (H): ICD-10-CM

## 2025-03-19 DIAGNOSIS — Z00.00 ROUTINE GENERAL MEDICAL EXAMINATION AT A HEALTH CARE FACILITY: ICD-10-CM

## 2025-03-19 DIAGNOSIS — Z13.6 CARDIOVASCULAR SCREENING; LDL GOAL LESS THAN 130: ICD-10-CM

## 2025-03-19 DIAGNOSIS — G62.9 PERIPHERAL POLYNEUROPATHY: ICD-10-CM

## 2025-03-19 DIAGNOSIS — E11.9 TYPE 2 DIABETES MELLITUS WITHOUT COMPLICATION, WITHOUT LONG-TERM CURRENT USE OF INSULIN (H): Primary | ICD-10-CM

## 2025-03-19 DIAGNOSIS — R73.03 PREDIABETES: ICD-10-CM

## 2025-03-19 LAB
ALBUMIN SERPL BCG-MCNC: 4.3 G/DL (ref 3.5–5.2)
ALP SERPL-CCNC: 83 U/L (ref 40–150)
ALT SERPL W P-5'-P-CCNC: 50 U/L (ref 0–50)
ANION GAP SERPL CALCULATED.3IONS-SCNC: 11 MMOL/L (ref 7–15)
AST SERPL W P-5'-P-CCNC: 37 U/L (ref 0–45)
BILIRUB SERPL-MCNC: 0.4 MG/DL
BUN SERPL-MCNC: 11.9 MG/DL (ref 6–20)
CALCIUM SERPL-MCNC: 9.8 MG/DL (ref 8.8–10.4)
CHLORIDE SERPL-SCNC: 102 MMOL/L (ref 98–107)
CHOLEST SERPL-MCNC: 194 MG/DL
CREAT SERPL-MCNC: 0.82 MG/DL (ref 0.51–0.95)
EGFRCR SERPLBLD CKD-EPI 2021: 82 ML/MIN/1.73M2
EST. AVERAGE GLUCOSE BLD GHB EST-MCNC: 160 MG/DL
FASTING STATUS PATIENT QL REPORTED: NO
FASTING STATUS PATIENT QL REPORTED: NO
GLUCOSE SERPL-MCNC: 153 MG/DL (ref 70–99)
HBA1C MFR BLD: 7.2 % (ref 0–5.6)
HCO3 SERPL-SCNC: 24 MMOL/L (ref 22–29)
HDLC SERPL-MCNC: 39 MG/DL
LDLC SERPL CALC-MCNC: 100 MG/DL
NONHDLC SERPL-MCNC: 155 MG/DL
POTASSIUM SERPL-SCNC: 4.4 MMOL/L (ref 3.4–5.3)
PROT SERPL-MCNC: 7.1 G/DL (ref 6.4–8.3)
SODIUM SERPL-SCNC: 137 MMOL/L (ref 135–145)
TOTAL PROTEIN SERUM FOR ELP: 7 G/DL (ref 6.4–8.3)
TRIGL SERPL-MCNC: 273 MG/DL
TSH SERPL DL<=0.005 MIU/L-ACNC: 0.87 UIU/ML (ref 0.3–4.2)

## 2025-03-19 PROCEDURE — 83036 HEMOGLOBIN GLYCOSYLATED A1C: CPT | Performed by: PHYSICIAN ASSISTANT

## 2025-03-19 PROCEDURE — 36415 COLL VENOUS BLD VENIPUNCTURE: CPT | Performed by: PHYSICIAN ASSISTANT

## 2025-03-19 RX ORDER — LANCETS
EACH MISCELLANEOUS
Qty: 100 EACH | Refills: 6 | Status: SHIPPED | OUTPATIENT
Start: 2025-03-19

## 2025-03-19 ASSESSMENT — PAIN SCALES - GENERAL: PAINLEVEL_OUTOF10: MILD PAIN (1)

## 2025-03-19 NOTE — PATIENT INSTRUCTIONS
Patient Education   Preventive Care Advice   This is general advice given by our system to help you stay healthy. However, your care team may have specific advice just for you. Please talk to your care team about your preventive care needs.  Nutrition  Eat 5 or more servings of fruits and vegetables each day.  Try wheat bread, brown rice and whole grain pasta (instead of white bread, rice, and pasta).  Get enough calcium and vitamin D. Check the label on foods and aim for 100% of the RDA (recommended daily allowance).  Lifestyle  Exercise at least 150 minutes each week  (30 minutes a day, 5 days a week).  Do muscle strengthening activities 2 days a week. These help control your weight and prevent disease.  No smoking.  Wear sunscreen to prevent skin cancer.  Have a dental exam and cleaning every 6 months.  Yearly exams  See your health care team every year to talk about:  Any changes in your health.  Any medicines your care team has prescribed.  Preventive care, family planning, and ways to prevent chronic diseases.  Shots (vaccines)   HPV shots (up to age 26), if you've never had them before.  Hepatitis B shots (up to age 59), if you've never had them before.  COVID-19 shot: Get this shot when it's due.  Flu shot: Get a flu shot every year.  Tetanus shot: Get a tetanus shot every 10 years.  Pneumococcal, hepatitis A, and RSV shots: Ask your care team if you need these based on your risk.  Shingles shot (for age 50 and up)  General health tests  Diabetes screening:  Starting at age 35, Get screened for diabetes at least every 3 years.  If you are younger than age 35, ask your care team if you should be screened for diabetes.  Cholesterol test: At age 39, start having a cholesterol test every 5 years, or more often if advised.  Bone density scan (DEXA): At age 50, ask your care team if you should have this scan for osteoporosis (brittle bones).  Hepatitis C: Get tested at least once in your life.  STIs (sexually  transmitted infections)  Before age 24: Ask your care team if you should be screened for STIs.  After age 24: Get screened for STIs if you're at risk. You are at risk for STIs (including HIV) if:  You are sexually active with more than one person.  You don't use condoms every time.  You or a partner was diagnosed with a sexually transmitted infection.  If you are at risk for HIV, ask about PrEP medicine to prevent HIV.  Get tested for HIV at least once in your life, whether you are at risk for HIV or not.  Cancer screening tests  Cervical cancer screening: If you have a cervix, begin getting regular cervical cancer screening tests starting at age 21.  Breast cancer scan (mammogram): If you've ever had breasts, begin having regular mammograms starting at age 40. This is a scan to check for breast cancer.  Colon cancer screening: It is important to start screening for colon cancer at age 45.  Have a colonoscopy test every 10 years (or more often if you're at risk) Or, ask your provider about stool tests like a FIT test every year or Cologuard test every 3 years.  To learn more about your testing options, visit:   .  For help making a decision, visit:   https://bit.ly/su75726.  Prostate cancer screening test: If you have a prostate, ask your care team if a prostate cancer screening test (PSA) at age 55 is right for you.  Lung cancer screening: If you are a current or former smoker ages 50 to 80, ask your care team if ongoing lung cancer screenings are right for you.  For informational purposes only. Not to replace the advice of your health care provider. Copyright   2023 [facility]. All rights reserved. Clinically reviewed by the [facility] Transitions Program. [employer] 391823 - REV 01/24.  Preventing Falls: Care Instructions  Injuries and health problems such as trouble walking or poor eyesight can increase your risk of falling. So can some medicines. But there are things you can do to help  "prevent falls. You can exercise to get stronger. You can also arrange your home to make it safer.    Talk to your doctor about the medicines you take. Ask if any of them increase the risk of falls and whether they can be changed or stopped.   Try to exercise regularly. It can help improve your strength and balance. This can help lower your risk of falling.         Practice fall safety and prevention.   Wear low-heeled shoes that fit well and give your feet good support. Talk to your doctor if you have foot problems that make this hard.  Carry a cellphone or wear a medical alert device that you can use to call for help.  Use stepladders instead of chairs to reach high objects. Don't climb if you're at risk for falls. Ask for help, if needed.  Wear the correct eyeglasses, if you need them.        Make your home safer.   Remove rugs, cords, clutter, and furniture from walkways.  Keep your house well lit. Use night-lights in hallways and bathrooms.  Install and use sturdy handrails on stairways.  Wear nonskid footwear, even inside. Don't walk barefoot or in socks without shoes.        Be safe outside.   Use handrails, curb cuts, and ramps whenever possible.  Keep your hands free by using a shoulder bag or backpack.  Try to walk in well-lit areas. Watch out for uneven ground, changes in pavement, and debris.  Be careful in the winter. Walk on the grass or gravel when sidewalks are slippery. Use de-icer on steps and walkways. Add non-slip devices to shoes.    Put grab bars and nonskid mats in your shower or tub and near the toilet. Try to use a shower chair or bath bench when bathing.   Get into a tub or shower by putting in your weaker leg first. Get out with your strong side first. Have a phone or medical alert device in the bathroom with you.   Where can you learn more?  Go to https://www.[employer].net/patiented  Enter G117 in the search box to learn more about \"Preventing Falls: Care Instructions.\"  Current as of: " July 31, 2024  Content Version: 14.4    1090-7346 [employer].   Care instructions adapted under license by your healthcare professional. If you have questions about a medical condition or this instruction, always ask your healthcare professional. [employer] disclaims any warranty or liability for your use of this information.    Learning About Stress  What is stress?     Stress is your body's response to a hard situation. Your body can have a physical, emotional, or mental response. Stress is a fact of life for most people, and it affects everyone differently. What causes stress for you may not be stressful for someone else.  A lot of things can cause stress. You may feel stress when you go on a job interview, take a test, or run a race. This kind of short-term stress is normal and even useful. It can help you if you need to work hard or react quickly. For example, stress can help you finish an important job on time.  Long-term stress is caused by ongoing stressful situations or events. Examples of long-term stress include long-term health problems, ongoing problems at work, or conflicts in your family. Long-term stress can harm your health.  How does stress affect your health?  When you are stressed, your body responds as though you are in danger. It makes hormones that speed up your heart, make you breathe faster, and give you a burst of energy. This is called the fight-or-flight stress response. If the stress is over quickly, your body goes back to normal and no harm is done.  But if stress happens too often or lasts too long, it can have bad effects. Long-term stress can make you more likely to get sick, and it can make symptoms of some diseases worse. If you tense up when you are stressed, you may develop neck, shoulder, or low back pain. Stress is linked to high blood pressure and heart disease.  Stress also harms your emotional health. It can make you jaquez, tense, or depressed. Your  relationships may suffer, and you may not do well at work or school.  What can you do to manage stress?  You can try these things to help manage stress:   Do something active. Exercise or activity can help reduce stress. Walking is a great way to get started. Even everyday activities such as housecleaning or yard work can help.  Try yoga or vidhya chi. These techniques combine exercise and meditation. You may need some training at first to learn them.  Do something you enjoy. For example, listen to music or go to a movie. Practice your hobby or do volunteer work.  Meditate. This can help you relax, because you are not worrying about what happened before or what may happen in the future.  Do guided imagery. Imagine yourself in any setting that helps you feel calm. You can use online videos, books, or a teacher to guide you.  Do breathing exercises. For example:  From a standing position, bend forward from the waist with your knees slightly bent. Let your arms dangle close to the floor.  Breathe in slowly and deeply as you return to a standing position. Roll up slowly and lift your head last.  Hold your breath for just a few seconds in the standing position.  Breathe out slowly and bend forward from the waist.  Let your feelings out. Talk, laugh, cry, and express anger when you need to. Talking with supportive friends or family, a counselor, or a alexei leader about your feelings is a healthy way to relieve stress. Avoid discussing your feelings with people who make you feel worse.  Write. It may help to write about things that are bothering you. This helps you find out how much stress you feel and what is causing it. When you know this, you can find better ways to cope.  What can you do to prevent stress?  You might try some of these things to help prevent stress:  Manage your time. This helps you find time to do the things you want and need to do.  Get enough sleep. Your body recovers from the stresses of the day while  "you are sleeping.  Get support. Your family, friends, and community can make a difference in how you experience stress.  Limit your news feed. Avoid or limit time on social media or news that may make you feel stressed.  Do something active. Exercise or activity can help reduce stress. Walking is a great way to get started.  Where can you learn more?  Go to https://www.[employer].net/patiented  Enter N032 in the search box to learn more about \"Learning About Stress.\"  Current as of: October 24, 2024  Content Version: 14.4    7899-8409 [employer].   Care instructions adapted under license by your healthcare professional. If you have questions about a medical condition or this instruction, always ask your healthcare professional. [employer] disclaims any warranty or liability for your use of this information.       "

## 2025-03-19 NOTE — PROGRESS NOTES
Preventive Care Visit  [facility]  Anton Arreola PA-C, Family Medicine  Mar 19, 2025  {Provider  Link to [facility] :[id number]}    Assessment & Plan   Type 2 diabetes mellitus without complication, without long-term current use of insulin (H)  ***Transitions from prediabetes to diabetes.   - Hemoglobin A1c; Future  - Tirzepatide (MOUNJARO) 7.5 MG/0.5ML SOAJ auto-injector pen; Inject 0.5 mLs (7.5 mg) subcutaneously every 7 days.  - tirzepatide (MOUNJARO) 5 MG/0.5ML SOAJ auto-injector pen; Inject 0.5 mLs (5 mg) subcutaneously every 7 days.  - tirzepatide (MOUNJARO) 2.5 MG/0.5ML SOAJ auto-injector pen; Inject 0.5 mLs (2.5 mg) subcutaneously every 7 days.  - blood glucose monitoring (NO BRAND SPECIFIED) meter device kit; Use to test blood sugar 1 times daily or as directed. Preferred blood glucose meter OR supplies to accompany: Blood Glucose Monitor Brands: per insurance.  - blood glucose (NO BRAND SPECIFIED) test strip; Use to test blood sugar 1 times daily or as directed. To accompany: Blood Glucose Monitor Brands: per insurance.  - thin (NO BRAND SPECIFIED) lancets; Use with lanceting device. To accompany: Blood Glucose Monitor Brands: per insurance.    Peripheral polyneuropathy  ***  - TSH with free T4 reflex; Future  - Vitamin B12; Future  - Vitamin B1 whole blood; Future  - Protein Electrophoresis with IELP Reflex; Future    Morbid obesity (H)  ***  - Comprehensive metabolic panel (BMP + Alb, Alk Phos, ALT, AST, Total. Bili, TP); Future    Routine general medical examination at a health care facility  59 yr old here for physical exam. Last physical exam done 1 year ago. Discussed preventative screenings which are updated below. Counseled on immunizations and healthy lifestyle. Follow-up in 1 year for repeat physical exam.     CARDIOVASCULAR SCREENING; LDL GOAL LESS THAN 130  Due for screening.   - Lipid panel; Future  - Lipid panel    Patient has been advised of split billing  requirements and indicates understanding: Yes        Counseling  Appropriate preventive services were addressed with this patient via screening, questionnaire, or discussion as appropriate for fall prevention, nutrition, physical activity, Tobacco-use cessation, social engagement, weight loss and cognition.  Checklist reviewing preventive services available has been given to the patient.  Reviewed patient's diet, addressing concerns and/or questions.   She is at risk for lack of exercise and has been provided with information to increase physical activity for the benefit of her well-being.   She is at risk for psychosocial distress and has been provided with information to reduce risk.     Adi Quesada is a 59 year old, presenting for the following:  Physical        3/19/2025    11:20 AM   Additional Questions   Roomed by Victorina ELI CMA   Accompanied by Self     HPI    Patient reports for annual wellness exam   She is tolerating her medications well     She has concerns about weight gain and would like to discuss options   Reports that she is eating a healthy diet   She is unable to exercise due to frequent and debilitating migraines   She denies use of alcohol   Additionally she has noticed neuropathy in both of her feet   Continues to see an eye doctor annually   Denies noticing any changes to her skin such as ulcerations or pigment changes  She denies any recent F, illness, CP, SOB, or LE edema          3/17/2025   General Health   How would you rate your overall physical health? (!) FAIR   Feel stress (tense, anxious, or unable to sleep) To some extent   (!) STRESS CONCERN      3/17/2025   Nutrition   Three or more servings of calcium each day? Yes   Diet: Regular (no restrictions)   How many servings of fruit and vegetables per day? 4 or more   How many sweetened beverages each day? 0-1         3/17/2025   Exercise   Days per week of moderate/strenous exercise 1 day   Average minutes spent exercising at  this level 20 min   (!) EXERCISE CONCERN      3/17/2025   Social Factors   Frequency of gathering with friends or relatives Once a week   Worry food won't last until get money to buy more No   Food not last or not have enough money for food? No   Do you have housing? (Housing is defined as stable permanent housing and does not include staying ouside in a car, in a tent, in an abandoned building, in an overnight shelter, or couch-surfing.) Yes   Are you worried about losing your housing? No   Lack of transportation? No   Unable to get utilities (heat,electricity)? No         3/19/2025   Fall Risk   Gait Speed Test (Document in seconds) 4          3/17/2025   Dental   Dentist two times every year? Yes         { Rooming Staff Patient needs a PHQ as part of the AWV.  Use this link to complete and then refresh the note to pull results Link to PHQ9 Assessment :[id number]}  Today's PHQ-9 Score:       2025    11:40 AM   PHQ-9 SCORE   PHQ-9 Total Score MyChart 0   PHQ-9 Total Score 0        Patient-reported           3/17/2025   Substance Use   Alcohol more than 3/day or more than 7/wk No   Do you use any other substances recreationally? (!) CANNABIS PRODUCTS     Social History     Tobacco Use    Smoking status: Former     Current packs/day: 0.00     Average packs/day: 1 pack/day for 10.0 years (10.0 ttl pk-yrs)     Types: Cigarettes     Start date: 10/10/1982     Quit date: 1991     Years since quittin.8    Smokeless tobacco: Never   Vaping Use    Vaping status: Never Used   Substance Use Topics    Alcohol use: Yes     Comment: 1-2 beer/month    Drug use: Yes     Types: Marijuana     Comment: Occasional use of thc gummies     {Provider  If there are gaps in the social history shown above, please follow the link to update and then refresh the note Link to Social and Substance History :[id number]}      2024   LAST FHS-7 RESULTS   1st degree relative breast or ovarian cancer No   Any relative bilateral breast  cancer No   Any male have breast cancer No   Any ONE woman have BOTH breast AND ovarian cancer No   Any woman with breast cancer before 50yrs No   2 or more relatives with breast AND/OR ovarian cancer No   2 or more relatives with breast AND/OR bowel cancer No     {If any of the questions to the FHS7 are answered yes, consider referral for genetic counseling.    Additional indications for genetic referral include personal history of breast or ovarian cancer, genetic mutation in 1st degree relative which increases risk of breast cancer including BRCA1, BRCA2, HUBERT, PALB 2, TP53, CHEK2, PTEN, CDH1, STK11 (per ACS) and/or 1st degree relative with history of pancreatic or high-risk prostate cancer (per NCCN):937274}         3/17/2025   STI Screening   New sexual partner(s) since last STI/HIV test? No     History of abnormal Pap smear: No - age 30- 64 PAP with HPV every 5 years recommended        Latest Ref Rng & Units 1/7/2025    11:14 AM 9/9/2021     9:23 AM   PAP / HPV   PAP  Negative for Intraepithelial Lesion or Malignancy (NILM)  Negative for Intraepithelial Lesion or Malignancy (NILM)    HPV 16 DNA Negative Negative  Negative    HPV 18 DNA Negative Negative  Negative    Other HR HPV Negative Negative  Negative      ASCVD Risk   The 10-year ASCVD risk score (Narayan DK, et al., 2019) is: 3.9%    Values used to calculate the score:      Age: 59 years      Sex: Female      Is Non- : No      Diabetic: No      Tobacco smoker: No      Systolic Blood Pressure: 130 mmHg      Is BP treated: No      HDL Cholesterol: 37 mg/dL      Total Cholesterol: 210 mg/dL    {Provider  REQUIRED FOR AWV Use the storyboard to review patient history, after sections have been marked as reviewed, refresh note to capture documentation:[id number]}   Reviewed and updated as needed this visit by Provider                        Review of Systems  See HPI      Objective    Exam  /86 (BP Location: Right arm, Patient  "Position: Sitting, Cuff Size: Adult Large)   Pulse 70   Temp 98  F (36.7  C) (Tympanic)   Resp 18   Ht 1.626 m (5' 4\")   Wt 107.1 kg (236 lb 3.2 oz)   LMP 12/09/2018   SpO2 96%   BMI 40.54 kg/m     Estimated body mass index is 40.54 kg/m  as calculated from the following:    Height as of this encounter: 1.626 m (5' 4\").    Weight as of this encounter: 107.1 kg (236 lb 3.2 oz).    Physical Exam  Constitutional: healthy, alert, and no distress  Head: Normocephalic. Atraumatic  Eyes: No conjunctival injection, sclera anicteric  Neck: supple, no thyromegaly, nodules or asymmetry of the thyroid. No cervical LAD.  Cardiovascular: RRR. No murmurs, clicks, gallops, or rubs. No peripheral edema.   Respiratory: No resp distress. Lungs CTAB bilaterally.   Musculoskeletal: extremities normal- no gross deformities noted, and normal muscle tone  Skin: no suspicious lesions or rashes  Neurologic: Gait normal. CN 2-12 grossly intact  Psychiatric: mentation appears normal and affect normal/bright     Physician Attestation   I, Anton Arreola PA-C, was present with the medical/HOLDEN student who participated in the service and in the documentation of the note.  I have verified the history and personally performed the physical exam and medical decision making.  I agree with the assessment and plan of care as documented in the note.     Anton Arreola PA-C     {Email feedback regarding this note to [email or web address]   :[id number]}  "

## 2025-03-20 LAB — VIT B12 SERPL-MCNC: 370 PG/ML (ref 232–1245)

## 2025-03-20 RX ORDER — METFORMIN HYDROCHLORIDE 500 MG/1
500 TABLET, EXTENDED RELEASE ORAL 2 TIMES DAILY WITH MEALS
Qty: 180 TABLET | Refills: 0 | Status: SHIPPED | OUTPATIENT
Start: 2025-03-20

## 2025-03-21 ENCOUNTER — TELEPHONE (OUTPATIENT)
Dept: FAMILY MEDICINE | Facility: CLINIC | Age: 60
End: 2025-03-21
Payer: COMMERCIAL

## 2025-03-21 NOTE — TELEPHONE ENCOUNTER
Patient requested Prior authorization for:      Tirzepatide (MOUNJARO) 7.5 MG/0.5ML SOAJ auto-injector pen Inject 0.5 mLs (7.5 mg) subcutaneously every 7 days. 2 mL 0 ordered 03/19/2025     Ana Aguilera RN on 3/21/2025 at 12:42 PM

## 2025-03-23 LAB — VIT B1 PYROPHOSHATE BLD-SCNC: 118 NMOL/L

## 2025-03-25 NOTE — TELEPHONE ENCOUNTER
Prior Authorization Approval    Medication: TIRZEPATIDE 7.5 MG/0.5ML SC SOAJ  Authorization Effective Date: 2/17/2025  Authorization Expiration Date: 12/31/1999  Approved Dose/Quantity:   Reference #: [id number]   Insurance Company: [employer] Non-Specialty PA's - Phone [phone] Fax [phone]  Expected CoPay: $    CoPay Card Available:      Financial Assistance Needed:   Which Pharmacy is filling the prescription: [id number] - [place], [place] - [address] [place] [address] [place]

## 2025-04-07 ENCOUNTER — HOSPITAL ENCOUNTER (OUTPATIENT)
Dept: ULTRASOUND IMAGING | Facility: CLINIC | Age: 60
Discharge: HOME | End: 2025-04-07
Attending: PHYSICIAN ASSISTANT | Admitting: PHYSICIAN ASSISTANT
Payer: COMMERCIAL

## 2025-04-07 DIAGNOSIS — M79.652 PAIN IN BOTH THIGHS: ICD-10-CM

## 2025-04-07 DIAGNOSIS — M79.651 PAIN IN BOTH THIGHS: ICD-10-CM

## 2025-04-07 PROCEDURE — 93925 LOWER EXTREMITY STUDY: CPT

## 2025-04-08 ENCOUNTER — MYC MEDICAL ADVICE (OUTPATIENT)
Dept: FAMILY MEDICINE | Facility: CLINIC | Age: 60
End: 2025-04-08
Payer: COMMERCIAL

## 2025-04-08 DIAGNOSIS — R11.0 NAUSEA: Primary | ICD-10-CM

## 2025-04-08 DIAGNOSIS — T88.7XXA MEDICATION SIDE EFFECTS: ICD-10-CM

## 2025-04-09 RX ORDER — ONDANSETRON 4 MG/1
4 TABLET, ORALLY DISINTEGRATING ORAL EVERY 8 HOURS PRN
Qty: 20 TABLET | Refills: 3 | Status: SHIPPED | OUTPATIENT
Start: 2025-04-09

## 2025-04-09 NOTE — TELEPHONE ENCOUNTER
Pls see [employer] message below.     RX pended, message routed to provider for consideration.   Julie Behrendt RN

## 2025-05-07 ENCOUNTER — OFFICE VISIT (OUTPATIENT)
Dept: FAMILY MEDICINE | Facility: CLINIC | Age: 60
End: 2025-05-07
Payer: COMMERCIAL

## 2025-05-07 VITALS
RESPIRATION RATE: 22 BRPM | DIASTOLIC BLOOD PRESSURE: 80 MMHG | OXYGEN SATURATION: 98 % | SYSTOLIC BLOOD PRESSURE: 118 MMHG | HEART RATE: 75 BPM | WEIGHT: 222.2 LBS | BODY MASS INDEX: 37.94 KG/M2 | TEMPERATURE: 97.5 F | HEIGHT: 64 IN

## 2025-05-07 DIAGNOSIS — E78.2 MIXED HYPERLIPIDEMIA: ICD-10-CM

## 2025-05-07 DIAGNOSIS — H81.13 BENIGN PAROXYSMAL POSITIONAL VERTIGO DUE TO BILATERAL VESTIBULAR DISORDER: Primary | ICD-10-CM

## 2025-05-07 DIAGNOSIS — H81.09 MENIERE'S DISEASE, UNSPECIFIED LATERALITY: ICD-10-CM

## 2025-05-07 PROCEDURE — 3079F DIAST BP 80-89 MM HG: CPT | Performed by: FAMILY MEDICINE

## 2025-05-07 PROCEDURE — 99213 OFFICE O/P EST LOW 20 MIN: CPT | Performed by: FAMILY MEDICINE

## 2025-05-07 PROCEDURE — 1126F AMNT PAIN NOTED NONE PRSNT: CPT | Performed by: FAMILY MEDICINE

## 2025-05-07 PROCEDURE — 3074F SYST BP LT 130 MM HG: CPT | Performed by: FAMILY MEDICINE

## 2025-05-07 ASSESSMENT — PAIN SCALES - GENERAL: PAINLEVEL_OUTOF10: NO PAIN (0)

## 2025-05-07 NOTE — PROGRESS NOTES
"  Assessment & Plan     Benign paroxysmal positional vertigo due to bilateral vestibular disorder  Differential discussed in detail including benign paroxysmal positional vertigo, history of Ménière's disease.  Recommended elevation, over-the-counter antihistamine and vestibular therapy.  ENT referral placed  - Physical Therapy  Referral; Future    Meniere's disease, unspecified laterality  - Adult ENT  Referral; Future    Mixed hyperlipidemia  Known to have type 2 diabetes mellitus.  Patient deferred starting antihypertensive for now      Adi Quesada is a 59 year old, presenting for the following health issues:  Dizziness        5/7/2025    10:23 AM   Additional Questions   Roomed by Jaja SKELTON CMA     History of Present Illness       Reason for visit:  Manage Menieres disease; help with vertigo  Symptom onset:  1-2 weeks ago  Symptoms include:  Has been having \"attacks\" since last week, where she starts to lose hearing in her ears, and will feel off balance or dizzy.  She has known Meniere's disease, and is not sure if this is related to that or not.  Also has history of benign positional vertigo, and the Epley maneuver didn't really help  Symptom intensity:  Moderate  Symptom progression:  Staying the same    She eats 2-3 servings of fruits and vegetables daily.She consumes 0 sweetened beverage(s) daily.She exercises with enough effort to increase her heart rate 9 or less minutes per day.  She exercises with enough effort to increase her heart rate 3 or less days per week.   She is taking medications regularly.          Review of Systems  Constitutional, HEENT, cardiovascular, pulmonary, gi and gu systems are negative, except as otherwise noted.      Objective    /80 (BP Location: Right arm, Patient Position: Sitting, Cuff Size: Adult Large)   Pulse 75   Temp 97.5  F (36.4  C) (Tympanic)   Resp 22   Ht 1.626 m (5' 4\")   Wt 100.8 kg (222 lb 3.2 oz)   LMP 12/09/2018   SpO2 98% "   BMI 38.14 kg/m    Body mass index is 38.14 kg/m .  Physical Exam   GENERAL: alert and no distress  EYES: Eyes grossly normal to inspection, PERRL and conjunctivae and sclerae normal  HENT: normal cephalic/atraumatic, ear canals and TM's normal, nose and mouth without ulcers or lesions, oropharynx clear, oral mucous membranes moist, and Richard-Hallpike maneuver positive for vertigo, negative for nystagmus.  History of  NECK: no adenopathy, no asymmetry, masses, or scars  RESP: lungs clear to auscultation - no rales, rhonchi or wheezes  CV: regular rates and rhythm, normal S1 S2, no S3 or S4, and no murmur, click or rub  MS: no gross musculoskeletal defects noted, no edema  NEURO: Normal strength and tone, mentation intact and speech normal  PSYCH: mentation appears normal, affect normal/bright          Signed Electronically by: Reji Cai MD

## 2025-05-08 ENCOUNTER — PATIENT OUTREACH (OUTPATIENT)
Dept: CARE COORDINATION | Facility: CLINIC | Age: 60
End: 2025-05-08
Payer: COMMERCIAL

## 2025-05-12 ENCOUNTER — PATIENT OUTREACH (OUTPATIENT)
Dept: CARE COORDINATION | Facility: CLINIC | Age: 60
End: 2025-05-12
Payer: COMMERCIAL

## 2025-06-03 ENCOUNTER — THERAPY VISIT (OUTPATIENT)
Dept: PHYSICAL THERAPY | Facility: CLINIC | Age: 60
End: 2025-06-03
Attending: FAMILY MEDICINE
Payer: COMMERCIAL

## 2025-06-03 DIAGNOSIS — H81.13 BENIGN PAROXYSMAL POSITIONAL VERTIGO DUE TO BILATERAL VESTIBULAR DISORDER: ICD-10-CM

## 2025-06-03 PROCEDURE — 97112 NEUROMUSCULAR REEDUCATION: CPT | Mod: GP | Performed by: PHYSICAL THERAPIST

## 2025-06-03 PROCEDURE — 97162 PT EVAL MOD COMPLEX 30 MIN: CPT | Mod: GP | Performed by: PHYSICAL THERAPIST

## 2025-06-03 NOTE — PROGRESS NOTES
PHYSICAL THERAPY EVALUATION  Type of Visit: Evaluation       Fall Risk Screen:  Have you fallen 2 or more times in the past year?: Yes  Have you fallen and had an injury in the past year?: No  Is patient receiving Physical Therapy Services?: Yes    Subjective   5/7/25 when she saw the doctor having short lived room spins. Dx with Meniere's about 2 years ago. Since then has had more bad attacks, gets ear pressure ,hearing goes out completely, gets spinning 4 hours or so, takes zofran . Woke Sat and all sx were gone. Last real bad episode over a year ago, was having real bad headaches at that time.      Presenting condition or subjective complaint: Menieres vertigo attacks, balance issues, bppv  Date of onset: 05/07/25    Relevant medical history:     Dates & types of surgery: '92, '09 c-sec, '98 cholecystectomy; '01 tubal ligation; '03 pelvic floor repair; '07 tubal ligation reversal; '16 abdominplasty; '23 sigmoid colectomy    Prior diagnostic imaging/testing results: MRI     Prior therapy history for the same diagnosis, illness or injury: Yes Bppv management    Prior Level of Function  Transfers: Independent  Ambulation: Independent  ADL: Independent  IADL:     Living Environment  Social support: With family members   Type of home: Multi-level   Stairs to enter the home: Yes 2 Is there a railing: No     Ramp: No   Stairs inside the home: Yes 26 Is there a railing: Yes     Help at home: Other  Equipment owned:       Employment: Not Applicable    Hobbies/Interests: Hobby farm (various animals), reading, crocheting, nature trails (not anymore)    Patient goals for therapy: Walk straight, make plans    Pain assessment:      Objective        GAIT: slow due to hamstring injury, gait with horiz head turn no change, vertical head turns slowed pace      BALANCE:   Standing balance FT EO and FT EC with horizontal and vertical head turns x10, increased moving sensation with each    SPECIAL TESTS    Modified CTSIB Conditions  "(sec) Cond 1: 30  Cond 2: 30 mod sway  Cond 4: 30  Cond 5 : FA 2\" 30sec, FT 6 sec   Romberg  (sec)    Sharpened Romberg (sec)    30 Second Sit to Stand (reps/height)    Mini-BESTest                     VESTIBULAR EVALUATION  ADDITIONAL HISTORY:  Description of symptoms: Off balance; Nausea or vomiting; Attacks of dizziness; Blurry or jumping vision; I feel like the room is spinning  Dizzy attacks:   Start: Spring '24   Last attack: Yesterday   Frequency of occurrences: 4-5/week   Length of attack: 2-6 hours  Difficulty hearing: Both ears  Noise in ears? Yes Tinnitus: varies- screeching, ringing, whooshing, humming  Alleviates symptoms: Sitting very still, eyes closed, Zofran, sleep  Worsens symptoms: Movement,  noise, sometimes light, especially flickering light  Activities that bring on symptoms: Bending over; Reaching up; Turning while walking; Shopping at the grocery or mall; Unstable surfaces       Pertinent visual history: wears progressive lenses  Pertinent history of current vestibular problem:   DHI: Total Score: (Patient-Rptd) 60    Cervicogenic Screen    Neck ROM Normal   Vertebral Artery Test    Alar Ligament Test    Transverse Ligament Test    Distraction    Neck Torsion Test (head still, body rotating)    Neck Torsion Test (head and body rotating)         Oculomotor Screen    Ocular ROM    Smooth Pursuit abnormal saccadic intrusions, mild dizziness   Saccades Normal   VOR Able to maintain gaze, increased dizziness, nausea   VOR Cancellation    Head Impulse Test Normal   Convergence Testing         Infrared Goggle Exam Vestibular Suppressant in Last 24 Hours? No  Exam Completed With: Infrared goggles   Spontaneous Nystagmus Negative   Gaze Evoked Nystagmus Negative   Head Shake Horizontal Nystagmus Negative reported dizziness   Positional Testing    Supine Head-Hanging Test     Left Right   Ranson-Hallpike Negative Negative   Sidelying Test     [facility] Roll Test Negative Negative   [facility] Forward Roll " Test     [place] and Lean Test -  Sitting Erect    [place] and Lean Test - Seated, Head Bent 60 Degrees Forward    [place] and Lean Test - Seated, Head Bent Backwards       BPPV Canal(s):   BPPV Type:     Dynamic Visual Acuity (DVA)    Static Acuity (LogMar) Line 10   Horizontal Head Movement at 1 Hz (LogMar)    Horizontal Head Movement at 2 Hz (LogMar) Line 7 blurry, mild dizzy          Assessment & Plan   CLINICAL IMPRESSIONS  Medical Diagnosis: Benign paroxysmal positional vertigo due to bilateral vestibular disorder    Treatment Diagnosis: unilateral vestibular hypofunction   Impression/Assessment: Patient is a 59 year old female with dizizness complaints.  The following significant findings have been identified: Impaired balance, Dizziness, and Disequilibrium . These impairments interfere with their ability to perform recreational activities, household chores, and community mobility as compared to previous level of function.     Clinical Decision Making (Complexity):  Clinical Presentation: Evolving/Changing  Clinical Presentation Rationale: based on medical and personal factors listed in PT evaluation  Clinical Decision Making (Complexity): Moderate complexity    PLAN OF CARE  Treatment Interventions:  Interventions: Neuromuscular Re-education, Therapeutic Activity, Therapeutic Exercise, Self-Care/Home Management    Long Term Goals     PT Goal 1  Goal Identifier: 1  Goal Description: pt will be able to do all bed mobility w/o dizziness in2wks  Target Date: 06/17/25  PT Goal 2  Goal Identifier: 2  Goal Description: pt will be able to do all activity w/o dizziness in 6wks  Target Date: 07/15/25      Frequency of Treatment: 1x per 3 wks  Duration of Treatment: 6wk    Recommended Referrals to Other Professionals:   Education Assessment:   Learner/Method: Patient;Listening;No Barriers to Learning    Risks and benefits of evaluation/treatment have been explained.   Patient/Family/caregiver agrees with Plan of Care.      Evaluation Time:     PT Bree, Moderate Complexity Minutes (06594): 25       Signing Clinician: Kris Hoenk, PT

## 2025-06-11 ENCOUNTER — OFFICE VISIT (OUTPATIENT)
Dept: FAMILY MEDICINE | Facility: CLINIC | Age: 60
End: 2025-06-11
Attending: PHYSICIAN ASSISTANT
Payer: COMMERCIAL

## 2025-06-11 ENCOUNTER — RESULTS FOLLOW-UP (OUTPATIENT)
Dept: FAMILY MEDICINE | Facility: CLINIC | Age: 60
End: 2025-06-11

## 2025-06-11 VITALS
SYSTOLIC BLOOD PRESSURE: 126 MMHG | HEART RATE: 64 BPM | TEMPERATURE: 98.9 F | DIASTOLIC BLOOD PRESSURE: 76 MMHG | BODY MASS INDEX: 36.12 KG/M2 | RESPIRATION RATE: 16 BRPM | WEIGHT: 211.6 LBS | OXYGEN SATURATION: 97 % | HEIGHT: 64 IN

## 2025-06-11 DIAGNOSIS — E11.9 TYPE 2 DIABETES MELLITUS WITHOUT COMPLICATION, WITHOUT LONG-TERM CURRENT USE OF INSULIN (H): Primary | ICD-10-CM

## 2025-06-11 DIAGNOSIS — H81.09 MENIERE'S DISEASE, UNSPECIFIED LATERALITY: ICD-10-CM

## 2025-06-11 DIAGNOSIS — E78.2 MIXED HYPERLIPIDEMIA: ICD-10-CM

## 2025-06-11 DIAGNOSIS — E66.01 MORBID OBESITY (H): ICD-10-CM

## 2025-06-11 LAB
CHOLEST SERPL-MCNC: 157 MG/DL
CREAT UR-MCNC: 153.6 MG/DL
EST. AVERAGE GLUCOSE BLD GHB EST-MCNC: 117 MG/DL
FASTING STATUS PATIENT QL REPORTED: NO
HBA1C MFR BLD: 5.7 % (ref 0–5.6)
HDLC SERPL-MCNC: 39 MG/DL
LDLC SERPL CALC-MCNC: 91 MG/DL
MICROALBUMIN UR-MCNC: <12 MG/L
MICROALBUMIN/CREAT UR: NORMAL MG/G{CREAT}
NONHDLC SERPL-MCNC: 118 MG/DL
TRIGL SERPL-MCNC: 137 MG/DL

## 2025-06-11 PROCEDURE — 3044F HG A1C LEVEL LT 7.0%: CPT | Performed by: PHYSICIAN ASSISTANT

## 2025-06-11 PROCEDURE — 36415 COLL VENOUS BLD VENIPUNCTURE: CPT | Performed by: PHYSICIAN ASSISTANT

## 2025-06-11 PROCEDURE — 3048F LDL-C <100 MG/DL: CPT | Performed by: PHYSICIAN ASSISTANT

## 2025-06-11 PROCEDURE — 99214 OFFICE O/P EST MOD 30 MIN: CPT | Performed by: PHYSICIAN ASSISTANT

## 2025-06-11 PROCEDURE — 3078F DIAST BP <80 MM HG: CPT | Performed by: PHYSICIAN ASSISTANT

## 2025-06-11 PROCEDURE — 1126F AMNT PAIN NOTED NONE PRSNT: CPT | Performed by: PHYSICIAN ASSISTANT

## 2025-06-11 PROCEDURE — 80061 LIPID PANEL: CPT | Performed by: PHYSICIAN ASSISTANT

## 2025-06-11 PROCEDURE — 82043 UR ALBUMIN QUANTITATIVE: CPT | Performed by: PHYSICIAN ASSISTANT

## 2025-06-11 PROCEDURE — 82570 ASSAY OF URINE CREATININE: CPT | Performed by: PHYSICIAN ASSISTANT

## 2025-06-11 PROCEDURE — G2211 COMPLEX E/M VISIT ADD ON: HCPCS | Performed by: PHYSICIAN ASSISTANT

## 2025-06-11 PROCEDURE — 83036 HEMOGLOBIN GLYCOSYLATED A1C: CPT | Performed by: PHYSICIAN ASSISTANT

## 2025-06-11 PROCEDURE — 3074F SYST BP LT 130 MM HG: CPT | Performed by: PHYSICIAN ASSISTANT

## 2025-06-11 RX ORDER — BUSPIRONE HYDROCHLORIDE 10 MG/1
TABLET ORAL
COMMUNITY
Start: 2025-03-25

## 2025-06-11 RX ORDER — MECLIZINE HYDROCHLORIDE 25 MG/1
25 TABLET ORAL 3 TIMES DAILY PRN
Qty: 30 TABLET | Refills: 1 | Status: SHIPPED | OUTPATIENT
Start: 2025-06-11

## 2025-06-11 ASSESSMENT — PAIN SCALES - GENERAL: PAINLEVEL_OUTOF10: NO PAIN (0)

## 2025-06-11 ASSESSMENT — PATIENT HEALTH QUESTIONNAIRE - PHQ9: SUM OF ALL RESPONSES TO PHQ QUESTIONS 1-9: 0

## 2025-06-11 NOTE — PATIENT INSTRUCTIONS
Continue to increase Mounjaro as discussed.     Trial Meclizine for dizziness. If no improvement, then would recommend lasix.     Follow-up in 6 months for recheck.     Consider starting a statin, or even Zetia (non-statin) to reduce your risk of heart disease.

## 2025-06-11 NOTE — PROGRESS NOTES
Assessment & Plan   Type 2 diabetes mellitus without complication, without long-term current use of insulin (H)  Recently started Mounjaro for new onset diabetes. Taking Mounjaro with Hb A1c at 5.7%, improved from 7.2%. Some gastrointestinal side effects (nausea, diarrhea) are mild, transient, and occurs during dose escalation. Discussed starting a statin, patient would like to hold off on this. Educated on risks of CAD with diabetes. Continue Mounjaro titration and follow-up in 6 months for recheck.   - Hemoglobin A1c; Future  - Albumin Random Urine Quantitative with Creat Ratio; Future  - tirzepatide (MOUNJARO) 10 MG/0.5ML SOAJ auto-injector pen; Inject 0.5 mLs (10 mg) subcutaneously once a week.  - tirzepatide (MOUNJARO) 12.5 MG/0.5ML SOAJ auto-injector pen; Inject 0.5 mLs (12.5 mg) subcutaneously once a week.  - tirzepatide (MOUNJARO) 15 MG/0.5ML SOAJ auto-injector pen; Inject 0.5 mLs (15 mg) subcutaneously once a week.    Morbid obesity (H)  Body mass index is 36.32 kg/m. Associated with DM2, HLD which would improve with weight loss. Encouraged healthy lifestyle changes.       Mixed hyperlipidemia  Due for recheck.   - Lipid panel reflex to direct LDL Fasting; Future    Meniere's disease, unspecified laterality  Patient reports increased vertigo and tinnitus. Initiate meclizine as needed for vertigo, as requested by patient. Monitor for further changes in vertigo, hearing, or other Meniere's symptoms. Consider using lasix if meclizine is not affective.   - meclizine (ANTIVERT) 25 MG tablet; Take 1 tablet (25 mg) by mouth 3 times daily as needed for dizziness.    Adi Quesada is a 59 year old, presenting for the following health issues:  Diabetes and Health Maintenance (Declines vaccines )        6/11/2025    11:14 AM   Additional Questions   Roomed by Colette Alatorre CMA   Accompanied by self     Via the Health Maintenance questionnaire, the patient has reported the following services have been completed  "-Eye Exam: [facility] 2024-08-10, this information has been sent to the abstraction team.  History of Present Illness       Diabetes:   She presents for follow up of diabetes.  She is checking home blood glucose a few times a month.   She checks blood glucose before meals and after meals.  Blood glucose is never over 200 and sometimes under 70.  When her blood glucose is low, the patient is asymptomatic for confusion, blurred vision, lethargy and reports not feeling dizzy, shaky, or weak.   She has no concerns regarding her diabetes at this time.   She is not experiencing numbness or burning in feet, excessive thirst, blurry vision, weight changes or redness, sores or blisters on feet. The patient has had a diabetic eye exam in the last 12 months. Eye exam performed on 8/10/24. Location of last eye exam [facility] ([place]).           Patient is a 59 year old female presenting for a three-month follow-up after initiation of tirzepatide for type 2 diabetes. She reports transient gastrointestinal symptoms (nausea, diarrhea) following weekly injection, resolving within 2-3 days. The patient has a history of Meniere's disease and reports worsening vertigo symptoms with her recent weight loss. She denies hypoglycemia and denies intolerable side effects. No signs of dehydration or acute illness. No new neurological deficits.     Review of Systems  See HPI.      Objective    /76 (BP Location: Right arm, Patient Position: Sitting, Cuff Size: Adult Regular)   Pulse 64   Temp 98.9  F (37.2  C) (Tympanic)   Resp 16   Ht 1.626 m (5' 4\")   Wt 96 kg (211 lb 9.6 oz)   LMP 12/09/2018   SpO2 97%   Breastfeeding No   BMI 36.32 kg/m    Body mass index is 36.32 kg/m .  Physical Exam   GENERAL: alert and no distress  NECK: no adenopathy, no asymmetry, masses, or scars  RESP: lungs clear to auscultation - no rales, rhonchi or wheezes  CV: regular rate and rhythm, normal S1 S2, no S3 or S4, no murmur, " click or rub, no peripheral edema  ABDOMEN: soft, nontender, no hepatosplenomegaly, no masses and bowel sounds normal  MS: no gross musculoskeletal defects noted, no edema    Results for orders placed or performed in visit on 06/11/25   Lipid panel reflex to direct LDL Fasting     Status: Abnormal   Result Value Ref Range    Cholesterol 157 <200 mg/dL    Triglycerides 137 <150 mg/dL    Direct Measure HDL 39 (L) >=50 mg/dL    LDL Cholesterol Calculated 91 <100 mg/dL    Non HDL Cholesterol 118 <130 mg/dL    Patient Fasting > 8hrs? No     Narrative    Cholesterol  Desirable: < 200 mg/dL  Borderline High: 200 - 239 mg/dL  High: >= 240 mg/dL    Triglycerides  Normal: < 150 mg/dL  Borderline High: 150 - 199 mg/dL  High: 200-499 mg/dL  Very High: >= 500 mg/dL    Direct Measure HDL  Female: >= 50 mg/dL   Male: >= 40 mg/dL    LDL Cholesterol  Desirable: < 100 mg/dL  Above Desirable: 100 - 129 mg/dL   Borderline High: 130 - 159 mg/dL   High:  160 - 189 mg/dL   Very High: >= 190 mg/dL    Non HDL Cholesterol  Desirable: < 130 mg/dL  Above Desirable: 130 - 159 mg/dL  Borderline High: 160 - 189 mg/dL  High: 190 - 219 mg/dL  Very High: >= 220 mg/dL   Hemoglobin A1c     Status: Abnormal   Result Value Ref Range    Estimated Average Glucose 117 (H) <117 mg/dL    Hemoglobin A1C 5.7 (H) 0.0 - 5.6 %          Physician Attestation   I, Anton Arreola PA-C, was present with the medical/HOLDEN student who participated in the service and in the documentation of the note.  I have verified the history and personally performed the physical exam and medical decision making.  I agree with the assessment and plan of care as documented in the note.      Anton Arreola PA-C

## 2025-06-20 ASSESSMENT — SLEEP AND FATIGUE QUESTIONNAIRES
HOW LIKELY ARE YOU TO NOD OFF OR FALL ASLEEP IN A CAR, WHILE STOPPED FOR A FEW MINUTES IN TRAFFIC: WOULD NEVER DOZE
HOW LIKELY ARE YOU TO NOD OFF OR FALL ASLEEP WHILE SITTING QUIETLY AFTER LUNCH WITHOUT ALCOHOL: SLIGHT CHANCE OF DOZING
HOW LIKELY ARE YOU TO NOD OFF OR FALL ASLEEP WHILE LYING DOWN TO REST IN THE AFTERNOON WHEN CIRCUMSTANCES PERMIT: HIGH CHANCE OF DOZING
HOW LIKELY ARE YOU TO NOD OFF OR FALL ASLEEP WHILE SITTING AND READING: SLIGHT CHANCE OF DOZING
HOW LIKELY ARE YOU TO NOD OFF OR FALL ASLEEP WHEN YOU ARE A PASSENGER IN A CAR FOR AN HOUR WITHOUT A BREAK: MODERATE CHANCE OF DOZING
HOW LIKELY ARE YOU TO NOD OFF OR FALL ASLEEP WHILE SITTING INACTIVE IN A PUBLIC PLACE: WOULD NEVER DOZE
HOW LIKELY ARE YOU TO NOD OFF OR FALL ASLEEP WHILE SITTING AND TALKING TO SOMEONE: WOULD NEVER DOZE
HOW LIKELY ARE YOU TO NOD OFF OR FALL ASLEEP WHILE WATCHING TV: SLIGHT CHANCE OF DOZING

## 2025-06-25 ENCOUNTER — VIRTUAL VISIT (OUTPATIENT)
Dept: SLEEP MEDICINE | Facility: CLINIC | Age: 60
End: 2025-06-25
Payer: COMMERCIAL

## 2025-06-25 VITALS — WEIGHT: 205 LBS | HEIGHT: 63 IN | BODY MASS INDEX: 36.32 KG/M2

## 2025-06-25 DIAGNOSIS — G47.33 OSA ON CPAP: Primary | ICD-10-CM

## 2025-06-25 DIAGNOSIS — R73.03 PREDIABETES: ICD-10-CM

## 2025-06-25 DIAGNOSIS — Z53.9 DIAGNOSIS NOT YET DEFINED: ICD-10-CM

## 2025-06-25 DIAGNOSIS — E66.01 MORBID OBESITY (H): ICD-10-CM

## 2025-06-25 RX ORDER — METFORMIN HYDROCHLORIDE 500 MG/1
500 TABLET, EXTENDED RELEASE ORAL 2 TIMES DAILY WITH MEALS
Qty: 180 TABLET | Refills: 1 | Status: SHIPPED | OUTPATIENT
Start: 2025-06-25

## 2025-06-25 ASSESSMENT — PAIN SCALES - GENERAL: PAINLEVEL_OUTOF10: NO PAIN (0)

## 2025-06-25 NOTE — NURSING NOTE
Current patient location: [address] [place] [id number]    Is the patient currently in the state of [place]? YES    Visit mode: VIDEO    If the visit is dropped, the patient can be reconnected by:VIDEO VISIT: Text to cell phone:   Telephone Information:   Mobile [phone]       Will anyone else be joining the visit? NO  (If patient encounters technical issues they should call [phone]    Are changes needed to the allergy or medication list? No    Are refills needed on medications prescribed by this physician? NO    Rooming Documentation:  Questionnaire(s) completed    Reason for visit: AMNA COLEMANF

## 2025-06-25 NOTE — PROGRESS NOTES
Virtual Visit Details    Type of service:  Video Visit   Originating Location (pt. Location): Home    Distant Location (provider location):  Off-site  Platform used for Video Visit: [facility]  Sleep clinic follow-up visit note                Date: 6/25/25     Chief complaint:  Follow-up of AILEEN, review CPAP compliance measures    History of Present Illness:Sangita Meraz presents for follow-up of previously diagnosed sleep apnea, managed with CPAP.     She obtained her current CPAP device in 2022 (prescription for the replacement device was provided during her last sleep clinic visit in June 2022)    Do you use a CPAP Machine at home: (Patient-Rptd) Yes  Overall, on a scale of 0-10 how would you rate your CPAP (0 poor, 10 great): (Patient-Rptd) 6    What type of mask do you use: nasal mask  Is your mask comfortable: (Patient-Rptd) No  If not, why: (Patient-Rptd) Does not stay sealed; many noisy and sleep disruptive leaks    Is your mask leaking: (Patient-Rptd) Yes  If yes, where do you feel it: (Patient-Rptd) Top edge; blows air into my eyes  How many night per week does the mask leak (0-7): (Patient-Rptd) 7    Do you notice snoring with mask on: (Patient-Rptd) No  Do you notice gasping arousals with mask on: (Patient-Rptd) No  Are you having significant oral or nasal dryness: (Patient-Rptd) Yes  Is the pressure setting comfortable: (Patient-Rptd) Yes  If not, why:      What is your typical bedtime: (Patient-Rptd) 12:00am  How long does it take you to go to sleep on PAP therapy: (Patient-Rptd) 1 minute  What time do you typically get out of bed for the day: (Patient-Rptd) 6:30-7:00am  How many hours on average per night are you using PAP therapy: (Patient-Rptd) 6 1/2  How many hours are you sleeping per night: (Patient-Rptd) 6 1/2  Do you feel well rested in the morning: (Patient-Rptd) No  Naps 20-60 minutes- feel better after nap  She  lost about 30 pounds of weight in the past 3 months  since she started Mounjaro.    ResMed   Auto titrating CPAP 12-16 cmH2O 30 day usage data (5/21/2025 through 6/19/2025)  80% of days with > 4 hours of use.  2/30 days with no use.   Average use 5 hours and 26 minutes per day.   95%ile Leak 44.1 liters/min.   CPAP 95% pressure 12.9 cm H2O.   AHI 1.0 events per hour.       EPWORTH SLEEPINESS SCALE         6/20/2025    10:03 AM    Surrey Sleepiness Scale ( BAILEE Dunn  [id number]<br>ESS - [place]/English - Final version - 21 Nov 07 - [facility] [place].)   Sitting and reading Slight chance of dozing   Watching TV Slight chance of dozing   Sitting, inactive in a public place (e.g. a theatre or a meeting) Would never doze   As a passenger in a car for an hour without a break Moderate chance of dozing   Lying down to rest in the afternoon when circumstances permit High chance of dozing   Sitting and talking to someone Would never doze   Sitting quietly after a lunch without alcohol Slight chance of dozing   In a car, while stopped for a few minutes in traffic Would never doze   Surrey Score (MC) 8   Surrey Score (Sleep) 8        Patient-reported       INSOMNIA SEVERITY INDEX (NATALI)          6/20/2025     9:58 AM   Insomnia Severity Index (NATALI)   Difficulty falling asleep 1   Difficulty staying asleep 2   Problems waking up too early 2   How SATISFIED/DISSATISFIED are you with your CURRENT sleep pattern? 2   How NOTICEABLE to others do you think your sleep problem is in terms of impairing the quality of your life? 0   How WORRIED/DISTRESSED are you about your current sleep problem? 1   To what extent do you consider your sleep problem to INTERFERE with your daily functioning (e.g. daytime fatigue, mood, ability to function at work/daily chores, concentration, memory, mood, etc.) CURRENTLY? 1   NATALI Total Score 9        Patient-reported       Guidelines for Scoring/Interpretation:  Total score categories:  0-7 = No clinically significant insomnia   8-14 = Subthreshold  "insomnia   15-21 = Clinical insomnia (moderate severity)  22-28 = Clinical insomnia (severe)  Used via courtesy of www.myhealth.va.gov with permission from Todd Aguilera PhD., [facility]      Past medical/surgical history, family history, social history, medications and allergies were reviewed.        Problem List:  Patient Active Problem List    Diagnosis Date Noted    Benign paroxysmal positional vertigo due to bilateral vestibular disorder 06/03/2025     Priority: Medium    Female cystocele 10/17/2022     Priority: Medium    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     Priority: Medium    Mixed stress and urge urinary incontinence 10/17/2022     Priority: Medium    Polycystic ovarian syndrome 10/17/2022     Priority: Medium    Recurrent major depressive disorder, in partial remission 10/17/2022     Priority: Medium    Obstructive sleep apnea syndrome 10/17/2022     Priority: Medium     severe      Uterovaginal prolapse 10/17/2022     Priority: Medium    Meniere's disease, unspecified laterality 10/17/2022     Priority: Medium    Morbid obesity (H) 07/13/2021     Priority: Medium             Physical Examination:   Ht 1.6 m (5' 3\")   Wt 93 kg (205 lb)   LMP 12/09/2018   BMI 36.31 kg/m    General: Pleasant. Cooperative. In no apparent distress.  Pulmonary: Able to speak in full sentences easily. No cough or wheeze.   Neurologic: Alert, oriented x3.  Psychiatric: Mood euthymic. Affect congruent with full range and intensity.     ASSESSMENT/PLAN:  Obstructive sleep apnea: Pt reports adequate compliance with CPAP and based on compliance measures, AILEEN is adequately controlled with CPAP at the current settings 12 to 16 cm water  DME orders were generated for  renewal of CPAP supplies. Patient was instructed to follow-up with the DME provider [facility] to obtain help with mask fit optimization  Recommended to continue using the CPAP regularly during sleep and getting the supplies for the CPAP replaced " regularly.   Patient instructed to remember to bring PAP with her if hospitalized and if anticipating procedure that requires sedation/surgery to be sure to discuss with the provider/surgeon that she has sleep apnea and uses PAP therapy.      Obesity: We discussed weight management with healthy diet, and exercise. She was started on Mounjaro recently and lost 30 pounds. Her current weight is 205 pounds(weight at the time of previous sleep study 212 pounds).  She will continue following up with her primary care provider for medical weight management.   She was instructed to let us know if she loses approximately 30 more lbs of body weight so we can obtain a repeat sleep study to re-assess the status of sleep disordered breathing.     Previously diagnosed idiopathic hypersomnolence--she is not on any stimulant medications. She tried a few stimulant medications that  but none were compatible with her  SSRI; her  best results came from scheduled naps and she is not taking any stimulant medications.   She reports that the naps are restorative which is not usually noted in patients with IH.  Recommended increasing the total duration of sleep aiming at obtaining at least 7 up to 8 hours if possible.  Recommended using bright lightbox of 10,000 Lux intensity with exposure to the bright light soon after awakening for 30 to 60 minutes. We discussed avoiding napping during the day.    Patient was strongly advised to avoid driving, operating any heavy machinery or other hazardous situations while drowsy or sleepy.  Patient was counseled on the importance of driving while alert, to pull over if drowsy, or nap before getting into the vehicle if sleepy.      Follow-up with sleep clinic via video visit in 1 year or sooner if any concerns.     The above note was dictated using voice recognition software. Although reviewed after completion, some word and grammatical error may remain . Please contact the author for any  "clarifications.     \" Total time spent was 35 minutes for this appointment on this date of service which include time spent before, during and after the visit for chart review, patient care, counseling and coordination of care including documentation.\"      Mynor Donaldson MD  [facility]  [address] , [place] [address]      "

## 2025-06-26 NOTE — PATIENT INSTRUCTIONS
Summary recommendations:   Based on the CPAP compliance measures, your sleep apnea is adequately controlled with CPAP at the current pressure settings   Prescription for renewal of CPAP supplies has been generated [facility]  Please follow-up with [facility] to obtain help with mask fit optimization  Recommend  continue using the CPAP regularly during sleep and getting the supplies for the CPAP replaced regularly.   Patient instructed to remember to bring PAP with you if hospitalized and if anticipating procedure that requires sedation/surgery to be sure to discuss with the provider/surgeon that you have sleep apnea and use CPAP therapy.      We encourage you to follow healthy diet, and regular exercise.  Recommend continue following up with your primary care provider for medical weight management.   Please let us know if you lose approximately 30 more lbs of body weight so we can obtain a repeat sleep study to re-assess the status of sleep apnea     Recommend increasing the total duration of sleep aiming at obtaining at least 7 up to 8 hours if possible.  Recommended using bright lightbox of 10,000 Lux intensity (can be purchased through online resources )with exposure to the bright light soon after awakening for 30 to 60 minutes.  Please avoid napping during the day.    Please  avoid driving, operating any heavy machinery or other hazardous situations while drowsy or sleepy.       Follow-up with sleep clinic via video visit in 1-2 years or sooner if any concerns.

## 2025-07-01 ENCOUNTER — MYC REFILL (OUTPATIENT)
Dept: FAMILY MEDICINE | Facility: CLINIC | Age: 60
End: 2025-07-01
Payer: COMMERCIAL

## 2025-07-01 DIAGNOSIS — E11.9 TYPE 2 DIABETES MELLITUS WITHOUT COMPLICATION, WITHOUT LONG-TERM CURRENT USE OF INSULIN (H): ICD-10-CM

## 2025-07-02 NOTE — TELEPHONE ENCOUNTER
GFR Estimate   Date Value Ref Range Status   03/19/2025 82 >60 mL/min/1.73m2 Final     Comment:     eGFR calculated using 2021 CKD-EPI equation.   11/16/2020 60 (L) >60 mL/min/[1.73_m2] Final     Comment:     Non  GFR Calc  Starting 12/18/2018, serum creatinine based estimated GFR (eGFR) will be   calculated using the Chronic Kidney Disease Epidemiology Collaboration   (CKD-EPI) equation.

## 2025-07-02 NOTE — TELEPHONE ENCOUNTER
RN filled the med but see message from patient. Update on side effects from med.   Gloria Richey RN on 7/2/2025 at 4:14 PM

## 2025-07-03 ENCOUNTER — MYC MEDICAL ADVICE (OUTPATIENT)
Dept: SLEEP MEDICINE | Facility: CLINIC | Age: 60
End: 2025-07-03
Payer: COMMERCIAL

## 2025-07-24 ENCOUNTER — CARE COORDINATION (OUTPATIENT)
Dept: SLEEP MEDICINE | Facility: CLINIC | Age: 60
End: 2025-07-24
Payer: COMMERCIAL

## 2025-08-04 ENCOUNTER — OFFICE VISIT (OUTPATIENT)
Dept: OTOLARYNGOLOGY | Facility: CLINIC | Age: 60
End: 2025-08-04
Payer: COMMERCIAL

## 2025-08-04 ENCOUNTER — OFFICE VISIT (OUTPATIENT)
Dept: AUDIOLOGY | Facility: CLINIC | Age: 60
End: 2025-08-04
Payer: COMMERCIAL

## 2025-08-04 VITALS
WEIGHT: 210 LBS | HEART RATE: 73 BPM | TEMPERATURE: 97.5 F | OXYGEN SATURATION: 97 % | BODY MASS INDEX: 37.2 KG/M2 | DIASTOLIC BLOOD PRESSURE: 78 MMHG | SYSTOLIC BLOOD PRESSURE: 126 MMHG

## 2025-08-04 DIAGNOSIS — H90.41 SENSORINEURAL HEARING LOSS (SNHL) OF RIGHT EAR WITH UNRESTRICTED HEARING OF LEFT EAR: Primary | ICD-10-CM

## 2025-08-04 DIAGNOSIS — H81.09 MENIERE'S DISEASE, UNSPECIFIED LATERALITY: Primary | ICD-10-CM

## 2025-08-04 PROCEDURE — 92550 TYMPANOMETRY & REFLEX THRESH: CPT | Performed by: AUDIOLOGIST

## 2025-08-04 PROCEDURE — 92557 COMPREHENSIVE HEARING TEST: CPT | Performed by: AUDIOLOGIST

## 2025-08-04 ASSESSMENT — PAIN SCALES - GENERAL: PAINLEVEL_OUTOF10: NO PAIN (0)

## 2025-08-05 ENCOUNTER — PATIENT OUTREACH (OUTPATIENT)
Dept: CARE COORDINATION | Facility: CLINIC | Age: 60
End: 2025-08-05
Payer: COMMERCIAL

## 2025-08-06 ENCOUNTER — TRANSFERRED RECORDS (OUTPATIENT)
Dept: HEALTH INFORMATION MANAGEMENT | Facility: CLINIC | Age: 60
End: 2025-08-06
Payer: COMMERCIAL

## 2025-08-06 LAB — RETINOPATHY: NEGATIVE

## 2025-08-07 ENCOUNTER — PATIENT OUTREACH (OUTPATIENT)
Dept: CARE COORDINATION | Facility: CLINIC | Age: 60
End: 2025-08-07
Payer: COMMERCIAL

## 2025-08-11 ENCOUNTER — TELEPHONE (OUTPATIENT)
Dept: FAMILY MEDICINE | Facility: CLINIC | Age: 60
End: 2025-08-11
Payer: COMMERCIAL

## 2025-08-12 ENCOUNTER — CARE COORDINATION (OUTPATIENT)
Dept: SLEEP MEDICINE | Facility: CLINIC | Age: 60
End: 2025-08-12
Payer: COMMERCIAL

## 2025-08-12 ENCOUNTER — MEDICAL CORRESPONDENCE (OUTPATIENT)
Dept: HEALTH INFORMATION MANAGEMENT | Facility: CLINIC | Age: 60
End: 2025-08-12
Payer: COMMERCIAL

## (undated) DEVICE — ENDO TROCAR BLUNT TIP KII BALLOON 12X100MM C0R47

## (undated) DEVICE — SUCTION IRR STRYKERFLOW II W/TIP 250-070-520

## (undated) DEVICE — ENDO TROCAR FIRST ENTRY KII FIOS ADV FIX 05X100MM CFF03

## (undated) DEVICE — SEAL SET MYOSURE ROD LENS SCOPE SINGLE USE 40-902

## (undated) DEVICE — SOL WATER IRRIG 1000ML BOTTLE 07139-09

## (undated) DEVICE — JELLY LUBRICATING SURGILUBE 2OZ TUBE

## (undated) DEVICE — DRAPE LEGGINGS CLEAR 8430

## (undated) DEVICE — PREP CHLORHEXIDINE 4% 4OZ (HIBICLENS) 57504

## (undated) DEVICE — STPL POWERED ECHELON 45MM PSEE45A

## (undated) DEVICE — SU PDS II 1 TP-1 54" Z879G

## (undated) DEVICE — SU VICRYL 3-0 SH 27" UND J416H

## (undated) DEVICE — SOL NACL 0.9% IRRIG 1000ML BOTTLE 07138-09

## (undated) DEVICE — TUBING SUCTION 10'X3/16" N510

## (undated) DEVICE — TUBING SUCTION 12"X1/4" N612

## (undated) DEVICE — ANTIFOG SOLUTION W/FOAM PAD 31142527

## (undated) DEVICE — DECANTER VIAL 2006S

## (undated) DEVICE — SU VICRYL 0 UR-6 27" J603H

## (undated) DEVICE — Device

## (undated) DEVICE — LUBRICATING JELLY 4.25OZ

## (undated) DEVICE — GLOVE PROTEXIS BLUE W/NEU-THERA 7.0  2D73EB70

## (undated) DEVICE — DRAPE U SPLIT 74X120" 29440

## (undated) DEVICE — SOL WATER IRRIG 1000ML BOTTLE 2F7114

## (undated) DEVICE — LINEN TOWEL PACK X6 WHITE 5487

## (undated) DEVICE — ESU LIGASURE LAPAROSCPC L-HOOK SEALER/DVDR 5MM-44CM LF5644

## (undated) DEVICE — BLADE KNIFE SURG 15 371115

## (undated) DEVICE — PAD PERI INDIV WRAP 11" 2022

## (undated) DEVICE — SU MONOCRYL 4-0 PS-2 27" UND Y426H

## (undated) DEVICE — ENDO SCOPE WARMER SEAL  C3101

## (undated) DEVICE — KIT NEW IMAGE COLOSTOMY/ILEOSTOMY 2 3/4" LF 19354

## (undated) DEVICE — ESU ENDO SCISSORS 5MM CVD 5DCS

## (undated) DEVICE — STPL SKIN 35W ROTATING HEAD PRW35

## (undated) DEVICE — DRAPE MAYO STAND 23X54 8337

## (undated) DEVICE — SPECIMEN CONTAINER 3OZ W/FORMALIN 59901

## (undated) DEVICE — ADH SKIN CLOSURE PREMIERPRO EXOFIN 1.0ML 3470

## (undated) DEVICE — NDL SPINAL 22GA 3.5" QUINCKE 405181

## (undated) DEVICE — ENDO TROCAR SLEEVE KII Z-THREADED 05X100MM CTS02

## (undated) DEVICE — SU VICRYL 2-0 CT-1 27" UND J259H

## (undated) DEVICE — KIT ENDO TURNOVER/PROCEDURE CARRY-ON 101822

## (undated) DEVICE — BLADE KNIFE SURG 11 371111

## (undated) DEVICE — SU PROLENE 2-0 SHDA 36" 8523H

## (undated) DEVICE — DRSG TELFA 3X8" 1238

## (undated) DEVICE — SOL NACL 0.9% IRRIG 3000ML BAG 07972-08

## (undated) DEVICE — CLIP HEMOCLIP ENDOSCOPIC INSTINCT 2.8X230CM INSC-7-230-SS

## (undated) DEVICE — SUCTION MANIFOLD NEPTUNE 2 SYS 1 PORT 702-025-000

## (undated) DEVICE — GOWN IMPERVIOUS 2XL BLUE

## (undated) DEVICE — SUCTION MANIFOLD NEPTUNE 2 SYS 4 PORT 0702-020-000

## (undated) DEVICE — KIT PATIENT POSITIONING PIGAZZI LATEX FREE 40580

## (undated) DEVICE — PACK LAPAROSCOPY/PELVISCOPY STD

## (undated) DEVICE — ENDO FORCEP BX CAPTURA PRO SPIKE G50696

## (undated) DEVICE — SOL NACL 0.9% INJ 1000ML BAG 2B1324X

## (undated) DEVICE — LINEN TOWEL PACK X30 5481

## (undated) DEVICE — CATH INTERMITTENT CLEAN-CATH FEMALE 14FR 6" VINYL LF 420614

## (undated) DEVICE — CATH TRAY FOLEY SURESTEP 16FR W/TMP PRB STLK LATEX A319416AM

## (undated) DEVICE — ESU GROUND PAD ADULT W/CORD E7507

## (undated) DEVICE — PREP CHLORAPREP 26ML TINTED HI-LITE ORANGE 930815

## (undated) DEVICE — STPL CIRCULAR 29MM CVD CDH29P

## (undated) DEVICE — GLOVE PROTEXIS W/NEU-THERA 6.5  2D73TE65

## (undated) DEVICE — ENDO TROCAR FIRST ENTRY KII FIOS Z-THRD 05X100MM CTF03

## (undated) DEVICE — PAD FLOOR SURGISAFE

## (undated) DEVICE — GLOVE BIOGEL PI ULTRATOUCH SZ 6.5 41165

## (undated) DEVICE — DRAPE IOBAN INCISE 23X17" 6650EZ

## (undated) DEVICE — TUBING SMOKE EVAC PNEUMOCLEAR HIGH FLOW 0620050250

## (undated) DEVICE — ESU PENCIL SMOKE EVAC W/ROCKER SWITCH 0703-047-000

## (undated) DEVICE — GOWN LG DISP 9515

## (undated) RX ORDER — PROPOFOL 10 MG/ML
INJECTION, EMULSION INTRAVENOUS
Status: DISPENSED
Start: 2022-10-18

## (undated) RX ORDER — FENTANYL CITRATE 50 UG/ML
INJECTION, SOLUTION INTRAMUSCULAR; INTRAVENOUS
Status: DISPENSED
Start: 2021-03-16

## (undated) RX ORDER — ACETAMINOPHEN 325 MG/1
TABLET ORAL
Status: DISPENSED
Start: 2022-10-18

## (undated) RX ORDER — FENTANYL CITRATE 50 UG/ML
INJECTION, SOLUTION INTRAMUSCULAR; INTRAVENOUS
Status: DISPENSED
Start: 2022-10-18

## (undated) RX ORDER — ONDANSETRON 2 MG/ML
INJECTION INTRAMUSCULAR; INTRAVENOUS
Status: DISPENSED
Start: 2022-10-18

## (undated) RX ORDER — FENTANYL CITRATE 50 UG/ML
INJECTION, SOLUTION INTRAMUSCULAR; INTRAVENOUS
Status: DISPENSED
Start: 2023-03-31

## (undated) RX ORDER — HEPARIN SODIUM 5000 [USP'U]/.5ML
INJECTION, SOLUTION INTRAVENOUS; SUBCUTANEOUS
Status: DISPENSED
Start: 2023-03-31

## (undated) RX ORDER — LIDOCAINE HYDROCHLORIDE 10 MG/ML
INJECTION, SOLUTION EPIDURAL; INFILTRATION; INTRACAUDAL; PERINEURAL
Status: DISPENSED
Start: 2022-10-18

## (undated) RX ORDER — GLYCOPYRROLATE 0.2 MG/ML
INJECTION, SOLUTION INTRAMUSCULAR; INTRAVENOUS
Status: DISPENSED
Start: 2022-10-18

## (undated) RX ORDER — BUPIVACAINE HYDROCHLORIDE 2.5 MG/ML
INJECTION, SOLUTION EPIDURAL; INFILTRATION; INTRACAUDAL
Status: DISPENSED
Start: 2023-03-31

## (undated) RX ORDER — ERTAPENEM 1 G/1
INJECTION, POWDER, LYOPHILIZED, FOR SOLUTION INTRAMUSCULAR; INTRAVENOUS
Status: DISPENSED
Start: 2023-03-31

## (undated) RX ORDER — OXYCODONE HYDROCHLORIDE 5 MG/1
TABLET ORAL
Status: DISPENSED
Start: 2022-10-18

## (undated) RX ORDER — DEXAMETHASONE SODIUM PHOSPHATE 4 MG/ML
INJECTION, SOLUTION INTRA-ARTICULAR; INTRALESIONAL; INTRAMUSCULAR; INTRAVENOUS; SOFT TISSUE
Status: DISPENSED
Start: 2022-10-18

## (undated) RX ORDER — GABAPENTIN 300 MG/1
CAPSULE ORAL
Status: DISPENSED
Start: 2022-10-18

## (undated) RX ORDER — BUPIVACAINE HYDROCHLORIDE 2.5 MG/ML
INJECTION, SOLUTION EPIDURAL; INFILTRATION; INTRACAUDAL
Status: DISPENSED
Start: 2022-10-18

## (undated) RX ORDER — HYDROMORPHONE HYDROCHLORIDE 1 MG/ML
INJECTION, SOLUTION INTRAMUSCULAR; INTRAVENOUS; SUBCUTANEOUS
Status: DISPENSED
Start: 2023-03-31

## (undated) RX ORDER — LIDOCAINE HYDROCHLORIDE 10 MG/ML
INJECTION, SOLUTION EPIDURAL; INFILTRATION; INTRACAUDAL; PERINEURAL
Status: DISPENSED
Start: 2023-03-31

## (undated) RX ORDER — ACETAMINOPHEN 325 MG/1
TABLET ORAL
Status: DISPENSED
Start: 2023-03-31